# Patient Record
Sex: MALE | Race: WHITE | NOT HISPANIC OR LATINO | Employment: FULL TIME | URBAN - METROPOLITAN AREA
[De-identification: names, ages, dates, MRNs, and addresses within clinical notes are randomized per-mention and may not be internally consistent; named-entity substitution may affect disease eponyms.]

---

## 2021-01-26 ENCOUNTER — IMMUNIZATION (OUTPATIENT)
Dept: PEDIATRICS | Facility: CLINIC | Age: 31
End: 2021-01-26
Payer: COMMERCIAL

## 2021-01-26 PROCEDURE — 91300 PR COVID VAC PFIZER DIL RECON 30 MCG/0.3 ML IM: CPT

## 2021-01-26 PROCEDURE — 0001A PR COVID VAC PFIZER DIL RECON 30 MCG/0.3 ML IM: CPT

## 2021-02-13 ENCOUNTER — HEALTH MAINTENANCE LETTER (OUTPATIENT)
Age: 31
End: 2021-02-13

## 2021-02-16 ENCOUNTER — IMMUNIZATION (OUTPATIENT)
Dept: PEDIATRICS | Facility: CLINIC | Age: 31
End: 2021-02-16
Attending: INTERNAL MEDICINE
Payer: COMMERCIAL

## 2021-02-16 PROCEDURE — 91300 PR COVID VAC PFIZER DIL RECON 30 MCG/0.3 ML IM: CPT

## 2021-02-16 PROCEDURE — 0002A PR COVID VAC PFIZER DIL RECON 30 MCG/0.3 ML IM: CPT

## 2021-03-24 ENCOUNTER — HOSPITAL ENCOUNTER (INPATIENT)
Facility: CLINIC | Age: 31
LOS: 2 days | Discharge: ANOTHER HOSPITAL | DRG: 897 | End: 2021-03-26
Attending: EMERGENCY MEDICINE | Admitting: INTERNAL MEDICINE
Payer: COMMERCIAL

## 2021-03-24 ENCOUNTER — APPOINTMENT (OUTPATIENT)
Dept: ULTRASOUND IMAGING | Facility: CLINIC | Age: 31
DRG: 897 | End: 2021-03-24
Attending: INTERNAL MEDICINE
Payer: COMMERCIAL

## 2021-03-24 ENCOUNTER — TELEPHONE (OUTPATIENT)
Dept: BEHAVIORAL HEALTH | Facility: CLINIC | Age: 31
End: 2021-03-24

## 2021-03-24 DIAGNOSIS — F11.93 OPIATE WITHDRAWAL (H): ICD-10-CM

## 2021-03-24 DIAGNOSIS — F13.939 BENZODIAZEPINE WITHDRAWAL WITH COMPLICATION (H): ICD-10-CM

## 2021-03-24 DIAGNOSIS — R45.851 SUICIDAL IDEATION: ICD-10-CM

## 2021-03-24 DIAGNOSIS — Z11.52 ENCOUNTER FOR SCREENING LABORATORY TESTING FOR SEVERE ACUTE RESPIRATORY SYNDROME CORONAVIRUS 2 (SARS-COV-2): ICD-10-CM

## 2021-03-24 DIAGNOSIS — K75.9 HEPATITIS: ICD-10-CM

## 2021-03-24 DIAGNOSIS — F11.23 OPIOID DEPENDENCE WITH WITHDRAWAL (H): Primary | ICD-10-CM

## 2021-03-24 PROBLEM — K70.10 ALCOHOLIC HEPATITIS WITHOUT ASCITES (H): Status: ACTIVE | Noted: 2021-03-24

## 2021-03-24 LAB
ALBUMIN SERPL-MCNC: 3.2 G/DL (ref 3.4–5)
ALBUMIN SERPL-MCNC: 3.9 G/DL (ref 3.4–5)
ALP SERPL-CCNC: 48 U/L (ref 40–150)
ALP SERPL-CCNC: 55 U/L (ref 40–150)
ALT SERPL W P-5'-P-CCNC: 188 U/L (ref 0–70)
ALT SERPL W P-5'-P-CCNC: 219 U/L (ref 0–70)
AMPHETAMINES UR QL SCN: POSITIVE
ANION GAP SERPL CALCULATED.3IONS-SCNC: 8 MMOL/L (ref 3–14)
APAP SERPL-MCNC: 3 MG/L (ref 10–20)
AST SERPL W P-5'-P-CCNC: 398 U/L (ref 0–45)
AST SERPL W P-5'-P-CCNC: 599 U/L (ref 0–45)
BARBITURATES UR QL: NEGATIVE
BASOPHILS # BLD AUTO: 0.1 10E9/L (ref 0–0.2)
BASOPHILS NFR BLD AUTO: 0.4 %
BENZODIAZ UR QL: POSITIVE
BILIRUB DIRECT SERPL-MCNC: 0.2 MG/DL (ref 0–0.2)
BILIRUB SERPL-MCNC: 0.7 MG/DL (ref 0.2–1.3)
BILIRUB SERPL-MCNC: 0.9 MG/DL (ref 0.2–1.3)
BUN SERPL-MCNC: 22 MG/DL (ref 7–30)
CALCIUM SERPL-MCNC: 9.1 MG/DL (ref 8.5–10.1)
CANNABINOIDS UR QL SCN: POSITIVE
CHLORIDE SERPL-SCNC: 103 MMOL/L (ref 94–109)
CO2 SERPL-SCNC: 27 MMOL/L (ref 20–32)
COCAINE UR QL: POSITIVE
CREAT SERPL-MCNC: 0.79 MG/DL (ref 0.66–1.25)
DIFFERENTIAL METHOD BLD: ABNORMAL
EOSINOPHIL # BLD AUTO: 0 10E9/L (ref 0–0.7)
EOSINOPHIL NFR BLD AUTO: 0.1 %
ERYTHROCYTE [DISTWIDTH] IN BLOOD BY AUTOMATED COUNT: 11.8 % (ref 10–15)
ETHANOL UR QL SCN: NEGATIVE
GFR SERPL CREATININE-BSD FRML MDRD: >90 ML/MIN/{1.73_M2}
GLUCOSE SERPL-MCNC: 127 MG/DL (ref 70–99)
HCT VFR BLD AUTO: 44.7 % (ref 40–53)
HGB BLD-MCNC: 15.6 G/DL (ref 13.3–17.7)
IMM GRANULOCYTES # BLD: 0.1 10E9/L (ref 0–0.4)
IMM GRANULOCYTES NFR BLD: 0.4 %
INR PPP: 1.01 (ref 0.86–1.14)
LABORATORY COMMENT REPORT: NORMAL
LYMPHOCYTES # BLD AUTO: 1.5 10E9/L (ref 0.8–5.3)
LYMPHOCYTES NFR BLD AUTO: 13.3 %
MAGNESIUM SERPL-MCNC: 2 MG/DL (ref 1.6–2.3)
MCH RBC QN AUTO: 30.1 PG (ref 26.5–33)
MCHC RBC AUTO-ENTMCNC: 34.9 G/DL (ref 31.5–36.5)
MCV RBC AUTO: 86 FL (ref 78–100)
MONOCYTES # BLD AUTO: 1 10E9/L (ref 0–1.3)
MONOCYTES NFR BLD AUTO: 9 %
NEUTROPHILS # BLD AUTO: 8.8 10E9/L (ref 1.6–8.3)
NEUTROPHILS NFR BLD AUTO: 76.8 %
NRBC # BLD AUTO: 0 10*3/UL
NRBC BLD AUTO-RTO: 0 /100
OPIATES UR QL SCN: POSITIVE
PLATELET # BLD AUTO: 294 10E9/L (ref 150–450)
POTASSIUM SERPL-SCNC: 3.5 MMOL/L (ref 3.4–5.3)
POTASSIUM SERPL-SCNC: 3.9 MMOL/L (ref 3.4–5.3)
PROT SERPL-MCNC: 6.7 G/DL (ref 6.8–8.8)
PROT SERPL-MCNC: 7.5 G/DL (ref 6.8–8.8)
RBC # BLD AUTO: 5.18 10E12/L (ref 4.4–5.9)
SALICYLATES SERPL-MCNC: <2 MG/DL
SARS-COV-2 RNA RESP QL NAA+PROBE: NEGATIVE
SODIUM SERPL-SCNC: 138 MMOL/L (ref 133–144)
SPECIMEN SOURCE: NORMAL
WBC # BLD AUTO: 11.4 10E9/L (ref 4–11)

## 2021-03-24 PROCEDURE — 80076 HEPATIC FUNCTION PANEL: CPT | Performed by: INTERNAL MEDICINE

## 2021-03-24 PROCEDURE — 87635 SARS-COV-2 COVID-19 AMP PRB: CPT | Performed by: EMERGENCY MEDICINE

## 2021-03-24 PROCEDURE — 250N000013 HC RX MED GY IP 250 OP 250 PS 637: Performed by: EMERGENCY MEDICINE

## 2021-03-24 PROCEDURE — 90791 PSYCH DIAGNOSTIC EVALUATION: CPT

## 2021-03-24 PROCEDURE — 96365 THER/PROPH/DIAG IV INF INIT: CPT | Performed by: EMERGENCY MEDICINE

## 2021-03-24 PROCEDURE — 120N000002 HC R&B MED SURG/OB UMMC

## 2021-03-24 PROCEDURE — 258N000003 HC RX IP 258 OP 636: Performed by: EMERGENCY MEDICINE

## 2021-03-24 PROCEDURE — 76705 ECHO EXAM OF ABDOMEN: CPT

## 2021-03-24 PROCEDURE — 83735 ASSAY OF MAGNESIUM: CPT | Performed by: INTERNAL MEDICINE

## 2021-03-24 PROCEDURE — 80179 DRUG ASSAY SALICYLATE: CPT | Performed by: EMERGENCY MEDICINE

## 2021-03-24 PROCEDURE — 85025 COMPLETE CBC W/AUTO DIFF WBC: CPT | Performed by: EMERGENCY MEDICINE

## 2021-03-24 PROCEDURE — 80053 COMPREHEN METABOLIC PANEL: CPT | Performed by: EMERGENCY MEDICINE

## 2021-03-24 PROCEDURE — 80320 DRUG SCREEN QUANTALCOHOLS: CPT | Performed by: FAMILY MEDICINE

## 2021-03-24 PROCEDURE — C9803 HOPD COVID-19 SPEC COLLECT: HCPCS | Performed by: EMERGENCY MEDICINE

## 2021-03-24 PROCEDURE — 99285 EMERGENCY DEPT VISIT HI MDM: CPT | Performed by: EMERGENCY MEDICINE

## 2021-03-24 PROCEDURE — 80307 DRUG TEST PRSMV CHEM ANLYZR: CPT | Performed by: FAMILY MEDICINE

## 2021-03-24 PROCEDURE — 99223 1ST HOSP IP/OBS HIGH 75: CPT | Mod: AI | Performed by: INTERNAL MEDICINE

## 2021-03-24 PROCEDURE — 85610 PROTHROMBIN TIME: CPT | Performed by: EMERGENCY MEDICINE

## 2021-03-24 PROCEDURE — 84132 ASSAY OF SERUM POTASSIUM: CPT | Performed by: INTERNAL MEDICINE

## 2021-03-24 PROCEDURE — 99285 EMERGENCY DEPT VISIT HI MDM: CPT | Mod: 25 | Performed by: EMERGENCY MEDICINE

## 2021-03-24 PROCEDURE — 250N000011 HC RX IP 250 OP 636: Performed by: EMERGENCY MEDICINE

## 2021-03-24 PROCEDURE — 80143 DRUG ASSAY ACETAMINOPHEN: CPT | Performed by: EMERGENCY MEDICINE

## 2021-03-24 RX ORDER — HYDROXYZINE HYDROCHLORIDE 25 MG/1
25 TABLET, FILM COATED ORAL ONCE
Status: COMPLETED | OUTPATIENT
Start: 2021-03-24 | End: 2021-03-24

## 2021-03-24 RX ORDER — LIDOCAINE 40 MG/G
CREAM TOPICAL
Status: DISCONTINUED | OUTPATIENT
Start: 2021-03-24 | End: 2021-03-26 | Stop reason: HOSPADM

## 2021-03-24 RX ORDER — ONDANSETRON 4 MG/1
4 TABLET, ORALLY DISINTEGRATING ORAL EVERY 6 HOURS PRN
Status: DISCONTINUED | OUTPATIENT
Start: 2021-03-24 | End: 2021-03-26 | Stop reason: HOSPADM

## 2021-03-24 RX ORDER — BUPRENORPHINE AND NALOXONE 4; 1 MG/1; MG/1
1 FILM, SOLUBLE BUCCAL; SUBLINGUAL ONCE
Status: COMPLETED | OUTPATIENT
Start: 2021-03-24 | End: 2021-03-24

## 2021-03-24 RX ORDER — ONDANSETRON 8 MG/1
8 TABLET, ORALLY DISINTEGRATING ORAL ONCE
Status: COMPLETED | OUTPATIENT
Start: 2021-03-24 | End: 2021-03-24

## 2021-03-24 RX ORDER — NAPROXEN 500 MG/1
500 TABLET ORAL ONCE
Status: COMPLETED | OUTPATIENT
Start: 2021-03-24 | End: 2021-03-24

## 2021-03-24 RX ORDER — ACETAMINOPHEN 500 MG
1000 TABLET ORAL ONCE
Status: COMPLETED | OUTPATIENT
Start: 2021-03-24 | End: 2021-03-24

## 2021-03-24 RX ORDER — ONDANSETRON 2 MG/ML
4 INJECTION INTRAMUSCULAR; INTRAVENOUS EVERY 6 HOURS PRN
Status: DISCONTINUED | OUTPATIENT
Start: 2021-03-24 | End: 2021-03-26 | Stop reason: HOSPADM

## 2021-03-24 RX ORDER — HYDROXYZINE HYDROCHLORIDE 25 MG/1
50 TABLET, FILM COATED ORAL ONCE
Status: COMPLETED | OUTPATIENT
Start: 2021-03-24 | End: 2021-03-24

## 2021-03-24 RX ORDER — BUPRENORPHINE AND NALOXONE 4; 1 MG/1; MG/1
1 FILM, SOLUBLE BUCCAL; SUBLINGUAL DAILY
Status: DISCONTINUED | OUTPATIENT
Start: 2021-03-24 | End: 2021-03-24

## 2021-03-24 RX ORDER — SODIUM CHLORIDE 9 MG/ML
INJECTION, SOLUTION INTRAVENOUS CONTINUOUS
Status: DISCONTINUED | OUTPATIENT
Start: 2021-03-24 | End: 2021-03-26

## 2021-03-24 RX ADMIN — BUPRENORPHINE AND NALOXONE 1 FILM: 4; 1 FILM, SOLUBLE BUCCAL; SUBLINGUAL at 17:01

## 2021-03-24 RX ADMIN — ACETAMINOPHEN 1000 MG: 500 TABLET, FILM COATED ORAL at 15:44

## 2021-03-24 RX ADMIN — HYDROXYZINE HYDROCHLORIDE 25 MG: 25 TABLET, FILM COATED ORAL at 15:43

## 2021-03-24 RX ADMIN — HYDROXYZINE HYDROCHLORIDE 50 MG: 25 TABLET, FILM COATED ORAL at 21:52

## 2021-03-24 RX ADMIN — ONDANSETRON 8 MG: 8 TABLET, ORALLY DISINTEGRATING ORAL at 14:41

## 2021-03-24 RX ADMIN — ACETYLCYSTEINE 11.6 G: 200 INJECTION, SOLUTION INTRAVENOUS at 19:22

## 2021-03-24 RX ADMIN — NAPROXEN 500 MG: 500 TABLET ORAL at 15:43

## 2021-03-24 RX ADMIN — BUPRENORPHINE AND NALOXONE 1 FILM: 4; 1 FILM, SOLUBLE BUCCAL; SUBLINGUAL at 15:43

## 2021-03-24 ASSESSMENT — ENCOUNTER SYMPTOMS
NAUSEA: 1
FEVER: 0
DYSPHORIC MOOD: 1
CHILLS: 1
SLEEP DISTURBANCE: 1
ABDOMINAL PAIN: 0
SHORTNESS OF BREATH: 0
VOMITING: 1
MYALGIAS: 1

## 2021-03-24 NOTE — TELEPHONE ENCOUNTER
S: 6:25 pm Pt is a 30 yr old male in [facility] ED for SI and polysub abuse report by Lindsey    B: Pt reports SI w/ no plan but is unsure what he would do if he had access to a gun.  Pt reports he relapsed on substances 2 months ago and his fiance kicked him out 2 months ago.  Pt reports abusing Xanax, methe and heroin.  Pt reports he was arrested for a DUI yesterday and was released today. Pt reports he has nowhere to go.  No reported hx of seizures.  Pt reporting he would like treatment after stabilization.  No reported medical concerns.  COVID test ordered.     A: vol     R: per  pt is going to medical for admission.     Patient cleared and ready for behavioral bed placement: Yes
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
## 2021-03-24 NOTE — ED PROVIDER NOTES
ED Provider Note  [facility]      History     Chief Complaint   Patient presents with     Addiction Problem     pt has been heroin IV and smoking x 30 days     The history is provided by the patient and medical records.     Romeo Maldonado is a 30 year old male who presents to the ED for evaluation of an addiction problem.  The patient reports that he is been using heroin IV and smoking over the last 30 days.  He states that he has used about a half a gram to a gram per day.  The patient has been feeling nauseous, restless, diaphoretic in addition to having chills, diaphoresis, racing thoughts and vomiting.  The patient also endorses generalized myalgia.  The patient has not been able to sleep very well.  Patient has had Suboxone in the past.  The patient last used heroin 2 days ago.  He was most recently vomiting in the ED. He states he was diagnosed with hepatitis C in the past however received curative treatment approximately 4 years ago at [facility].    The [facility]  was able to provide additional information.  They state that 2 months ago the patient was sober but is since relapsed and is now having suicidal thoughts and feeling hopeless.  He states he has no reason to live and is having racing thoughts.  He reports that he lost everything.  The patient does not have any current suicidal ideation but he is not sure what would happen if he left.  The patient does not have any current plan but is looking for help.  The patient does not have access to a gun.    Past Medical History  Past Medical History:   Diagnosis Date     Allergic state      Anxiety      Chronic kidney disease      Depressive disorder      Hepatitis C      Substance abuse (H)     meth use     Uncomplicated asthma      Past Surgical History:   Procedure Laterality Date     INCISION AND DRAINAGE SOFT TISSUE UPPER EXTREMITY, COMBINED  8/11/2012    Procedure: COMBINED INCISION AND DRAINAGE SOFT TISSUE UPPER EXTREMITY;   Incision and drainage Right Arm Abscess;  Surgeon: Cheli White MD;  Location: [facility] OR     GABAPENTIN PO      Allergies   Allergen Reactions     Ambien [Zolpidem] Shortness Of Breath     wheezing     Pollen Extract Unknown     Family History  Family History   Problem Relation Age of Onset     Hypertension Father      Social History   Social History     Tobacco Use     Smoking status: Current Some Day Smoker     Packs/day: 0.25     Smokeless tobacco: Never Used   Substance Use Topics     Alcohol use: Yes     Comment: occasional     Drug use: Yes     Types: Methamphetamines, Opiates, IV     Comment: heroin daily, meth a couple of times in the last 2 weks      Past medical history, past surgical history, medications, allergies, family history, and social history were reviewed with the patient. No additional pertinent items.       Review of Systems   Constitutional: Positive for chills. Negative for fever.        Positive for restlessness.   Respiratory: Negative for shortness of breath.    Cardiovascular: Negative for chest pain.   Gastrointestinal: Positive for nausea and vomiting. Negative for abdominal pain.   Musculoskeletal: Positive for myalgias.   Psychiatric/Behavioral: Positive for dysphoric mood, sleep disturbance and suicidal ideas.        Positive for racing thoughts.     A complete review of systems was performed with pertinent positives and negatives noted in the HPI, and all other systems negative.    Physical Exam   BP: (!) 149/88  Pulse: 99  Temp: 97.9  F (36.6  C)  Resp: 16  Weight: 77.3 kg (170 lb 8 oz)  SpO2: 98 %  Physical Exam  Vitals signs and nursing note reviewed.   Constitutional:       General: He is not in acute distress.     Appearance: Normal appearance. He is not diaphoretic.   HENT:      Head: Atraumatic.   Eyes:      General: No scleral icterus.     Pupils: Pupils are equal, round, and reactive to light.   Cardiovascular:      Rate and Rhythm: Normal rate and regular rhythm.      Heart  sounds: Normal heart sounds.   Pulmonary:      Effort: No respiratory distress.      Breath sounds: Normal breath sounds.   Abdominal:      General: Bowel sounds are normal.      Palpations: Abdomen is soft.      Tenderness: There is no abdominal tenderness.   Musculoskeletal:         General: No tenderness.   Skin:     General: Skin is warm.      Findings: No rash.   Neurological:      General: No focal deficit present.      Mental Status: He is alert and oriented to person, place, and time.           ED Course       3:01 PM  The patient was seen and examined by Rosi Serna MD in Room ED16A.   Procedures              -----  Initiation of Medication for the Treatment of Opioid Use Disorder (OUD) in the Emergency Department (ED)  [facility]      Assessment:   Opioid(s) used: heroin   Amount: 0.5-]g - 1g / day   Frequency: daily   Route: intravenous  Duration: 1 month  Last use: 48 hours ago     Other substance(s) with ongoing use: benzos, cocaine, methamphetamines     The patient meets the following DSM-V criteria for Opioid Use Disorder (OUD):   Continued use despite causing social or interpersonal problems     (Severity: Mild: 2-3 criteria, Moderate: 4-5 criteria. Severe: 6 or more criteria)  Based on my assessment, the patient has Severe OUD.     Medication Initiated in the ED:  In the ED, treatment for OUD was initiated. The patient was given 2 dose(s) of 4 mg buprenorphine while in the ED.       Patient admitted to hospital for further cares   -----       Results for orders placed or performed during the hospital encounter of 03/24/21   Drug abuse screen 6 urine (chem dep)     Status: Abnormal   Result Value Ref Range    Amphetamine Qual Urine Positive (A) NEG^Negative    Barbiturates Qual Urine Negative NEG^Negative    Benzodiazepine Qual Urine Positive (A) NEG^Negative    Cannabinoids Qual Urine Positive (A) NEG^Negative    Cocaine Qual Urine Positive (A) NEG^Negative    Ethanol Qual Urine Negative  NEG^Negative    Opiates Qualitative Urine Positive (A) NEG^Negative     Medications   ondansetron (ZOFRAN-ODT) ODT tab 8 mg (8 mg Oral Given 3/24/21 1441)        Assessments & Plan (with Medical Decision Making)     30 year old male who presents to the ED for evaluation of an addiction problem suicidal ideation.  Patient signs and symptoms consistent with opiate withdrawal as well as benzodiazepine withdrawal.  Patient rancho for safety here in the emergency department for a one-to-one observation was discontinued.  He was initiated on MAT with Suboxone along with Zofran 8 mg ODT and hydroxyzine 25 mg p.o. and naproxen 5 mg p.o. and acetaminophen 1 g p.o.  Upon repeat assessment patient symptoms had somewhat improved but not resolved so he was given a second dose of Suboxone here in the emergency department.  Behavioral crisis assessment obtained in the emergency department, please refer to their note for further details.  Screening labs are drawn sent reviewed and document in epic and remarkable for an AST of 599, ALT of 219 but otherwise normal hepatic panel and normal electrolytes, CBC normal, UDS positive for amphetamines and benzos and cannabinoids and cocaine and opiates.  Case discussed with poison control who recommend initiation of acetylcysteine in the setting of unknown liver toxicity and recheck of hepatic panel at 11 PM and if decreasing transaminitis then can discontinue acetylcysteine however if LFTs are uptrending then he should continue full course of acetylcysteine.  Case discussed with medicine hospital service with arrangements to admit to the [facility].    I have reviewed the nursing notes. I have reviewed the findings, diagnosis, plan and need for follow up with the patient.    New Prescriptions    No medications on file       Final diagnoses:   Benzodiazepine withdrawal with complication (H)   Suicidal ideation   Hepatitis   Opiate withdrawal (H)       --  I, Delvis Azevedo, am serving as  a trained medical scribe to document services personally performed by Rosi Serna MD, based on the provider's statements to me.     I, Rosi Serna MD, was physically present and have reviewed and verified the accuracy of this note documented by Delvis Azevedo.    Rosi Serna MD  [facility] EMERGENCY DEPARTMENT  3/24/2021     Rosi Serna MD  03/26/21 [id number]

## 2021-03-25 LAB
ALBUMIN SERPL-MCNC: 2.9 G/DL (ref 3.4–5)
ALP SERPL-CCNC: 43 U/L (ref 40–150)
ALT SERPL W P-5'-P-CCNC: 158 U/L (ref 0–70)
ANION GAP SERPL CALCULATED.3IONS-SCNC: 7 MMOL/L (ref 3–14)
AST SERPL W P-5'-P-CCNC: 292 U/L (ref 0–45)
BILIRUB DIRECT SERPL-MCNC: 0.1 MG/DL (ref 0–0.2)
BILIRUB SERPL-MCNC: 0.5 MG/DL (ref 0.2–1.3)
BUN SERPL-MCNC: 22 MG/DL (ref 7–30)
CALCIUM SERPL-MCNC: 8 MG/DL (ref 8.5–10.1)
CHLORIDE SERPL-SCNC: 105 MMOL/L (ref 94–109)
CO2 SERPL-SCNC: 29 MMOL/L (ref 20–32)
CREAT SERPL-MCNC: 0.76 MG/DL (ref 0.66–1.25)
ERYTHROCYTE [DISTWIDTH] IN BLOOD BY AUTOMATED COUNT: 11.9 % (ref 10–15)
GFR SERPL CREATININE-BSD FRML MDRD: >90 ML/MIN/{1.73_M2}
GLUCOSE SERPL-MCNC: 95 MG/DL (ref 70–99)
HCT VFR BLD AUTO: 40.2 % (ref 40–53)
HGB BLD-MCNC: 13.7 G/DL (ref 13.3–17.7)
MCH RBC QN AUTO: 29.6 PG (ref 26.5–33)
MCHC RBC AUTO-ENTMCNC: 34.1 G/DL (ref 31.5–36.5)
MCV RBC AUTO: 87 FL (ref 78–100)
PLATELET # BLD AUTO: 217 10E9/L (ref 150–450)
POTASSIUM SERPL-SCNC: 3.6 MMOL/L (ref 3.4–5.3)
PROT SERPL-MCNC: 6.2 G/DL (ref 6.8–8.8)
RBC # BLD AUTO: 4.63 10E12/L (ref 4.4–5.9)
SODIUM SERPL-SCNC: 141 MMOL/L (ref 133–144)
WBC # BLD AUTO: 7.6 10E9/L (ref 4–11)

## 2021-03-25 PROCEDURE — 85027 COMPLETE CBC AUTOMATED: CPT | Performed by: INTERNAL MEDICINE

## 2021-03-25 PROCEDURE — 250N000013 HC RX MED GY IP 250 OP 250 PS 637: Performed by: PSYCHIATRY & NEUROLOGY

## 2021-03-25 PROCEDURE — 36415 COLL VENOUS BLD VENIPUNCTURE: CPT | Performed by: INTERNAL MEDICINE

## 2021-03-25 PROCEDURE — 80048 BASIC METABOLIC PNL TOTAL CA: CPT | Performed by: INTERNAL MEDICINE

## 2021-03-25 PROCEDURE — 80076 HEPATIC FUNCTION PANEL: CPT | Performed by: INTERNAL MEDICINE

## 2021-03-25 PROCEDURE — 99233 SBSQ HOSP IP/OBS HIGH 50: CPT | Performed by: INTERNAL MEDICINE

## 2021-03-25 PROCEDURE — 258N000003 HC RX IP 258 OP 636: Performed by: INTERNAL MEDICINE

## 2021-03-25 PROCEDURE — 99207 PR CDG-MDM COMPONENT: MEETS HIGH - UP CODED: CPT | Performed by: INTERNAL MEDICINE

## 2021-03-25 PROCEDURE — 99233 SBSQ HOSP IP/OBS HIGH 50: CPT | Performed by: PSYCHIATRY & NEUROLOGY

## 2021-03-25 PROCEDURE — 250N000011 HC RX IP 250 OP 636: Performed by: INTERNAL MEDICINE

## 2021-03-25 PROCEDURE — 120N000002 HC R&B MED SURG/OB UMMC

## 2021-03-25 RX ORDER — BUPRENORPHINE AND NALOXONE 4; 1 MG/1; MG/1
1 FILM, SOLUBLE BUCCAL; SUBLINGUAL DAILY
Status: SHIPPED | DISCHARGE
Start: 2021-03-26 | End: 2021-06-26

## 2021-03-25 RX ORDER — IBUPROFEN 400 MG/1
400 TABLET, FILM COATED ORAL EVERY 6 HOURS PRN
Status: DISCONTINUED | OUTPATIENT
Start: 2021-03-25 | End: 2021-03-26 | Stop reason: HOSPADM

## 2021-03-25 RX ORDER — BUPRENORPHINE 2 MG/1
2 TABLET SUBLINGUAL 4 TIMES DAILY
Status: DISCONTINUED | OUTPATIENT
Start: 2021-03-26 | End: 2021-03-25

## 2021-03-25 RX ORDER — CALCIUM CARBONATE 500 MG/1
1000 TABLET, CHEWABLE ORAL 3 TIMES DAILY PRN
Status: DISCONTINUED | OUTPATIENT
Start: 2021-03-25 | End: 2021-03-26 | Stop reason: HOSPADM

## 2021-03-25 RX ORDER — ONDANSETRON 4 MG/1
4 TABLET, ORALLY DISINTEGRATING ORAL EVERY 6 HOURS PRN
DISCHARGE
Start: 2021-03-25 | End: 2021-06-26

## 2021-03-25 RX ORDER — HYDROXYZINE HYDROCHLORIDE 25 MG/1
25-50 TABLET, FILM COATED ORAL 3 TIMES DAILY PRN
DISCHARGE
Start: 2021-03-25 | End: 2021-06-26

## 2021-03-25 RX ORDER — IBUPROFEN 400 MG/1
400 TABLET, FILM COATED ORAL EVERY 6 HOURS PRN
DISCHARGE
Start: 2021-03-25 | End: 2021-06-26

## 2021-03-25 RX ORDER — HYDROXYZINE HYDROCHLORIDE 25 MG/1
25-50 TABLET, FILM COATED ORAL 3 TIMES DAILY PRN
Status: DISCONTINUED | OUTPATIENT
Start: 2021-03-25 | End: 2021-03-26 | Stop reason: HOSPADM

## 2021-03-25 RX ORDER — PHENOBARBITAL 64.8 MG/1
64.8 TABLET ORAL 3 TIMES DAILY
Status: DISCONTINUED | OUTPATIENT
Start: 2021-03-25 | End: 2021-03-26 | Stop reason: HOSPADM

## 2021-03-25 RX ORDER — CALCIUM CARBONATE 500 MG/1
1-2 TABLET, CHEWABLE ORAL 3 TIMES DAILY PRN
DISCHARGE
Start: 2021-03-25 | End: 2021-06-26

## 2021-03-25 RX ORDER — BUPRENORPHINE AND NALOXONE 4; 1 MG/1; MG/1
1 FILM, SOLUBLE BUCCAL; SUBLINGUAL DAILY
Status: DISCONTINUED | OUTPATIENT
Start: 2021-03-25 | End: 2021-03-26 | Stop reason: HOSPADM

## 2021-03-25 RX ORDER — FAMOTIDINE 20 MG/1
20 TABLET, FILM COATED ORAL DAILY PRN
Status: DISCONTINUED | OUTPATIENT
Start: 2021-03-25 | End: 2021-03-26 | Stop reason: HOSPADM

## 2021-03-25 RX ORDER — GABAPENTIN 600 MG/1
600 TABLET ORAL DAILY PRN
Status: ON HOLD | COMMUNITY
End: 2021-03-26

## 2021-03-25 RX ORDER — FAMOTIDINE 20 MG/1
20 TABLET, FILM COATED ORAL DAILY PRN
DISCHARGE
Start: 2021-03-25 | End: 2021-06-26

## 2021-03-25 RX ORDER — PHENOBARBITAL 64.8 MG/1
64.8 TABLET ORAL 3 TIMES DAILY
Status: ON HOLD | DISCHARGE
Start: 2021-03-25 | End: 2021-03-31

## 2021-03-25 RX ORDER — BUPRENORPHINE AND NALOXONE 8; 2 MG/1; MG/1
1 FILM, SOLUBLE BUCCAL; SUBLINGUAL 2 TIMES DAILY
Status: ON HOLD | COMMUNITY
End: 2021-03-26

## 2021-03-25 RX ADMIN — SODIUM CHLORIDE: 9 INJECTION, SOLUTION INTRAVENOUS at 10:02

## 2021-03-25 RX ADMIN — PHENOBARBITAL 64.8 MG: 64.8 TABLET ORAL at 13:37

## 2021-03-25 RX ADMIN — SODIUM CHLORIDE: 9 INJECTION, SOLUTION INTRAVENOUS at 20:07

## 2021-03-25 RX ADMIN — SODIUM CHLORIDE: 9 INJECTION, SOLUTION INTRAVENOUS at 00:14

## 2021-03-25 RX ADMIN — BUPRENORPHINE AND NALOXONE 1 FILM: 4; 1 FILM, SOLUBLE BUCCAL; SUBLINGUAL at 13:37

## 2021-03-25 RX ADMIN — ONDANSETRON 4 MG: 2 INJECTION INTRAMUSCULAR; INTRAVENOUS at 10:23

## 2021-03-25 RX ADMIN — PHENOBARBITAL 64.8 MG: 64.8 TABLET ORAL at 20:13

## 2021-03-25 ASSESSMENT — ACTIVITIES OF DAILY LIVING (ADL)
TOILETING_ISSUES: NO
DRESSING/BATHING_DIFFICULTY: NO
DIFFICULTY_COMMUNICATING: NO
HEARING_DIFFICULTY_OR_DEAF: NO
WALKING_OR_CLIMBING_STAIRS_DIFFICULTY: NO
DIFFICULTY_EATING/SWALLOWING: NO
DOING_ERRANDS_INDEPENDENTLY_DIFFICULTY: NO
FALL_HISTORY_WITHIN_LAST_SIX_MONTHS: NO
CONCENTRATING,_REMEMBERING_OR_MAKING_DECISIONS_DIFFICULTY: NO
WEAR_GLASSES_OR_BLIND: NO

## 2021-03-25 NOTE — UTILIZATION REVIEW
Admission Status; Secondary Review Determination         Under the authority of the Utilization Management Committee, the utilization review process indicated a secondary review on the above patient.  The review outcome is based on review of the medical records, discussions with staff, and applying clinical experience noted on the date of the review.        (x)      Inpatient Status Appropriate - This patient's medical care is consistent with medical management for inpatient care and reasonable inpatient medical practice.     RATIONALE FOR DETERMINATION   The patient is a 30-year-old admitted on 3/24/2021 for multisubstance abuse and withdraw along with elevated liver enzymes and suicidal ideation.  The patient came to the emergency room with symptoms of nausea, diaphoresis, chills, racing thoughts and vomiting.  A tox screen came back positive for amphetamines, cocaine, opiates, cannabinoids, benzodiazepines.  Patient is diagnosed with opiate withdrawal and benzo withdrawal.  The patient had a assessment from a licensed practicing  from [facility] assessment which strongly recommended MICD detox versus inpatient psych due to very high risk for self-harm and suicidality.  Liver enzymes are elevated with significant AST of 599 and ALT of 219.  White blood cell count on admission is elevated at 11.4.  Because of possible overdose of a liver toxic drug the patient was medically admitted and will be assessed by psychiatry more fully to determine appropriate direction after becoming medically stable.  Due to all the above, the patient is appropriate to stay as inpatient status.        The severity of illness, intensity of service provided, expected LOS and risk for adverse outcome make the care complex, high risk and appropriate for hospital admission.        The information on this document is developed by the utilization review team in order for the business office to ensure compliance.  This only denotes the  appropriateness of proper admission status and does not reflect the quality of care rendered.         The definitions of Inpatient Status and Observation Status used in making the determination above are those provided in the CMS Coverage Manual, Chapter 1 and Chapter 6, section 70.4.      Sincerely,     Ezequiel Ku MD  Physician Advisor  Utilization Review/ Case Management  [facility].

## 2021-03-25 NOTE — ED NOTES
[facility]   ED Nurse to Floor Handoff     Romeo Maldonado is a 30 year old male who speaks English and lives with a spouse,  in a home  They arrived in the ED by car from emergency room    ED Chief Complaint: Addiction Problem (pt has been heroin IV and smoking x 30 days)    ED Dx;   Final diagnoses:   Benzodiazepine withdrawal with complication (H)   Suicidal ideation   Hepatitis         Needed?: No    Allergies:   Allergies   Allergen Reactions     Ambien [Zolpidem] Shortness Of Breath     wheezing     Pollen Extract Unknown   .  Past Medical Hx:   Past Medical History:   Diagnosis Date     Allergic state      Anxiety      Chronic kidney disease      Depressive disorder      Hepatitis C      Substance abuse (H)     meth use     Uncomplicated asthma       Baseline Mental status: WDL  Current Mental Status changes: at basesline    Infection present or suspected this encounter: no  Sepsis suspected: No  Isolation type: No active isolations  Patient tested for COVID 19 prior to admission: YES     Activity level - Baseline/Home:  Independent  Activity Level - Current:   Independent    Bariatric equipment needed?: No    In the ED these meds were given:   Medications   acetylcysteine (ACETADOTE) 11.6 g in sodium chloride 0.9 % 200 mL STEP ONE infusion (11.6 g Intravenous New Bag 3/24/21 1922)   ondansetron (ZOFRAN-ODT) ODT tab 8 mg (8 mg Oral Given 3/24/21 1441)   naproxen (NAPROSYN) tablet 500 mg (500 mg Oral Given 3/24/21 1543)   acetaminophen (TYLENOL) tablet 1,000 mg (1,000 mg Oral Given 3/24/21 1544)   hydrOXYzine (ATARAX) tablet 25 mg (25 mg Oral Given 3/24/21 1543)   buprenorphine HCl-naloxone HCl (SUBOXONE) 4-1 MG per film 1 Film (1 Film Sublingual Given 3/24/21 1701)       Drips running?  Yes    Home pump  No    Current LDAs  Peripheral IV 03/24/21 Anterior;Right Upper forearm (Active)   Number of days: 0       Labs results:   Labs Ordered and Resulted  from Time of ED Arrival Up to the Time of Departure from the ED   DRUG ABUSE SCREEN 6 CHEM DEP URINE ([facility]) - Abnormal; Notable for the following components:       Result Value    Amphetamine Qual Urine Positive (*)     Benzodiazepine Qual Urine Positive (*)     Cannabinoids Qual Urine Positive (*)     Cocaine Qual Urine Positive (*)     Opiates Qualitative Urine Positive (*)     All other components within normal limits   CBC WITH PLATELETS DIFFERENTIAL - Abnormal; Notable for the following components:    WBC 11.4 (*)     Absolute Neutrophil 8.8 (*)     All other components within normal limits   COMPREHENSIVE METABOLIC PANEL - Abnormal; Notable for the following components:    Glucose 127 (*)      (*)      (*)     All other components within normal limits   SARS-COV-2 (COVID-19) VIRUS RT-PCR   ACETAMINOPHEN LEVEL   SALICYLATE LEVEL   INR   HEPATIC PANEL       Imaging Studies: No results found for this or any previous visit (from the past 24 hour(s)).    Recent vital signs:   /73   Pulse 71   Temp 98.4  F (36.9  C) (Oral)   Resp 16   Wt 77.3 kg (170 lb 8 oz)   SpO2 93%   BMI 24.46 kg/m              Cardiac Rhythm: Normal Sinus  Pt needs tele? No  Skin/wound Issues: None    Code Status: Full Code    Pain control: good    Nausea control: good    Abnormal labs/tests/findings requiring intervention: Elevated kidney tests- treating with Acetadote    Family present during ED course? No   Family Comments/Social Situation comments: NA    Tasks needing completion: Mild discomfort starting in hips. Mild nausea. Does not want meds at the moment. First dose Acetadote running.     Diana Johnson RN  [phone] [place] [facility]  [phone] [facility]

## 2021-03-25 NOTE — TELEPHONE ENCOUNTER
S:  Pt is currently on 5 ortho, x [phone] depressed and suicidal and needing detox from xanax and heroin.    B:  Info per   Pt has been seen on consult.  He is now medically cleared and she would like him transferred to  as an MI/CD pt.  He is feeling very depressed and hopeless.  Pt is cooperative and voluntary.    Please see chart for further info.    A:  Needing hospitalization for safety and stabilization.  Brynn CONTE consulted and supports admit.    R:  Admit to   MI/CD    accepts for herself   Vol    -  3:45  3A  Ar[facility]ia informed.  -  3;45  5 Ortho informed
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
## 2021-03-25 NOTE — PROGRESS NOTES
[facility]    Medicine Progress Note - Hospitalist Service       Date of Admission:  3/24/2021  Assessment & Plan         Romeo Maldonado is a 30 year old male admitted on 3/24/2021. He presented to the ED for evaluation of heroin addiction.   The patient reports that he is been using heroin IV and smoking over the last 30 days. The patient has been feeling nauseous, restless, diaphoretic, chills, racing thoughts and vomiting.  The patient also endorses generalized myalgia. The patient last used heroin 2 days ago. He also is having suicidal thoughts. Patient is not able to sleep at night.   He presented for evaluation to the ED, tox screen came back positive for amphetamines, cocaine, opiates, cannabinoids and benzodiazepines. Patient was diagnosed with opiate withdrawal and benzodiazepine withdrawal. He received one dose of Suboxone. Patient was also found with abnormal LFT's, case was discussed with poison control who recommended initiation of acetylcysteine.          #Polysubstance abuse disorder  #Opiate withdrawal  #Benzodiazepine withdrawal.  -Denies alcohol use  -Continue to monitor opiate withdrawal using RN directed COWS protocol.  -Monitor benzo withdrawal using MSSA.  -Psychiatric consultation to assist with withdrawal, disposition planning ? suboxone management  - consultation  -Chemical dependency consultation  -IV fluid as needed/encourage oral fluids  -Supportive cares as antiemetics, antacids, famotidine, ibuprofen as needed  -Monitor hemodynamics    #Suicide ideation:    -Sitter at bedside.   -Suicide precautions.   -Psychiatry consult.        #Abnormal LFT's   Suspect related to substance use  -Improving.  -Status post acetylcysteine dose on admission   -Right upper quadrant ultrasound: Unremarkable  -Continue to trend          Diet: Combination Diet Regular Diet Adult    DVT Prophylaxis: Ambulate every shift  Marie Catheter: not  present  Code Status: Full Code           Disposition Plan   Expected discharge: 2 - 3 days, recommended to tbd    Entered: Deion Bush MD 03/25/2021, 8:28 AM       The patient's care was discussed with the Bedside Nurse, Care Coordinator/ and Patient.    Deion Bush MD  Hospitalist Service  [facility]  Contact information available via [facility] Paging/Directory    ______________________________________________________________________    Interval History   Interval events reviewed.   Reports not feeling well  Chills, nausea, anxiety, sweating  Feeling depressed with some suicidal ideations.  No plan.  No fever.  No pain abdomen.  No cough or cp or sob.   No LH or dizziness. .   No new sensory or motor complaint.   No new rash.    No other new or acute medical concern      Data reviewed today: I reviewed all medications, new labs and imaging results over the last 24 hours. I personally reviewed no images or EKG's today.    Physical Exam   Vital Signs: Temp: 96.8  F (36  C) Temp src: Oral BP: (!) 141/64 Pulse: 65   Resp: 16 SpO2: 93 % O2 Device: None (Room air)    Weight: 170 lbs 8 oz    General Appearance: Awake, interactive, anxious  Respiratory: Normal work of breathing. CTA BL.   Cardiovascular: S1 S2 Regular  GI: Soft. NT. ND.  Extremities: Distally wwp. No pedal edema  Skin: No acute rash on exposed areas.   Other: A0 x 4.  Flat affect    Data   Recent Labs   Lab 03/25/21  0552 03/24/21  2309 03/24/21  1719   WBC 7.6  --  11.4*   HGB 13.7  --  15.6   MCV 87  --  86     --  294   INR  --   --  1.01     --  138   POTASSIUM 3.6 3.5 3.9   CHLORIDE 105  --  103   CO2 29  --  27   BUN 22  --  22   CR 0.76  --  0.79   ANIONGAP 7  --  8   NILA 8.0*  --  9.1   GLC 95  --  127*   ALBUMIN 2.9* 3.2* 3.9   PROTTOTAL 6.2* 6.7* 7.5   BILITOTAL 0.5 0.7 0.9   ALKPHOS 43 48 55   * 188* 219*   * 398* 599*     Recent Results (from the past 24  hour(s))   US Abdomen Limited ([facility] only)    Narrative    EXAM: US ABDOMEN LIMITED  LOCATION: [facility]  DATE/TIME: 3/24/2021 11:23 PM    INDICATION: ; Abnormal LFT's;  COMPARISON: None.  TECHNIQUE: Limited abdominal ultrasound.    FINDINGS:    GALLBLADDER: Normal. No gallstones, wall thickening, or pericholecystic fluid. Negative sonographic Ernst's sign.    BILE DUCTS: No biliary dilatation. The common duct measures 2 mm.    LIVER: Normal parenchyma with smooth contour. No focal mass.    RIGHT KIDNEY: No hydronephrosis. Incidental 2.9 x 2.0 x 2.7 simple cyst.    PANCREAS: The visualized portions are normal.    No ascites.      Impression    IMPRESSION:  1.  Normal limited abdominal ultrasound.         Medications     sodium chloride 100 mL/hr at 03/25/21 1002       buprenorphine HCl-naloxone HCl  1 Film Sublingual Daily     PHENobarbital  64.8 mg Oral TID     sodium chloride (PF)  3 mL Intracatheter Q8H

## 2021-03-25 NOTE — PHARMACY-ADMISSION MEDICATION HISTORY
Admission Medication History Completed by Pharmacy    See [facility] Admission Navigator for allergy information, preferred outpatient pharmacy, prior to admission medications and immunization status.     Medication History Sources:     Patient    Pharmacy fill history via [employer]    Changes made to PTA medication list (reason):    Added: Suboxone (per fill history, pt)    Deleted: None    Changed: added dose and instructions to gabapentin    Additional Information:    Pt doesn't recall the last time he took his medications but said it's been over a month    Denied OTC medications or vitamins/supplements    Prior to Admission medications    Medication Sig Last Dose Taking? Auth Provider   buprenorphine HCl-naloxone HCl (SUBOXONE) 8-2 MG per film Place 1 Film under the tongue 2 times daily More than a month at Unknown time  Unknown, Entered By History   gabapentin (NEURONTIN) 600 MG tablet Take 600 mg by mouth daily as needed (anxiety) More than a month at Unknown time  Unknown, Entered By History       Date completed: 03/25/21    Medication history completed by: Carmelina Rose [facility]
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
## 2021-03-25 NOTE — H&P
[facility]    History and Physical - Hospitalist Service       Date of Admission:  3/24/2021    Assessment & Plan   Romeo Maldonado is a 30 year old male admitted on 3/24/2021. He presented to the ED for evaluation of heroin addiction.   The patient reports that he is been using heroin IV and smoking over the last 30 days. The patient has been feeling nauseous, restless, diaphoretic, chills, racing thoughts and vomiting.  The patient also endorses generalized myalgia. The patient last used heroin 2 days ago. He also is having suicidal thoughts. Patient is not able to sleep at night.   He presented for evaluation to the ED, tox screen came back positive for amphetamines, cocaine, opiates, cannabinoids and benzodiazepines. Patient was diagnosed with opiate withdrawal and benzodiazepine withdrawal. He received one dose of Suboxone. Patient was also found with abnormal LFT's, case was discussed with poison control who recommended initiation of acetylcysteine.        #Opiate withdrawal  #Benzodiazepine withdrawal.  #Substance abuse   -Admit to medical floor.   -Gentle hydration.   -Supportive management.   -Psychiatry consult.   -Further Suboxone per Psychiatry.     #Abnormal LFT's   -Continue trending.   -Follow-up poison control recommendations. If LFT's are uptrending then he should continue full course of acetylcysteine. Next LFT's @ 11 pm.   -GEORGE VARMA.   -Consider hepatology consult in the morning if LFT's are not improving.     #Suicide ideations   -Sitter at bedside.   -Suicide precautions.   -Psychiatry consult.        Diet:  General diet   DVT Prophylaxis: Ambulate every shift  Marie Catheter: not present  Code Status:  Full code          Disposition Plan   Expected discharge: TBD   Entered: Ronny Greer MD 03/24/2021, 9:30 PM     The patient's care was discussed with the Patient.    Ronny Greer MD  [facility]  Contact information available via [facility] Paging/Directory      ______________________________________________________________________    Chief Complaint   Nausea, vomit, chills and diaphoresis.     History is obtained from the patient    History of Present Illness   Romeo Maldonado is a 30 year old male admitted on 3/24/2021. He presented to the ED for evaluation of heroin addiction.   The patient reports that he is been using heroin IV and smoking over the last 30 days. The patient has been feeling nauseous, restless, diaphoretic, chills, racing thoughts and vomiting.  The patient also endorses generalized myalgia. The patient last used heroin 2 days ago. He also is having suicidal thoughts. Patient is not able to sleep at night.   He presented for evaluation to the ED, tox screen came back positive for amphetamines, cocaine, opiates, cannabinoids and benzodiazepines. Patient was diagnosed with opiate withdrawal and benzodiazepine withdrawal. He received one dose of Suboxone. Patient was also found with abnormal LFT's, case was discussed with poison control who recommended initiation of acetylcysteine.    He felt better after Suboxone was given. No chest pain, palpitations, shortness of breath, cough, fever, dizziness or abdominal pain.     Review of Systems    The 10 point Review of Systems is negative other than noted in the HPI or here. Other ROS negative.     Past Medical History    I have reviewed this patient's medical history and updated it with pertinent information if needed.   Past Medical History:   Diagnosis Date     Allergic state      Anxiety      Chronic kidney disease      Depressive disorder      Hepatitis C      Substance abuse (H)     meth use     Uncomplicated asthma        Past Surgical History   I have reviewed this patient's surgical history and updated it with pertinent information if needed.  Past Surgical History:   Procedure Laterality Date     INCISION AND DRAINAGE SOFT TISSUE  UPPER EXTREMITY, COMBINED  8/11/2012    Procedure: COMBINED INCISION AND DRAINAGE SOFT TISSUE UPPER EXTREMITY;  Incision and drainage Right Arm Abscess;  Surgeon: Cheli White MD;  Location:  OR       Social History   I have reviewed this patient's social history and updated it with pertinent information if needed.  Social History     Tobacco Use     Smoking status: Current Some Day Smoker     Packs/day: 0.25     Smokeless tobacco: Never Used   Substance Use Topics     Alcohol use: Yes     Comment: occasional     Drug use: Yes     Types: Methamphetamines, Opiates, IV     Comment: heroin daily, meth a couple of times in the last 2 weks       Family History   I have reviewed this patient's family history and updated it with pertinent information if needed.  Family History   Problem Relation Age of Onset     Hypertension Father        Prior to Admission Medications   Prior to Admission Medications   Prescriptions Last Dose Informant Patient Reported? Taking?   GABAPENTIN PO Unknown at Unknown time  Yes No      Facility-Administered Medications: None     Allergies   Allergies   Allergen Reactions     Ambien [Zolpidem] Shortness Of Breath     wheezing     Pollen Extract Unknown       Physical Exam   Vital Signs: Temp: 98.4  F (36.9  C) Temp src: Oral BP: 135/73 Pulse: 71   Resp: 16 SpO2: 93 % O2 Device: None (Room air)    Weight: 170 lbs 8 oz    General Appearance: Alert, room air, no acute distress.   Eyes: Pupils equal and reactive to light.   HEENT: Oral mucosa is moist.   Respiratory: Normal respiratory effort, clear lungs, no crackles or wheezing.   Cardiovascular: RRR, no murmur. No peripheral edema.   GI: Soft, + BS, no tenderness to palpation.   Lymph/Hematologic: No lymphadenopathy.   Genitourinary: Deferred.   Skin: No rash.   Musculoskeletal: No swollen joints.  Neurologic: Alert, oriented x 3, no focal deficits.   Psychiatric: Mood stable.     Data   Data reviewed today: I reviewed all medications, new  labs and imaging results over the last 24 hours. I personally reviewed no images or EKG's today.    Recent Labs   Lab 03/24/21  1719   WBC 11.4*   HGB 15.6   MCV 86      INR 1.01      POTASSIUM 3.9   CHLORIDE 103   CO2 27   BUN 22   CR 0.79   ANIONGAP 8   NILA 9.1   *   ALBUMIN 3.9   PROTTOTAL 7.5   BILITOTAL 0.9   ALKPHOS 55   *   *
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
## 2021-03-26 ENCOUNTER — HOSPITAL ENCOUNTER (INPATIENT)
Facility: CLINIC | Age: 31
LOS: 6 days | Discharge: NURSING, REHAB OR LONG-TERM CARE | DRG: 897 | End: 2021-04-01
Attending: PSYCHIATRY & NEUROLOGY | Admitting: PSYCHIATRY & NEUROLOGY
Payer: COMMERCIAL

## 2021-03-26 VITALS
DIASTOLIC BLOOD PRESSURE: 51 MMHG | WEIGHT: 170.5 LBS | TEMPERATURE: 97.2 F | HEART RATE: 59 BPM | OXYGEN SATURATION: 96 % | BODY MASS INDEX: 24.46 KG/M2 | SYSTOLIC BLOOD PRESSURE: 127 MMHG | RESPIRATION RATE: 16 BRPM

## 2021-03-26 DIAGNOSIS — F13.939 BENZODIAZEPINE WITHDRAWAL WITH COMPLICATION (H): ICD-10-CM

## 2021-03-26 DIAGNOSIS — F41.1 ANXIETY STATE: ICD-10-CM

## 2021-03-26 DIAGNOSIS — F33.2 SEVERE EPISODE OF RECURRENT MAJOR DEPRESSIVE DISORDER, WITHOUT PSYCHOTIC FEATURES (H): Primary | ICD-10-CM

## 2021-03-26 LAB
ALBUMIN SERPL-MCNC: 2.5 G/DL (ref 3.4–5)
ALP SERPL-CCNC: 38 U/L (ref 40–150)
ALT SERPL W P-5'-P-CCNC: 105 U/L (ref 0–70)
AST SERPL W P-5'-P-CCNC: 140 U/L (ref 0–45)
BILIRUB DIRECT SERPL-MCNC: <0.1 MG/DL (ref 0–0.2)
BILIRUB SERPL-MCNC: 0.2 MG/DL (ref 0.2–1.3)
PROT SERPL-MCNC: 5.4 G/DL (ref 6.8–8.8)

## 2021-03-26 PROCEDURE — 99223 1ST HOSP IP/OBS HIGH 75: CPT | Mod: AI | Performed by: PSYCHIATRY & NEUROLOGY

## 2021-03-26 PROCEDURE — 80076 HEPATIC FUNCTION PANEL: CPT | Performed by: INTERNAL MEDICINE

## 2021-03-26 PROCEDURE — 250N000013 HC RX MED GY IP 250 OP 250 PS 637: Performed by: PSYCHIATRY & NEUROLOGY

## 2021-03-26 PROCEDURE — 99239 HOSP IP/OBS DSCHRG MGMT >30: CPT | Performed by: INTERNAL MEDICINE

## 2021-03-26 PROCEDURE — 258N000003 HC RX IP 258 OP 636: Performed by: INTERNAL MEDICINE

## 2021-03-26 PROCEDURE — 36415 COLL VENOUS BLD VENIPUNCTURE: CPT | Performed by: INTERNAL MEDICINE

## 2021-03-26 PROCEDURE — 99207 PR CDG-MDM COMPONENT: MEETS HIGH - UP CODED: CPT | Performed by: PSYCHIATRY & NEUROLOGY

## 2021-03-26 PROCEDURE — 128N000001 HC R&B CD/MH ADULT

## 2021-03-26 PROCEDURE — 999N000216 HC STATISTIC ADULT CD FACE TO FACE-NO CHRG

## 2021-03-26 PROCEDURE — 250N000013 HC RX MED GY IP 250 OP 250 PS 637: Performed by: INTERNAL MEDICINE

## 2021-03-26 PROCEDURE — 99207 PR CDG-CHARGE REQUIRED MANUAL ENTRY: CPT | Performed by: INTERNAL MEDICINE

## 2021-03-26 RX ORDER — CALCIUM CARBONATE 500 MG/1
500-1000 TABLET, CHEWABLE ORAL 3 TIMES DAILY PRN
Status: DISCONTINUED | OUTPATIENT
Start: 2021-03-26 | End: 2021-04-01 | Stop reason: HOSPADM

## 2021-03-26 RX ORDER — ESCITALOPRAM OXALATE 10 MG/1
10 TABLET ORAL DAILY
Status: DISCONTINUED | OUTPATIENT
Start: 2021-03-26 | End: 2021-03-31

## 2021-03-26 RX ORDER — PHENOBARBITAL 64.8 MG/1
64.8 TABLET ORAL 3 TIMES DAILY
Status: DISCONTINUED | OUTPATIENT
Start: 2021-03-26 | End: 2021-03-28

## 2021-03-26 RX ORDER — DOCUSATE SODIUM 100 MG/1
100 CAPSULE, LIQUID FILLED ORAL 2 TIMES DAILY PRN
Status: DISCONTINUED | OUTPATIENT
Start: 2021-03-26 | End: 2021-04-01 | Stop reason: HOSPADM

## 2021-03-26 RX ORDER — HYDROXYZINE HYDROCHLORIDE 25 MG/1
25 TABLET, FILM COATED ORAL EVERY 4 HOURS PRN
Status: DISCONTINUED | OUTPATIENT
Start: 2021-03-26 | End: 2021-04-01 | Stop reason: HOSPADM

## 2021-03-26 RX ORDER — MAGNESIUM HYDROXIDE/ALUMINUM HYDROXICE/SIMETHICONE 120; 1200; 1200 MG/30ML; MG/30ML; MG/30ML
30 SUSPENSION ORAL EVERY 4 HOURS PRN
Status: DISCONTINUED | OUTPATIENT
Start: 2021-03-26 | End: 2021-04-01 | Stop reason: HOSPADM

## 2021-03-26 RX ORDER — DOCUSATE SODIUM 100 MG/1
100 CAPSULE, LIQUID FILLED ORAL 2 TIMES DAILY
Status: DISCONTINUED | OUTPATIENT
Start: 2021-03-26 | End: 2021-03-26

## 2021-03-26 RX ORDER — ACETAMINOPHEN 325 MG/1
650 TABLET ORAL EVERY 4 HOURS PRN
Status: DISCONTINUED | OUTPATIENT
Start: 2021-03-26 | End: 2021-04-01 | Stop reason: HOSPADM

## 2021-03-26 RX ORDER — IBUPROFEN 400 MG/1
400 TABLET, FILM COATED ORAL EVERY 6 HOURS PRN
Status: DISCONTINUED | OUTPATIENT
Start: 2021-03-26 | End: 2021-04-01 | Stop reason: HOSPADM

## 2021-03-26 RX ORDER — PHENOBARBITAL 64.8 MG/1
64.8 TABLET ORAL 2 TIMES DAILY
Status: DISCONTINUED | OUTPATIENT
Start: 2021-03-28 | End: 2021-03-29

## 2021-03-26 RX ORDER — HYDROXYZINE HYDROCHLORIDE 25 MG/1
25-50 TABLET, FILM COATED ORAL 3 TIMES DAILY PRN
Status: DISCONTINUED | OUTPATIENT
Start: 2021-03-26 | End: 2021-03-26

## 2021-03-26 RX ORDER — ONDANSETRON 4 MG/1
4 TABLET, ORALLY DISINTEGRATING ORAL EVERY 6 HOURS PRN
Status: DISCONTINUED | OUTPATIENT
Start: 2021-03-26 | End: 2021-04-01 | Stop reason: HOSPADM

## 2021-03-26 RX ORDER — FAMOTIDINE 10 MG
20 TABLET ORAL DAILY PRN
Status: DISCONTINUED | OUTPATIENT
Start: 2021-03-26 | End: 2021-04-01 | Stop reason: HOSPADM

## 2021-03-26 RX ORDER — PHENOBARBITAL 64.8 MG/1
64.8 TABLET ORAL 3 TIMES DAILY
Status: DISCONTINUED | OUTPATIENT
Start: 2021-03-26 | End: 2021-03-26

## 2021-03-26 RX ORDER — BUPRENORPHINE AND NALOXONE 4; 1 MG/1; MG/1
1 FILM, SOLUBLE BUCCAL; SUBLINGUAL DAILY
Status: DISCONTINUED | OUTPATIENT
Start: 2021-03-27 | End: 2021-03-27

## 2021-03-26 RX ORDER — METHOCARBAMOL 500 MG/1
500 TABLET, FILM COATED ORAL 3 TIMES DAILY
Status: DISCONTINUED | OUTPATIENT
Start: 2021-03-26 | End: 2021-03-26 | Stop reason: HOSPADM

## 2021-03-26 RX ORDER — PHENOBARBITAL 32.4 MG/1
32.4 TABLET ORAL ONCE
Status: COMPLETED | OUTPATIENT
Start: 2021-03-28 | End: 2021-03-28

## 2021-03-26 RX ADMIN — PHENOBARBITAL 64.8 MG: 64.8 TABLET ORAL at 20:22

## 2021-03-26 RX ADMIN — METHOCARBAMOL 500 MG: 500 TABLET ORAL at 13:44

## 2021-03-26 RX ADMIN — DOCUSATE SODIUM 100 MG: 100 CAPSULE, LIQUID FILLED ORAL at 16:52

## 2021-03-26 RX ADMIN — ESCITALOPRAM OXALATE 10 MG: 10 TABLET ORAL at 16:52

## 2021-03-26 RX ADMIN — SODIUM CHLORIDE: 9 INJECTION, SOLUTION INTRAVENOUS at 05:42

## 2021-03-26 RX ADMIN — PHENOBARBITAL 64.8 MG: 64.8 TABLET ORAL at 14:04

## 2021-03-26 RX ADMIN — BUPRENORPHINE AND NALOXONE 1 FILM: 4; 1 FILM, SOLUBLE BUCCAL; SUBLINGUAL at 08:29

## 2021-03-26 RX ADMIN — PHENOBARBITAL 64.8 MG: 64.8 TABLET ORAL at 08:28

## 2021-03-26 RX ADMIN — IBUPROFEN 400 MG: 400 TABLET ORAL at 11:28

## 2021-03-26 ASSESSMENT — ACTIVITIES OF DAILY LIVING (ADL)
DRESS: INDEPENDENT
FALL_HISTORY_WITHIN_LAST_SIX_MONTHS: NO
DOING_ERRANDS_INDEPENDENTLY_DIFFICULTY: NO
PATIENT_/_FAMILY_COMMUNICATION_STYLE: SPOKEN LANGUAGE (ENGLISH OR BILINGUAL)
DIFFICULTY_EATING/SWALLOWING: NO
CONCENTRATING,_REMEMBERING_OR_MAKING_DECISIONS_DIFFICULTY: NO
WEAR_GLASSES_OR_BLIND: NO
DIFFICULTY_COMMUNICATING: NO
TOILETING_ISSUES: NO
ADL_ASSESSMENT: WDL
DRESSING/BATHING_DIFFICULTY: NO
HYGIENE/GROOMING: INDEPENDENT
LAUNDRY: WITH SUPERVISION
HEARING_DIFFICULTY_OR_DEAF: NO
WALKING_OR_CLIMBING_STAIRS_DIFFICULTY: NO
ORAL_HYGIENE: INDEPENDENT

## 2021-03-26 ASSESSMENT — MIFFLIN-ST. JEOR: SCORE: 1804.48
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
## 2021-03-26 NOTE — CONSULTS
3/26/2021    CD consult acknowledged and closing.  Pt transferring to detox, will be able to receive services on 3A.     [email or web address]  [phone]
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
## 2021-03-26 NOTE — PHARMACY-ADMISSION MEDICATION HISTORY
"Admission Medication History Completed by Pharmacy    Please refer to progress note on 3/25/2021, \"Pharmacy-Admission Medication History\" under previous encounter at [facility], Med Surg Ortho unit for information regarding prior to admission medications.    Yumi Rivera, Pharm.D., [facility]  Behavioral Health Inpatient Pharmacist  [facility] ([facility]) Emergency Department  Phone: *[phone] (Ascom) or [phone]        "

## 2021-03-26 NOTE — PLAN OF CARE
Pt A/O X 4. Afebrile. VSS. Is on the MSSA protocol with scores of: 2,3. Is on the COWS protocol  with scores of 2,2. 1:1 Beside  present, and pt denies Suicidal Ideation and having a Suicidal plan. Lungs-Clear bilaterally with both anterior and posterior. Bowels-Hyperactive in all four quadrants. Voids spontaneously without difficulty in the bathroom. Denies nausea and vomiting. CMS and Neuro's are intact. Has numbness and tingling in the LLE. Has pain in the low back and given Ibuprofen, Robaxin, and pain is manageable. Is on a Regular diet and appetite was Good this shift. Pt up in room independently. PIV removed from the right arm for transfer to station 3A. Bilateral heels are elevated off the bed. Pt is able to make needs known, and call light is within reach. Report called and given to WANDA Mane @ station 3A. Pt was transferred with 2 security officers, and 1 transport aide via wheelchair to Station 3A. Continue to monitor.

## 2021-03-26 NOTE — CONSULTS
Brief Social Work Note    Note to acknowledge consult - pt is transferring to 3A when a bed is available. SW spoke with 5O charge RN, the pt has no current needs. SW on this unit will be available until the pt transfers should any needs arise.     GRETCHEN Sarah  [facility]  5 Ortho & 8A   Ph: [phone]  Pager: [phone]

## 2021-03-26 NOTE — PROGRESS NOTES
PATIENT CURRENTLY APPEARS MEDICALLY STABLE FOR TRANSFER TO DETOX UNIT.     Deion Bush MD  [facility]  Contact information available via [facility] Paging/Directory
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
## 2021-03-26 NOTE — PROGRESS NOTES
03/26/21 1508   Patient Belongings   Did you bring any home meds/supplements to the hospital?  No   Patient Belongings other (see comments)   Belongings Search Yes   Clothing Search Yes   Second Staff Randy PA     Coat, shoes, pants w/strings in storage  , cell in med room  Keys, name badge, plastic wallet in sharps  Visa, [employer], [employer], [address] [place] [address], [employer] in security    A               Admission:  I am responsible for any personal items that are not sent to the safe or pharmacy.  [facility] is not responsible for loss, theft or damage of any property in my possession.    Signature:  _________________________________ Date: _______  Time: _____                                              Staff Signature:  ____________________________ Date: ________  Time: _____      2nd Staff person, if patient is unable/unwilling to sign:    Signature: ________________________________ Date: ________  Time: _____     Discharge:  [facility]y has returned all of my personal belongings:    Signature: _________________________________ Date: ________  Time: _____                                          Staff Signature:  ____________________________ Date: ________  Time: _____

## 2021-03-27 PROCEDURE — 250N000013 HC RX MED GY IP 250 OP 250 PS 637: Performed by: PSYCHIATRY & NEUROLOGY

## 2021-03-27 PROCEDURE — 250N000011 HC RX IP 250 OP 636: Performed by: PSYCHIATRY & NEUROLOGY

## 2021-03-27 PROCEDURE — 250N000013 HC RX MED GY IP 250 OP 250 PS 637: Performed by: CLINICAL NURSE SPECIALIST

## 2021-03-27 PROCEDURE — 128N000001 HC R&B CD/MH ADULT

## 2021-03-27 RX ORDER — CLONIDINE HYDROCHLORIDE 0.1 MG/1
0.1 TABLET ORAL 2 TIMES DAILY PRN
Status: DISCONTINUED | OUTPATIENT
Start: 2021-03-27 | End: 2021-04-01 | Stop reason: HOSPADM

## 2021-03-27 RX ORDER — BUPRENORPHINE AND NALOXONE 4; 1 MG/1; MG/1
1 FILM, SOLUBLE BUCCAL; SUBLINGUAL 2 TIMES DAILY
Status: DISCONTINUED | OUTPATIENT
Start: 2021-03-27 | End: 2021-03-29

## 2021-03-27 RX ADMIN — DOCUSATE SODIUM 100 MG: 100 CAPSULE, LIQUID FILLED ORAL at 14:07

## 2021-03-27 RX ADMIN — PHENOBARBITAL 64.8 MG: 64.8 TABLET ORAL at 14:07

## 2021-03-27 RX ADMIN — HYDROXYZINE HYDROCHLORIDE 25 MG: 25 TABLET, FILM COATED ORAL at 20:34

## 2021-03-27 RX ADMIN — ONDANSETRON 4 MG: 4 TABLET, ORALLY DISINTEGRATING ORAL at 09:57

## 2021-03-27 RX ADMIN — CLONIDINE HYDROCHLORIDE 0.1 MG: 0.1 TABLET ORAL at 14:08

## 2021-03-27 RX ADMIN — BUPRENORPHINE AND NALOXONE 1 FILM: 4; 1 FILM, SOLUBLE BUCCAL; SUBLINGUAL at 08:23

## 2021-03-27 RX ADMIN — PHENOBARBITAL 64.8 MG: 64.8 TABLET ORAL at 08:23

## 2021-03-27 RX ADMIN — ESCITALOPRAM OXALATE 10 MG: 10 TABLET ORAL at 08:23

## 2021-03-27 RX ADMIN — IBUPROFEN 400 MG: 400 TABLET ORAL at 20:34

## 2021-03-27 RX ADMIN — BUPRENORPHINE AND NALOXONE 1 FILM: 4; 1 FILM, SOLUBLE BUCCAL; SUBLINGUAL at 20:35

## 2021-03-27 RX ADMIN — PHENOBARBITAL 64.8 MG: 64.8 TABLET ORAL at 20:35

## 2021-03-27 RX ADMIN — DOCUSATE SODIUM 100 MG: 100 CAPSULE, LIQUID FILLED ORAL at 20:34

## 2021-03-27 NOTE — PLAN OF CARE
Problem: Substance Withdrawal  Goal: Substance Withdrawal  Description: Signs and symptoms of listed problems will be absent or manageable.  Outcome: No Change     Patient has been isolative to his room.     Poor appetite; zofran 4mg was administered.    Patient reported depression 9/10 and anxiety 7/10. Denied SI/SIB. Refused to take anxiety medication.    MSSA=6 and 6.   COWS=8 and 7. Patient reported having more opiate withdrawal symptoms. Suboxone 4-1mg daily changed to BID. Clonidine 0.1mg PRN BID was ordered.    We'll continue to monitor.

## 2021-03-27 NOTE — PLAN OF CARE
Problem: Substance Withdrawal  Goal: Substance Withdrawal  Description: Signs and symptoms of listed problems will be absent or manageable.  Outcome: No Change     Pt scored a 6 and a 7 on the COWS, and a 4 and a 5 on the MSSA. Pt is receiving scheduled phenobarbital for withdrawals.     Pt was visible in the milieu early in the evening attending music therapy. Pt isolative to his room after dinner.     Pt rates his anxiety a 6/10 and depression a 7/10, denies SI/SIB. Pt describes his anxiety to be related to changing floors.

## 2021-03-27 NOTE — CONSULTS
Pt transferred from medical floor. Please refer to admission H&P by Dr. Torres while on medicine dated 03/24/2021 and Discharge Summary by Dr. Bush dated 03/26/2021 of which this writer reviewed and is up to date. Please call the on-call [facility] for any f/u medical issues if they arise.     In short, Romeo Maldonado is a 30 year old male with a past medical history of depression w/ SI, anxiety, polysubstance use d/o- benzo use & WD, opiate use & WD who is admitted to station 3A for detox off benzos and opiates.    Diagnoses:    #Benzo use and WD  #Opiate use and WD  -Management per primary team, chem dep    #Depression w/ SI  #Anxiety  -Management per primary team, psych/chem dep    #Abnormal LFTs  #Chronic hep C  ALT of 105 (bri of 219 on 03/24), AST of 140 (bri of 599 on 03/24), overall improving. T bili and alk phos normal. Tylenol level negative. Normal RUQ US. Known h /o Hep C, last AB positive 09/2015  w/ positive RNA quant. No records of treatment. Will need to f/u with PCP as OP for repeat LFTs and consideration of hep C treatment when sober (discharge f/u order placed).    nAalisa Gastelum PA-C

## 2021-03-27 NOTE — PROVIDER NOTIFICATION
03/27/21 0609   Sleep/Rest/Relaxation   Sleep/Rest/Relaxation appears asleep   Night Time # Hours 6.75 hours     No concerns voiced.

## 2021-03-28 PROCEDURE — 250N000013 HC RX MED GY IP 250 OP 250 PS 637: Performed by: PSYCHIATRY & NEUROLOGY

## 2021-03-28 PROCEDURE — 250N000013 HC RX MED GY IP 250 OP 250 PS 637: Performed by: CLINICAL NURSE SPECIALIST

## 2021-03-28 PROCEDURE — 250N000013 HC RX MED GY IP 250 OP 250 PS 637: Performed by: PHYSICIAN ASSISTANT

## 2021-03-28 PROCEDURE — H2032 ACTIVITY THERAPY, PER 15 MIN: HCPCS

## 2021-03-28 PROCEDURE — 128N000001 HC R&B CD/MH ADULT

## 2021-03-28 RX ORDER — BISACODYL 10 MG
10 SUPPOSITORY, RECTAL RECTAL DAILY PRN
Status: DISCONTINUED | OUTPATIENT
Start: 2021-03-28 | End: 2021-04-01 | Stop reason: HOSPADM

## 2021-03-28 RX ORDER — SENNOSIDES 8.6 MG
8.6 TABLET ORAL 2 TIMES DAILY PRN
Status: DISCONTINUED | OUTPATIENT
Start: 2021-03-28 | End: 2021-03-29

## 2021-03-28 RX ORDER — POLYETHYLENE GLYCOL 3350 17 G/17G
17 POWDER, FOR SOLUTION ORAL DAILY
Status: DISCONTINUED | OUTPATIENT
Start: 2021-03-28 | End: 2021-04-01 | Stop reason: HOSPADM

## 2021-03-28 RX ADMIN — PHENOBARBITAL 64.8 MG: 64.8 TABLET ORAL at 08:58

## 2021-03-28 RX ADMIN — ESCITALOPRAM OXALATE 10 MG: 10 TABLET ORAL at 08:58

## 2021-03-28 RX ADMIN — HYDROXYZINE HYDROCHLORIDE 25 MG: 25 TABLET, FILM COATED ORAL at 13:23

## 2021-03-28 RX ADMIN — PHENOBARBITAL 64.8 MG: 64.8 TABLET ORAL at 20:46

## 2021-03-28 RX ADMIN — DOCUSATE SODIUM 100 MG: 100 CAPSULE, LIQUID FILLED ORAL at 08:58

## 2021-03-28 RX ADMIN — PHENOBARBITAL 32.4 MG: 32.4 TABLET ORAL at 13:23

## 2021-03-28 RX ADMIN — POLYETHYLENE GLYCOL 3350 17 G: 17 POWDER, FOR SOLUTION ORAL at 11:15

## 2021-03-28 RX ADMIN — BUPRENORPHINE AND NALOXONE 1 FILM: 4; 1 FILM, SOLUBLE BUCCAL; SUBLINGUAL at 08:58

## 2021-03-28 RX ADMIN — BUPRENORPHINE AND NALOXONE 1 FILM: 4; 1 FILM, SOLUBLE BUCCAL; SUBLINGUAL at 20:46

## 2021-03-28 NOTE — PROVIDER NOTIFICATION
Pt c/o constipation. Been trying ordered PRNs with no results. States one small hard painful BM 2 days ago and prior that had not had a BM in 1 week. C/o abdominal pain 6/10, nausea. Bowel sounds hypoactive. Txt page sent to internal medicine, awaiting return call, reported to oncoming nurse.

## 2021-03-28 NOTE — PROGRESS NOTES
03/28/21 0613   Sleep/Rest/Relaxation   Sleep/Rest/Relaxation appears asleep   Night Time # Hours 6.25 hours     No concerns.

## 2021-03-28 NOTE — PROGRESS NOTES
"Pt taking PRN colace for constipation. Pt states last BM was 2 days ago  (3/25) and it was \"little\"  "

## 2021-03-28 NOTE — PLAN OF CARE
"Pt monitored for benzo and opiate withdrawal    COWS: 6, 6  MSSA: 3, 4    Pt is receiving scheduled phenobarb and suboxone.     Pt states his mood is \"feeling better\" but does endorse anxiety. States he feels \"overwhelmed\" by his situation. Denied SI  "

## 2021-03-28 NOTE — PLAN OF CARE
Problem: Substance Withdrawal  Goal: Substance Withdrawal  Description: Signs and symptoms of listed problems will be absent or manageable.  Outcome: Improving    Patient has been isolative to his room. He comes out for meals and medications.    Patient rated depression 8/10 and anxiety 7/10. Denied SI/SIB. Refused to take hydroxyzine for anxiety this morning. He did agree to take hydroxyzine 25mg this afternoon.    MSSA=4 and 4.  COWS=3 and 3.    Good appetite and sleep.    Patient continues to endorse constipation; given miralax 17g and colace 100mg. Senna 8.6mg BID PRN was ordered.    We'll continue to monitor.

## 2021-03-29 PROCEDURE — 250N000013 HC RX MED GY IP 250 OP 250 PS 637: Performed by: PHYSICIAN ASSISTANT

## 2021-03-29 PROCEDURE — 250N000013 HC RX MED GY IP 250 OP 250 PS 637: Performed by: CLINICAL NURSE SPECIALIST

## 2021-03-29 PROCEDURE — 99232 SBSQ HOSP IP/OBS MODERATE 35: CPT | Performed by: PSYCHIATRY & NEUROLOGY

## 2021-03-29 PROCEDURE — 128N000001 HC R&B CD/MH ADULT

## 2021-03-29 PROCEDURE — 250N000013 HC RX MED GY IP 250 OP 250 PS 637: Performed by: PSYCHIATRY & NEUROLOGY

## 2021-03-29 RX ORDER — QUETIAPINE FUMARATE 25 MG/1
25 TABLET, FILM COATED ORAL AT BEDTIME
Status: DISCONTINUED | OUTPATIENT
Start: 2021-03-29 | End: 2021-03-31

## 2021-03-29 RX ORDER — PHENOBARBITAL 64.8 MG/1
64.8 TABLET ORAL AT BEDTIME
Status: COMPLETED | OUTPATIENT
Start: 2021-03-29 | End: 2021-03-29

## 2021-03-29 RX ORDER — SENNOSIDES 8.6 MG
8.6 TABLET ORAL 2 TIMES DAILY
Status: DISCONTINUED | OUTPATIENT
Start: 2021-03-29 | End: 2021-04-01 | Stop reason: HOSPADM

## 2021-03-29 RX ORDER — BUPRENORPHINE AND NALOXONE 2; .5 MG/1; MG/1
1 FILM, SOLUBLE BUCCAL; SUBLINGUAL DAILY
Status: DISCONTINUED | OUTPATIENT
Start: 2021-03-29 | End: 2021-03-30

## 2021-03-29 RX ORDER — PHENOBARBITAL 32.4 MG/1
32.4 TABLET ORAL 3 TIMES DAILY
Status: DISCONTINUED | OUTPATIENT
Start: 2021-03-30 | End: 2021-03-30

## 2021-03-29 RX ORDER — PHENOBARBITAL 32.4 MG/1
32.4 TABLET ORAL AT BEDTIME
Status: DISCONTINUED | OUTPATIENT
Start: 2021-03-29 | End: 2021-03-29

## 2021-03-29 RX ADMIN — PHENOBARBITAL 64.8 MG: 64.8 TABLET ORAL at 21:33

## 2021-03-29 RX ADMIN — ESCITALOPRAM OXALATE 10 MG: 10 TABLET ORAL at 09:16

## 2021-03-29 RX ADMIN — SENNOSIDES 8.6 MG: 8.6 TABLET, FILM COATED ORAL at 21:33

## 2021-03-29 RX ADMIN — DOCUSATE SODIUM 100 MG: 100 CAPSULE, LIQUID FILLED ORAL at 10:47

## 2021-03-29 RX ADMIN — POLYETHYLENE GLYCOL 3350 17 G: 17 POWDER, FOR SOLUTION ORAL at 09:16

## 2021-03-29 RX ADMIN — QUETIAPINE FUMARATE 25 MG: 25 TABLET ORAL at 21:34

## 2021-03-29 RX ADMIN — BUPRENORPHINE AND NALOXONE 1 FILM: 2; .5 FILM, SOLUBLE BUCCAL; SUBLINGUAL at 17:46

## 2021-03-29 RX ADMIN — PHENOBARBITAL 64.8 MG: 64.8 TABLET ORAL at 09:15

## 2021-03-29 ASSESSMENT — ACTIVITIES OF DAILY LIVING (ADL)
LAUNDRY: WITH SUPERVISION
HYGIENE/GROOMING: INDEPENDENT
DRESS: SCRUBS (BEHAVIORAL HEALTH)
ORAL_HYGIENE: INDEPENDENT

## 2021-03-29 NOTE — PROGRESS NOTES
[place] Prescription Drug Control Monitoring Note:      NARX SCORES  Narcotic  391  Sedative  150  Stimulant  000  Explanation and GuidanceOVERDOSE RISK SCORE 290  (Range 000-999)  Explanation and Guidance  ADDITIONAL RISK INDICATORS ( 1 )  > 100 MME total and 40 MME/day average  Explanation and GuidanceThis NarxCare report is based on search criteria supplied and the data entered by the dispensing pharmacy. For more information about any prescription, please contact the dispensing pharmacy or the prescriber. NarxCare scores and reports are intended to aid, not replace, medical decision making. None of the information presented should be used as sole justification for providing or refusing to provide medications. The information on this report is not warranted as accurate or complete.  Graphs  RX GRAPH   Narcotic  Buprenorphine  Sedative  Stimulant  Other  All Prescribers  Prescribers  2 - Brandon Menezes  1 - Esau Salinas  Timeline  03/29  2m  6m  1y  2y  Buprenorphine mg  16  4  0  28  Timeline  03/29  2m  6m  1y  2y  Morphine MgEq (MME)  200  80  0  320  Timeline  03/29  2m  6m  1y  2y  Lorazepam MgEq (LME)  10  2  0  18  Timeline 03/29  2m  6m  1y  2y  *Per CDC guidance, the MME conversion factors prescribed or provided as part of the medication-assisted treatment for opioid use disorder should not be used to benchmark against dosage thresholds meant for opioids prescribed for pain. Buprenorphine products have no agreed upon morphine equivalency, and as partial opioid agonists, are not expected to be associated with overdose risk in the same dose-dependent manner as doses for full agonist opioids. MME = morphine milligram equivalents. LME = Lorazepam milligram equivalents. mg = dose in milligrams.  Summary  Summary  Total Prescriptions:  20  Total Prescribers:  2  Total Pharmacies:  2  Narcotics* (excluding Buprenorphine)  Current Qty:  0  Current MME/day:  0.00  30 Day Avg  MME/day:  0.00  Sedatives*  Current Qty:  0  Current LME/day:  0.00  30 Day Avg LME/day:  0.00  Buprenorphine*  Current Qty:  4  Current mg/day:  16.00  30 Day Avg mg/day:  14.40  Rx Data  PRESCRIPTIONS  Total Prescriptions: 20  Total Private Pay: 1  Fill Date ID Written Sold Drug Qty Days Prescriber Rx # Pharmacy Refill Daily Dose * Pymt Type   03/04/2021 3 01/29/2021 03/04/2021 Gabapentin 600 Mg Tablet  30.00 30 [place] [id number] [facility] 0/1  [place]  03/02/2021 3 03/02/2021 03/03/2021 Buprenorphine-Nalox 8-2mg Film  60.00 30 Fe Par [id number] [facility] ([facility]) 0/0 16.00 mg [place]  02/11/2021 2 01/29/2021 02/11/2021 Gabapentin 600 Mg Tablet  30.00 30 [place] [id number] [facility] 1/3  [place]  01/29/2021 2 01/29/2021 02/02/2021 Gabapentin 600 Mg Tablet  30.00 30 [place] [id number] [facility] ([facility] 0/3  [place]  11/01/2020 2 09/03/2020 11/01/2020 Gabapentin 600 Mg Tablet  18.00 18 [place] [id number] [facility] ([facility] 0/[place]  [place]  10/27/2020 2 06/02/2020 10/29/2020 Gabapentin 300 Mg Capsule  30.00 30 [place] [id number] [facility] 0/[place]  10/23/2020 2 09/03/2020 10/26/2020 Gabapentin 600 Mg Tablet  4.00 4 [place] [id number] [facility] 1/[place]  10/22/2020 2 09/03/2020 10/22/2020 Gabapentin 600 Mg Tablet  4.00 4 [place] [id number] [facility] ([facility] 0/[place]  [place]  10/05/2020 2 08/06/2020 10/07/2020 Gabapentin 600 Mg Tablet  30.00 30 [place] [id number] [facility] ([facility] 3/3  [place]  10/05/2020 1 06/02/2020 10/05/2020 Gabapentin 300 Mg Capsule  30.00 30 [place] [id number] [facility] ([facility] 0/1  [place]  09/18/2020 2 08/06/2020 09/20/2020 Gabapentin 600 Mg Tablet  30.00 30 [place] [id number] [facility] ([facility] 2/3  [place]  09/16/2020 3 09/03/2020 09/16/2020 Gabapentin 600 Mg Tablet  4.00 4 [place] [id number] [facility] [place]  [place]  09/04/2020 3 08/06/2020 09/04/2020 Gabapentin 600 Mg Tablet  30.00 30 [place] [id number] [facility] 1/3  [place]  08/17/2020 3 08/06/2020 08/23/2020 Gabapentin 600 Mg Tablet  30.00 30 [place] [id number] [facility] ([facility] 0/3  [place]  07/26/2020 3 07/02/2020 08/06/2020 Gabapentin 600 Mg Tablet  30.00 30 [place] [id number] [facility] ([facility] 1/1  [place]  07/02/2020 3 07/02/2020 07/02/2020 Gabapentin 600 Mg Tablet  30.00 30 [place] [id number] [facility] ([facility]605[facility] 0/1  [place]  06/19/2020 3 06/02/2020 06/19/2020 Gabapentin 300 Mg Capsule  30.00 30 [place] [id number] [facility] ([facility] 1/3  [place]  06/04/2020 3 06/02/2020 06/07/2020 Gabapentin 300 Mg Capsule  30.00 30 [place] [id number] [facility] ([facility] 0/3  [place]  06/02/2020 3 04/23/2020 06/02/2020 Gabapentin 100 Mg Capsule  60.00 30 [place] [id number] [facility] ([facility]605[facility] 1/1  [place]  04/23/2020 3 04/23/2020 04/23/2020 Gabapentin 100 Mg Capsule  60.00 30 [place] [id number] [facility] ([facility]605) 0/1  [place]  *Per CDC guidance, the MME conversion factors prescribed or provided as part of the medication-assisted treatment for opioid use disorder should not be used to benchmark against dosage thresholds meant for opioids prescribed for pain. Buprenorphine products have no agreed upon morphine equivalency, and as partial opioid agonists, are not expected to be associated with overdose risk in the same dose-dependent manner as doses for full agonist opioids. MME = morphine milligram equivalents. LME = Lorazepam milligram equivalents. mg = dose in milligrams.  Providers  Total Providers: 2  Name Address [facility] Zipcode Phone  Brandon Menezes [address]  [address] [facility] [address] [phone]  Esau Kaufman MD [address] [facility] [address] [phone]  Pharmacies  Total Pharmacies: 2  Name Address [facility] Zipcode Phone  [facility] ([phone]) [address] [place] [address] [phone]  [facility] ([phone]) [address] [place] [address] [phone]  The report provided is based upon the search criteria entered and the corresponding data as it has been reported by dispenser(s). If erroneous information is identified or additional information is needed, please contact the  dispenser or the prescriber provided on the report. Date Sold signifies the date the prescription was sold (left the pharmacy). The absence of Date Sold does not necessarily indicate the prescription was not dispensed. Fill Date represents the date the medication was filled or prepared by the pharmacy. Note, federal regulation (CFR Title 42: Part 2) requires patient consent prior to releasing certain patient data from federally funded opioid treatment programs (OTPs). As such, controlled substances dispensed from OTPs for medication-assisted treatment may not appear in the [place]  report. Morphine milligram equivalent (MME) conversion factors published by the CDC are used in the MME calculation. Per the CDC, the MME conversion factor is intended only for analytic purposes where prescription data are used to retrospectively calculate daily MME to inform analyses of risks associated with opioid prescribing. This value does not constitute clinical guidance or recommendations for converting patients from one form of opioid analgesic to another. Per the CDC, the conversion factors for drugs prescribed or provided, as part of medication-assisted treatment for opioid use disorder should not be used to benchmark against MME dosage thresholds meant for opioids prescribed for pain. Buprenorphine products listed in the CDC s MME file do not have an associated conversion factor. Lastly, the CDC notes, in clinical practice, calculating MME for methadone often involves a sliding-scale approach, whereby the conversion factor increases with increasing dose. The conversion factor of 3 for methadone presented in this file could underestimate MME for a given patient. This report contains confidential information, including patient identifiers, and is not a public record. The information on this report must be treated as protected health information and is only to be disclosed to others as authorized by applicable state and Federal  regulations.

## 2021-03-29 NOTE — PLAN OF CARE
"Pt was out in milieu and attended all groups. He reported feeling \"much better\".  MSSA 1 , COWS 1. Pt continues on the phenobarbital . He received a suppository prn has not had any results   "

## 2021-03-29 NOTE — PROGRESS NOTES
"Pt endorses anxiety and depression both rated 5 of 10 in severity. Denies suicidal ideation plans or intent. Reported some blood after giving himself a suppository yesterday that he declined to report to any member of staff. Denies currently rectal bleeding. States plan as to go to treatment followed by sober housing. Pt declined to take suboxone this morning stating he wants to taper off of this medication (updated Dr. Dick about pt wishes). Reports 2 relapses in the past 5 years \"had 2 slips\" \"one that lasted a day and the other that lasted about a month\". COWS score is a 0 and MSSA score is a 1 today. Will continue to monitor and intervene as warranted.      "

## 2021-03-29 NOTE — PROVIDER NOTIFICATION
Pt declined suboxone this morning stating he wanted to taper off of it. Dr. Dick updated and order obtained for 2mg suboxone daily. Pt updated and voices no unmet needs at this time.

## 2021-03-29 NOTE — PLAN OF CARE
Pt reported feeling depressed today  8/9. MSSA 2, Cows 0.He was out in the milieu and was attended groupsl.

## 2021-03-29 NOTE — PROGRESS NOTES
Met with pt to discuss aftercare plans. Pt reports he would like treatment at [facility], had [employer] for insurance. Pt signed CHERRIE, writer placed call and spoke with Bhakti in admissions ([phone]). Faxed over hospital documents for review. Bhakti will call pt on the unit to initiate referral. CM to follow-up on 3/30.

## 2021-03-29 NOTE — PROVIDER NOTIFICATION
Pt reported some bleeding yesterday while giving himself a suppository. Denies bleeding today. Still constipated. Updated Viola with internal medicine and an enema will be ordered.

## 2021-03-29 NOTE — PROGRESS NOTES
Behavioral Team Discussion: (3/29/2021)    Continued Stay Criteria/Rationale: Patient admitted for Chemical Use Issues.  Plan: The following services will be provided to the patient; psychiatric assessment, medication management, therapeutic milieu, individual and group support, and skills groups.   Participants: 3A Provider: Dr. Madhav Dick MD; 3A RN: Simon Cristina RN; 3A CM's: Yumiko Mcgee.  Summary/Recommendation: Providers will assess today for treatment recommendations, discharge planning, and aftercare plans. CM will meet with pt for discharge planning.   Medical/Physical: Per ED note:    Allergic state       Anxiety       Chronic kidney disease       Depressive disorder       Hepatitis C       Substance abuse (H)       meth use     Uncomplicated asthma      Precautions:   Behavioral Orders   Procedures     Code 1 - Restrict to Unit     Routine Programming     As clinically indicated     Status 15     Every 15 minutes.     Suicide precautions     Patients on Suicide Precautions should have a Combination Diet ordered that includes a Diet selection(s) AND a Behavioral Tray selection for Safe Tray - with utensils, or Safe Tray - NO utensils       Withdrawal precautions     Rationale for change in precautions or plan: N/A  Progress: Initial.

## 2021-03-29 NOTE — PROGRESS NOTES
COVID-19 PCR Testing Order Questions 3/24/21   Asymptomatic/Symptomatic: Asymptomatic    Indication for Testing: Inpatient behavioral admission    Close contact of [employer] Employee or Provider/Resident/Faculty? No     Some values recorded on this date have been omitted.        Comments are available for some flowsheets but are not being displayed.      COVID-19 PCR Results    COVID-19 PCR Results 10/26/20 3/24/21   COVID-19 Virus by PCR (External Result) Not Detected      SARS-CoV-2 Virus Specimen Source    Nasopharyngeal   SARS-CoV-2 PCR Result    NEGATIVE       Comments are available for some flowsheets but are not being displayed.

## 2021-03-29 NOTE — PROGRESS NOTES
[facility], [place]   Psychiatric Progress Note        Interim history   This is a 30 year old male with 1.  Major depressive disorder, recurrent, severe without psychotic features.   2.  Posttraumatic stress disorder (PTSD), chronic.     3.  Anxiety disorder, not otherwise specified.   4.  Benzodiazepine use disorder, severe.   5.  Benzodiazepine withdrawal, severe.   6.  Opiate use disorder, severe.   7.  Opiate withdrawal, severe.     8.  He is also using periodic cocaine and meth.    .Pt seen in rounds.   The patient's care was discussed with the treatment team during the daily team meeting and/or staff's chart notes were reviewed.  Staff report patient has been visible in the milieu,  no acute eventsovernight.     Patient's mood is Optimistic, hopeful   Energy Level:decent   Sleep:poor  Appetite:fair   Denied any motivation interest   Denied Suicidal/homicidal ideation/plan intent.  Denied anypsychosis  No prior suicde attempts  No access to gun    Tolerating meds and has no side effects.              Medications:     Current Facility-Administered Medications   Medication     acetaminophen (TYLENOL) tablet 650 mg     alum & mag hydroxide-simethicone (MAALOX) suspension 30 mL     bisacodyl (DULCOLAX) Suppository 10 mg     buprenorphine HCl-naloxone HCl (SUBOXONE) 4-1 MG per film 1 Film     calcium carbonate (TUMS) chewable tablet 500-1,000 mg     cloNIDine (CATAPRES) tablet 0.1 mg     docusate sodium (COLACE) capsule 100 mg     escitalopram (LEXAPRO) tablet 10 mg     famotidine (PEPCID) tablet 20 mg     hydrOXYzine (ATARAX) tablet 25 mg     ibuprofen (ADVIL/MOTRIN) tablet 400 mg     ondansetron (ZOFRAN-ODT) ODT tab 4 mg     PHENobarbital (LUMINAL) tablet 32.4 mg     polyethylene glycol (MIRALAX) Packet 17 g     sennosides (SENOKOT) tablet 8.6 mg     Facility-Administered Medications Ordered in Other Encounters   Medication     Self Administer Medications: Behavioral Services           "   Allergies:     Allergies   Allergen Reactions     Ambien [Zolpidem] Shortness Of Breath     wheezing     Pollen Extract Unknown            Psychiatric Examination:   Blood pressure 116/72, pulse 52, temperature 97.7  F (36.5  C), temperature source Temporal, resp. rate 16, height 1.778 m (5' 10\"), weight 84.8 kg (187 lb), SpO2 98 %.  Weight is 187 lbs 0 oz  Body mass index is 26.83 kg/m .    Appearance:  awake, alert and adequately groomed  Attitude:  cooperative  Eye Contact:  good  Mood:  better  Affect:  appropriate and in normal range and mood congruent  Speech:  clear, coherent rate /rhythm are good  Psychomotor Behavior:  no evidence of tardive dyskinesia, dystonia, or tics and intact station, gait and muscle tone  Throught Process:  logical  Associations:  no loose associations  Thought Content:  no evidence of suicidal ideation or homicidal ideation, no evidence of psychotic thought, no auditory hallucinations present and no visual hallucinations present  Insight:  limited  Judgement:  limited  Oriented to:  time, person, and place  Attention Span and Concentration:  intact  Recent and Remote Memory:  intact  Language fund of knowledge are adequate         Labs:   No results found for this or any previous visit (from the past 24 hour(s)).      DX   1.  Major depressive disorder, recurrent, severe without psychotic features.   2.  Posttraumatic stress disorder (PTSD), chronic.     3.  Anxiety disorder, not otherwise specified.   4.  Benzodiazepine use disorder, severe.   5.  Benzodiazepine withdrawal, severe.   6.  Opiate use disorder, severe.   7.  Opiate withdrawal, severe.     8.  He is also using periodic cocaine and meth.    PLAN  Will continue taper of phenbarbital 90 mg today   MSSA    Eating Disturbances: ate and enjoyed all of it or not applicable  Tremor: 0 - no tremor  Sleep Disturbance: slept through the night or not applicable  Clouding of Sensorium: no evidence  Hallucinations: 0 - " none  Quality of Contact: 0 - awareness of examiner and people around him/her  Agitation: 0 - normal activity  Paroxysmal Sweats: 0 - no observed sweating  Temperature: 99.5 or below  Pulse: 0 - 69 or below  Total MSSA Score: 1  C/o of constipation Will talk with medicine  Will order tox    Continue lexapro 10 mg   wants to taper off suboxone dose reduced to 2mg sl daily   He is open to being on naltrexone injections    Laboratory/Imaging: reviewed with patient   Consults: internal medicine consult reviewed  Patient will be treated in therapeutic milieu with appropriate individual and group therapies as described.  PDMP CHECKED     Supportive psychotheraoy provided, annie talked about recovery enviroment, relapse prevention, triggers to use.  Discussed with patient many issues of addiction,triggers, relapse, and establishing a solid recovery program.  Asked pt to be med complinat   Medical diagnoses to be addressed this admission:    Plan:  Diagnoses:    #Benzo use and WD  #Opiate use and WD  -Management per primary team, chem dep     #Depression w/ SI  #Anxiety  -Management per primary team, psych/chem dep     #Abnormal LFTs  #Chronic hep C  ALT of 105 (bri of 219 on 03/24), AST of 140 (bri of 599 on 03/24), overall improving. T bili and alk phos normal. Tylenol level negative. Normal RUQ US. Known h /o Hep C, last AB positive 09/2015  w/ positive RNA quant. No records of treatment. Will need to f/u with PCP as OP for repeat LFTs and consideration of hep C treatment when sober (discharge f/u order placed).       Legal Status: voluntary    Safety Assessment:   Checks:  15 min  Precautions: withdrawal precautions  Pt has not required locked seclusion or restraints in the past 24 hours to maintain safety, please refer to RN documentation for further details.  Discussed with patient many issues of addiction,triggers, relapse, and establishing a solid recovery program.  Able to give informed consent:  YES   Discussed  Risks/Benefits/Side Effects/Alternatives: YES    After discussion of the indications, risks, benefits, alternatives and consequences of no treatment, the patient elects to complete detox and do trt.

## 2021-03-29 NOTE — PROGRESS NOTES
03/29/21 0610   Sleep/Rest/Relaxation   Sleep/Rest/Relaxation difficulty falling asleep   Night Time # Hours 5 hours     Patient was having trouble sleeping; he was offered vistaril for anxiety but he refused.

## 2021-03-29 NOTE — PLAN OF CARE
Pt attended the Therapeutic Recreation group with focus on leisure education and acquisition of knowledge. Pt was not very engaged in creating a leisure inventory containing healthy recreation outlets that related to important domains of development. Pt was very disruptive and needed several cues to stop talking while others were trying to participate.

## 2021-03-30 PROCEDURE — 250N000013 HC RX MED GY IP 250 OP 250 PS 637: Performed by: PHYSICIAN ASSISTANT

## 2021-03-30 PROCEDURE — 128N000001 HC R&B CD/MH ADULT

## 2021-03-30 PROCEDURE — 99232 SBSQ HOSP IP/OBS MODERATE 35: CPT | Performed by: PSYCHIATRY & NEUROLOGY

## 2021-03-30 PROCEDURE — 250N000013 HC RX MED GY IP 250 OP 250 PS 637: Performed by: PSYCHIATRY & NEUROLOGY

## 2021-03-30 RX ORDER — PHENOBARBITAL 32.4 MG/1
32.4 TABLET ORAL 2 TIMES DAILY
Status: COMPLETED | OUTPATIENT
Start: 2021-03-30 | End: 2021-03-30

## 2021-03-30 RX ORDER — BUPRENORPHINE AND NALOXONE 2; .5 MG/1; MG/1
1 FILM, SOLUBLE BUCCAL; SUBLINGUAL DAILY PRN
Status: DISCONTINUED | OUTPATIENT
Start: 2021-03-30 | End: 2021-04-01 | Stop reason: HOSPADM

## 2021-03-30 RX ORDER — PHENOBARBITAL 32.4 MG/1
32.4 TABLET ORAL 2 TIMES DAILY
Status: COMPLETED | OUTPATIENT
Start: 2021-03-31 | End: 2021-03-31

## 2021-03-30 RX ADMIN — PHENOBARBITAL 32.4 MG: 32.4 TABLET ORAL at 21:55

## 2021-03-30 RX ADMIN — QUETIAPINE FUMARATE 25 MG: 25 TABLET ORAL at 21:54

## 2021-03-30 RX ADMIN — ESCITALOPRAM OXALATE 10 MG: 10 TABLET ORAL at 10:15

## 2021-03-30 RX ADMIN — SENNOSIDES 8.6 MG: 8.6 TABLET, FILM COATED ORAL at 10:16

## 2021-03-30 RX ADMIN — PHENOBARBITAL 32.4 MG: 32.4 TABLET ORAL at 10:16

## 2021-03-30 RX ADMIN — POLYETHYLENE GLYCOL 3350 17 G: 17 POWDER, FOR SOLUTION ORAL at 10:16

## 2021-03-30 RX ADMIN — SENNOSIDES 8.6 MG: 8.6 TABLET, FILM COATED ORAL at 21:54

## 2021-03-30 RX ADMIN — BUPRENORPHINE AND NALOXONE 1 FILM: 2; .5 FILM, SOLUBLE BUCCAL; SUBLINGUAL at 10:25

## 2021-03-30 ASSESSMENT — ACTIVITIES OF DAILY LIVING (ADL)
HYGIENE/GROOMING: INDEPENDENT
ORAL_HYGIENE: INDEPENDENT
DRESS: SCRUBS (BEHAVIORAL HEALTH)
LAUNDRY: WITH SUPERVISION

## 2021-03-30 NOTE — PROGRESS NOTES
Pt denies anxiety and depression. Denies suicidal ideation plans or intent. Declined full assessment and medications at this time preferring to sleep in bed. Will assess and administer medications later once pt is done sleeping.

## 2021-03-30 NOTE — PROGRESS NOTES
"[facility], [place]   Psychiatric Progress Note        Interim history   This is a 30 year old male with 1.  Major depressive disorder, recurrent, severe without psychotic features.   2.  Posttraumatic stress disorder (PTSD), chronic.     3.  Anxiety disorder, not otherwise specified.   4.  Benzodiazepine use disorder, severe.   5.  Benzodiazepine withdrawal, severe.   6.  Opiate use disorder, severe.   7.  Opiate withdrawal, severe.     8.  He is also using periodic cocaine and meth.    .Pt seen in rounds.   The patient's care was discussed with the treatment team during the daily team meeting and/or staff's chart notes were reviewed.  Staff report patient has been visible in the milieu,  no acute eventsovernight.     Patient's mood is good  Energy Level:good  Sleep:improved \"solid\"  Appetite:fair   Denied any motivation interest   Denied Suicidal/homicidal ideation/plan intent.  Denied anypsychosis  No prior suicde attempts  No access to gun    Tolerating meds and has no side effects.              Medications:     Current Facility-Administered Medications   Medication     acetaminophen (TYLENOL) tablet 650 mg     alum & mag hydroxide-simethicone (MAALOX) suspension 30 mL     bisacodyl (DULCOLAX) Suppository 10 mg     buprenorphine HCl-naloxone HCl (SUBOXONE) 2-0.5 MG per film 1 Film     calcium carbonate (TUMS) chewable tablet 500-1,000 mg     cloNIDine (CATAPRES) tablet 0.1 mg     docusate sodium (COLACE) capsule 100 mg     escitalopram (LEXAPRO) tablet 10 mg     famotidine (PEPCID) tablet 20 mg     hydrOXYzine (ATARAX) tablet 25 mg     ibuprofen (ADVIL/MOTRIN) tablet 400 mg     ondansetron (ZOFRAN-ODT) ODT tab 4 mg     PHENobarbital (LUMINAL) tablet 32.4 mg     polyethylene glycol (MIRALAX) Packet 17 g     QUEtiapine (SEROquel) tablet 25 mg     sennosides (SENOKOT) tablet 8.6 mg     sodium phosphate (FLEET ENEMA) 1 enema     witch hazel-glycerin (TUCKS) pad     Facility-Administered " "Medications Ordered in Other Encounters   Medication     Self Administer Medications: Behavioral Services             Allergies:     Allergies   Allergen Reactions     Ambien [Zolpidem] Shortness Of Breath     wheezing     Pollen Extract Unknown            Psychiatric Examination:   Blood pressure 113/73, pulse 63, temperature 97.7  F (36.5  C), temperature source Temporal, resp. rate 16, height 1.778 m (5' 10\"), weight 84.8 kg (187 lb), SpO2 97 %.  Weight is 187 lbs 0 oz  Body mass index is 26.83 kg/m .    Appearance:  awake, alert and adequately groomed  Attitude:  cooperative  Eye Contact:  good  Mood:  better  Affect:  appropriate and in normal range and mood congruent  Speech:  clear, coherent rate /rhythm are good  Psychomotor Behavior:  no evidence of tardive dyskinesia, dystonia, or tics and intact station, gait and muscle tone  Throught Process:  logical  Associations:  no loose associations  Thought Content:  no evidence of suicidal ideation or homicidal ideation, no evidence of psychotic thought, no auditory hallucinations present and no visual hallucinations present  Insight:  limited  Judgement:  limited  Oriented to:  time, person, and place  Attention Span and Concentration:  intact  Recent and Remote Memory:  intact  Language fund of knowledge are adequate         Labs:   No results found for this or any previous visit (from the past 24 hour(s)).      DX   1.  Major depressive disorder, recurrent, severe without psychotic features.   2.  Posttraumatic stress disorder (PTSD), chronic.     3.  Anxiety disorder, not otherwise specified.   4.  Benzodiazepine use disorder, severe.   5.  Benzodiazepine withdrawal, severe.   6.  Opiate use disorder, severe.   7.  Opiate withdrawal, severe.     8.  He is also using periodic cocaine and meth.    PLAN  Will continue taper of phenbarbital 60 mg today we will continue the taper tomorrow taper     patient is having an interview with [facility] hopefully he will be " able to go to the treatment there tomorrow      MSSA    Eating Disturbances: ate and enjoyed all of it or not applicable  Tremor: 0 - no tremor  Sleep Disturbance: slept through the night or not applicable  Clouding of Sensorium: no evidence  Hallucinations: 0 - none  Quality of Contact: 0 - awareness of examiner and people around him/her  Agitation: 0 - normal activity  Paroxysmal Sweats: 0 - no observed sweating  Temperature: 99.5 or below  Pulse: 0 - 69 or below  Total MSSA Score: 1      Continue lexapro 10 mg   wants to taper off suboxone dose reduced to 2mg sl daily , it will be switched to as needed  He is open to being on naltrexone injections  Ordered quetiapine as needed for insomnia as needed    Laboratory/Imaging: reviewed with patient   Consults: internal medicine consult reviewed  Patient will be treated in therapeutic milieu with appropriate individual and group therapies as described.  PDMP CHECKED     Supportive psychotheraoy provided, annie talked about recovery enviroment, relapse prevention, triggers to use.  Discussed with patient many issues of addiction,triggers, relapse, and establishing a solid recovery program.  Asked pt to be med complinat   Medical diagnoses to be addressed this admission:    Plan:  Diagnoses:    #Benzo use and WD  #Opiate use and WD  -Management per primary team, chem dep     #Depression w/ SI  #Anxiety  -Management per primary team, psych/chem dep     #Abnormal LFTs  #Chronic hep C  ALT of 105 (bri of 219 on 03/24), AST of 140 (bri of 599 on 03/24), overall improving. T bili and alk phos normal. Tylenol level negative. Normal RUQ US. Known h /o Hep C, last AB positive 09/2015  w/ positive RNA quant. No records of treatment. Will need to f/u with PCP as OP for repeat LFTs and consideration of hep C treatment when sober (discharge f/u order placed).       Legal Status: voluntary    Safety Assessment:   Checks:  15 min  Precautions: withdrawal precautions  Pt has not  required locked seclusion or restraints in the past 24 hours to maintain safety, please refer to RN documentation for further details.  Discussed with patient many issues of addiction,triggers, relapse, and establishing a solid recovery program.  Able to give informed consent:  YES   Discussed Risks/Benefits/Side Effects/Alternatives: YES    After discussion of the indications, risks, benefits, alternatives and consequences of no treatment, the patient elects to complete detox and do trt.

## 2021-03-30 NOTE — PROGRESS NOTES
Spoke with Bhakti at [facility] who reports pt has an assessment scheduled for today, 3/30 at 4:30 pm with Jonh Pearce ([phone]). Pt notified of time. Assessment will be sent to [facility] for funding authorization when complete. Will follow-up with Meme on 3/31 to coordinate admission.

## 2021-03-30 NOTE — PROGRESS NOTES
03/30/21 0606   Sleep/Rest/Relaxation   Sleep/Rest/Relaxation appears asleep   Night Time # Hours 6.5 hours     No concerns voiced.

## 2021-03-31 PROCEDURE — H2032 ACTIVITY THERAPY, PER 15 MIN: HCPCS

## 2021-03-31 PROCEDURE — 250N000013 HC RX MED GY IP 250 OP 250 PS 637: Performed by: PHYSICIAN ASSISTANT

## 2021-03-31 PROCEDURE — 250N000013 HC RX MED GY IP 250 OP 250 PS 637: Performed by: PSYCHIATRY & NEUROLOGY

## 2021-03-31 PROCEDURE — 128N000001 HC R&B CD/MH ADULT

## 2021-03-31 RX ORDER — FOLIC ACID 1 MG/1
5 TABLET ORAL DAILY
Status: DISCONTINUED | OUTPATIENT
Start: 2021-03-31 | End: 2021-04-01 | Stop reason: HOSPADM

## 2021-03-31 RX ORDER — QUETIAPINE FUMARATE 25 MG/1
25 TABLET, FILM COATED ORAL
Status: DISCONTINUED | OUTPATIENT
Start: 2021-03-31 | End: 2021-04-01 | Stop reason: HOSPADM

## 2021-03-31 RX ORDER — PHENOBARBITAL 32.4 MG/1
32.4 TABLET ORAL DAILY
Qty: 3 TABLET | Refills: 0 | Status: SHIPPED | OUTPATIENT
Start: 2021-03-31 | End: 2021-06-26

## 2021-03-31 RX ORDER — BUSPIRONE HYDROCHLORIDE 5 MG/1
5 TABLET ORAL 3 TIMES DAILY
Status: DISCONTINUED | OUTPATIENT
Start: 2021-03-31 | End: 2021-04-01 | Stop reason: HOSPADM

## 2021-03-31 RX ORDER — QUETIAPINE FUMARATE 25 MG/1
25 TABLET, FILM COATED ORAL
Qty: 30 TABLET | Refills: 1 | Status: SHIPPED | OUTPATIENT
Start: 2021-03-31 | End: 2021-06-26

## 2021-03-31 RX ORDER — FOLIC ACID 1 MG/1
5 TABLET ORAL DAILY
Qty: 150 TABLET | Refills: 4 | Status: SHIPPED | OUTPATIENT
Start: 2021-03-31 | End: 2021-06-26

## 2021-03-31 RX ORDER — BUSPIRONE HYDROCHLORIDE 5 MG/1
5 TABLET ORAL 3 TIMES DAILY
Qty: 90 TABLET | Refills: 1 | Status: SHIPPED | OUTPATIENT
Start: 2021-03-31 | End: 2021-06-26

## 2021-03-31 RX ORDER — ESCITALOPRAM OXALATE 5 MG/1
15 TABLET ORAL DAILY
Qty: 90 TABLET | Refills: 1 | Status: SHIPPED | OUTPATIENT
Start: 2021-04-01 | End: 2021-06-26

## 2021-03-31 RX ORDER — ESCITALOPRAM OXALATE 5 MG/1
5 TABLET ORAL ONCE
Status: COMPLETED | OUTPATIENT
Start: 2021-03-31 | End: 2021-03-31

## 2021-03-31 RX ADMIN — PHENOBARBITAL 32.4 MG: 32.4 TABLET ORAL at 09:09

## 2021-03-31 RX ADMIN — ESCITALOPRAM 5 MG: 5 TABLET, FILM COATED ORAL at 14:13

## 2021-03-31 RX ADMIN — POLYETHYLENE GLYCOL 3350 17 G: 17 POWDER, FOR SOLUTION ORAL at 09:09

## 2021-03-31 RX ADMIN — SENNOSIDES 8.6 MG: 8.6 TABLET, FILM COATED ORAL at 09:09

## 2021-03-31 RX ADMIN — SENNOSIDES 8.6 MG: 8.6 TABLET, FILM COATED ORAL at 22:02

## 2021-03-31 RX ADMIN — BUSPIRONE HYDROCHLORIDE 5 MG: 5 TABLET ORAL at 22:01

## 2021-03-31 RX ADMIN — BUSPIRONE HYDROCHLORIDE 5 MG: 5 TABLET ORAL at 14:14

## 2021-03-31 RX ADMIN — ESCITALOPRAM OXALATE 10 MG: 10 TABLET ORAL at 09:08

## 2021-03-31 RX ADMIN — BUPRENORPHINE AND NALOXONE 1 FILM: 2; .5 FILM, SOLUBLE BUCCAL; SUBLINGUAL at 11:23

## 2021-03-31 RX ADMIN — FOLIC ACID 5 MG: 1 TABLET ORAL at 14:13

## 2021-03-31 RX ADMIN — QUETIAPINE FUMARATE 25 MG: 25 TABLET ORAL at 22:01

## 2021-03-31 NOTE — PLAN OF CARE
"Pt states \"I'm struggling\" and \"anxious\" then requested prn suboxone. Administered 2 mg suboxone for COWS score of a 3. Pt denies suicidal ideation plans or intent. Endorses anxiety 6 and depression an 8 of 10. Pt depression slowly increasing over the past few days from a 5 to 6 to an 8 today. Updated Dr. Jeter about pt increasing depression. Will continue to monitor and intervene as warranted.  "

## 2021-03-31 NOTE — PLAN OF CARE
Pt attended groups this evening. He reported feeling situational depressed, regarding his situation. He denied any SI. He denied any anxiety. He appears to be tolerating his phenobarbital regiment.

## 2021-03-31 NOTE — PROGRESS NOTES
"Patient seen, chart reviewed, care discussed with staff.  Blood pressure 121/80, pulse 57, temperature 98  F (36.7  C), temperature source Temporal, resp. rate 16, height 1.778 m (5' 10\"), weight 84.8 kg (187 lb), SpO2 97 %.  Depression discussed; onset in his teens, it comes and goes without fully remitting.  Family history positive on his maternal side.  Symptoms of sleep onset delay, lack of energy, sadness, not wanting to face the day, resentful feelings.  Past meds: Lexapro- worked some  Prozac- worked some  Celexa, Paxil, Effexor- \"shakey  Cymbalta- worked some, hard withdrawal  Wellbutrin- Panic and increased anxiety  He is interested in Buspar.    Options discussed, including increased Lexapro (QTC normal 2015), or change to Pristiq    Minipress and Tenex were discussed for PTSD    General appearance: fair  Alert.   Affect: sad  Mood: depressed   Speech:  normal.   Eye contact:  good.    Psychomotor behavior: normal  Gait: normal.    Abnormal movements: none  Delusions: none  Hallucinations:   none  Thoughts: logical  Associations: intact  Judgement: good  Insight: good  Cognitions: intact in conversation  Memory:  intact in conversation  Orientation: normal    Not suicidal.    Seroquel side effects discussed in detail including weight, TD, blood sugar.    Plan: 1.  Increase Lexapro to 15mg  2.  Add Buspar      Current Facility-Administered Medications:      acetaminophen (TYLENOL) tablet 650 mg, 650 mg, Oral, Q4H PRN, Madhav Dick MD     alum & mag hydroxide-simethicone (MAALOX) suspension 30 mL, 30 mL, Oral, Q4H PRN, Madhav Dick MD     bisacodyl (DULCOLAX) Suppository 10 mg, 10 mg, Rectal, Daily PRN, Vidhi Squires PA     buprenorphine HCl-naloxone HCl (SUBOXONE) 2-0.5 MG per film 1 Film, 1 Film, Sublingual, Daily PRN, Madhav Dick MD, 1 Film at 03/31/21 1123     busPIRone (BUSPAR) tablet 5 mg, 5 mg, Oral, TID, Jeancarlos Jeter MD     calcium carbonate (TUMS) chewable tablet 500-1,000 " mg, 500-1,000 mg, Oral, TID PRN, Madhav Dick MD     cloNIDine (CATAPRES) tablet 0.1 mg, 0.1 mg, Oral, BID PRN, Naegele, Debra Ann, APRN CNS, 0.1 mg at 03/27/21 1408     docusate sodium (COLACE) capsule 100 mg, 100 mg, Oral, BID PRN, Madhav Dick MD, 100 mg at 03/29/21 1047     [START ON 4/1/2021] escitalopram (LEXAPRO) tablet 15 mg, 15 mg, Oral, Daily, Jeancarlos Jeter MD     escitalopram (LEXAPRO) tablet 5 mg, 5 mg, Oral, Once, Jeancarlos Jeter MD     famotidine (PEPCID) tablet 20 mg, 20 mg, Oral, Daily PRN, Madhav Dick MD     folic acid (FOLVITE) tablet 5 mg, 5 mg, Oral, Daily, Jeancarlos Jeter MD     hydrOXYzine (ATARAX) tablet 25 mg, 25 mg, Oral, Q4H PRN, Madhav Dick MD, 25 mg at 03/28/21 1323     ibuprofen (ADVIL/MOTRIN) tablet 400 mg, 400 mg, Oral, Q6H PRN, Madhav Dick MD, 400 mg at 03/27/21 2034     ondansetron (ZOFRAN-ODT) ODT tab 4 mg, 4 mg, Oral, Q6H PRN, Madhav Dick MD, 4 mg at 03/27/21 0957     polyethylene glycol (MIRALAX) Packet 17 g, 17 g, Oral, Daily, Vidhi Squires PA, 17 g at 03/31/21 0909     QUEtiapine (SEROquel) tablet 25 mg, 25 mg, Oral, At Bedtime PRN, Jeancarlos Jeter MD     sennosides (SENOKOT) tablet 8.6 mg, 8.6 mg, Oral, BID, Viola Marsh PA-C, 8.6 mg at 03/31/21 0909     sodium phosphate (FLEET ENEMA) 1 enema, 1 enema, Rectal, Daily PRN, Gessler, Viola M, PA-C     witch hazel-glycerin (TUCKS) pad, , Topical, Q1H PRN, Madhav Dick MD    Facility-Administered Medications Ordered in Other Encounters:      Self Administer Medications: Behavioral Services, , Does not apply, See Admin Instructions, Mian Hurley MD  Recent Results (from the past 168 hour(s))   Drug abuse screen 6 urine (chem dep)    Collection Time: 03/24/21  2:26 PM   Result Value Ref Range    Amphetamine Qual Urine Positive (A) NEG^Negative    Barbiturates Qual Urine Negative NEG^Negative    Benzodiazepine Qual Urine Positive (A) NEG^Negative    Cannabinoids Qual Urine  Positive (A) NEG^Negative    Cocaine Qual Urine Positive (A) NEG^Negative    Ethanol Qual Urine Negative NEG^Negative    Opiates Qualitative Urine Positive (A) NEG^Negative   CBC with platelets differential    Collection Time: 03/24/21  5:19 PM   Result Value Ref Range    WBC 11.4 (H) 4.0 - 11.0 10e9/L    RBC Count 5.18 4.4 - 5.9 10e12/L    Hemoglobin 15.6 13.3 - 17.7 g/dL    Hematocrit 44.7 40.0 - 53.0 %    MCV 86 78 - 100 fl    MCH 30.1 26.5 - 33.0 pg    MCHC 34.9 31.5 - 36.5 g/dL    RDW 11.8 10.0 - 15.0 %    Platelet Count 294 150 - 450 10e9/L    Diff Method Automated Method     % Neutrophils 76.8 %    % Lymphocytes 13.3 %    % Monocytes 9.0 %    % Eosinophils 0.1 %    % Basophils 0.4 %    % Immature Granulocytes 0.4 %    Nucleated RBCs 0 0 /100    Absolute Neutrophil 8.8 (H) 1.6 - 8.3 10e9/L    Absolute Lymphocytes 1.5 0.8 - 5.3 10e9/L    Absolute Monocytes 1.0 0.0 - 1.3 10e9/L    Absolute Eosinophils 0.0 0.0 - 0.7 10e9/L    Absolute Basophils 0.1 0.0 - 0.2 10e9/L    Abs Immature Granulocytes 0.1 0 - 0.4 10e9/L    Absolute Nucleated RBC 0.0    Comprehensive metabolic panel    Collection Time: 03/24/21  5:19 PM   Result Value Ref Range    Sodium 138 133 - 144 mmol/L    Potassium 3.9 3.4 - 5.3 mmol/L    Chloride 103 94 - 109 mmol/L    Carbon Dioxide 27 20 - 32 mmol/L    Anion Gap 8 3 - 14 mmol/L    Glucose 127 (H) 70 - 99 mg/dL    Urea Nitrogen 22 7 - 30 mg/dL    Creatinine 0.79 0.66 - 1.25 mg/dL    GFR Estimate >90 >60 mL/min/[1.73_m2]    GFR Estimate If Black >90 >60 mL/min/[1.73_m2]    Calcium 9.1 8.5 - 10.1 mg/dL    Bilirubin Total 0.9 0.2 - 1.3 mg/dL    Albumin 3.9 3.4 - 5.0 g/dL    Protein Total 7.5 6.8 - 8.8 g/dL    Alkaline Phosphatase 55 40 - 150 U/L     (H) 0 - 70 U/L     (HH) 0 - 45 U/L   Asymptomatic SARS-CoV-2 COVID-19 Virus (Coronavirus) by PCR    Collection Time: 03/24/21  5:19 PM    Specimen: Nasopharyngeal   Result Value Ref Range    SARS-CoV-2 Virus Specimen Source Nasopharyngeal      SARS-CoV-2 PCR Result NEGATIVE     SARS-CoV-2 PCR Comment (Note)    Acetaminophen level    Collection Time: 03/24/21  5:19 PM   Result Value Ref Range    Acetaminophen Level 3 mg/L   Salicylate level    Collection Time: 03/24/21  5:19 PM   Result Value Ref Range    Salicylate Level <2 mg/dL   INR    Collection Time: 03/24/21  5:19 PM   Result Value Ref Range    INR 1.01 0.86 - 1.14   Potassium    Collection Time: 03/24/21 11:09 PM   Result Value Ref Range    Potassium 3.5 3.4 - 5.3 mmol/L   Magnesium    Collection Time: 03/24/21 11:09 PM   Result Value Ref Range    Magnesium 2.0 1.6 - 2.3 mg/dL   Hepatic panel    Collection Time: 03/24/21 11:09 PM   Result Value Ref Range    Bilirubin Direct 0.2 0.0 - 0.2 mg/dL    Bilirubin Total 0.7 0.2 - 1.3 mg/dL    Albumin 3.2 (L) 3.4 - 5.0 g/dL    Protein Total 6.7 (L) 6.8 - 8.8 g/dL    Alkaline Phosphatase 48 40 - 150 U/L     (H) 0 - 70 U/L     (H) 0 - 45 U/L   Hepatic panel    Collection Time: 03/25/21  5:52 AM   Result Value Ref Range    Bilirubin Direct 0.1 0.0 - 0.2 mg/dL    Bilirubin Total 0.5 0.2 - 1.3 mg/dL    Albumin 2.9 (L) 3.4 - 5.0 g/dL    Protein Total 6.2 (L) 6.8 - 8.8 g/dL    Alkaline Phosphatase 43 40 - 150 U/L     (H) 0 - 70 U/L     (H) 0 - 45 U/L   Basic metabolic panel    Collection Time: 03/25/21  5:52 AM   Result Value Ref Range    Sodium 141 133 - 144 mmol/L    Potassium 3.6 3.4 - 5.3 mmol/L    Chloride 105 94 - 109 mmol/L    Carbon Dioxide 29 20 - 32 mmol/L    Anion Gap 7 3 - 14 mmol/L    Glucose 95 70 - 99 mg/dL    Urea Nitrogen 22 7 - 30 mg/dL    Creatinine 0.76 0.66 - 1.25 mg/dL    GFR Estimate >90 >60 mL/min/[1.73_m2]    GFR Estimate If Black >90 >60 mL/min/[1.73_m2]    Calcium 8.0 (L) 8.5 - 10.1 mg/dL   CBC with platelets    Collection Time: 03/25/21  5:52 AM   Result Value Ref Range    WBC 7.6 4.0 - 11.0 10e9/L    RBC Count 4.63 4.4 - 5.9 10e12/L    Hemoglobin 13.7 13.3 - 17.7 g/dL    Hematocrit 40.2 40.0 - 53.0 %     MCV 87 78 - 100 fl    MCH 29.6 26.5 - 33.0 pg    MCHC 34.1 31.5 - 36.5 g/dL    RDW 11.9 10.0 - 15.0 %    Platelet Count 217 150 - 450 10e9/L   Hepatic panel    Collection Time: 03/26/21  5:10 AM   Result Value Ref Range    Bilirubin Direct <0.1 0.0 - 0.2 mg/dL    Bilirubin Total 0.2 0.2 - 1.3 mg/dL    Albumin 2.5 (L) 3.4 - 5.0 g/dL    Protein Total 5.4 (L) 6.8 - 8.8 g/dL    Alkaline Phosphatase 38 (L) 40 - 150 U/L     (H) 0 - 70 U/L     (H) 0 - 45 U/L

## 2021-03-31 NOTE — PROGRESS NOTES
Spoke with pt who reports he had his assessment on 3/30 with Jonh Pearce, reports assessment went well and they will update pt when insurance authorization has been approved. Writer placed call to Jonh and left voicemail message with contact information for case coordination. Waiting for return call.

## 2021-03-31 NOTE — PLAN OF CARE
Pt continues to report feelings of depression and anxiety.He reports his depression is 9/10 anxiety 5/10.He denies any SI. He feels that he is overwhelmed with his circumstances. He did attended groups. He did not request or require any prn medication.

## 2021-03-31 NOTE — PROGRESS NOTES
03/31/21 0602   Sleep/Rest/Relaxation   Sleep/Rest/Relaxation appears asleep   Night Time # Hours 6.75 hours     No concerns.

## 2021-04-01 VITALS
HEART RATE: 98 BPM | WEIGHT: 187 LBS | HEIGHT: 70 IN | DIASTOLIC BLOOD PRESSURE: 70 MMHG | BODY MASS INDEX: 26.77 KG/M2 | RESPIRATION RATE: 16 BRPM | TEMPERATURE: 98 F | OXYGEN SATURATION: 97 % | SYSTOLIC BLOOD PRESSURE: 115 MMHG

## 2021-04-01 PROCEDURE — 250N000013 HC RX MED GY IP 250 OP 250 PS 637: Performed by: PHYSICIAN ASSISTANT

## 2021-04-01 PROCEDURE — 250N000013 HC RX MED GY IP 250 OP 250 PS 637: Performed by: PSYCHIATRY & NEUROLOGY

## 2021-04-01 RX ADMIN — ESCITALOPRAM OXALATE 15 MG: 10 TABLET ORAL at 08:56

## 2021-04-01 RX ADMIN — POLYETHYLENE GLYCOL 3350 17 G: 17 POWDER, FOR SOLUTION ORAL at 08:55

## 2021-04-01 RX ADMIN — FOLIC ACID 5 MG: 1 TABLET ORAL at 08:55

## 2021-04-01 RX ADMIN — BUPRENORPHINE AND NALOXONE 1 FILM: 2; .5 FILM, SOLUBLE BUCCAL; SUBLINGUAL at 08:56

## 2021-04-01 RX ADMIN — DOCUSATE SODIUM 100 MG: 100 CAPSULE, LIQUID FILLED ORAL at 08:56

## 2021-04-01 RX ADMIN — BUSPIRONE HYDROCHLORIDE 5 MG: 5 TABLET ORAL at 08:55

## 2021-04-01 RX ADMIN — SENNOSIDES 8.6 MG: 8.6 TABLET, FILM COATED ORAL at 08:56

## 2021-04-01 ASSESSMENT — ACTIVITIES OF DAILY LIVING (ADL)
DRESS: SCRUBS (BEHAVIORAL HEALTH)
HYGIENE/GROOMING: INDEPENDENT
ORAL_HYGIENE: INDEPENDENT
LAUNDRY: WITH SUPERVISION

## 2021-04-01 NOTE — DISCHARGE INSTRUCTIONS
Behavioral Discharge Planning and Instructions  THANK YOU FOR CHOOSING [facility]  3A  [phone]    Summary: You were admitted to [facility] on 3/26/2021 for detoxification from sedative hypnotics (benzodiazepines) and opioids.  A medical exam was performed that included lab work. You have met with a  and opted to attend treatment at [facility].  Please take care and make your recovery a daily priority, Romeo! It was a pleasure working with you and the entire treatment team here wishes you the very best in your recovery!     Recommendation:  Residential Treatment    Main Diagnoses:  Per Dr. Madhav Dick;  1.  Major depressive disorder, recurrent, severe without psychotic features.   2.  Posttraumatic stress disorder (PTSD), chronic.     3.  Anxiety disorder, not otherwise specified.   4.  Benzodiazepine use disorder, severe.   5.  Benzodiazepine withdrawal, severe.   6.  Opiate use disorder, severe.   7.  Opiate withdrawal, severe.     8.  He is also using periodic cocaine and meth.     Major Treatments, Procedures and Findings:  You were treated for benzodiazepine detoxification using phenobarbital. You were treated for opioid detoxification using suboxone.  You had a chemical dependency assessment. You had labs drawn and those results were reviewed with you. Please take a copy of your lab work with you to your next primary care provider appointment.    Symptoms to Report:  If you experience more anxiety, confusion, sleeplessness, deep sadness or thoughts of suicide, notify your treatment team or notify your primary care provider. IF ANY OF THE SYMPTOMS YOU ARE EXPERIENCING ARE A MEDICAL EMERGENCY CALL 911 IMMEDIATELY.     Lifestyle Adjustment: Adjust your lifestyle to get enough sleep, relaxation, exercise and good nutrition. Continue to develop healthy coping skills to decrease stress and promote a sober living environment. Do not use mood altering substances including alcohol, illegal  drugs or addictive medications other than what is currently prescribed.     Disposition: [facility]    Facts about COVID19 at www.cdc.gov/COVID19 and www.[place].gov/covid19    Keeping hands clean is one of the most important steps we can take to avoid getting sick and spreading germs to others.  Please wash your hands frequently and lather with soap for at least 20 seconds!    Medical Follow-Up:  You declined to set up a follow up prior to discharge reporting you might establish care within the [facility] system. Please set this appointment up prior to discharge from [facility].    Treatment Follow-Up:  [facility]  Admission: Thursday April 1st at 3:00 pm  [address] [place] [address]  [phone]    Recovery apps for your phone to locate current in person and zoom recovery meetings  [place] - meeting finn  AA  - meeting finn  Meeting guide - meeting finn  Quick NA meeting - meeting finn  Meme- has various apps    Resources:  *due to covid-19 most AA/NA meetings are being held online*  AA meetings online search for them at: https://aa-intergroup.org (worldwide meeting listings)  AA meetings for [place] area can be found online at: https://a[place].org (click local online meetings listings)  NA meetings for [place] area can be found online at: https://www.nam[place].org  (click find a meeting)  AA and NA Sponsors are excellent resources for support and you can find one at any support group meeting.   Alcoholics Anonymous (https://aa.org/): for information 24 hours/day  AA Intergroup service office in [place] (http://www.aastpaul.org/) [phone]  AA Intergroup service office in [facility]: [phone]. (http://www.a[place].org/)  Narcotics Anonymous (www.nam[place].org) (431) 671-8238  https://aafairviewriverside.org/meetings  SMART Recovery - self management for addiction recovery:  www.[employer]recovery.org  Pathways ~ A Health Crisis Resource & Support Center:   949.403.6880.  https://prescribetoprevent.org/patient-education/videos/  http://www.harmreduction.org  [facility] [phone]  Support Group:  AA/NA and Sponsor/support.  National [employer] on Mental Illness (www.[place].berto.org): 537.195.2564 or 577-744-1189.  Alcoholics Anonymous (www.alcoholics-anonymous.org): Check your phone book for your local chapter.  Suicide Awareness Voices of Education (SAVE) (www.save.org): 746-506-BRGC (1898)  National Suicide Prevention Line (www.mentalhealthmn.org): 540-038-ATKG (7124)  Mental Health Consumer/Survivor Network of [place] (www.mhcsn.net): 708.225.4098 or 355-475-1158  Mental Health Association of [place] (www.mentalhealth.org): 989.853.6299 or 426-594-4297   Substance Abuse and Mental Health Services (www.samhsa.gov)  [place] Opioid Prevention Coalition: www.opioidcoalition.org    [place] Recovery Connection ([facility])  [facility] connects people seeking recovery to resources that help foster and sustain long-term recovery.  Whether you are seeking resources for treatment, transportation, housing, job training, education, health care or other pathways to recovery, [facility] is a great place to start.  [phone].  www.[place]    Great Pod casts for nutrition and wellness  Listen on Apple Podcasts  Dishing [employer] Nutrition   [employer] [facility]   Nutrition       Understand the connection between what you eat and how you feel. Hosted by licensed nutritionists and dietitians from [employer] we share practical, real-life solutions for healthier living through nutrition.     General Medication Instructions:   See your medication sheet(s) for instructions.   Take all medications as prescribed.  Make no changes unless your primary care provider suggests them.   Go to all your primary care provider visits.  Be sure to have all your required lab tests. This way, your medicines can be refilled on time.  Do not use any forms of alcohol.    Please  Note:  If you have any questions at anytime after you are discharged please call [facility] detox unit 3AW at [phone].  [facility], Behavioral Intake [phone]  Medical Records call [phone]  Outpatient Behavioral Intake call [phone]  LP+ Wait List/Bed Availability call [phone]    Please remember to take all of your behavioral discharge planning and lab paperwork to any follow up appointments, it contains your lab results, diagnosis, medication list and discharge recommendations.      THANK YOU FOR CHOOSING [facility]

## 2021-04-01 NOTE — PROGRESS NOTES
Pt reports he spoke with Luad at [facility] ([phone]) and funding auth was approved by [employer]. Pt states he is waiting to talk with his mother to see if she can drive him to treatment this evening or tomorrow. Placed call to Luda at [facility], left voicemail message waiting for return call to assist with admission process.     Update: Pt reports his mother will pick him up today at 1:30 pm and can drive him to [facility]. Pt and treatment team notified. Placed call to Luda at [facility] to provide update. Pt is expected to arrive around 3:00 pm. AVS udpated.

## 2021-04-01 NOTE — PROGRESS NOTES
04/01/21 0611   Sleep/Rest/Relaxation   Sleep/Rest/Relaxation appears asleep   Night Time # Hours 6.75 hours      No concerns.

## 2021-04-01 NOTE — PLAN OF CARE
Problem: General Rehab Plan of Care  Goal: Therapeutic Recreation/Music Therapy Goal (Art Therapy)  Description: The patient and/or their representative will achieve their patient-specific goals related to the plan of care.  The patient-specific goals include: emotional expression  Outcome: No Change    Art Therapy directive was to create a two dimensional mask using art materials of pts choice and representing personal strength. Goals of directive: emotional expression, strength-based directive, emotional regulation, creating a personal self narrative.  Pt was a positive participant, focused on task for the full duration of group. Pt finished mask and briefly shared with group.  Pts mood was calm, pleasant participant.

## 2021-04-01 NOTE — PROGRESS NOTES
Pt given copy of their discharge instructions and medication administration instructions. All discharge plans and labs were discussed with patient. Pt reports no questions at this time regarding discharge plans, labs or medications. All of pt prescribed medications for discharge are being returned to discharge pharmacy (per ). [facility] does not want medications to be sent to their facilty per case management team here. Pt denies any suicidal ideation, plans or intent at this time. Patient discharge assisted via [employer] directly to the lobby where his ride is here to transport him to treatment. Patient plan is to begin treatment today. Patient is discharged at this time.
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
## 2021-06-26 ENCOUNTER — APPOINTMENT (OUTPATIENT)
Dept: GENERAL RADIOLOGY | Facility: CLINIC | Age: 31
End: 2021-06-26
Attending: EMERGENCY MEDICINE
Payer: COMMERCIAL

## 2021-06-26 ENCOUNTER — HOSPITAL ENCOUNTER (OUTPATIENT)
Facility: CLINIC | Age: 31
Setting detail: OBSERVATION
Discharge: HOME | End: 2021-06-27
Attending: EMERGENCY MEDICINE | Admitting: HOSPITALIST
Payer: COMMERCIAL

## 2021-06-26 DIAGNOSIS — R09.02 HYPOXIA: ICD-10-CM

## 2021-06-26 DIAGNOSIS — T40.601A OPIATE OVERDOSE, ACCIDENTAL OR UNINTENTIONAL, INITIAL ENCOUNTER (H): ICD-10-CM

## 2021-06-26 DIAGNOSIS — T40.2X1A OPIOID OVERDOSE, ACCIDENTAL OR UNINTENTIONAL, INITIAL ENCOUNTER (H): ICD-10-CM

## 2021-06-26 LAB
ANION GAP SERPL CALCULATED.3IONS-SCNC: 2 MMOL/L (ref 3–14)
BASOPHILS # BLD AUTO: 0 10E9/L (ref 0–0.2)
BASOPHILS NFR BLD AUTO: 0.2 %
BUN SERPL-MCNC: 18 MG/DL (ref 7–30)
CALCIUM SERPL-MCNC: 8.1 MG/DL (ref 8.5–10.1)
CHLORIDE SERPL-SCNC: 102 MMOL/L (ref 94–109)
CO2 SERPL-SCNC: 32 MMOL/L (ref 20–32)
CREAT SERPL-MCNC: 1.04 MG/DL (ref 0.66–1.25)
DIFFERENTIAL METHOD BLD: ABNORMAL
EOSINOPHIL # BLD AUTO: 0 10E9/L (ref 0–0.7)
EOSINOPHIL NFR BLD AUTO: 0 %
ERYTHROCYTE [DISTWIDTH] IN BLOOD BY AUTOMATED COUNT: 12.3 % (ref 10–15)
ETHANOL SERPL-MCNC: <0.01 G/DL
GFR SERPL CREATININE-BSD FRML MDRD: >90 ML/MIN/{1.73_M2}
GLUCOSE SERPL-MCNC: 117 MG/DL (ref 70–99)
HCT VFR BLD AUTO: 41.3 % (ref 40–53)
HGB BLD-MCNC: 13.2 G/DL (ref 13.3–17.7)
IMM GRANULOCYTES # BLD: 0.1 10E9/L (ref 0–0.4)
IMM GRANULOCYTES NFR BLD: 0.7 %
INTERPRETATION ECG - MUSE: NORMAL
INTERPRETATION ECG - MUSE: NORMAL
LABORATORY COMMENT REPORT: NORMAL
LYMPHOCYTES # BLD AUTO: 1.3 10E9/L (ref 0.8–5.3)
LYMPHOCYTES NFR BLD AUTO: 9.9 %
MCH RBC QN AUTO: 28.8 PG (ref 26.5–33)
MCHC RBC AUTO-ENTMCNC: 32 G/DL (ref 31.5–36.5)
MCV RBC AUTO: 90 FL (ref 78–100)
MONOCYTES # BLD AUTO: 1.1 10E9/L (ref 0–1.3)
MONOCYTES NFR BLD AUTO: 8.5 %
NEUTROPHILS # BLD AUTO: 10.5 10E9/L (ref 1.6–8.3)
NEUTROPHILS NFR BLD AUTO: 80.7 %
NRBC # BLD AUTO: 0 10*3/UL
NRBC BLD AUTO-RTO: 0 /100
NT-PROBNP SERPL-MCNC: 112 PG/ML (ref 0–450)
PLATELET # BLD AUTO: 239 10E9/L (ref 150–450)
POTASSIUM SERPL-SCNC: 3.9 MMOL/L (ref 3.4–5.3)
RBC # BLD AUTO: 4.59 10E12/L (ref 4.4–5.9)
SARS-COV-2 RNA RESP QL NAA+PROBE: NEGATIVE
SODIUM SERPL-SCNC: 136 MMOL/L (ref 133–144)
SPECIMEN SOURCE: NORMAL
TROPONIN I SERPL-MCNC: 0.02 UG/L (ref 0–0.04)
TROPONIN I SERPL-MCNC: <0.015 UG/L (ref 0–0.04)
WBC # BLD AUTO: 13 10E9/L (ref 4–11)

## 2021-06-26 PROCEDURE — 250N000013 HC RX MED GY IP 250 OP 250 PS 637: Performed by: EMERGENCY MEDICINE

## 2021-06-26 PROCEDURE — 84484 ASSAY OF TROPONIN QUANT: CPT | Performed by: EMERGENCY MEDICINE

## 2021-06-26 PROCEDURE — G0378 HOSPITAL OBSERVATION PER HR: HCPCS

## 2021-06-26 PROCEDURE — 99285 EMERGENCY DEPT VISIT HI MDM: CPT | Mod: 25

## 2021-06-26 PROCEDURE — 71045 X-RAY EXAM CHEST 1 VIEW: CPT

## 2021-06-26 PROCEDURE — 93005 ELECTROCARDIOGRAM TRACING: CPT

## 2021-06-26 PROCEDURE — 80048 BASIC METABOLIC PNL TOTAL CA: CPT | Performed by: EMERGENCY MEDICINE

## 2021-06-26 PROCEDURE — 82077 ASSAY SPEC XCP UR&BREATH IA: CPT | Performed by: EMERGENCY MEDICINE

## 2021-06-26 PROCEDURE — 99220 PR INITIAL OBSERVATION CARE,LEVEL III: CPT | Performed by: HOSPITALIST

## 2021-06-26 PROCEDURE — 71046 X-RAY EXAM CHEST 2 VIEWS: CPT

## 2021-06-26 PROCEDURE — 87635 SARS-COV-2 COVID-19 AMP PRB: CPT | Performed by: EMERGENCY MEDICINE

## 2021-06-26 PROCEDURE — 85025 COMPLETE CBC W/AUTO DIFF WBC: CPT | Performed by: EMERGENCY MEDICINE

## 2021-06-26 PROCEDURE — 250N000013 HC RX MED GY IP 250 OP 250 PS 637: Performed by: HOSPITALIST

## 2021-06-26 PROCEDURE — 83880 ASSAY OF NATRIURETIC PEPTIDE: CPT | Performed by: EMERGENCY MEDICINE

## 2021-06-26 RX ORDER — ACETAMINOPHEN 325 MG/1
650 TABLET ORAL EVERY 4 HOURS PRN
Status: DISCONTINUED | OUTPATIENT
Start: 2021-06-26 | End: 2021-06-27 | Stop reason: HOSPADM

## 2021-06-26 RX ORDER — ALBUTEROL SULFATE 90 UG/1
2 AEROSOL, METERED RESPIRATORY (INHALATION) EVERY 6 HOURS PRN
Status: ON HOLD | COMMUNITY
End: 2021-07-14

## 2021-06-26 RX ORDER — ESCITALOPRAM OXALATE 20 MG/1
20 TABLET ORAL DAILY
Status: DISCONTINUED | OUTPATIENT
Start: 2021-06-26 | End: 2021-06-27 | Stop reason: HOSPADM

## 2021-06-26 RX ORDER — GABAPENTIN 800 MG/1
800 TABLET ORAL 3 TIMES DAILY
Status: DISCONTINUED | OUTPATIENT
Start: 2021-06-26 | End: 2021-06-27 | Stop reason: HOSPADM

## 2021-06-26 RX ORDER — LIDOCAINE 40 MG/G
CREAM TOPICAL
Status: DISCONTINUED | OUTPATIENT
Start: 2021-06-26 | End: 2021-06-27 | Stop reason: HOSPADM

## 2021-06-26 RX ORDER — ONDANSETRON 4 MG/1
4 TABLET, ORALLY DISINTEGRATING ORAL EVERY 6 HOURS PRN
Status: DISCONTINUED | OUTPATIENT
Start: 2021-06-26 | End: 2021-06-27 | Stop reason: HOSPADM

## 2021-06-26 RX ORDER — ACETAMINOPHEN 650 MG/1
650 SUPPOSITORY RECTAL EVERY 4 HOURS PRN
Status: DISCONTINUED | OUTPATIENT
Start: 2021-06-26 | End: 2021-06-27 | Stop reason: HOSPADM

## 2021-06-26 RX ORDER — ACETAMINOPHEN 500 MG
1000 TABLET ORAL ONCE
Status: COMPLETED | OUTPATIENT
Start: 2021-06-26 | End: 2021-06-26

## 2021-06-26 RX ORDER — ONDANSETRON 2 MG/ML
4 INJECTION INTRAMUSCULAR; INTRAVENOUS EVERY 6 HOURS PRN
Status: DISCONTINUED | OUTPATIENT
Start: 2021-06-26 | End: 2021-06-27 | Stop reason: HOSPADM

## 2021-06-26 RX ORDER — GABAPENTIN 800 MG/1
800 TABLET ORAL 3 TIMES DAILY
COMMUNITY
End: 2021-07-04

## 2021-06-26 RX ORDER — METOCLOPRAMIDE 10 MG/1
10 TABLET ORAL ONCE
Status: COMPLETED | OUTPATIENT
Start: 2021-06-26 | End: 2021-06-26

## 2021-06-26 RX ORDER — PHENOBARBITAL 32.4 MG/1
32.4 TABLET ORAL DAILY
Status: DISCONTINUED | OUTPATIENT
Start: 2021-06-26 | End: 2021-06-26

## 2021-06-26 RX ORDER — QUETIAPINE FUMARATE 25 MG/1
25 TABLET, FILM COATED ORAL
Status: DISCONTINUED | OUTPATIENT
Start: 2021-06-26 | End: 2021-06-26

## 2021-06-26 RX ORDER — BUPRENORPHINE AND NALOXONE 4; 1 MG/1; MG/1
1 FILM, SOLUBLE BUCCAL; SUBLINGUAL DAILY
Status: DISCONTINUED | OUTPATIENT
Start: 2021-06-26 | End: 2021-06-26

## 2021-06-26 RX ORDER — BUSPIRONE HYDROCHLORIDE 5 MG/1
5 TABLET ORAL 3 TIMES DAILY
Status: DISCONTINUED | OUTPATIENT
Start: 2021-06-26 | End: 2021-06-26

## 2021-06-26 RX ORDER — ESCITALOPRAM OXALATE 20 MG/1
20 TABLET ORAL DAILY
Status: ON HOLD | COMMUNITY
End: 2021-07-14

## 2021-06-26 RX ADMIN — ACETAMINOPHEN 650 MG: 325 TABLET, FILM COATED ORAL at 18:15

## 2021-06-26 RX ADMIN — METOCLOPRAMIDE 10 MG: 10 TABLET ORAL at 05:37

## 2021-06-26 RX ADMIN — GABAPENTIN 800 MG: 800 TABLET, FILM COATED ORAL at 20:31

## 2021-06-26 RX ADMIN — ESCITALOPRAM OXALATE 20 MG: 20 TABLET ORAL at 20:32

## 2021-06-26 RX ADMIN — ACETAMINOPHEN 1000 MG: 500 TABLET, FILM COATED ORAL at 05:37

## 2021-06-26 ASSESSMENT — MIFFLIN-ST. JEOR
SCORE: 1790.25
SCORE: 1750.04

## 2021-06-26 ASSESSMENT — ENCOUNTER SYMPTOMS: ARTHRALGIAS: 1

## 2021-06-26 NOTE — ED NOTES
Pt given warm blankets. Pt remains cooperative. Drifts to sleep quickly but awakens easily to voice. RR 16. Remains on the 3L O2 via NC.
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
## 2021-06-26 NOTE — PROGRESS NOTES
RECEIVING UNIT ED HANDOFF REVIEW    ED Nurse Handoff Report was reviewed by: Jaspal Conley RN on June 26, 2021 at 5:28 PM

## 2021-06-26 NOTE — H&P
[facility]    History and Physical - Hospitalist Service       Date of Admission:  6/26/2021    Assessment & Plan     Romeo Maldonado is a 30 year old male who is status post treatment with Narcan for an unintentional heroin overdose.  He was being observed in the ER and was going to discharge home, but he developed hypoxia and is requiring 3L/min oxygen by nasal canula.      Opioid(heroin) overdose   Opioid dependence   Treated with Narcan and has resolved.    Acute respiratory failure, hypoxic  Status post respiratory arrest due to heroin  History of mild intermittent asthma  Initial chest X-ray was negative but after he started to require supplemental oxygen, repeat chest X-ray showed mild pulmonary vascular congestion. He is on 3L nasal canula.  Could be due to non-cardiogenic pulmonary edema from heroin overdose or from ongoing hypoventilation due to opoid toxicity. He does have some pain from the chest compressions so there may be some atelectasis. Aspiration pneumonitis is possible but seems unlikely.  On exam his lungs are clear to auscultation.    Continue oxygen as needed    Could try inhaled bronchodilators if not improving    Anxiety and depression     Continue gabapentin and escitalopram     Diet: Regular Diet Adultregular   DVT Prophylaxis: Low Risk/Ambulatory with no VTE prophylaxis indicated  Marie Catheter: Not present  Central Lines: None  Code Status: Full Code Full                Disposition Plan   Expected discharge: Tomorrow, recommended to prior living arrangement once o2 saturation is stable on RA.     The patient's care was discussed with the Patient.    Vazquez Duarte MD  [facility]  Securely message with the [employer] Web Console (learn more here)  Text page via [employer] Paging/Directory      ______________________________________________________________________    Chief Complaint       History is obtained from the patient, electronic  health record and emergency department physician    History of Present Illness   Romeo Maldonado is a 30 year old male who has a history of opioid abuse.  He uses heroin and unintentionally overdosed at his home on the bathroom floor.  His mother found him not breathing and initiated CPR.  She called 911 and on route he was given intranasal Narcan.  In the emergency room he had stable vital signs he was awake and oriented.  EKG showed sinus rhythm with J-point elevation.  White count was 13,000 and his other labs were unremarkable.  A chest x-ray was unremarkable.  He received Tylenol and Reglan in the ER.  Poison control recommended up to 6 hours of observation.  Towards the end of the observation period the patient became hypoxic and was requiring 3 L of oxygen by nasal cannula.  A repeat chest x-ray showed mild pulmonary vascular congestion.  On IC the patient has no complaints at all.  He has no cough or wheezing.    Review of Systems    The 10 point Review of Systems is negative other than noted in the HPI or here.     Past Medical History    I have reviewed this patient's medical history and updated it with pertinent information if needed.   Past Medical History:   Diagnosis Date     Allergic state      Anxiety      Chronic kidney disease      Depressive disorder      Hepatitis C      Substance abuse (H)     meth use     Uncomplicated asthma        Past Surgical History   I have reviewed this patient's surgical history and updated it with pertinent information if needed.  Past Surgical History:   Procedure Laterality Date     INCISION AND DRAINAGE SOFT TISSUE UPPER EXTREMITY, COMBINED  8/11/2012    Procedure: COMBINED INCISION AND DRAINAGE SOFT TISSUE UPPER EXTREMITY;  Incision and drainage Right Arm Abscess;  Surgeon: Cheli White MD;  Location:  OR       Social History   I have reviewed this patient's social history and updated it with pertinent information if needed.  Social History     Tobacco Use      Smoking status: Current Some Day Smoker     Packs/day: 0.25     Smokeless tobacco: Never Used   Substance Use Topics     Alcohol use: Yes     Comment: occasional     Drug use: Yes     Types: Methamphetamines, Opiates, IV     Comment: heroin daily, meth a couple of times in the last 2 weks       Family History   I have reviewed this patient's family history and updated it with pertinent information if needed.  Family History   Problem Relation Age of Onset     Hypertension Father        Prior to Admission Medications   Prior to Admission Medications   Prescriptions Last Dose Informant Patient Reported? Taking?   albuterol (PROAIR HFA/PROVENTIL HFA/VENTOLIN HFA) 108 (90 Base) MCG/ACT inhaler More than a month at Unknown time Self Yes No   Sig: Inhale 2 puffs into the lungs every 6 hours   escitalopram (LEXAPRO) 20 MG tablet 6/25/2021 at Unknown time Self Yes Yes   Sig: Take 20 mg by mouth daily   gabapentin (NEURONTIN) 800 MG tablet 6/25/2021 at Unknown time Self Yes Yes   Sig: Take 800 mg by mouth 3 times daily      Facility-Administered Medications: None     Allergies   Allergies   Allergen Reactions     Ambien [Zolpidem] Shortness Of Breath     wheezing     Pollen Extract Unknown       Physical Exam   Vital Signs: Temp: 98.8  F (37.1  C) Temp src: Oral BP: 132/77 Pulse: 79   Resp: 16 SpO2: 95 % O2 Device: None (Room air) Oxygen Delivery: 3 LPM  Weight: 183 lbs 13.82 oz    Constitutional: awake, alert, cooperative, no apparent distress  Eyes: Lids and lashes normal, pupils equal, round, extra ocular muscles intact, sclera clear, conjunctiva normal  ENT: Normocephalic, without obvious abnormality, atraumatic  Respiratory: No increased work of breathing, good air exchange, clear to auscultation bilaterally, no crackles or wheezing  Cardiovascular:regular rate and rhythm, normal S1 and S2, no S3 or S4, and no murmur noted  GI: normal bowel sounds, soft, non-distended, non-tender, no masses palpated  Skin: no rashes  and no jaundice  Musculoskeletal: There is no redness, warmth, or swelling of the joints.  Full range of motion noted.  Motor strength is 5 out of 5 all extremities bilaterally.  Tone is normal.  Neurologic: Awake, alert, oriented to name, place and time.  Cranial nerves II-XII are grossly intact.  Motor is 5 out of 5 bilaterally.  No tremor.  Neuropsychiatric: General: normal, calm and normal eye contact    Data   Data reviewed today: I reviewed all medications, new labs and imaging results over the last 24 hours. I personally reviewed no images or EKG's today. Chest X-ray - mild pulmonary vascular congestion.    Recent Labs   Lab 06/26/21  1349 06/26/21  1139 06/26/21  0540   WBC  --   --  13.0*   HGB  --   --  13.2*   MCV  --   --  90   PLT  --   --  239   NA  --   --  136   POTASSIUM  --   --  3.9   CHLORIDE  --   --  102   CO2  --   --  32   BUN  --   --  18   CR  --   --  1.04   ANIONGAP  --   --  2*   NILA  --   --  8.1*   GLC  --   --  117*   TROPI <0.015 0.018  --      Recent Results (from the past 24 hour(s))   Chest XR,  PA & LAT    Narrative    EXAM: XR CHEST 2 VW  LOCATION: [facility]  DATE/TIME: 6/26/2021 6:03 AM    INDICATION: Hypoxia.  COMPARISON: 05/29/2015      Impression    IMPRESSION: Negative chest.   XR Chest Port 1 View    Narrative    XR CHEST PORT 1 VIEW 6/26/2021 12:20 PM    HISTORY: worsening SOB    COMPARISON: Radiograph earlier today at 6:20 AM      Impression    IMPRESSION:Mild pulmonary vascular congestion. Mild linear atelectasis  in the right lung base. No focal infiltrate, pleural effusion or  pneumothorax. Normal heart size. Tortuous aorta.    RAH PANCHAL MD

## 2021-06-26 NOTE — PHARMACY-ADMISSION MEDICATION HISTORY
Pharmacy Medication History  Admission medication history interview status for the 6/26/2021  admission is complete. See EPIC admission navigator for prior to admission medications     Location of Interview: Patient room  Medication history sources: Patient and Surescripts    Significant changes made to the medication list:  Added: none  Changed: none  Removed: suboxone, buspirone, calcium, famotidine, folic acid, hydroxizine, ibuprofen, ondansetron, phenobarbital, quetiapine,     In the past week, patient estimated taking medication this percent of the time: greater than 90%    Additional medication history information:   - none    Medication reconciliation completed by provider prior to medication history? No    Time spent in this activity: 15 minutes    Prior to Admission medications    Medication Sig Last Dose Taking? Auth Provider   escitalopram (LEXAPRO) 20 MG tablet Take 20 mg by mouth daily 6/25/2021 at Unknown time Yes Reported, Patient   gabapentin (NEURONTIN) 800 MG tablet Take 800 mg by mouth 3 times daily 6/25/2021 at Unknown time Yes Unknown, Entered By History   albuterol (PROAIR HFA/PROVENTIL HFA/VENTOLIN HFA) 108 (90 Base) MCG/ACT inhaler Inhale 2 puffs into the lungs every 6 hours More than a month at Unknown time  Unknown, Entered By History         The information provided in this note is only as accurate as the sources available at the time of update(s)

## 2021-06-26 NOTE — PROGRESS NOTES
Called lab. Covid sample did not have the hot pink label on it. Lab pulled sample from  and will run it in house.

## 2021-06-26 NOTE — ED NOTES
Bed: ED16  Expected date: 6/26/21  Expected time: 4:45 AM  Means of arrival: Ambulance  Comments:  Megan 534: 30M Opiod right eye - Narcan given

## 2021-06-26 NOTE — ED NOTES
Pt talking to his mother on the phone. Pt agitated on the phone with her. Pt remains cooperative with staff.

## 2021-06-26 NOTE — PLAN OF CARE
DATE & TIME: 6/26/21, 5732-3917  Cognitive Concerns/ Orientation : A&O x4   BEHAVIOR & AGGRESSION TOOL COLOR: green  CIWA SCORE: N/A   ABNL VS/O2: VSS, on RA  MOBILITY: independent  PAIN MANAGMENT: tylenol given for headache  DIET: regular  BOWEL/BLADDER: continent  SKIN: intact   D/C DAY/GOALS/PLACE: tomorrow home?  OTHER IMPORTANT INFO: observation status

## 2021-06-26 NOTE — ED NOTES
Pt informed that his mother had called and wanted and update. Pt states that he would talk with his mother. Pt given phone.

## 2021-06-26 NOTE — ED NOTES
Pt falls asleep quickly between conversation but awakens easily to voice. Respirations 16 and shallow at this time. O2 at 3L via NC currently.

## 2021-06-26 NOTE — ED TRIAGE NOTES
Pt found by mother on the floor. Mother started CPR. Pt given 8mg narcan intranasal by police at approx 0415. Pt reports that he believes he smoke heroin earlier in the night and then took iv. heroin. EMS gave fluids and zofran 4mg.

## 2021-06-26 NOTE — ED NOTES
Poison control called at this time for updated vitals and status. Informed of current stable vitals and status. They state he is cleared and to call with any changes. MD updated.

## 2021-06-26 NOTE — ED PROVIDER NOTES
"  History   Chief Complaint:  Drug overdose     HPI   Romeo Maldonado is a 30 year old male with history of depression and substance abuse who presents with heroin overdose.  He was found on the bathroom floor by his mother who started CPR. En route to the ED, EMS gave 8 Narcan intranasal. He noted he has a history of heroin use but has not used it in a while. He also explained he has been on suboxone in the past but was not interested in taking it again. He complained of sternum pain and leg pain and a headache.      Review of Systems   Cardiovascular: Positive for chest pain.   Musculoskeletal: Positive for arthralgias.        + leg pain   Neurological: Positive for syncope.   All other systems reviewed and are negative.    Allergies:  Ambien [Zolpidem]  Pollen Extract    Medications:  Pepcid  Atarax  Zofran  Luminal    Past Medical History:    Allergies  Anxiety  CKD  Depression  Hepatitis C  Substance abuse  Asthma  Suicidal ideation  Benzodiazepine withdrawal complication  Opioid withdrawal  Psychosis  Cellulitis  Chemical dependency  Opiate addiction  Drug overdose     Past Surgical History:    I and D soft tissue upper extremity    Family History:    Father: hypertension    Social History:  Presents to the ED via EMS.    Physical Exam     Patient Vitals for the past 24 hrs:   BP Temp Temp src Pulse Resp SpO2 Height Weight   06/26/21 0600 128/69 -- -- 79 -- 96 % -- --   06/26/21 0530 110/75 -- -- 72 -- 98 % -- --   06/26/21 0514 -- 98.3  F (36.8  C) Temporal -- -- -- -- --   06/26/21 0506 -- -- -- -- -- 94 % -- --   06/26/21 0504 -- -- -- -- -- (!) 87 % -- --   06/26/21 0503 -- -- -- 82 -- -- 1.778 m (5' 10\") 79.4 kg (175 lb)   06/26/21 0453 136/79 -- Temporal -- 16 92 % -- --       Physical Exam  Eyes:               Sclera white; Pupils are equal and round  ENT:                External ears and nares normal  CV:                  Rate as above with regular rhythm                           Sternal " tenderness  Resp:               Breath sounds clear and equal bilaterally                          Non-labored, no retractions or accessory muscle use  GI:                   Abdomen is soft, non-tender, non-distended                          No rebound tenderness or peritoneal features  MS:                  Moves all extremities  Skin:                Warm and dry  Neuro:             Speech is normal and fluent. No apparent deficit.    Emergency Department Course   ECG  ECG taken at 538, ECG read at 544  Normal sinus rhythm.  ST elevation, consider early repolarization, pericarditis, or injury.  Nonspecific ST and T wave abnormality  Abnormal ECG   No significant changes as compared to prior, dated 10/20/15.  Rate 69 bpm. AK interval 152 ms. QRS duration 88 ms. QT/QTc 370/396 ms. P-R-T axes 47 29 31.     Laboratory:  CBC: WBC 13.0 (H), HGB 13.2 (L),      BMP: anion gap: 2 (L), glucose: 117, calcium: 8.1 (L) (H) o/w WNL (Creatinine 1.04)     Imaging:  Chest XR,  PA & LAT  IMPRESSION: Negative chest.  Reading per radiology    Emergency Department Course:    Reviewed:  I reviewed nursing notes, vitals, past medical history and care everywhere    Assessments:  502 I obtained history and examined the patient as noted above.     Consults:   640 I consulted with Poison control regarding the patient.    Interventions:  537 Tylenol 1000 mg PO  537 Reglan 10 mg PO      Impression & Plan     Medical Decision Making:  Romeo Maldonado is here for evaluation after requiring CPR due to heroin overdose at home. Fortunately he is neurologically intact after Narcan. He has borderline saturations of 91% suggestive that he may have developed pulmonary edema, pulmonary contusion secondary to CPR, or aspiration. Fortunately CXR normal.  No severe abnormalities in blood work.  Headache treated with oral medications.  Not thunderclap and no associated neurologic abnormalities.  CT not indicated as bleed not suspected.  Poison control  recommended up to 6 hours of observation due to the potential for opioids other than heroin being present in what he used, definitely at least 4 hours.  Signed out to Dr. Abdullahi    Diagnosis:    ICD-10-CM    1. Opioid overdose, accidental or unintentional, initial encounter (H)  T40.2X1A      Scribe Disclosure:  I, Mariellacherise Mei, am serving as a scribe at 5:02 AM on 6/26/2021 to document services personally performed by Lucy Sanchez, based on my observations and the provider's statements to me.          Lucy Sanchez MD  06/26/21 0719

## 2021-06-27 VITALS
HEIGHT: 70 IN | RESPIRATION RATE: 18 BRPM | SYSTOLIC BLOOD PRESSURE: 142 MMHG | BODY MASS INDEX: 26.32 KG/M2 | WEIGHT: 183.86 LBS | TEMPERATURE: 98.3 F | HEART RATE: 60 BPM | OXYGEN SATURATION: 95 % | DIASTOLIC BLOOD PRESSURE: 85 MMHG

## 2021-06-27 LAB
ALBUMIN SERPL-MCNC: 3.1 G/DL (ref 3.4–5)
ALP SERPL-CCNC: 53 U/L (ref 40–150)
ALT SERPL W P-5'-P-CCNC: 27 U/L (ref 0–70)
ANION GAP SERPL CALCULATED.3IONS-SCNC: 5 MMOL/L (ref 3–14)
AST SERPL W P-5'-P-CCNC: 16 U/L (ref 0–45)
BILIRUB DIRECT SERPL-MCNC: 0.1 MG/DL (ref 0–0.2)
BILIRUB SERPL-MCNC: 0.7 MG/DL (ref 0.2–1.3)
BUN SERPL-MCNC: 11 MG/DL (ref 7–30)
CALCIUM SERPL-MCNC: 8.4 MG/DL (ref 8.5–10.1)
CHLORIDE SERPL-SCNC: 109 MMOL/L (ref 94–109)
CO2 SERPL-SCNC: 27 MMOL/L (ref 20–32)
CREAT SERPL-MCNC: 0.68 MG/DL (ref 0.66–1.25)
ERYTHROCYTE [DISTWIDTH] IN BLOOD BY AUTOMATED COUNT: 11.9 % (ref 10–15)
GFR SERPL CREATININE-BSD FRML MDRD: >90 ML/MIN/{1.73_M2}
GLUCOSE SERPL-MCNC: 99 MG/DL (ref 70–99)
HCT VFR BLD AUTO: 43.1 % (ref 40–53)
HGB BLD-MCNC: 13.8 G/DL (ref 13.3–17.7)
MCH RBC QN AUTO: 28.4 PG (ref 26.5–33)
MCHC RBC AUTO-ENTMCNC: 32 G/DL (ref 31.5–36.5)
MCV RBC AUTO: 89 FL (ref 78–100)
PLATELET # BLD AUTO: 214 10E9/L (ref 150–450)
POTASSIUM SERPL-SCNC: 4.3 MMOL/L (ref 3.4–5.3)
PROT SERPL-MCNC: 6.5 G/DL (ref 6.8–8.8)
RBC # BLD AUTO: 4.86 10E12/L (ref 4.4–5.9)
SODIUM SERPL-SCNC: 141 MMOL/L (ref 133–144)
WBC # BLD AUTO: 7.9 10E9/L (ref 4–11)

## 2021-06-27 PROCEDURE — 85027 COMPLETE CBC AUTOMATED: CPT | Performed by: HOSPITALIST

## 2021-06-27 PROCEDURE — 99217 PR OBSERVATION CARE DISCHARGE: CPT | Performed by: INTERNAL MEDICINE

## 2021-06-27 PROCEDURE — 80048 BASIC METABOLIC PNL TOTAL CA: CPT | Performed by: HOSPITALIST

## 2021-06-27 PROCEDURE — 36415 COLL VENOUS BLD VENIPUNCTURE: CPT | Performed by: HOSPITALIST

## 2021-06-27 PROCEDURE — 250N000013 HC RX MED GY IP 250 OP 250 PS 637: Performed by: HOSPITALIST

## 2021-06-27 PROCEDURE — 99207 PR CDG-CODE CATEGORY CHANGED: CPT | Performed by: INTERNAL MEDICINE

## 2021-06-27 PROCEDURE — 80076 HEPATIC FUNCTION PANEL: CPT | Performed by: HOSPITALIST

## 2021-06-27 PROCEDURE — G0378 HOSPITAL OBSERVATION PER HR: HCPCS

## 2021-06-27 RX ADMIN — ESCITALOPRAM OXALATE 20 MG: 20 TABLET ORAL at 08:24

## 2021-06-27 RX ADMIN — GABAPENTIN 800 MG: 800 TABLET, FILM COATED ORAL at 08:23

## 2021-06-27 NOTE — PROVIDER NOTIFICATION
MD Notification    Notified Person: MD    Notified Person Name: Dr. Price    Notification Date/Time: 06/27/21 8:55 AM    Notification Interaction: Text Page    Purpose of Notification: Pt states he is already in an outpatient Chemical dependency treatment program through [facility]. Was hoping to Discharge him today. Psych and Chem Dep don't consult on weekends    Orders Received: Waiting for response    Comments:

## 2021-06-27 NOTE — DISCHARGE SUMMARY
[facility]  Discharge Summary        Romeo Maldonado MRN# [id number]   YOB: 1990 Age: 30 year old     Date of Admission:  6/26/2021  Date of Discharge:  6/27/2021  Admitting Physician:  Vazquez Duarte MD  Discharge Physician: Esther Price MD  Discharging Service: Hospitalist     Primary Provider: Иван Jaime  Primary Care Physician Phone Number: [phone]         Discharge Diagnoses/Problem Oriented Hospital Course (Providers):    Romeo Maldonado was admitted on 6/26/2021 by Vazquez Duarte MD and I would refer you to their history and physical.  The following problems were addressed during his hospitalization:  Assessment & Plan       Romeo Maldonado is a 30 year old male who is status post treatment with Narcan for an unintentional heroin overdose.  He was being observed in the ER and was going to discharge home, but he developed hypoxia and is requiring 3L/min oxygen by nasal canula.       Opioid(heroin) overdose   Opioid dependence   Treated with Narcan and has resolved.  Chemical dependency consultation recommended to patient  Patient said he just went through inpatient CHEM Depp treatment at [place] and is currently in outpatient program and is not refused to see CHEM Depp during this hospitalization       Acute respiratory failure, hypoxic  Status post respiratory arrest due to heroin  History of mild intermittent asthma  Initial chest X-ray was negative but after he started to require supplemental oxygen, repeat chest X-ray showed mild pulmonary vascular congestion. He is on 3L nasal canula.  Could be due to non-cardiogenic pulmonary edema from heroin overdose or from ongoing hypoventilation due to opoid toxicity. He does have some pain from the chest compressions so there may be some atelectasis. Aspiration pneumonitis is possible but seems unlikely.  On exam his lungs are clear to auscultation.    Able to be weaned off of oxygen  today    Could try inhaled bronchodilators if not improving     Anxiety and depression     Continue gabapentin and escitalopram        Diet: Regular Diet Adultregular   DVT Prophylaxis: Low Risk/Ambulatory with no VTE prophylaxis indicated  Marie Catheter: Not present  Central Lines: None  Code Status: Full Code Full                    Disposition Plan     Expected discharge: Discharge to home today in stable condition with family    Esther Price MD   Pager  [phone](7AM-6PM)          Code Status:      Full Code        Brief Hospital Stay Summary Sent Home With Patient in AVS:        Reason for your hospital stay      Heroin overdose causing respiratory arrest                 Important Results:      See below         Pending Results:        Unresulted Labs Ordered in the Past 30 Days of this Admission     No orders found for last 31 day(s).            Discharge Instructions and Follow-Up:      Follow-up Appointments     Follow-up and recommended labs and tests       Follow up with primary care provider, Иван Jaime, within 7 days   for hospital follow- up.  No follow up labs or test are needed.               Discharge Disposition:      Discharged to home        Discharge Medications:        Discharge Medication List as of 6/27/2021 10:04 AM      CONTINUE these medications which have NOT CHANGED    Details   escitalopram (LEXAPRO) 20 MG tablet Take 20 mg by mouth daily, Historical      gabapentin (NEURONTIN) 800 MG tablet Take 800 mg by mouth 3 times daily, Historical      albuterol (PROAIR HFA/PROVENTIL HFA/VENTOLIN HFA) 108 (90 Base) MCG/ACT inhaler Inhale 2 puffs into the lungs every 6 hours, HistoricalPharmacy may dispense brand covered by insurance (Proair, or proventil or ventolin or generic albuterol inhaler)               Allergies:         Allergies   Allergen Reactions     Ambien [Zolpidem] Shortness Of Breath     wheezing     Pollen Extract Unknown           Consultations This Hospital Stay:     "  No consults were done         Condition and Physical on Discharge:      Discharge condition: Stable   Vitals: Blood pressure (!) 142/85, pulse 60, temperature 98.3  F (36.8  C), temperature source Axillary, resp. rate 18, height 1.778 m (5' 10\"), weight 83.4 kg (183 lb 13.8 oz), SpO2 95 %.     Constitutional: Alert, awake, NAD    Lungs: CTA b/l    Cardiovascular: RRR   Abdomen: Soft, NT, ND, BS+   Skin: Warm and dry    Other:          Discharge Time:      Greater than 30 minutes.        Image Results From This Hospital Stay (For Non-EPIC Providers):        Results for orders placed or performed during the hospital encounter of 06/26/21   Chest XR,  PA & LAT    Narrative    EXAM: XR CHEST 2 VW  LOCATION: [facility]  DATE/TIME: 6/26/2021 6:03 AM    INDICATION: Hypoxia.  COMPARISON: 05/29/2015      Impression    IMPRESSION: Negative chest.   XR Chest Port 1 View    Narrative    XR CHEST PORT 1 VIEW 6/26/2021 12:20 PM    HISTORY: worsening SOB    COMPARISON: Radiograph earlier today at 6:20 AM      Impression    IMPRESSION:Mild pulmonary vascular congestion. Mild linear atelectasis  in the right lung base. No focal infiltrate, pleural effusion or  pneumothorax. Normal heart size. Tortuous aorta.    RAH PANHCAL MD             Most Recent Lab Results In EPIC (For Non-EPIC Providers):    Most Recent 3 CBC's:  Recent Labs   Lab Test 06/27/21  0819 06/26/21  0540 03/25/21  0552   WBC 7.9 13.0* 7.6   HGB 13.8 13.2* 13.7   MCV 89 90 87    239 217      Most Recent 3 BMP's:  Recent Labs   Lab Test 06/27/21  0819 06/26/21  0540 03/25/21  0552    136 141   POTASSIUM 4.3 3.9 3.6   CHLORIDE 109 102 105   CO2 27 32 29   BUN 11 18 22   CR 0.68 1.04 0.76   ANIONGAP 5 2* 7   NILA 8.4* 8.1* 8.0*   GLC 99 117* 95     Most Recent 3 Troponin's:  Recent Labs   Lab Test 06/26/21  1349 06/26/21  1139   TROPI <0.015 0.018     Most Recent 3 INR's:  Recent Labs   Lab Test 03/24/21  1719   INR 1.01     Most Recent 2 " LFT's:  Recent Labs   Lab Test 06/27/21  0819 03/26/21  0510   AST 16 140*   ALT 27 105*   ALKPHOS 53 38*   BILITOTAL 0.7 0.2     Most Recent Cholesterol Panel:No lab results found.  Most Recent 6 Bacteria Isolates From Any Culture (See EPIC Reports for Culture Details):  Recent Labs   Lab Test 05/29/15  1138 05/29/15  1135 03/03/14  2225 03/03/14  2159   CULT No Beta Streptococcus isolated No growth No growth No growth     Most Recent TSH, T4 and HgbA1c:   Recent Labs   Lab Test 09/24/15  0745   TSH 0.91

## 2021-06-27 NOTE — PLAN OF CARE
DATE & TIME: 6/26/21-6/27/21 6025-9331   Cognitive Concerns/ Orientation : Pt A/Ox4   BEHAVIOR & AGGRESSION TOOL COLOR: Gree   ABNL VS/O2: VSS on RA  MOBILITY: Independent  PAIN MANAGMENT: Denies  DIET: Regular  BOWEL/BLADDER: Continent  DRAIN/DEVICES: PIV SL  SKIN: Intact  D/C DATE: Discharge likely today  OTHER IMPORTANT INFO: Lung sounds clear. Pt complained of sternal discomfort d/t chest compressions.

## 2021-06-27 NOTE — PROGRESS NOTES
Observation goals PRIOR TO DISCHARGE     Comments: -vital signs normal or at patient baseline met  Nurse to notify provider when observation goals have been met and patient is ready for discharge.

## 2021-06-27 NOTE — PLAN OF CARE
DATE & TIME: 6/26/21 1900-2300    Cognitive Concerns/ Orientation : Pt A/Ox4   BEHAVIOR & AGGRESSION TOOL COLOR: Green   ABNL VS/O2: VSS on RA  MOBILITY: Independent  PAIN MANAGMENT: Denies  DIET: Regular  BOWEL/BLADDER: Continent  DRAIN/DEVICES: IV SL  SKIN: Intact  D/C DATE: Discharge tomorrow  OTHER IMPORTANT INFO: Lung sounds clear.

## 2021-06-27 NOTE — PLAN OF CARE
Pt discharged back home via family. Refused Psych and Chem dep consults because he is currently in a outpatient program with [facility]. AVS gone over with patient in room and all questions were answered.     Sierra Hawkins RN

## 2021-07-04 ENCOUNTER — APPOINTMENT (OUTPATIENT)
Dept: GENERAL RADIOLOGY | Facility: CLINIC | Age: 31
DRG: 885 | End: 2021-07-04
Attending: EMERGENCY MEDICINE
Payer: COMMERCIAL

## 2021-07-04 ENCOUNTER — HOSPITAL ENCOUNTER (INPATIENT)
Facility: CLINIC | Age: 31
LOS: 9 days | Discharge: PSYCHIATRIC OR SUBSTANCE USE FACILITY | DRG: 885 | End: 2021-07-15
Attending: EMERGENCY MEDICINE | Admitting: PSYCHIATRY & NEUROLOGY
Payer: COMMERCIAL

## 2021-07-04 DIAGNOSIS — F32.9 CURRENT EPISODE OF MAJOR DEPRESSIVE DISORDER WITHOUT PRIOR EPISODE, UNSPECIFIED DEPRESSION EPISODE SEVERITY: ICD-10-CM

## 2021-07-04 DIAGNOSIS — R50.9 HYPERTHERMIA: ICD-10-CM

## 2021-07-04 DIAGNOSIS — Z11.52 ENCOUNTER FOR SCREENING LABORATORY TESTING FOR SEVERE ACUTE RESPIRATORY SYNDROME CORONAVIRUS 2 (SARS-COV-2): ICD-10-CM

## 2021-07-04 DIAGNOSIS — K59.09 OTHER CONSTIPATION: ICD-10-CM

## 2021-07-04 DIAGNOSIS — F11.23 OPIOID DEPENDENCE WITH WITHDRAWAL (H): ICD-10-CM

## 2021-07-04 DIAGNOSIS — F41.1 ANXIETY STATE: ICD-10-CM

## 2021-07-04 DIAGNOSIS — K59.03 DRUG-INDUCED CONSTIPATION: ICD-10-CM

## 2021-07-04 DIAGNOSIS — F51.01 PRIMARY INSOMNIA: ICD-10-CM

## 2021-07-04 DIAGNOSIS — R09.02 HYPOXIA: Primary | ICD-10-CM

## 2021-07-04 DIAGNOSIS — N17.9 ACUTE RENAL FAILURE, UNSPECIFIED ACUTE RENAL FAILURE TYPE (H): ICD-10-CM

## 2021-07-04 DIAGNOSIS — R50.9 HYPERTHERMIA-INDUCED DEFECT: ICD-10-CM

## 2021-07-04 LAB
ALBUMIN SERPL-MCNC: 4.8 G/DL (ref 3.4–5)
ALBUMIN UR-MCNC: 300 MG/DL
ALP SERPL-CCNC: 92 U/L (ref 40–150)
ALT SERPL W P-5'-P-CCNC: 27 U/L (ref 0–70)
AMPHETAMINES UR QL SCN: POSITIVE
ANION GAP SERPL CALCULATED.3IONS-SCNC: 4 MMOL/L (ref 3–14)
APAP SERPL-MCNC: <2 MG/L (ref 10–20)
APPEARANCE UR: ABNORMAL
AST SERPL W P-5'-P-CCNC: 36 U/L (ref 0–45)
BACTERIA #/AREA URNS HPF: ABNORMAL /HPF
BARBITURATES UR QL: NEGATIVE
BASOPHILS # BLD AUTO: 0 10E9/L (ref 0–0.2)
BASOPHILS NFR BLD AUTO: 0.3 %
BENZODIAZ UR QL: NEGATIVE
BILIRUB SERPL-MCNC: 0.9 MG/DL (ref 0.2–1.3)
BILIRUB UR QL STRIP: NEGATIVE
BUN SERPL-MCNC: 18 MG/DL (ref 7–30)
CALCIUM SERPL-MCNC: 9.5 MG/DL (ref 8.5–10.1)
CANNABINOIDS UR QL SCN: NEGATIVE
CHLORIDE SERPL-SCNC: 109 MMOL/L (ref 94–109)
CK SERPL-CCNC: 993 U/L (ref 30–300)
CO2 SERPL-SCNC: 29 MMOL/L (ref 20–32)
COCAINE UR QL: NEGATIVE
COLOR UR AUTO: YELLOW
CREAT SERPL-MCNC: 1.35 MG/DL (ref 0.66–1.25)
DIFFERENTIAL METHOD BLD: ABNORMAL
EOSINOPHIL # BLD AUTO: 0 10E9/L (ref 0–0.7)
EOSINOPHIL NFR BLD AUTO: 0.1 %
ERYTHROCYTE [DISTWIDTH] IN BLOOD BY AUTOMATED COUNT: 12.3 % (ref 10–15)
ETHANOL UR QL SCN: NEGATIVE
GFR SERPL CREATININE-BSD FRML MDRD: 69 ML/MIN/{1.73_M2}
GLUCOSE SERPL-MCNC: 140 MG/DL (ref 70–99)
GLUCOSE UR STRIP-MCNC: NEGATIVE MG/DL
HCT VFR BLD AUTO: 47.7 % (ref 40–53)
HGB BLD-MCNC: 16 G/DL (ref 13.3–17.7)
HGB UR QL STRIP: NEGATIVE
HYALINE CASTS #/AREA URNS LPF: 28 /LPF (ref 0–2)
IMM GRANULOCYTES # BLD: 0.1 10E9/L (ref 0–0.4)
IMM GRANULOCYTES NFR BLD: 0.4 %
KETONES UR STRIP-MCNC: 10 MG/DL
LABORATORY COMMENT REPORT: NORMAL
LACTATE BLD-SCNC: 0.8 MMOL/L (ref 0.7–2)
LACTATE BLD-SCNC: 2.5 MMOL/L (ref 0.7–2)
LEUKOCYTE ESTERASE UR QL STRIP: NEGATIVE
LYMPHOCYTES # BLD AUTO: 2 10E9/L (ref 0.8–5.3)
LYMPHOCYTES NFR BLD AUTO: 16.3 %
MCH RBC QN AUTO: 29.5 PG (ref 26.5–33)
MCHC RBC AUTO-ENTMCNC: 33.5 G/DL (ref 31.5–36.5)
MCV RBC AUTO: 88 FL (ref 78–100)
MONOCYTES # BLD AUTO: 1.5 10E9/L (ref 0–1.3)
MONOCYTES NFR BLD AUTO: 12.2 %
MUCOUS THREADS #/AREA URNS LPF: PRESENT /LPF
NEUTROPHILS # BLD AUTO: 8.4 10E9/L (ref 1.6–8.3)
NEUTROPHILS NFR BLD AUTO: 70.7 %
NITRATE UR QL: NEGATIVE
NRBC # BLD AUTO: 0 10*3/UL
NRBC BLD AUTO-RTO: 0 /100
OPIATES UR QL SCN: POSITIVE
PH UR STRIP: 5.5 PH (ref 5–7)
PLATELET # BLD AUTO: 333 10E9/L (ref 150–450)
POTASSIUM SERPL-SCNC: 3.8 MMOL/L (ref 3.4–5.3)
PROT SERPL-MCNC: 8.8 G/DL (ref 6.8–8.8)
RBC # BLD AUTO: 5.43 10E12/L (ref 4.4–5.9)
RBC #/AREA URNS AUTO: 2 /HPF (ref 0–2)
SALICYLATES SERPL-MCNC: <2 MG/DL
SARS-COV-2 RNA RESP QL NAA+PROBE: NEGATIVE
SODIUM SERPL-SCNC: 142 MMOL/L (ref 133–144)
SOURCE: ABNORMAL
SP GR UR STRIP: 1.02 (ref 1–1.03)
SPECIMEN SOURCE: NORMAL
SQUAMOUS #/AREA URNS AUTO: 0 /HPF (ref 0–1)
UROBILINOGEN UR STRIP-MCNC: NORMAL MG/DL (ref 0–2)
WBC # BLD AUTO: 11.9 10E9/L (ref 4–11)
WBC #/AREA URNS AUTO: 3 /HPF (ref 0–5)

## 2021-07-04 PROCEDURE — 81001 URINALYSIS AUTO W/SCOPE: CPT | Performed by: EMERGENCY MEDICINE

## 2021-07-04 PROCEDURE — 99285 EMERGENCY DEPT VISIT HI MDM: CPT | Mod: 25 | Performed by: EMERGENCY MEDICINE

## 2021-07-04 PROCEDURE — 93010 ELECTROCARDIOGRAM REPORT: CPT | Performed by: EMERGENCY MEDICINE

## 2021-07-04 PROCEDURE — 83605 ASSAY OF LACTIC ACID: CPT | Performed by: EMERGENCY MEDICINE

## 2021-07-04 PROCEDURE — 96360 HYDRATION IV INFUSION INIT: CPT | Performed by: EMERGENCY MEDICINE

## 2021-07-04 PROCEDURE — 80143 DRUG ASSAY ACETAMINOPHEN: CPT | Performed by: EMERGENCY MEDICINE

## 2021-07-04 PROCEDURE — 80053 COMPREHEN METABOLIC PANEL: CPT | Performed by: EMERGENCY MEDICINE

## 2021-07-04 PROCEDURE — 85025 COMPLETE CBC W/AUTO DIFF WBC: CPT | Performed by: EMERGENCY MEDICINE

## 2021-07-04 PROCEDURE — 82550 ASSAY OF CK (CPK): CPT | Performed by: EMERGENCY MEDICINE

## 2021-07-04 PROCEDURE — 80320 DRUG SCREEN QUANTALCOHOLS: CPT | Performed by: EMERGENCY MEDICINE

## 2021-07-04 PROCEDURE — 96361 HYDRATE IV INFUSION ADD-ON: CPT | Performed by: EMERGENCY MEDICINE

## 2021-07-04 PROCEDURE — 80179 DRUG ASSAY SALICYLATE: CPT | Performed by: EMERGENCY MEDICINE

## 2021-07-04 PROCEDURE — 87635 SARS-COV-2 COVID-19 AMP PRB: CPT | Performed by: EMERGENCY MEDICINE

## 2021-07-04 PROCEDURE — 93005 ELECTROCARDIOGRAM TRACING: CPT | Performed by: EMERGENCY MEDICINE

## 2021-07-04 PROCEDURE — 258N000003 HC RX IP 258 OP 636: Performed by: EMERGENCY MEDICINE

## 2021-07-04 PROCEDURE — 99223 1ST HOSP IP/OBS HIGH 75: CPT | Mod: AI | Performed by: INTERNAL MEDICINE

## 2021-07-04 PROCEDURE — 250N000013 HC RX MED GY IP 250 OP 250 PS 637: Performed by: EMERGENCY MEDICINE

## 2021-07-04 PROCEDURE — C9803 HOPD COVID-19 SPEC COLLECT: HCPCS | Performed by: EMERGENCY MEDICINE

## 2021-07-04 PROCEDURE — 83735 ASSAY OF MAGNESIUM: CPT | Performed by: INTERNAL MEDICINE

## 2021-07-04 PROCEDURE — 71046 X-RAY EXAM CHEST 2 VIEWS: CPT

## 2021-07-04 PROCEDURE — 87040 BLOOD CULTURE FOR BACTERIA: CPT | Performed by: EMERGENCY MEDICINE

## 2021-07-04 PROCEDURE — 80307 DRUG TEST PRSMV CHEM ANLYZR: CPT | Performed by: EMERGENCY MEDICINE

## 2021-07-04 RX ORDER — SODIUM CHLORIDE 9 MG/ML
INJECTION, SOLUTION INTRAVENOUS ONCE
Status: COMPLETED | OUTPATIENT
Start: 2021-07-04 | End: 2021-07-04

## 2021-07-04 RX ORDER — HYDROXYZINE HYDROCHLORIDE 25 MG/1
25 TABLET, FILM COATED ORAL 3 TIMES DAILY PRN
Status: ON HOLD | COMMUNITY
End: 2021-07-14

## 2021-07-04 RX ORDER — LIDOCAINE 40 MG/G
CREAM TOPICAL
Status: DISCONTINUED | OUTPATIENT
Start: 2021-07-04 | End: 2021-07-06

## 2021-07-04 RX ORDER — NICOTINE 21 MG/24HR
1 PATCH, TRANSDERMAL 24 HOURS TRANSDERMAL ONCE
Status: COMPLETED | OUTPATIENT
Start: 2021-07-04 | End: 2021-07-05

## 2021-07-04 RX ORDER — SODIUM CHLORIDE 9 MG/ML
INJECTION, SOLUTION INTRAVENOUS CONTINUOUS
Status: DISCONTINUED | OUTPATIENT
Start: 2021-07-04 | End: 2021-07-06

## 2021-07-04 RX ORDER — ACETAMINOPHEN 325 MG/1
650 TABLET ORAL EVERY 6 HOURS PRN
Status: DISCONTINUED | OUTPATIENT
Start: 2021-07-04 | End: 2021-07-06

## 2021-07-04 RX ADMIN — NICOTINE POLACRILEX 2 MG: 2 GUM, CHEWING BUCCAL at 20:14

## 2021-07-04 RX ADMIN — NICOTINE 1 PATCH: 21 PATCH, EXTENDED RELEASE TRANSDERMAL at 21:16

## 2021-07-04 RX ADMIN — SODIUM CHLORIDE: 9 INJECTION, SOLUTION INTRAVENOUS at 19:29

## 2021-07-04 RX ADMIN — SODIUM CHLORIDE: 9 INJECTION, SOLUTION INTRAVENOUS at 19:34

## 2021-07-04 RX ADMIN — SODIUM CHLORIDE 1000 ML: 9 INJECTION, SOLUTION INTRAVENOUS at 17:40

## 2021-07-04 NOTE — ED PROVIDER NOTES
ED Provider Note  [facility]      History     Chief Complaint   Patient presents with     Depression     Severe depression, recently out of detox 2 days ago, used opiates and meth, not suicidal but afraid it will get to that point      HPI  Romeo Maldonado is a 30 year old male who presents for depression and substance use.     Has a hx of anxiety, depression and polysubstance abuse with his drug of choice being opiates.   He was admitted to [facility] from 6/26-6/27 for unintentional heroin overdose requiring reversal by narcan and resulting in mild pulmonary edema. From [facility] he spent several days in detox at [facility]. Since leaving [place] he feels anxious, severely depressed and struggling after a stressor of a failed relationship with the mother of his daughter (now ex-fiance). Denies current SI or HI. Has been taking lexapro and gabapentin.   He has used alcohol in small volume yesterday, heroin regularly, and this AM a partial tablet of Adderall.     Has been out on the street for the last 24 hours in 90 degree heat, ans comes in with diaphoresis and fever to 102. He denies cough, shortness of breath, abdominal pain, nausea and vomiting, dysuria, cellulitis. Has minimal right forearm injection site pain. He does not share needles, but has fear of getting HIV. Has a hx of treated Hep C.     Additional information obtained from history from DEC :  1 beer yesterday  Heroin only 10 times since April 2021  Small amount of adderall today  Working  Had been living in sober setting after 28-day treatment, now staying in a hotel.   Taking lexapro as scheduled. Not taking hydroxyzine. Has been prescribed gabapentin previously but no longer due to misusing it.      Past Medical History  Past Medical History:   Diagnosis Date     Allergic state      Anxiety      Chronic kidney disease      Depressive disorder      Hepatitis C      Substance abuse (H)     meth use      Uncomplicated asthma      Past Surgical History:   Procedure Laterality Date     INCISION AND DRAINAGE SOFT TISSUE UPPER EXTREMITY, COMBINED  8/11/2012    Procedure: COMBINED INCISION AND DRAINAGE SOFT TISSUE UPPER EXTREMITY;  Incision and drainage Right Arm Abscess;  Surgeon: Cheli White MD;  Location:  OR     escitalopram (LEXAPRO) 20 MG tablet  hydrOXYzine (ATARAX) 25 MG tablet  albuterol (PROAIR HFA/PROVENTIL HFA/VENTOLIN HFA) 108 (90 Base) MCG/ACT inhaler      Allergies   Allergen Reactions     Ambien [Zolpidem] Shortness Of Breath     wheezing     Pollen Extract Unknown     Family History  Family History   Problem Relation Age of Onset     Hypertension Father      Social History   Social History     Tobacco Use     Smoking status: Current Some Day Smoker     Packs/day: 0.25     Smokeless tobacco: Never Used   Substance Use Topics     Alcohol use: Yes     Comment: occasional     Drug use: Yes     Types: Methamphetamines, Opiates, IV     Comment: heroin daily, meth a couple of times in the last 2 weks      Past medical history, past surgical history, medications, allergies, family history, and social history were reviewed with the patient. No additional pertinent items.       Review of Systems  A complete review of systems was performed with pertinent positives and negatives noted in the HPI, and all other systems negative.    Physical Exam   BP: (!) 152/65  Pulse: 125  Temp: 102.7  F (39.3  C)  Resp: 16  SpO2: 94 %       Physical Exam  Gen:A&Ox3, slightly restless and speech pressured but linear, diaphoretic  HEENT:PERRL, no facial tenderness or wounds, head atraumatic, oropharynx clear, mucous membranes moist, TMs clear bilaterally  Neck:no bony tenderness or step offs, no JVD, trachea midline  Back: no CVA tenderness, no midline bony tenderness  CV:tachycardic without murmurs  PULM:Clear to auscultation bilaterally  Abd:soft, nontender, nondistended. Bowel sounds present and normal  UE:No traumatic  injuries, skin normal  LE:no traumatic injuries, skin normal, no LE edema.   Neuro:CN II-XII intact, strength 5/5 throughout, gait stable, coordination normal  Skin: no rashes or ecchymoses, moist      ED Course     ED Course as of Jul 06 1044   Tue Jul 06, 2021   0037 Voluntary, holdable (would overdose on fentanyl)        Procedures             EKG Interpretation:      Interpreted by Haylee Alcala MD  Time reviewed: 5:34pm  Symptoms at time of EKG: hyperthermia  Rhythm: normal sinus   Rate: 94  Axis: normal  Ectopy: none  Conduction: normal  ST Segments/ T Waves: LVH  Q Waves: none  Comparison to prior: Unchanged from June 26, 2021    Clinical Impression: NSR with LVH       Results for orders placed or performed during the hospital encounter of 07/04/21   Chest XR,  PA & LAT     Status: None    Narrative    CHEST TWO VIEWS July 4, 2021 6:26 PM     HISTORY: Fever, evaluate for infiltrate.    COMPARISON: June 26, 2021.       Impression    IMPRESSION: There are no acute infiltrates. The cardiac silhouette is  not enlarged. Pulmonary vasculature is unremarkable.    DANGELO ACOSTA MD          SYSTEM ID:  [id number]   CBC with platelets differential     Status: Abnormal   Result Value Ref Range    WBC 11.9 (H) 4.0 - 11.0 10e9/L    RBC Count 5.43 4.4 - 5.9 10e12/L    Hemoglobin 16.0 13.3 - 17.7 g/dL    Hematocrit 47.7 40.0 - 53.0 %    MCV 88 78 - 100 fl    MCH 29.5 26.5 - 33.0 pg    MCHC 33.5 31.5 - 36.5 g/dL    RDW 12.3 10.0 - 15.0 %    Platelet Count 333 150 - 450 10e9/L    Diff Method Automated Method     % Neutrophils 70.7 %    % Lymphocytes 16.3 %    % Monocytes 12.2 %    % Eosinophils 0.1 %    % Basophils 0.3 %    % Immature Granulocytes 0.4 %    Nucleated RBCs 0 0 /100    Absolute Neutrophil 8.4 (H) 1.6 - 8.3 10e9/L    Absolute Lymphocytes 2.0 0.8 - 5.3 10e9/L    Absolute Monocytes 1.5 (H) 0.0 - 1.3 10e9/L    Absolute Eosinophils 0.0 0.0 - 0.7 10e9/L    Absolute Basophils 0.0 0.0 - 0.2 10e9/L    Abs Immature  Granulocytes 0.1 0 - 0.4 10e9/L    Absolute Nucleated RBC 0.0    Comprehensive metabolic panel     Status: Abnormal   Result Value Ref Range    Sodium 142 133 - 144 mmol/L    Potassium 3.8 3.4 - 5.3 mmol/L    Chloride 109 94 - 109 mmol/L    Carbon Dioxide 29 20 - 32 mmol/L    Anion Gap 4 3 - 14 mmol/L    Glucose 140 (H) 70 - 99 mg/dL    Urea Nitrogen 18 7 - 30 mg/dL    Creatinine 1.35 (H) 0.66 - 1.25 mg/dL    GFR Estimate 69 >60 mL/min/[1.73_m2]    GFR Estimate If Black 79 >60 mL/min/[1.73_m2]    Calcium 9.5 8.5 - 10.1 mg/dL    Bilirubin Total 0.9 0.2 - 1.3 mg/dL    Albumin 4.8 3.4 - 5.0 g/dL    Protein Total 8.8 6.8 - 8.8 g/dL    Alkaline Phosphatase 92 40 - 150 U/L    ALT 27 0 - 70 U/L    AST 36 0 - 45 U/L   Lactic acid     Status: Abnormal   Result Value Ref Range    Lactic Acid 2.5 (H) 0.7 - 2.0 mmol/L   UA with Microscopic reflex to Culture     Status: Abnormal    Specimen: Urine clean catch; Unspecified Urine   Result Value Ref Range    Color Urine Yellow     Appearance Urine Slightly Cloudy     Glucose Urine Negative NEG^Negative mg/dL    Bilirubin Urine Negative NEG^Negative    Ketones Urine 10 (A) NEG^Negative mg/dL    Specific Gravity Urine 1.020 1.003 - 1.035    Blood Urine Negative NEG^Negative    pH Urine 5.5 5.0 - 7.0 pH    Protein Albumin Urine 300 (A) NEG^Negative mg/dL    Urobilinogen mg/dL Normal 0.0 - 2.0 mg/dL    Nitrite Urine Negative NEG^Negative    Leukocyte Esterase Urine Negative NEG^Negative    Source Unspecified Urine     WBC Urine 3 0 - 5 /HPF    RBC Urine 2 0 - 2 /HPF    Bacteria Urine None (A) NEG^Negative /HPF    Squamous Epithelial /HPF Urine 0 0 - 1 /HPF    Mucous Urine Present (A) NEG^Negative /LPF    Hyaline Casts 28 (H) 0 - 2 /LPF   Drug abuse screen 6 urine (tox)     Status: Abnormal   Result Value Ref Range    Amphetamine Qual Urine Positive (A) NEG^Negative    Barbiturates Qual Urine Negative NEG^Negative    Benzodiazepine Qual Urine Negative NEG^Negative    Cannabinoids Qual  Urine Negative NEG^Negative    Cocaine Qual Urine Negative NEG^Negative    Ethanol Qual Urine Negative NEG^Negative    Opiates Qualitative Urine Positive (A) NEG^Negative   Symptomatic SARS-CoV-2 COVID-19 Virus (Coronavirus) by PCR     Status: None    Specimen: Nasopharyngeal   Result Value Ref Range    SARS-CoV-2 Virus Specimen Source Nasopharyngeal     SARS-CoV-2 PCR Result NEGATIVE     SARS-CoV-2 PCR Comment (Note)    CK total     Status: Abnormal   Result Value Ref Range    CK Total 993 (H) 30 - 300 U/L   Acetaminophen level     Status: None   Result Value Ref Range    Acetaminophen Level <2 mg/L   Salicylate level     Status: None   Result Value Ref Range    Salicylate Level <2 mg/dL   Lactic acid     Status: None   Result Value Ref Range    Lactic Acid 0.8 0.7 - 2.0 mmol/L   Basic metabolic panel     Status: Abnormal   Result Value Ref Range    Sodium 138 133 - 144 mmol/L    Potassium 4.0 3.4 - 5.3 mmol/L    Chloride 106 94 - 109 mmol/L    Carbon Dioxide 29 20 - 32 mmol/L    Anion Gap 3 3 - 14 mmol/L    Glucose 104 (H) 70 - 99 mg/dL    Urea Nitrogen 17 7 - 30 mg/dL    Creatinine 0.79 0.66 - 1.25 mg/dL    GFR Estimate >90 >60 mL/min/[1.73_m2]    GFR Estimate If Black >90 >60 mL/min/[1.73_m2]    Calcium 8.6 8.5 - 10.1 mg/dL   CBC with platelets differential     Status: None   Result Value Ref Range    WBC 6.0 4.0 - 11.0 10e9/L    RBC Count 4.82 4.4 - 5.9 10e12/L    Hemoglobin 14.3 13.3 - 17.7 g/dL    Hematocrit 43.7 40.0 - 53.0 %    MCV 91 78 - 100 fl    MCH 29.7 26.5 - 33.0 pg    MCHC 32.7 31.5 - 36.5 g/dL    RDW 12.0 10.0 - 15.0 %    Platelet Count 235 150 - 450 10e9/L    Diff Method Automated Method     % Neutrophils 57.6 %    % Lymphocytes 28.9 %    % Monocytes 10.4 %    % Eosinophils 2.3 %    % Basophils 0.5 %    % Immature Granulocytes 0.3 %    Nucleated RBCs 0 0 /100    Absolute Neutrophil 3.5 1.6 - 8.3 10e9/L    Absolute Lymphocytes 1.7 0.8 - 5.3 10e9/L    Absolute Monocytes 0.6 0.0 - 1.3 10e9/L     "Absolute Eosinophils 0.1 0.0 - 0.7 10e9/L    Absolute Basophils 0.0 0.0 - 0.2 10e9/L    Abs Immature Granulocytes 0.0 0 - 0.4 10e9/L    Absolute Nucleated RBC 0.0    CK total     Status: Abnormal   Result Value Ref Range    CK Total 532 (H) 30 - 300 U/L   EKG 12 lead     Status: None   Result Value Ref Range    Interpretation ECG Click View Image link to view waveform and result    Psychiatry IP Consult: Severe depression; Consultant may enter orders: Yes; Patient to be seen: Routine; Call back #: Pager [phone]; Requesting provider? Attending physician     Status: None ()    Carrol Estrada, NATIVIDAD CNP     7/5/2021 12:23 PM    [facility], [place]   Initial Psychiatric Consult   Consult date: July 5, 2021         Reason for Consult, requesting source:    severe depression; Severe depression, recently out of detox 2   days ago, used opiates and meth, not suicidal but afraid it will   get to that point   Requesting source: Ronny Strong        HPI:   Admitted 7/4/21  Per H&P: \"Romeo Maldonado is a 30 year old male who presents for   depression and substance use.    Has a hx of anxiety, depression and polysubstance abuse with his   drug of choice being opiates.   He was admitted to [facility] from 6/26-6/27 for   unintentional heroin overdose requiring reversal by narcan and   resulting in mild pulmonary edema. From [facility] he spent   several days in detox at [facility]. Since leaving [place] he   feels anxious, severely depressed and struggling after a stressor   of a failed relationship with the mother of his daughter (now   ex-fiance). Denies current SI or HI. Has been taking lexapro and   gabapentin.   He has used alcohol in small volume yesterday, heroin regularly,   and this AM a partial tablet of Adderall.   Has been out on the street for the last 24 hours in 90 degree   heat, ans comes in with diaphoresis and fever to 102. He denies   cough, shortness of " "breath, abdominal pain, nausea and vomiting,   dysuria, cellulitis. Has minimal right forearm injection site   pain. He does not share needles, but has fear of getting HIV. Has   a hx of treated Hep C.\"    DEC: \"1 beer yesterday  Heroin only 10 times since April 2021- (recent IV use w clean   needle per PT)  Small amount of adderall\"    Came is w fever of 102- no afebrile and in my meeting w him NAD,   no s/s of withdrawal. No GI distress, feels hungry. He tell me   that typically WD Sx are mild. He states he has used heroin X 2   since discontinue from [facility] on 6/27/21.    USD positive for opiates, amphetamine; neg for alcohol  Neg for Covid. Blood cultures no growth thus far. CXR no   infiltrates.   EKG QTc 410.    The patient requests to be admitted directly to psychiatry for   treatment of depression- he denies SI at this time but is very   worried he could become suicidal and use dugs again to point of   over dose. .  The plan at this time is to admit to 8A medical;   recent fevers (now appears to be resolved) and to monitor detox.   From my understanding today 3A detox is full.      He was on 3A detox (Dr Jeter) in April 2021. At time of admission   UDS positive for amphetamine, opiates, benzodiazepine, and   cannabinoid.  He tells me he was discharged from there to   [facility]. Meds at discharge: Lexapro 15 mg, Buspar 5 TID,   Suboxone 2/0.5 film daily PRN, clonidine 0.1 mg BID, hydroxyzine   25 mg q 6 hr PRN, quetiapine 25 mg HS PRN. He states that he   stopped taking the Suboxone and did not use PRN meds. Suboxone is   very constipating for him, per his report.  Per records:   Prozac- worked some  Celexa, Paxil, Effexor- \"shaky  Cymbalta- worked some, hard withdrawal  Wellbutrin- Panic and increased anxiety  Gabapentin_ HX of misuse  Ambien- unspecified allergy    He tells me he has had many selective serotonin reuptake   inhibitor trials and none work although he had previously stated   that Lexapro had helped " some.   He thinks his depression is   contributing to his CD relapse. As stated, above, he has   psychosocial stressors. GF kicked him out of the house when he   was in treatment and she is already seeing someone else. They had   been together for 5 years and have a young daughter.         Past Psychiatric History:   PSYCHIATRIC HOSPITALIZATIONS: numerous. [place] 2012 and 3A   detox April 2021. Numerous CD admissions  PSYCHIATRIC TREATMENT/DX: Depression, PTSD, FE, substance use   disorder.   CURRENT PSYCHIATRIC PROVIDER: [facility].   THERAPIST: through [facility], has seen a couple of times  PSYCHOTROPIC HX/RESPONSE/ADR/ADHERENCE: as noted above  ONSET OF PSYCHIATRIC SYMPTOMS: teens, depression- continuous.  RECENT STRESSORS: B/U w fiance  HX SUICIDE ATTEMPTS/SIB: overdose Hx  SLEEP: poor  INTEREST/ENERGY: low  FOCUS/CONCENTRATION: fair  ST AND LT MEMORY: both intact  APPETITE: adequate  FEEDING ISSUES: none identified  MOOD HX/DEPRESSION/TAMIKO: depression no clear Hx of tamiko  ANXIETY/PANIC: anxious, no clear Hx of panic  OBSESSION/COMPULSIONS: none  TRAUMA-NIGHTMARES/INTRUSIVE THOUGHTS/FLASHBACKS: ? Hx but denies   today  HALLUCINATIONS: denies  DELUSIONS: none evident  PARANOIA: none evident  OTHER sx THOUGHT DISORDER: none    TBI/LOC: denies  DELIRIUM SCREEN: neg  HISTORY OF COMMITMENT/COURT AUTH MED: denies  No Hx of ECT          Substance Use and History:   Per Hx reviewed w him today: recent use of heroin X 2 since 6/27   including IV use. Used Adderall PO PTA.  Recent opiate overdose   requiring Narcan reversal 6/26/21 at [facility].  Per Dr Jeter in April 2021:  history of abusing benzos and heroin   in the past.  Started with pain pills at the age of 15; at age   17, he switched to heroin.  He was on Suboxone maintenance.  Two   months ago, he got it, but he did not take it.  He continued to   relapse.  He has been using heroin.  He smokes it.  He has   tolerance, withdrawal,  progressive use with loss of control, and   tried to quit unsuccessfully.  Benzos have been his drug of   choice in the past.  This relapse has been occurring for 2   months.  He has been taking Xanax 2 mg twice a day.  This has   been going on for 2 months.  He has tolerance, withdrawal,   progressive use with loss of control.  He has had blackouts.  He   has no history of seizures.  He denies using any alcohol.  He   vapes nicotine.  He states that he wants to sabotage, and that is   why he has done this.    He has a history of using meth.  He has history of using   alcohol.  He has history of overdosing.  He had numerous   psychiatric hospitalizations for meth use.  He has history of   overdose in 2012.    Tobacco- 0.25 PPD    Most recent CD Tx [facility] and then [facility], still has   therapist at [facility].    Today he expressed ambivalence about restarting Suboxone. He says   is causes him constipation. I discuss the danger of relapse   including death, recently requiring Narcan, and that the   constipation can be managed. He states he will give it some   consideration.     Hx of DUI          Past Medical History:   PAST MEDICAL HISTORY:   Past Medical History:   Diagnosis Date     Allergic state      Anxiety      Chronic kidney disease      Depressive disorder      Hepatitis C      Substance abuse (H)     meth use     Uncomplicated asthma        PAST SURGICAL HISTORY:   Past Surgical History:   Procedure Laterality Date     INCISION AND DRAINAGE SOFT TISSUE UPPER EXTREMITY, COMBINED    8/11/2012    Procedure: COMBINED INCISION AND DRAINAGE SOFT TISSUE UPPER   EXTREMITY;  Incision and drainage Right Arm Abscess;  Surgeon:   Cheli White MD;  Location: [facility] OR             Family History:   FAMILY HISTORY:   Family History   Problem Relation Age of Onset     Hypertension Father    HX OF SUICIDE IN FAMILY: denies  HX OF CD IN FAMILY: pGF alcohol  Hx MI in FAMILY: mother        Social History:   Living in hotel  at this time. States former [employer]/finance kicked him   out of the house when he was at [facility]. She is now with someone   else. They have a young daughter together. Born in [facility].   Only child. Parent are  and he was raised by his mother.     He was a student in [place] at [employer] but is now on leave.   He was working at [employer] but tells me he is working on   FMLA leave to get addiction and MH treated.          Physical ROS:   The patient endorsed no acute physical distress. The remainder of   10-point review of systems was negative except as noted in HPI.         Medications:     Current Facility-Administered Medications:      acetaminophen (TYLENOL) tablet 650 mg, 650 mg, Oral, Q6H PRN,   Ronny Greer MD     lidocaine (LMX4) cream, , Topical, Q1H PRN, Ronny Greer MD     lidocaine 1 % 0.1-1 mL, 0.1-1 mL, Other, Q1H PRN,   Ronny Greer MD     nicotine (NICODERM CQ) 21 MG/24HR 24 hr patch 1 patch, 1   patch, Transdermal, Once, Haylee Alcala MD, 1 patch at   07/04/21 2116     sodium chloride (PF) 0.9% PF flush 3 mL, 3 mL, Intracatheter,   Q8H, Ronny Greer MD     sodium chloride (PF) 0.9% PF flush 3 mL, 3 mL, Intracatheter,   q1 min prn, Ronny Greer MD     sodium chloride 0.9% infusion, , Intravenous, Continuous,   Ronny Greer MD    Current Outpatient Medications:      escitalopram (LEXAPRO) 20 MG tablet, Take 20 mg by mouth   daily, Disp: , Rfl:      hydrOXYzine (ATARAX) 25 MG tablet, Take 25 mg by mouth 3 times   daily as needed for itching, Disp: , Rfl:      albuterol (PROAIR HFA/PROVENTIL HFA/VENTOLIN HFA) 108 (90   Base) MCG/ACT inhaler, Inhale 2 puffs into the lungs every 6   hours, Disp: , Rfl:     Facility-Administered Medications Ordered in Other Encounters:      Self Administer Medications: Behavioral Services, , Does not   apply, See Admin Instructions, Mian Hurley MD  (Not in a hospital  admission)         Allergies:     Allergies   Allergen Reactions     Ambien [Zolpidem] Shortness Of Breath     wheezing     Pollen Extract Unknown          Labs:     Recent Results (from the past 48 hour(s))   CBC with platelets differential    Collection Time: 07/04/21  5:10 PM   Result Value Ref Range    WBC 11.9 (H) 4.0 - 11.0 10e9/L    RBC Count 5.43 4.4 - 5.9 10e12/L    Hemoglobin 16.0 13.3 - 17.7 g/dL    Hematocrit 47.7 40.0 - 53.0 %    MCV 88 78 - 100 fl    MCH 29.5 26.5 - 33.0 pg    MCHC 33.5 31.5 - 36.5 g/dL    RDW 12.3 10.0 - 15.0 %    Platelet Count 333 150 - 450 10e9/L    Diff Method Automated Method     % Neutrophils 70.7 %    % Lymphocytes 16.3 %    % Monocytes 12.2 %    % Eosinophils 0.1 %    % Basophils 0.3 %    % Immature Granulocytes 0.4 %    Nucleated RBCs 0 0 /100    Absolute Neutrophil 8.4 (H) 1.6 - 8.3 10e9/L    Absolute Lymphocytes 2.0 0.8 - 5.3 10e9/L    Absolute Monocytes 1.5 (H) 0.0 - 1.3 10e9/L    Absolute Eosinophils 0.0 0.0 - 0.7 10e9/L    Absolute Basophils 0.0 0.0 - 0.2 10e9/L    Abs Immature Granulocytes 0.1 0 - 0.4 10e9/L    Absolute Nucleated RBC 0.0    Comprehensive metabolic panel    Collection Time: 07/04/21  5:10 PM   Result Value Ref Range    Sodium 142 133 - 144 mmol/L    Potassium 3.8 3.4 - 5.3 mmol/L    Chloride 109 94 - 109 mmol/L    Carbon Dioxide 29 20 - 32 mmol/L    Anion Gap 4 3 - 14 mmol/L    Glucose 140 (H) 70 - 99 mg/dL    Urea Nitrogen 18 7 - 30 mg/dL    Creatinine 1.35 (H) 0.66 - 1.25 mg/dL    GFR Estimate 69 >60 mL/min/[1.73_m2]    GFR Estimate If Black 79 >60 mL/min/[1.73_m2]    Calcium 9.5 8.5 - 10.1 mg/dL    Bilirubin Total 0.9 0.2 - 1.3 mg/dL    Albumin 4.8 3.4 - 5.0 g/dL    Protein Total 8.8 6.8 - 8.8 g/dL    Alkaline Phosphatase 92 40 - 150 U/L    ALT 27 0 - 70 U/L    AST 36 0 - 45 U/L   Blood culture    Collection Time: 07/04/21  5:10 PM    Specimen: Arm, Left; Blood    Right Arm   Result Value Ref Range    Specimen Description Blood Right Arm     Culture  Micro No growth after 16 hours    Blood culture    Collection Time: 07/04/21  5:10 PM    Specimen: Arm, Right; Blood    Left Arm   Result Value Ref Range    Specimen Description Blood Left Arm     Culture Micro No growth after 16 hours    UA with Microscopic reflex to Culture    Collection Time: 07/04/21  5:10 PM    Specimen: Urine clean catch; Unspecified Urine   Result Value Ref Range    Color Urine Yellow     Appearance Urine Slightly Cloudy     Glucose Urine Negative NEG^Negative mg/dL    Bilirubin Urine Negative NEG^Negative    Ketones Urine 10 (A) NEG^Negative mg/dL    Specific Gravity Urine 1.020 1.003 - 1.035    Blood Urine Negative NEG^Negative    pH Urine 5.5 5.0 - 7.0 pH    Protein Albumin Urine 300 (A) NEG^Negative mg/dL    Urobilinogen mg/dL Normal 0.0 - 2.0 mg/dL    Nitrite Urine Negative NEG^Negative    Leukocyte Esterase Urine Negative NEG^Negative    Source Unspecified Urine     WBC Urine 3 0 - 5 /HPF    RBC Urine 2 0 - 2 /HPF    Bacteria Urine None (A) NEG^Negative /HPF    Squamous Epithelial /HPF Urine 0 0 - 1 /HPF    Mucous Urine Present (A) NEG^Negative /LPF    Hyaline Casts 28 (H) 0 - 2 /LPF   Drug abuse screen 6 urine (tox)    Collection Time: 07/04/21  5:10 PM   Result Value Ref Range    Amphetamine Qual Urine Positive (A) NEG^Negative    Barbiturates Qual Urine Negative NEG^Negative    Benzodiazepine Qual Urine Negative NEG^Negative    Cannabinoids Qual Urine Negative NEG^Negative    Cocaine Qual Urine Negative NEG^Negative    Ethanol Qual Urine Negative NEG^Negative    Opiates Qualitative Urine Positive (A) NEG^Negative   CK total    Collection Time: 07/04/21  5:10 PM   Result Value Ref Range    CK Total 993 (H) 30 - 300 U/L   Acetaminophen level    Collection Time: 07/04/21  5:10 PM   Result Value Ref Range    Acetaminophen Level <2 mg/L   Salicylate level    Collection Time: 07/04/21  5:10 PM   Result Value Ref Range    Salicylate Level <2 mg/dL   Symptomatic SARS-CoV-2 COVID-19 Virus  "(Coronavirus) by PCR    Collection Time: 07/04/21  5:11 PM    Specimen: Nasopharyngeal   Result Value Ref Range    SARS-CoV-2 Virus Specimen Source Nasopharyngeal     SARS-CoV-2 PCR Result NEGATIVE     SARS-CoV-2 PCR Comment (Note)    EKG 12 lead    Collection Time: 07/04/21  5:32 PM   Result Value Ref Range    Interpretation ECG Click View Image link to view waveform and   result    Lactic acid    Collection Time: 07/04/21  5:51 PM   Result Value Ref Range    Lactic Acid 2.5 (H) 0.7 - 2.0 mmol/L   Lactic acid    Collection Time: 07/04/21  8:04 PM   Result Value Ref Range    Lactic Acid 0.8 0.7 - 2.0 mmol/L          Physical and Psychiatric Examination:     /72   Pulse 96   Temp 98.3  F (36.8  C) (Oral)   Resp 16     SpO2 97%   Weight is 0 lbs 0 oz  There is no height or weight on file to   calculate BMI.    Physical Exam:  I have reviewed the physical exam as documented by the medical   team and agree with findings and assessment and have no   additional findings to add at this time.        Mental Status Exam  APPEARANCE/GROOMING/ATTITUDE: Fair grooming, calm, cooperative  ORIENTATION: Alert and oriented to person, place, date, day and   situation  SPEECH: Normal rate and rhythm, clear  MOVEMENTS: Ambulatory.  No tics, tremors, rigidity or abnormal   involuntary movements seen  THOUGHT PROCESS: Organized, no loosening of associations  THOUGHT CONTENT: Currently denies suicidal ideation but tells me   he is very worried that he will develop suicidal ideation and is   very concerned about his recent overdose on opiates requiring   Narcan reversal though he states that was accidental.  Denies   thoughts of harm towards others.  Denies thoughts of self   injurious behavior.  Denies auditory or visual hallucinations.    No evidence of delusions or paranoia  ATTENTION/CONCENTRATION/RECALL: Adequate focus and concentration;   recall 3/3  MOOD: \"Sad about my life and I think that is why I continue to   use " "drugs\"  AFFECT: Depressed  INTELLECTUAL CAPACITY: Average on gross examination  FUND OF KNOWLEDGE: Intact  INSIGHT: Fair  JUDGMENT: Impaired as evidenced by relapse to substance use   despite the consequences  IMPULSE CONTROL: Poor as evidenced by ongoing substance use    RISK ASSESSMENT: Risk of relapse to substance use.  Risk of   overdose as is evident in recent history               DSM-5 Diagnosis:   Major depressive disorder, recurrent, severe without psychotic   features  Opiate use disorder with potential for opiate withdrawal  Stimulant use disorder  Benzodiazepine use disorder  Generalized anxiety disorder  PTSD by history  Rule out substance-induced mood disorder          Assessment:   Patient is a 30-year-old single male father of young daughter who   came into [facility] seeking help for substance use issues.  He was   just discharged from [facility] after requiring Narcan   reversal for opiate overdose, discharge 6/27/2021.  He tells me   he has used heroin twice since leaving [facility] and has used   some oral Adderall.  He reports IV drug use.  He states he has   severe depression and he is very worried that he will develop   suicidality due to his ongoing depression in combination with   substance use in the setting of severe psychosocial stressors   patient was on [facility] for detox in April 2021 and recently was at   [facility].    Patient is being monitored for recurrence of fevers and thus far   work-up is negative and he is now afebrile.  Apparently he had   been walking around out in the heat for a few days and he was   dehydrated.            Summary of Recommendations:   1. Start COWS (ordered) . He tells me WD symptoms are typically   mild. Denies WD at this time.   2. Discussed restarting Suboxone and rec staying on it given his   relapse Hx. He is ambivalent at this time but will consider it   and discuss w providers. Re-consult psych PRN.   3. Monitor for detox on medical and if none " transfer to inpatient   psychiatry- Pt worries about SI and requests vol admission;   alternatively, if medically clear admit to [facility] for detox, then to   psych  4. He states he feels safe in the hospital. No imminent risk of   self harm.  I do not think 1:1 is necessary.   5. CD assessment and discussion of Tx options. (ordered)   6. No med changes at this time. Will wait until detox completed.   Continue Lexapro for now- recent increase from 15 to 20 mg noted.   Ordered.     Please page me if you have any question [phone]          Blood culture     Status: None (Preliminary result)    Specimen: Arm, Left; Blood    Right Arm   Result Value Ref Range    Specimen Description Blood Right Arm     Culture Micro No growth after 2 days    Blood culture     Status: None (Preliminary result)    Specimen: Arm, Right; Blood    Left Arm   Result Value Ref Range    Specimen Description Blood Left Arm     Culture Micro No growth after 2 days      Medications   lidocaine 1 % 0.1-1 mL (has no administration in time range)   lidocaine (LMX4) cream (has no administration in time range)   sodium chloride (PF) 0.9% PF flush 3 mL ( Intracatheter Canceled Entry 7/4/21 2320)   sodium chloride (PF) 0.9% PF flush 3 mL (has no administration in time range)   sodium chloride 0.9% infusion (has no administration in time range)   acetaminophen (TYLENOL) tablet 650 mg (650 mg Oral Given 7/6/21 0948)   albuterol (PROAIR HFA/PROVENTIL HFA/VENTOLIN HFA) 108 (90 Base) MCG/ACT inhaler 2 puff (has no administration in time range)   hydrOXYzine (ATARAX) tablet 25 mg (has no administration in time range)   escitalopram (LEXAPRO) tablet 20 mg (20 mg Oral Given 7/6/21 0928)   0.9% sodium chloride BOLUS (0 mLs Intravenous Stopped 7/4/21 1840)   sodium chloride 0.9% infusion ( Intravenous Stopped 7/4/21 2132)   nicotine (NICORETTE) gum 2 mg (2 mg Buccal Given 7/4/21 2014)   nicotine (NICODERM CQ) 21 MG/24HR 24 hr patch 1 patch (1 patch Transdermal  Patch/Med Applied 7/4/21 2116)        Assessments & Plan (with Medical Decision Making)   29 yo M presenting for mental health evaluation with a hx of depression, anxiety and substance abuse.   Found to have fever with concern for environmental heat exposure, though DDx includes COVID-19 or other sources of infection, as well as sympathomimetic toxidrome. Pt is alert and does not seem to have symptoms of anticholinergic toxidrome.   Cooling initiated with icepacks, hydration with room-temp 0.9NS IV, fan evaporation.   IV access obtained and lab testing done.   CBC  CMP  Lactic acid  CK  UA  COVID-19 infection  CXR  UDS  Blood cultures x2    Labs notable for elevated lactic acid of 2.5, MILTON with Cr 1.35, doubled from just 1 week ago. . WBC 11.9.   UA negative for UTI.   Drug screen + for amphetamine and opiates as expected.   Acetaminophen and salicylate levels negative.   COVID negative.   CXR without infiltrates.     Pt has cooled appropriately.   Discussed with IM triage hospitalist. At this time I am recommending IM admission for ongoing IV fluids for MILTON and monitoring for return of fever that could indicate bacteremia from IVDU. I do not feel that I can support full medical clearance for voluntary admission to the mental health service without observation and repeat Cr testing in the AM.     Pt has been seen by DEC . Please see her note for additional information.     I have reviewed the nursing notes. I have reviewed the findings, diagnosis, plan and need for follow up with the patient.    New Prescriptions    No medications on file       Final diagnoses:   Acute renal failure, unspecified acute renal failure type (H)   Hyperthermia       --  Haylee Alcala MD [facility] EMERGENCY DEPARTMENT  7/4/2021     Haylee Alcala MD  07/06/21 7568

## 2021-07-04 NOTE — PHARMACY-ADMISSION MEDICATION HISTORY
Admission Medication History Completed by Pharmacy    See [facility] Admission Navigator for allergy information, preferred outpatient pharmacy, prior to admission medications and immunization status.     Medication History Sources:     Patient, dispense report    Changes made to PTA medication list (reason):    Added:   o Hydroxyzine 25 mg tablet    Deleted:   o Gabapentin 800 mg tablet; Take 800 mg 3 times daily    Changed: None    Additional Information:    Preferred pharmacy: [facility]    Patient reported he told his provider to discontinue gabapentin because he was taking more than what the instructions stated since he has a history of chemical dependence.     Prior to Admission medications    Medication Sig Last Dose Taking? Auth Provider   escitalopram (LEXAPRO) 20 MG tablet Take 20 mg by mouth daily Past Week Yes Reported, Patient   hydrOXYzine (ATARAX) 25 MG tablet Take 25 mg by mouth 3 times daily as needed for itching Past Month Yes Reported, Patient   albuterol (PROAIR HFA/PROVENTIL HFA/VENTOLIN HFA) 108 (90 Base) MCG/ACT inhaler Inhale 2 puffs into the lungs every 6 hours More than a month  Unknown, Entered By History       Date completed: 07/04/21    Medication history completed by: Krzysztof Tejada

## 2021-07-05 ENCOUNTER — TELEPHONE (OUTPATIENT)
Dept: BEHAVIORAL HEALTH | Facility: CLINIC | Age: 31
End: 2021-07-05

## 2021-07-05 LAB
ANION GAP SERPL CALCULATED.3IONS-SCNC: 3 MMOL/L (ref 3–14)
BASOPHILS # BLD AUTO: 0 10E9/L (ref 0–0.2)
BASOPHILS NFR BLD AUTO: 0.5 %
BUN SERPL-MCNC: 17 MG/DL (ref 7–30)
CALCIUM SERPL-MCNC: 8.6 MG/DL (ref 8.5–10.1)
CHLORIDE SERPL-SCNC: 106 MMOL/L (ref 94–109)
CK SERPL-CCNC: 532 U/L (ref 30–300)
CO2 SERPL-SCNC: 29 MMOL/L (ref 20–32)
CREAT SERPL-MCNC: 0.79 MG/DL (ref 0.66–1.25)
DIFFERENTIAL METHOD BLD: NORMAL
EOSINOPHIL # BLD AUTO: 0.1 10E9/L (ref 0–0.7)
EOSINOPHIL NFR BLD AUTO: 2.3 %
ERYTHROCYTE [DISTWIDTH] IN BLOOD BY AUTOMATED COUNT: 12 % (ref 10–15)
GFR SERPL CREATININE-BSD FRML MDRD: >90 ML/MIN/{1.73_M2}
GLUCOSE SERPL-MCNC: 104 MG/DL (ref 70–99)
HCT VFR BLD AUTO: 43.7 % (ref 40–53)
HGB BLD-MCNC: 14.3 G/DL (ref 13.3–17.7)
IMM GRANULOCYTES # BLD: 0 10E9/L (ref 0–0.4)
IMM GRANULOCYTES NFR BLD: 0.3 %
INTERPRETATION ECG - MUSE: NORMAL
LYMPHOCYTES # BLD AUTO: 1.7 10E9/L (ref 0.8–5.3)
LYMPHOCYTES NFR BLD AUTO: 28.9 %
MCH RBC QN AUTO: 29.7 PG (ref 26.5–33)
MCHC RBC AUTO-ENTMCNC: 32.7 G/DL (ref 31.5–36.5)
MCV RBC AUTO: 91 FL (ref 78–100)
MONOCYTES # BLD AUTO: 0.6 10E9/L (ref 0–1.3)
MONOCYTES NFR BLD AUTO: 10.4 %
NEUTROPHILS # BLD AUTO: 3.5 10E9/L (ref 1.6–8.3)
NEUTROPHILS NFR BLD AUTO: 57.6 %
NRBC # BLD AUTO: 0 10*3/UL
NRBC BLD AUTO-RTO: 0 /100
PLATELET # BLD AUTO: 235 10E9/L (ref 150–450)
POTASSIUM SERPL-SCNC: 4 MMOL/L (ref 3.4–5.3)
RBC # BLD AUTO: 4.82 10E12/L (ref 4.4–5.9)
SODIUM SERPL-SCNC: 138 MMOL/L (ref 133–144)
WBC # BLD AUTO: 6 10E9/L (ref 4–11)

## 2021-07-05 PROCEDURE — 99223 1ST HOSP IP/OBS HIGH 75: CPT | Performed by: NURSE PRACTITIONER

## 2021-07-05 PROCEDURE — 85025 COMPLETE CBC W/AUTO DIFF WBC: CPT | Performed by: EMERGENCY MEDICINE

## 2021-07-05 PROCEDURE — 80048 BASIC METABOLIC PNL TOTAL CA: CPT | Performed by: EMERGENCY MEDICINE

## 2021-07-05 PROCEDURE — 82550 ASSAY OF CK (CPK): CPT | Performed by: INTERNAL MEDICINE

## 2021-07-05 PROCEDURE — 90791 PSYCH DIAGNOSTIC EVALUATION: CPT

## 2021-07-05 PROCEDURE — 99232 SBSQ HOSP IP/OBS MODERATE 35: CPT | Performed by: INTERNAL MEDICINE

## 2021-07-05 PROCEDURE — 250N000013 HC RX MED GY IP 250 OP 250 PS 637: Performed by: NURSE PRACTITIONER

## 2021-07-05 RX ORDER — ALBUTEROL SULFATE 90 UG/1
2 AEROSOL, METERED RESPIRATORY (INHALATION) EVERY 6 HOURS PRN
Status: DISCONTINUED | OUTPATIENT
Start: 2021-07-05 | End: 2021-07-06

## 2021-07-05 RX ORDER — ESCITALOPRAM OXALATE 10 MG/1
20 TABLET ORAL DAILY
Status: DISCONTINUED | OUTPATIENT
Start: 2021-07-05 | End: 2021-07-06

## 2021-07-05 RX ORDER — HYDROXYZINE HYDROCHLORIDE 25 MG/1
25 TABLET, FILM COATED ORAL 3 TIMES DAILY PRN
Status: DISCONTINUED | OUTPATIENT
Start: 2021-07-05 | End: 2021-07-06

## 2021-07-05 RX ORDER — ESCITALOPRAM OXALATE 10 MG/1
20 TABLET ORAL DAILY
Status: DISCONTINUED | OUTPATIENT
Start: 2021-07-05 | End: 2021-07-05

## 2021-07-05 RX ADMIN — ESCITALOPRAM OXALATE 20 MG: 10 TABLET ORAL at 18:56

## 2021-07-05 NOTE — CONSULTS
"  [facility], [place]   Initial Psychiatric Consult   Consult date: July 5, 2021         Reason for Consult, requesting source:    severe depression; Severe depression, recently out of detox 2 days ago, used opiates and meth, not suicidal but afraid it will get to that point   Requesting source: Ronny Strong        HPI:   Admitted 7/4/21  Per H&P: \"Romeo Maldonado is a 30 year old male who presents for depression and substance use.    Has a hx of anxiety, depression and polysubstance abuse with his drug of choice being opiates.   He was admitted to [facility] from 6/26-6/27 for unintentional heroin overdose requiring reversal by narcan and resulting in mild pulmonary edema. From [facility] he spent several days in detox at [facility]. Since leaving [facility] he feels anxious, severely depressed and struggling after a stressor of a failed relationship with the mother of his daughter (now ex-fiance). Denies current SI or HI. Has been taking lexapro and gabapentin.   He has used alcohol in small volume yesterday, heroin regularly, and this AM a partial tablet of Adderall.   Has been out on the street for the last 24 hours in 90 degree heat, ans comes in with diaphoresis and fever to 102. He denies cough, shortness of breath, abdominal pain, nausea and vomiting, dysuria, cellulitis. Has minimal right forearm injection site pain. He does not share needles, but has fear of getting HIV. Has a hx of treated Hep C.\"    DEC: \"1 beer yesterday  Heroin only 10 times since April 2021- (recent IV use w clean needle per PT)  Small amount of adderall\"    Came is w fever of 102- no afebrile and in my meeting w him NAD, no s/s of withdrawal. No GI distress, feels hungry. He tell me that typically WD Sx are mild. He states he has used heroin X 2 since discontinue from [facility] on 6/27/21.    USD positive for opiates, amphetamine; neg for alcohol  Neg for Covid. Blood cultures no growth thus far. CXR " "no infiltrates.   EKG QTc 410.    The patient requests to be admitted directly to psychiatry for treatment of depression- he denies SI at this time but is very worried he could become suicidal and use dugs again to point of over dose. .  The plan at this time is to admit to  medical; recent fevers (now appears to be resolved) and to monitor detox. From my understanding today 3A detox is full.      He was on 3A detox (Dr Jeter) in April 2021. At time of admission UDS positive for amphetamine, opiates, benzodiazepine, and cannabinoid.  He tells me he was discharged from there to [facility]. Meds at discharge: Lexapro 15 mg, Buspar 5 TID, Suboxone 2/0.5 film daily PRN, clonidine 0.1 mg BID, hydroxyzine 25 mg q 6 hr PRN, quetiapine 25 mg HS PRN. He states that he stopped taking the Suboxone and did not use PRN meds. Suboxone is very constipating for him, per his report.  Per records:   Prozac- worked some  Celexa, Paxil, Effexor- \"rony Baueralta- worked some, hard withdrawal  Wellbutrin- Panic and increased anxiety  Gabapentin_ HX of misuse  Ambien- unspecified allergy    He tells me he has had many selective serotonin reuptake inhibitor trials and none work although he had previously stated that Lexapro had helped some.   He thinks his depression is contributing to his CD relapse. As stated, above, he has psychosocial stressors. GF kicked him out of the house when he was in treatment and she is already seeing someone else. They had been together for 5 years and have a young daughter.         Past Psychiatric History:   PSYCHIATRIC HOSPITALIZATIONS: numerous. [place] 2012 and 3A detox April 2021. Numerous CD admissions  PSYCHIATRIC TREATMENT/DX: Depression, PTSD, FE, substance use disorder.   CURRENT PSYCHIATRIC PROVIDER: [facility] psychiatric provider.   THERAPIST: through [facility], has seen a couple of times  PSYCHOTROPIC HX/RESPONSE/ADR/ADHERENCE: as noted above  ONSET OF PSYCHIATRIC SYMPTOMS: teens, " depression- continuous.  RECENT STRESSORS: B/U w fiance  HX SUICIDE ATTEMPTS/SIB: overdose Hx  SLEEP: poor  INTEREST/ENERGY: low  FOCUS/CONCENTRATION: fair  ST AND LT MEMORY: both intact  APPETITE: adequate  FEEDING ISSUES: none identified  MOOD HX/DEPRESSION/TAMIKO: depression no clear Hx of tamiko  ANXIETY/PANIC: anxious, no clear Hx of panic  OBSESSION/COMPULSIONS: none  TRAUMA-NIGHTMARES/INTRUSIVE THOUGHTS/FLASHBACKS: ? Hx but denies today  HALLUCINATIONS: denies  DELUSIONS: none evident  PARANOIA: none evident  OTHER sx THOUGHT DISORDER: none    TBI/LOC: denies  DELIRIUM SCREEN: neg  HISTORY OF COMMITMENT/COURT AUTH MED: denies  No Hx of ECT          Substance Use and History:   Per Hx reviewed w him today: recent use of heroin X 2 since 6/27 including IV use. Used Adderall PO PTA.  Recent opiate overdose requiring Narcan reversal 6/26/21 at [facility].  Per Dr Jeter in April 2021:  history of abusing benzos and heroin in the past.  Started with pain pills at the age of 15; at age 17, he switched to heroin.  He was on Suboxone maintenance.  Two months ago, he got it, but he did not take it.  He continued to relapse.  He has been using heroin.  He smokes it.  He has tolerance, withdrawal, progressive use with loss of control, and tried to quit unsuccessfully.  Benzos have been his drug of choice in the past.  This relapse has been occurring for 2 months.  He has been taking Xanax 2 mg twice a day.  This has been going on for 2 months.  He has tolerance, withdrawal, progressive use with loss of control.  He has had blackouts.  He has no history of seizures.  He denies using any alcohol.  He vapes nicotine.  He states that he wants to sabotage, and that is why he has done this.    He has a history of using meth.  He has history of using alcohol.  He has history of overdosing.  He had numerous psychiatric hospitalizations for meth use.  He has history of overdose in 2012.    Tobacco- 0.25 PPD    Most recent CD Tx  [facility] and then [facility], still has therapist at [facility].    Today he expressed ambivalence about restarting Suboxone. He says is causes him constipation. I discuss the danger of relapse including death, recently requiring Narcan, and that the constipation can be managed. He states he will give it some consideration.     Hx of DUI          Past Medical History:   PAST MEDICAL HISTORY:   Past Medical History:   Diagnosis Date     Allergic state      Anxiety      Chronic kidney disease      Depressive disorder      Hepatitis C      Substance abuse (H)     meth use     Uncomplicated asthma        PAST SURGICAL HISTORY:   Past Surgical History:   Procedure Laterality Date     INCISION AND DRAINAGE SOFT TISSUE UPPER EXTREMITY, COMBINED  8/11/2012    Procedure: COMBINED INCISION AND DRAINAGE SOFT TISSUE UPPER EXTREMITY;  Incision and drainage Right Arm Abscess;  Surgeon: Cheli White MD;  Location: [facility] OR             Family History:   FAMILY HISTORY:   Family History   Problem Relation Age of Onset     Hypertension Father    HX OF SUICIDE IN FAMILY: denies  HX OF CD IN FAMILY: pGF alcohol  Hx MI in FAMILY: mother        Social History:   Living in hotel at this time. States former GF/finance kicked him out of the house when he was at [facility]. She is now with someone else. They have a young daughter together. Born in [place]. Only child. Parent are  and he was raised by his mother.     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 He was working at [employer] but tells me he is working on FMLA leave to get addiction and MH treated.          Physical ROS:   The patient endorsed no acute physical distress. The remainder of 10-point review of systems was negative except as noted in HPI.         Medications:     Current Facility-Administered Medications:      acetaminophen (TYLENOL) tablet 650 mg, 650 mg, Oral, Q6H PRN, Ronny Greer MD     lidocaine (LMX4) cream, ,  Topical, Q1H PRN, Ronny Greer MD     lidocaine 1 % 0.1-1 mL, 0.1-1 mL, Other, Q1H PRN, Ronny Greer MD     nicotine (NICODERM CQ) 21 MG/24HR 24 hr patch 1 patch, 1 patch, Transdermal, Once, Haylee Alcala MD, 1 patch at 07/04/21 2116     sodium chloride (PF) 0.9% PF flush 3 mL, 3 mL, Intracatheter, Q8H, Ronny Greer MD     sodium chloride (PF) 0.9% PF flush 3 mL, 3 mL, Intracatheter, q1 min prn, Ronny Greer MD     sodium chloride 0.9% infusion, , Intravenous, Continuous, Ronny Greer MD    Current Outpatient Medications:      escitalopram (LEXAPRO) 20 MG tablet, Take 20 mg by mouth daily, Disp: , Rfl:      hydrOXYzine (ATARAX) 25 MG tablet, Take 25 mg by mouth 3 times daily as needed for itching, Disp: , Rfl:      albuterol (PROAIR HFA/PROVENTIL HFA/VENTOLIN HFA) 108 (90 Base) MCG/ACT inhaler, Inhale 2 puffs into the lungs every 6 hours, Disp: , Rfl:     Facility-Administered Medications Ordered in Other Encounters:      Self Administer Medications: Behavioral Services, , Does not apply, See Admin Instructions, Mian Hurley MD  (Not in a hospital admission)         Allergies:     Allergies   Allergen Reactions     Ambien [Zolpidem] Shortness Of Breath     wheezing     Pollen Extract Unknown          Labs:     Recent Results (from the past 48 hour(s))   CBC with platelets differential    Collection Time: 07/04/21  5:10 PM   Result Value Ref Range    WBC 11.9 (H) 4.0 - 11.0 10e9/L    RBC Count 5.43 4.4 - 5.9 10e12/L    Hemoglobin 16.0 13.3 - 17.7 g/dL    Hematocrit 47.7 40.0 - 53.0 %    MCV 88 78 - 100 fl    MCH 29.5 26.5 - 33.0 pg    MCHC 33.5 31.5 - 36.5 g/dL    RDW 12.3 10.0 - 15.0 %    Platelet Count 333 150 - 450 10e9/L    Diff Method Automated Method     % Neutrophils 70.7 %    % Lymphocytes 16.3 %    % Monocytes 12.2 %    % Eosinophils 0.1 %    % Basophils 0.3 %    % Immature Granulocytes 0.4 %    Nucleated RBCs 0 0 /100    Absolute  Neutrophil 8.4 (H) 1.6 - 8.3 10e9/L    Absolute Lymphocytes 2.0 0.8 - 5.3 10e9/L    Absolute Monocytes 1.5 (H) 0.0 - 1.3 10e9/L    Absolute Eosinophils 0.0 0.0 - 0.7 10e9/L    Absolute Basophils 0.0 0.0 - 0.2 10e9/L    Abs Immature Granulocytes 0.1 0 - 0.4 10e9/L    Absolute Nucleated RBC 0.0    Comprehensive metabolic panel    Collection Time: 07/04/21  5:10 PM   Result Value Ref Range    Sodium 142 133 - 144 mmol/L    Potassium 3.8 3.4 - 5.3 mmol/L    Chloride 109 94 - 109 mmol/L    Carbon Dioxide 29 20 - 32 mmol/L    Anion Gap 4 3 - 14 mmol/L    Glucose 140 (H) 70 - 99 mg/dL    Urea Nitrogen 18 7 - 30 mg/dL    Creatinine 1.35 (H) 0.66 - 1.25 mg/dL    GFR Estimate 69 >60 mL/min/[1.73_m2]    GFR Estimate If Black 79 >60 mL/min/[1.73_m2]    Calcium 9.5 8.5 - 10.1 mg/dL    Bilirubin Total 0.9 0.2 - 1.3 mg/dL    Albumin 4.8 3.4 - 5.0 g/dL    Protein Total 8.8 6.8 - 8.8 g/dL    Alkaline Phosphatase 92 40 - 150 U/L    ALT 27 0 - 70 U/L    AST 36 0 - 45 U/L   Blood culture    Collection Time: 07/04/21  5:10 PM    Specimen: Arm, Left; Blood    Right Arm   Result Value Ref Range    Specimen Description Blood Right Arm     Culture Micro No growth after 16 hours    Blood culture    Collection Time: 07/04/21  5:10 PM    Specimen: Arm, Right; Blood    Left Arm   Result Value Ref Range    Specimen Description Blood Left Arm     Culture Micro No growth after 16 hours    UA with Microscopic reflex to Culture    Collection Time: 07/04/21  5:10 PM    Specimen: Urine clean catch; Unspecified Urine   Result Value Ref Range    Color Urine Yellow     Appearance Urine Slightly Cloudy     Glucose Urine Negative NEG^Negative mg/dL    Bilirubin Urine Negative NEG^Negative    Ketones Urine 10 (A) NEG^Negative mg/dL    Specific Gravity Urine 1.020 1.003 - 1.035    Blood Urine Negative NEG^Negative    pH Urine 5.5 5.0 - 7.0 pH    Protein Albumin Urine 300 (A) NEG^Negative mg/dL    Urobilinogen mg/dL Normal 0.0 - 2.0 mg/dL    Nitrite Urine  Negative NEG^Negative    Leukocyte Esterase Urine Negative NEG^Negative    Source Unspecified Urine     WBC Urine 3 0 - 5 /HPF    RBC Urine 2 0 - 2 /HPF    Bacteria Urine None (A) NEG^Negative /HPF    Squamous Epithelial /HPF Urine 0 0 - 1 /HPF    Mucous Urine Present (A) NEG^Negative /LPF    Hyaline Casts 28 (H) 0 - 2 /LPF   Drug abuse screen 6 urine (tox)    Collection Time: 07/04/21  5:10 PM   Result Value Ref Range    Amphetamine Qual Urine Positive (A) NEG^Negative    Barbiturates Qual Urine Negative NEG^Negative    Benzodiazepine Qual Urine Negative NEG^Negative    Cannabinoids Qual Urine Negative NEG^Negative    Cocaine Qual Urine Negative NEG^Negative    Ethanol Qual Urine Negative NEG^Negative    Opiates Qualitative Urine Positive (A) NEG^Negative   CK total    Collection Time: 07/04/21  5:10 PM   Result Value Ref Range    CK Total 993 (H) 30 - 300 U/L   Acetaminophen level    Collection Time: 07/04/21  5:10 PM   Result Value Ref Range    Acetaminophen Level <2 mg/L   Salicylate level    Collection Time: 07/04/21  5:10 PM   Result Value Ref Range    Salicylate Level <2 mg/dL   Symptomatic SARS-CoV-2 COVID-19 Virus (Coronavirus) by PCR    Collection Time: 07/04/21  5:11 PM    Specimen: Nasopharyngeal   Result Value Ref Range    SARS-CoV-2 Virus Specimen Source Nasopharyngeal     SARS-CoV-2 PCR Result NEGATIVE     SARS-CoV-2 PCR Comment (Note)    EKG 12 lead    Collection Time: 07/04/21  5:32 PM   Result Value Ref Range    Interpretation ECG Click View Image link to view waveform and result    Lactic acid    Collection Time: 07/04/21  5:51 PM   Result Value Ref Range    Lactic Acid 2.5 (H) 0.7 - 2.0 mmol/L   Lactic acid    Collection Time: 07/04/21  8:04 PM   Result Value Ref Range    Lactic Acid 0.8 0.7 - 2.0 mmol/L          Physical and Psychiatric Examination:     /72   Pulse 96   Temp 98.3  F (36.8  C) (Oral)   Resp 16   SpO2 97%   Weight is 0 lbs 0 oz  There is no height or weight on file to  "calculate BMI.    Physical Exam:  I have reviewed the physical exam as documented by the medical team and agree with findings and assessment and have no additional findings to add at this time.        Mental Status Exam  APPEARANCE/GROOMING/ATTITUDE: Fair grooming, calm, cooperative  ORIENTATION: Alert and oriented to person, place, date, day and situation  SPEECH: Normal rate and rhythm, clear  MOVEMENTS: Ambulatory.  No tics, tremors, rigidity or abnormal involuntary movements seen  THOUGHT PROCESS: Organized, no loosening of associations  THOUGHT CONTENT: Currently denies suicidal ideation but tells me he is very worried that he will develop suicidal ideation and is very concerned about his recent overdose on opiates requiring Narcan reversal though he states that was accidental.  Denies thoughts of harm towards others.  Denies thoughts of self injurious behavior.  Denies auditory or visual hallucinations.  No evidence of delusions or paranoia  ATTENTION/CONCENTRATION/RECALL: Adequate focus and concentration; recall 3/3  MOOD: \"Sad about my life and I think that is why I continue to use drugs\"  AFFECT: Depressed  INTELLECTUAL CAPACITY: Average on gross examination  FUND OF KNOWLEDGE: Intact  INSIGHT: Fair  JUDGMENT: Impaired as evidenced by relapse to substance use despite the consequences  IMPULSE CONTROL: Poor as evidenced by ongoing substance use    RISK ASSESSMENT: Risk of relapse to substance use.  Risk of overdose as is evident in recent history               DSM-5 Diagnosis:   Major depressive disorder, recurrent, severe without psychotic features  Opiate use disorder with potential for opiate withdrawal  Stimulant use disorder  Benzodiazepine use disorder  Generalized anxiety disorder  PTSD by history  Rule out substance-induced mood disorder          Assessment:   Patient is a 30-year-old single male father of young daughter who came into [facility] seeking help for substance use issues.  He was just " discharged from [facility] after requiring Narcan reversal for opiate overdose, discharge 6/27/2021.  He tells me he has used heroin twice since leaving [facility] and has used some oral Adderall.  He reports IV drug use.  He states he has severe depression and he is very worried that he will develop suicidality due to his ongoing depression in combination with substance use in the setting of severe psychosocial stressors patient was on 3A for detox in April 2021 and recently was at [facility].    Patient is being monitored for recurrence of fevers and thus far work-up is negative and he is now afebrile.  Apparently he had been walking around out in the heat for a few days and he was dehydrated.            Summary of Recommendations:   1. Start COWS (ordered) . He tells me WD symptoms are typically mild. Denies WD at this time.   2. Discussed restarting Suboxone and rec staying on it given his relapse Hx. He is ambivalent at this time but will consider it and discuss w providers. Re-consult psych PRN.   3. Monitor for detox on medical and if none transfer to inpatient psychiatry- Pt worries about SI and requests vol admission; alternatively, if medically clear admit to 3A for detox, then to psych  4. He states he feels safe in the hospital. No imminent risk of self harm.  I do not think 1:1 is necessary.   5. CD assessment and discussion of Tx options. (ordered)   6. No med changes at this time. Will wait until detox completed. Continue Lexapro for now- recent increase from 15 to 20 mg noted. Ordered.     Please page me if you have any question [phone]

## 2021-07-05 NOTE — UTILIZATION REVIEW
Admission Status; Secondary Review Determination         Under the authority of the Utilization Management Committee, the utilization review process indicated a secondary review on the above patient.  The review outcome is based on review of the medical records, discussions with staff, and applying clinical experience noted on the date of the review.        (x)      Inpatient Status Appropriate - This patient's medical care is consistent with medical management for inpatient care and reasonable inpatient medical practice.      RATIONALE FOR DETERMINATION   The patient is a 30-year-old male admitted on 7/4/2021.  He was recently admitted at [facility] for heroin and other drug addictions.  He has a history of using meth.  He has alcohol abuse and numerous psychiatric admissions.  He left self to the hospital on 6/27/2021.  He came to the emergency room due to having diaphoresis and fever up to 102 with association of being on the street for the last 24 hours and injecting drugs into his right forearm where there is currently some injection site pain.  Blood cultures are negative so far.  Drug screen is positive for opiates, amphetamines, negative for alcohol.  Psychiatry has been consulted.  Recommendation is to monitor for detox on the medical floor and if there is not room on the medical floor to transfer to inpatient psychiatry.  If medically cleared he will is recommended to be admitted to 3 a for detox then to psych.  Based on need for inpatient management without an imminent discharge date, recommend inpatient status remain.    The severity of illness, intensity of service provided, expected LOS and risk for adverse outcome make the care complex, high risk and appropriate for hospital admission.        The information on this document is developed by the utilization review team in order for the business office to ensure compliance.  This only denotes the appropriateness of proper admission status and  does not reflect the quality of care rendered.         The definitions of Inpatient Status and Observation Status used in making the determination above are those provided in the CMS Coverage Manual, Chapter 1 and Chapter 6, section 70.4.      Sincerely,     Ezequiel Ku MD  Physician Advisor  Utilization Review/ Case Management  [facility].

## 2021-07-05 NOTE — ED NOTES
Mom Kaylee called to get an update about patient. Very tearful and thankful for care. Patient gives permission to talk to mom.

## 2021-07-05 NOTE — H&P
[facility]    History and Physical - Hospitalist Service       Date of Admission:  7/4/2021    Assessment & Plan      Romeo Maldonado is a 30 year old male admitted on 7/4/2021 who was admitted to the hospital for evaluation of severe depression. He has a PMHx significant for anxiety, depression and polysubstance abuse.   Recently out of detox 2 days ago, used opiates and meth, not suicidal but afraid it will get to that point. At the ER he was found with fever.      Depression   Substance abuse  -Psychiatry consult.   -Sitter at bedside.   -Continue on PTA Lexapro for now.     MILTON   Dehydration   Mild CK elevation  -Most likely prerenal.   -Continue on IVF's.   -Strict I/O's.   -Continue monitoring renal function and lytes.   -Continue trending CK.     Fever   Dehydration   Lactic acidosis   -Lactic acidosis resolved.   -Continue on IVF's.   -No evidence of infectious process at this moment, continue monitoring. Hold ABX for now.   -Fever resolved.   -Continue trending WBC.          Diet: Combination Diet Regular Diet Adult    DVT Prophylaxis: Ambulate every shift  Marie Catheter: Not present  Central Lines: None  Code Status: Full Code          Disposition Plan   Expected discharge: TBD      The patient's care was discussed with the Patient.    Ronny Greer MD  [facility]  Securely message with the Vocera Web Console (learn more here)  Text page via [employer] Paging/Directory      ______________________________________________________________________    Chief Complaint   Depression     History is obtained from the patient    History of Present Illness   Romeo Maldonado is a 30 year old male admitted on 7/4/2021 who was admitted to the hospital for evaluation of severe depression. He has a PMHx significant for anxiety, depression and polysubstance abuse.   He was admitted to [facility] from 6/26-6/27 for  unintentional heroin overdose requiring reversal by narcan and resulting in mild pulmonary edema. From [facility] he spent several days in detox at [facility]. Since leaving [place] he feels anxious, severely depressed and struggling after a stressor of a failed relationship with the mother of his daughter. Denies current SI or HI. Has been taking lexapro and gabapentin.   He has used alcohol in small volume yesterday, heroin regularly, and this AM a partial tablet of Adderall.   Has been out on the street for the last 24 hours in 90 degree heat, and comes in with diaphoresis and fever to 102. He denies cough, shortness of breath, abdominal pain, nausea, vomit, dysuria, diarrhea or cellulitis.   At the ER initial evaluation was significant for MILTON, lactic acidosis and mild leucocytosis. CXR was negative. COVID-19 was negative. He received IVF's and supportive management, fever resolved.        Review of Systems    The 10 point Review of Systems is negative other than noted in the HPI or here. Other ROS negative.     Past Medical History    I have reviewed this patient's medical history and updated it with pertinent information if needed.   Past Medical History:   Diagnosis Date     Allergic state      Anxiety      Chronic kidney disease      Depressive disorder      Hepatitis C      Substance abuse (H)     meth use     Uncomplicated asthma        Past Surgical History   I have reviewed this patient's surgical history and updated it with pertinent information if needed.  Past Surgical History:   Procedure Laterality Date     INCISION AND DRAINAGE SOFT TISSUE UPPER EXTREMITY, COMBINED  8/11/2012    Procedure: COMBINED INCISION AND DRAINAGE SOFT TISSUE UPPER EXTREMITY;  Incision and drainage Right Arm Abscess;  Surgeon: Cheli White MD;  Location:  OR       Social History   I have reviewed this patient's social history and updated it with pertinent information if needed.  Social History     Tobacco Use      Smoking status: Current Some Day Smoker     Packs/day: 0.25     Smokeless tobacco: Never Used   Substance Use Topics     Alcohol use: Yes     Comment: occasional     Drug use: Yes     Types: Methamphetamines, Opiates, IV     Comment: heroin daily, meth a couple of times in the last 2 weks       Family History   I have reviewed this patient's family history and updated it with pertinent information if needed.  Family History   Problem Relation Age of Onset     Hypertension Father        Prior to Admission Medications   Prior to Admission Medications   Prescriptions Last Dose Informant Patient Reported? Taking?   albuterol (PROAIR HFA/PROVENTIL HFA/VENTOLIN HFA) 108 (90 Base) MCG/ACT inhaler More than a month Self Yes No   Sig: Inhale 2 puffs into the lungs every 6 hours   escitalopram (LEXAPRO) 20 MG tablet Past Week Self Yes Yes   Sig: Take 20 mg by mouth daily   hydrOXYzine (ATARAX) 25 MG tablet Past Month  Yes Yes   Sig: Take 25 mg by mouth 3 times daily as needed for itching      Facility-Administered Medications: None     Allergies   Allergies   Allergen Reactions     Ambien [Zolpidem] Shortness Of Breath     wheezing     Pollen Extract Unknown       Physical Exam   Vital Signs: Temp: 98.3  F (36.8  C) Temp src: Oral BP: 127/72 Pulse: 96   Resp: 16 SpO2: 97 % O2 Device: None (Room air)    Weight: 0 lbs 0 oz    General Appearance: Alert, room air, no acute distress.   Eyes: Pupils equal and reactive to light.   HEENT: Oral mucosa dry.   Respiratory: Normal respiratory effort, clear lungs, no crackles or wheezing.   Cardiovascular: RRR, no murmur.   GI: Non-distended, soft, + BS, no tenderness to palpation.   Lymph/Hematologic: No lymphadenopathy.   Genitourinary: Deferred.   Skin: No rash.   Musculoskeletal: No peripheral edema.   Neurologic: Alert, oriented x 3, no focal deficits.   Psychiatric: Mood stable.     Data   Data reviewed today: I reviewed all medications, new labs and imaging results over the last 24  hours. I personally reviewed the chest x-ray image(s) showing As above .    Recent Labs   Lab 07/04/21  1710   WBC 11.9*   HGB 16.0   MCV 88         POTASSIUM 3.8   CHLORIDE 109   CO2 29   BUN 18   CR 1.35*   ANIONGAP 4   NILA 9.5   *   ALBUMIN 4.8   PROTTOTAL 8.8   BILITOTAL 0.9   ALKPHOS 92   ALT 27   AST 36

## 2021-07-05 NOTE — PROGRESS NOTES
[facility]    Medicine Progress Note - Hospitalist Service       Date of Admission:  7/4/2021    Assessment & Plan           Romeo Maldonado is a 30 year old male admitted on 7/4/2021 who was admitted to the hospital for evaluation of severe depression. He has a PMHx significant for anxiety, depression and polysubstance abuse.   Recently out of detox 2 days ago, used opiates and meth, not suicidal but afraid it will get to that point. At the ER he was found with fever.       Depression   Substance abuse  -Psychiatry consultation. recs reviewed. Okay to discontinue 1:1.   -Continue on PTA Lexapro for now.   - Further per Psychiatry.      MILTON   Dehydration   Mild CK elevation  -Most likely prerenal.   -Continue on IVF's.   -Strict I/O's.   -Continue monitoring renal function and lytes.   -Continue trending CK.     7/5/2021  Better.   Tolerating po.      Fever   Dehydration   Lactic acidosis   Leucocytosis: resolved.   -Lactic acidosis resolved.   -No evidence of infectious process at this moment, continue monitoring. Hold ABX for now.   -Fever resolved.            Diet: Combination Diet Regular Diet Adult    DVT Prophylaxis: Ambulate every shift  Marie Catheter: Not present  Central Lines: None  Code Status: Full Code         Disposition Plan   Expected discharge: tbd.        dw ED provider. Per ED provider, patient medically doing well. No longer needs medical admission. Patient will be reassessed for likely psychiatry admission.   Med floor 8 med surg charge RN updated.   Patient updated.     Dieon uBsh MD  Hospitalist Service  [facility]  Securely message with the Vocera Web Console (learn more here)  Text page via [employer] Paging/Directory    __________________________________________________    Interval History   Doing better.   No fever or chills. No emesis or pain abdomen.   No cough or cp or sob.   No LH or  dizziness  Feels depressed. Denies SI @ current.     Data reviewed today: I reviewed all medications, new labs and imaging results over the last 24 hours. I personally reviewed no images or EKG's today.    Physical Exam   Vital Signs: Temp: 98.3  F (36.8  C) Temp src: Oral BP: 127/72 Pulse: 96   Resp: 16 SpO2: 97 % O2 Device: None (Room air)    Weight: 0 lbs 0 oz    General Appearance: Awake, interactive, NAD  Respiratory: Normal work of breathing.   Cardiovascular: RRR  GI: Soft. NT. ND.  Extremities: Distally wwp. No pedal edema  Skin: No acute rash on exposed areas.   Other: A0 x 4.     Data   Recent Labs   Lab 07/05/21  1717 07/04/21  1710   WBC 6.0 11.9*   HGB 14.3 16.0   MCV 91 88    333    142   POTASSIUM 4.0 3.8   CHLORIDE 106 109   CO2 29 29   BUN 17 18   CR 0.79 1.35*   ANIONGAP 3 4   NILA 8.6 9.5   * 140*   ALBUMIN  --  4.8   PROTTOTAL  --  8.8   BILITOTAL  --  0.9   ALKPHOS  --  92   ALT  --  27   AST  --  36     No results found for this or any previous visit (from the past 24 hour(s)).  Medications     sodium chloride         escitalopram  20 mg Oral Daily     sodium chloride (PF)  3 mL Intracatheter Q8H

## 2021-07-05 NOTE — ED NOTES
Pt has been pleasant and cooperative the whole evening and night. Denies SI. VS improved. Awake and on phone until 0100. Pt slept from 0100 until end of shift. No concerns currently. Waiting for bed on 8A MedSur pending staffing and bedside attendant.

## 2021-07-05 NOTE — ED NOTES
[facility]   ED Nurse to Floor Handoff     Romeo Maldonado is a 30 year old male who speaks English and currently in between living situations and looking for new housing.    He arrived in the ED on foot. He was walking around the cities and debating on when he was going to seek help.     ED Chief Complaint: Depression (Severe depression, recently out of detox 2 days ago, used opiates and meth, not suicidal but afraid it will get to that point )    ED Dx;   Final diagnoses:   Acute renal failure, unspecified acute renal failure type (H)   Hyperthermia         Needed?: No    Allergies:   Allergies   Allergen Reactions     Ambien [Zolpidem] Shortness Of Breath     wheezing     Pollen Extract Unknown   .  Past Medical Hx:   Past Medical History:   Diagnosis Date     Allergic state      Anxiety      Chronic kidney disease      Depressive disorder      Hepatitis C      Substance abuse (H)     meth use     Uncomplicated asthma       Baseline Mental status: WDL  Current Mental Status changes: at basesline    Infection present or suspected this encounter: no  Sepsis suspected: No  Isolation type: No active isolations  Patient tested for COVID 19 prior to admission: YES     Activity level - Baseline/Home:  Independent  Activity Level - Current:   Independent    Bariatric equipment needed?: No    In the ED these meds were given:   Medications   0.9% sodium chloride BOLUS (0 mLs Intravenous Stopped 7/4/21 1840)   sodium chloride 0.9% infusion ( Intravenous New Bag 7/4/21 1934)       Drips running?  Yes - continuous fluids    Home pump  No    Current LDAs  Peripheral IV 07/04/21 Right Upper forearm (Active)   Site Assessment WDL 07/04/21 1739   Line Status Infusing 07/04/21 1739   Dressing Intervention New dressing  07/04/21 1739   Phlebitis Scale 0-->no symptoms 07/04/21 1739   Infiltration Scale 0 07/04/21 1739   Number of days: 0       Labs results:   Labs Ordered and  Resulted from Time of ED Arrival Up to the Time of Departure from the ED   CBC WITH PLATELETS DIFFERENTIAL - Abnormal; Notable for the following components:       Result Value    WBC 11.9 (*)     Absolute Neutrophil 8.4 (*)     Absolute Monocytes 1.5 (*)     All other components within normal limits   COMPREHENSIVE METABOLIC PANEL - Abnormal; Notable for the following components:    Glucose 140 (*)     Creatinine 1.35 (*)     All other components within normal limits   LACTIC ACID WHOLE BLOOD - Abnormal; Notable for the following components:    Lactic Acid 2.5 (*)     All other components within normal limits   ROUTINE UA WITH MICROSCOPIC REFLEX TO CULTURE - Abnormal; Notable for the following components:    Ketones Urine 10 (*)     Protein Albumin Urine 300 (*)     Bacteria Urine None (*)     Mucous Urine Present (*)     Hyaline Casts 28 (*)     All other components within normal limits   DRUG ABUSE SCREEN 6 CHEM DEP URINE ([facility]) - Abnormal; Notable for the following components:    Amphetamine Qual Urine Positive (*)     Opiates Qualitative Urine Positive (*)     All other components within normal limits   CK TOTAL - Abnormal; Notable for the following components:    CK Total 993 (*)     All other components within normal limits   SARS-COV-2 (COVID-19) VIRUS RT-PCR   ACETAMINOPHEN LEVEL   SALICYLATE LEVEL   ACETAMINOPHEN LEVEL   SALICYLATE LEVEL   PERIPHERAL IV CATHETER   BLOOD CULTURE   BLOOD CULTURE       Imaging Studies:   Recent Results (from the past 24 hour(s))   Chest XR,  PA & LAT    Narrative    CHEST TWO VIEWS July 4, 2021 6:26 PM     HISTORY: Fever, evaluate for infiltrate.    COMPARISON: June 26, 2021.       Impression    IMPRESSION: There are no acute infiltrates. The cardiac silhouette is  not enlarged. Pulmonary vasculature is unremarkable.    DANGELO ACOSTA MD          SYSTEM ID:  [id number]       Recent vital signs:   /72   Pulse 96   Temp 98.3  F (36.8  C) (Oral)   Resp 16   SpO2 97%              Cardiac Rhythm: Normal Sinus  Pt needs tele? No  Skin/wound Issues: None    Code Status: Full Code    Pain control: good    Nausea control: pt had none    Abnormal labs/tests/findings requiring intervention: See lab results     Family present during ED course? No   Family Comments/Social Situation comments: NA    Tasks needing completion: None    Diana Johnson, RN  [phone] [facility]  [phone] [facility]

## 2021-07-06 ENCOUNTER — TELEPHONE (OUTPATIENT)
Dept: BEHAVIORAL HEALTH | Facility: CLINIC | Age: 31
End: 2021-07-06

## 2021-07-06 PROCEDURE — 124N000002 HC R&B MH UMMC

## 2021-07-06 PROCEDURE — 250N000013 HC RX MED GY IP 250 OP 250 PS 637: Performed by: NURSE PRACTITIONER

## 2021-07-06 PROCEDURE — 250N000013 HC RX MED GY IP 250 OP 250 PS 637: Performed by: INTERNAL MEDICINE

## 2021-07-06 RX ORDER — ALBUTEROL SULFATE 90 UG/1
2 AEROSOL, METERED RESPIRATORY (INHALATION) EVERY 6 HOURS PRN
Status: DISCONTINUED | OUTPATIENT
Start: 2021-07-06 | End: 2021-07-15 | Stop reason: HOSPADM

## 2021-07-06 RX ORDER — ESCITALOPRAM OXALATE 20 MG/1
20 TABLET ORAL DAILY
Status: DISCONTINUED | OUTPATIENT
Start: 2021-07-07 | End: 2021-07-07

## 2021-07-06 RX ORDER — ACETAMINOPHEN 325 MG/1
650 TABLET ORAL EVERY 4 HOURS PRN
Status: DISCONTINUED | OUTPATIENT
Start: 2021-07-06 | End: 2021-07-15 | Stop reason: HOSPADM

## 2021-07-06 RX ORDER — OLANZAPINE 10 MG/2ML
10 INJECTION, POWDER, FOR SOLUTION INTRAMUSCULAR 3 TIMES DAILY PRN
Status: DISCONTINUED | OUTPATIENT
Start: 2021-07-06 | End: 2021-07-15 | Stop reason: HOSPADM

## 2021-07-06 RX ORDER — MAGNESIUM HYDROXIDE/ALUMINUM HYDROXICE/SIMETHICONE 120; 1200; 1200 MG/30ML; MG/30ML; MG/30ML
30 SUSPENSION ORAL EVERY 4 HOURS PRN
Status: DISCONTINUED | OUTPATIENT
Start: 2021-07-06 | End: 2021-07-15 | Stop reason: HOSPADM

## 2021-07-06 RX ORDER — TRAZODONE HYDROCHLORIDE 50 MG/1
50 TABLET, FILM COATED ORAL
Status: DISCONTINUED | OUTPATIENT
Start: 2021-07-06 | End: 2021-07-15 | Stop reason: HOSPADM

## 2021-07-06 RX ORDER — OLANZAPINE 10 MG/1
10 TABLET ORAL 3 TIMES DAILY PRN
Status: DISCONTINUED | OUTPATIENT
Start: 2021-07-06 | End: 2021-07-15 | Stop reason: HOSPADM

## 2021-07-06 RX ORDER — HYDROXYZINE HYDROCHLORIDE 25 MG/1
25 TABLET, FILM COATED ORAL 3 TIMES DAILY PRN
Status: DISCONTINUED | OUTPATIENT
Start: 2021-07-06 | End: 2021-07-15 | Stop reason: HOSPADM

## 2021-07-06 RX ADMIN — ESCITALOPRAM OXALATE 20 MG: 10 TABLET ORAL at 09:28

## 2021-07-06 RX ADMIN — ACETAMINOPHEN 650 MG: 325 TABLET, FILM COATED ORAL at 09:48

## 2021-07-06 ASSESSMENT — MIFFLIN-ST. JEOR: SCORE: 1750.04

## 2021-07-06 NOTE — ED NOTES
SIGN-OUT:  - Assumed care of this patient from Dr. Sweeney.  Patient has been admitted to the medical service overnight for fever elevated white count.  Thought was a environmental exposure, possible toxidrome, infective work-up was negative.  After 25 hours in the emergency department patient was afebrile without antipyretics.  On reassessment 24 hours later patient's MILTON had normalized, vital signs remain normal, patient denied any infectious symptoms, was tolerating po normally  and had a normal repeat physical exam.      He did state he initially presented the ER seeking help for his depression stating he did become suicidal recently.  He is on Lexapro reports he may have missed a couple of doses but rather otherwise takes it regularly.  At this point do not feel that he needs a medical admission.  Will perform a mental health assessment.      - Pending at shift change: Initially waiting for inpatient medical bed though patient's fever has resolved, labs normalized, patient was asymptomatic and had no abnormal physical exam findings so do not feel medical admission is necessary at this time.    DEC assessment, please see their note.  Patient reports recent drug relapse; increasing depression and increasing suicidal ideation.  Initially reported no plan however later to the mental health  stated that he would overdose on fentanyl.  Plan is to admit to the mental health unit for treatment of his depression and suicidal ideation.      UPDATES / REASSESSMENT:  - Reassessment: Patient will be admitted to the mental health unit for treatment of his depression and suicidal ideation.      DISPOSITION:  -Admission to the mental health unit.    MD Jacinto Temple, Manoj Goyal MD  07/05/21 4702

## 2021-07-06 NOTE — TELEPHONE ENCOUNTER
R:  Patient cleared and ready for behavioral bed placement: Yes     Provider - Nia paged @ 1pm to present for unit 10/[facility]hol[facility]ky  Provider called back at 1::12pm and accepted for 10/[facility]holomy[facility]ky  Pt placed in que and unit called with disposition at 1:15pm   ED updated with placement @ 1:22pm        Per chart review and ED Dr - Pt is medically stable and referred for IP MH :  See Chart notes:    Emergency Medicine   ED Notes      Signed   Date of Service:  7/5/2021  7:18 PM   Creation Time:  7/5/2021  7:18 PM      Depression   Substance abuse  -Psychiatry consultation. recs reviewed. Okay to discontinue 1:1.   -Continue on PTA Lexapro for now.   - Further per Psychiatry.      MILTON   Dehydration   Mild CK elevation  -Most likely prerenal.   -Continue on IVF's.   -Strict I/O's.   -Continue trending CK.   Disposition Plan   Expected discharge: tbd.      annalise ED provider. Per ED provider, patient medically doing well. No longer needs medical admission. Patient will be reassessed for likely psychiatry admission.   Med floor 8 med surg charge RN updated.   Patient updated.      Deion Bush MD  Hospitalist Service  [facility]

## 2021-07-06 NOTE — CONSULTS
7/6/2021    CD consult closing at this time.  Per chart review, pt is willing to seek placement within the [facility], but is waiting on bed availability. A consult can be reordered if/when a placement occurs at [place]. If he is placed elsewhere, he will receive CD consult services there.     LC Harden  [email or web address]  [phone]

## 2021-07-06 NOTE — ED NOTES
[facility] ED Mental Health Handoff Note:       Brief HPI:  This is a 30 year old male signed out to me by Dr. Saini.  See initial ED Provider note for full details of the presentation. Interval history is pertinent for no acute events.    Home meds reviewed and ordered/administered: No    Medically stable for inpatient mental health admission: Yes.    Evaluated by mental health: Yes. The recommendation is for inpatient mental health treatment. Bed search in process    Safety concerns: At the time I received sign out, there were no safety concerns.    Hold Status:  Active Orders   N/A            Exam:   Patient Vitals for the past 24 hrs:   BP Temp Temp src Pulse Resp SpO2   07/06/21 0951 131/73 97.9  F (36.6  C) Oral 69 16 97 %   07/05/21 1938 121/71 98.1  F (36.7  C) -- 64 18 99 %   07/05/21 1543 126/68 98.1  F (36.7  C) Oral 58 -- 97 %           ED Course:    Medications   lidocaine 1 % 0.1-1 mL (has no administration in time range)   lidocaine (LMX4) cream (has no administration in time range)   sodium chloride (PF) 0.9% PF flush 3 mL (3 mLs Intracatheter Given 7/6/21 1308)   sodium chloride (PF) 0.9% PF flush 3 mL (has no administration in time range)   sodium chloride 0.9% infusion (has no administration in time range)   acetaminophen (TYLENOL) tablet 650 mg (650 mg Oral Given 7/6/21 0948)   albuterol (PROAIR HFA/PROVENTIL HFA/VENTOLIN HFA) 108 (90 Base) MCG/ACT inhaler 2 puff (has no administration in time range)   hydrOXYzine (ATARAX) tablet 25 mg (has no administration in time range)   escitalopram (LEXAPRO) tablet 20 mg (20 mg Oral Given 7/6/21 0928)   0.9% sodium chloride BOLUS (0 mLs Intravenous Stopped 7/4/21 1840)   sodium chloride 0.9% infusion ( Intravenous Stopped 7/4/21 2132)   nicotine (NICORETTE) gum 2 mg (2 mg Buccal Given 7/4/21 2014)   nicotine (NICODERM CQ) 21 MG/24HR 24 hr patch 1 patch (1 patch Transdermal Patch/Med Applied 7/4/21 2116)       ED Course as of Jul 06 1432   Tue Jul 06,  2021 0037 Voluntary, holdable (would overdose on fentanyl)          There were no significant events during my shift.    Patient was signed out to the oncoming provider.      Impression:    ICD-10-CM    1. Acute renal failure, unspecified acute renal failure type (H)  N17.9 Blood culture     Blood culture     Symptomatic SARS-CoV-2 COVID-19 Virus (Coronavirus) by PCR     Acetaminophen level     Acetaminophen level     Salicylate level     Salicylate level     Lactic acid     Basic metabolic panel     CBC with platelets differential     CK total     CK total     CANCELED: Acetaminophen level     CANCELED: Salicylate level     CANCELED: Potassium     CANCELED: Magnesium     CANCELED: Basic metabolic panel     CANCELED: CBC with platelets     CANCELED: CBC with platelets     CANCELED: Basic metabolic panel     CANCELED: CK total   2. Hyperthermia  R50.9        Plan:    1. Awaiting mental health evaluation/recommendations.      RESULTS:   Results for orders placed or performed during the hospital encounter of 07/04/21 (from the past 24 hour(s))   Basic metabolic panel     Status: Abnormal    Collection Time: 07/05/21  5:17 PM   Result Value Ref Range    Sodium 138 133 - 144 mmol/L    Potassium 4.0 3.4 - 5.3 mmol/L    Chloride 106 94 - 109 mmol/L    Carbon Dioxide 29 20 - 32 mmol/L    Anion Gap 3 3 - 14 mmol/L    Glucose 104 (H) 70 - 99 mg/dL    Urea Nitrogen 17 7 - 30 mg/dL    Creatinine 0.79 0.66 - 1.25 mg/dL    GFR Estimate >90 >60 mL/min/[1.73_m2]    GFR Estimate If Black >90 >60 mL/min/[1.73_m2]    Calcium 8.6 8.5 - 10.1 mg/dL   CBC with platelets differential     Status: None    Collection Time: 07/05/21  5:17 PM   Result Value Ref Range    WBC 6.0 4.0 - 11.0 10e9/L    RBC Count 4.82 4.4 - 5.9 10e12/L    Hemoglobin 14.3 13.3 - 17.7 g/dL    Hematocrit 43.7 40.0 - 53.0 %    MCV 91 78 - 100 fl    MCH 29.7 26.5 - 33.0 pg    MCHC 32.7 31.5 - 36.5 g/dL    RDW 12.0 10.0 - 15.0 %    Platelet Count 235 150 - 450 10e9/L     Diff Method Automated Method     % Neutrophils 57.6 %    % Lymphocytes 28.9 %    % Monocytes 10.4 %    % Eosinophils 2.3 %    % Basophils 0.5 %    % Immature Granulocytes 0.3 %    Nucleated RBCs 0 0 /100    Absolute Neutrophil 3.5 1.6 - 8.3 10e9/L    Absolute Lymphocytes 1.7 0.8 - 5.3 10e9/L    Absolute Monocytes 0.6 0.0 - 1.3 10e9/L    Absolute Eosinophils 0.1 0.0 - 0.7 10e9/L    Absolute Basophils 0.0 0.0 - 0.2 10e9/L    Abs Immature Granulocytes 0.0 0 - 0.4 10e9/L    Absolute Nucleated RBC 0.0    CK total     Status: Abnormal    Collection Time: 07/05/21  5:54 PM   Result Value Ref Range    CK Total 532 (H) 30 - 300 U/L             MD Bossman Cha Steven E, MD  07/06/21 6624

## 2021-07-06 NOTE — ED NOTES
Handoff report to WANDA Flores.  Informed of course of ED stay and plan of care.  WANDA Flores verbalized understanding.

## 2021-07-06 NOTE — TELEPHONE ENCOUNTER
[facility] 30[facility] plan     B: Pt reports SI w plan to overdose   Pt reports increase in dep sz  Last week pt overdosed on heroin, denies it was an SA last week.   Pt reports he is concerned that he would not be able to keep himself safe if he were to go home   Social stressors: pt was set to be  in April, rico cheated, kicked out of the home, unable to see his 3 month old child   Pt reports therapy, but inconsistent  Pt reports he hasn't been caring for himself well, sleeping too much, dropped out of school, dep and flat affect in the ED     Medically cleared, eating, drinking, ambulating indep  Patient cleared and ready for behavioral bed placement: Yes   No covid concerns, test neg     A: Voluntary     R: Pt placed on work list until appropriate placement is available     1048pm - Intake called ED, spoke w RN about how far pt is willing to go for placement. [place]

## 2021-07-06 NOTE — TELEPHONE ENCOUNTER
0332 Bed Search Update:    [place]    [facility] beds available  [facility]-No beds available  [facility]-No beds available  [facility] beds available  [facility]-No beds available  [facility]-No beds available  [facility]-No beds available  [facility]-No beds available    Pt remains on wait list pending bed availability.

## 2021-07-06 NOTE — ED NOTES
Handoff report to WANDA Leroy.  Informed of course of ED stay and plan of care.  Mariaa verbalized understanding.

## 2021-07-07 LAB
CHOLEST SERPL-MCNC: 174 MG/DL
HDLC SERPL-MCNC: 45 MG/DL
LDLC SERPL CALC-MCNC: 102 MG/DL
NONHDLC SERPL-MCNC: 129 MG/DL
TRIGL SERPL-MCNC: 137 MG/DL
TSH SERPL DL<=0.005 MIU/L-ACNC: 0.45 MU/L (ref 0.4–4)

## 2021-07-07 PROCEDURE — 124N000002 HC R&B MH UMMC

## 2021-07-07 PROCEDURE — 99223 1ST HOSP IP/OBS HIGH 75: CPT | Performed by: PSYCHIATRY & NEUROLOGY

## 2021-07-07 PROCEDURE — 250N000013 HC RX MED GY IP 250 OP 250 PS 637: Performed by: PSYCHIATRY & NEUROLOGY

## 2021-07-07 PROCEDURE — 99207 PR CDG-MDM COMPONENT: MEETS HIGH - UP CODED: CPT | Performed by: PSYCHIATRY & NEUROLOGY

## 2021-07-07 PROCEDURE — 84443 ASSAY THYROID STIM HORMONE: CPT | Performed by: PSYCHIATRY & NEUROLOGY

## 2021-07-07 PROCEDURE — 36415 COLL VENOUS BLD VENIPUNCTURE: CPT | Performed by: PSYCHIATRY & NEUROLOGY

## 2021-07-07 PROCEDURE — 80061 LIPID PANEL: CPT | Performed by: PSYCHIATRY & NEUROLOGY

## 2021-07-07 RX ORDER — BUPROPION HYDROCHLORIDE 100 MG/1
100 TABLET, EXTENDED RELEASE ORAL DAILY
Status: DISCONTINUED | OUTPATIENT
Start: 2021-07-07 | End: 2021-07-09

## 2021-07-07 RX ORDER — ESCITALOPRAM OXALATE 10 MG/1
10 TABLET ORAL DAILY
Status: DISCONTINUED | OUTPATIENT
Start: 2021-07-08 | End: 2021-07-12

## 2021-07-07 RX ORDER — NICOTINE 21 MG/24HR
1 PATCH, TRANSDERMAL 24 HOURS TRANSDERMAL DAILY
Status: DISCONTINUED | OUTPATIENT
Start: 2021-07-07 | End: 2021-07-15 | Stop reason: HOSPADM

## 2021-07-07 RX ORDER — BUPRENORPHINE AND NALOXONE 4; 1 MG/1; MG/1
1 FILM, SOLUBLE BUCCAL; SUBLINGUAL 3 TIMES DAILY PRN
Status: DISCONTINUED | OUTPATIENT
Start: 2021-07-07 | End: 2021-07-12

## 2021-07-07 RX ADMIN — HYDROXYZINE HYDROCHLORIDE 25 MG: 25 TABLET, FILM COATED ORAL at 13:44

## 2021-07-07 RX ADMIN — BUPRENORPHINE AND NALOXONE 1 FILM: 4; 1 FILM BUCCAL; SUBLINGUAL at 21:10

## 2021-07-07 RX ADMIN — NICOTINE 1 PATCH: 14 PATCH, EXTENDED RELEASE TRANSDERMAL at 17:36

## 2021-07-07 RX ADMIN — NICOTINE POLACRILEX 2 MG: 2 GUM, CHEWING ORAL at 17:43

## 2021-07-07 RX ADMIN — BUPROPION HYDROCHLORIDE 100 MG: 100 TABLET, EXTENDED RELEASE ORAL at 15:02

## 2021-07-07 RX ADMIN — OLANZAPINE 10 MG: 10 TABLET, FILM COATED ORAL at 21:13

## 2021-07-07 RX ADMIN — HYDROXYZINE HYDROCHLORIDE 25 MG: 25 TABLET, FILM COATED ORAL at 21:10

## 2021-07-07 RX ADMIN — BUPRENORPHINE AND NALOXONE 1 FILM: 4; 1 FILM BUCCAL; SUBLINGUAL at 15:02

## 2021-07-07 RX ADMIN — ESCITALOPRAM OXALATE 20 MG: 20 TABLET ORAL at 08:44

## 2021-07-07 ASSESSMENT — ACTIVITIES OF DAILY LIVING (ADL)
DIFFICULTY_EATING/SWALLOWING: NO
LAUNDRY: UNABLE TO COMPLETE
HYGIENE/GROOMING: INDEPENDENT
HYGIENE/GROOMING: INDEPENDENT
DRESSING/BATHING_DIFFICULTY: NO
FALL_HISTORY_WITHIN_LAST_SIX_MONTHS: NO
TOILETING_ISSUES: NO
PATIENT_/_FAMILY_COMMUNICATION_STYLE: SPOKEN LANGUAGE (ENGLISH OR BILINGUAL)
DRESS: INDEPENDENT
DOING_ERRANDS_INDEPENDENTLY_DIFFICULTY: NO
ORAL_HYGIENE: INDEPENDENT
ADL_ASSESSMENT: WDL EXCEPT (NO ADDITIONAL)
WALKING_OR_CLIMBING_STAIRS_DIFFICULTY: NO
CONCENTRATING,_REMEMBERING_OR_MAKING_DECISIONS_DIFFICULTY: NO
ORAL_HYGIENE: INDEPENDENT
HEARING_DIFFICULTY_OR_DEAF: NO
DIFFICULTY_COMMUNICATING: NO
WEAR_GLASSES_OR_BLIND: NO
DRESS: INDEPENDENT

## 2021-07-07 NOTE — PLAN OF CARE
"Patient is alert and oriented. Mood is calm and cooperative. Affect is flat, blunted and sad. He is withdrawn/isolative into his room. He came out for his meals and medications. He endorses depression, rates it at a 9 out of 10. He has anxiety but is mostly depressed. Pt states he's been depressed most of his adult life but it has gotten worse since March. Pt reports SI thoughts only but contracts for safety on the unit. He denies SIB, denies hallucinations or HI. Pt reports racing thoughts all the time, \"I can't shut my thoughts off, I think too much.\" He reports poor sleep. He reports good appetite. He denies pain other than chest cavity pain from last week when they performed CPR on him after he overdosed.   Sitting BP was 152/105 and standing BP was 166/101. After recheck his sitting BP was 147/92 and standing BP was 145/95. . Pt states \"I feel like I am going through opiate withdrawal.\" He reports chills. MD updated, staff will continue to monitor.   Patient on withdrawal protocol. He scored 12 on COWS assessment. Awaiting suboxone order to be verified before I can administer the medication.   Patient received prn suboxone at 3:06 pm.     Patient received prn hydroxyzine for anxiety.   "

## 2021-07-07 NOTE — PLAN OF CARE
Assessment/Intervention/Current Symtoms and Care Coordination  CTC (writer) met with trx team, provided update, and reviewed pt's chart. CTC completed initial plan of care team note. CTC met with pt to introduce themselves, and conduct initial psychosocial interview. IPA was completed. Pt presented as flat and blunted most of the interview. Pt stated that he currently feels like he can't put his life back together and is at a loss of what to do, but is open to recommendations from the trx team. CTC explained what they could and could not due to help pt, including that housing is outside their scope. CTC provided pt a list of sober homes for him to follow-up with independently, as per pt's request. CTC agreed to check with pt tomorrow after meeting with the trx team to review trx and discharge planning with pt again.    Discharge Plan or Goal  Evaluation/offer of rule 25 assessment, further goals to be determined on evaluation and assessment.     Barriers to Discharge   Safe discharge plan, ongoing symptom severity (Depression Symptoms), and medication evaluation/assessment.    Referral Status  TBD/None    Legal Status  VOLUNTARY

## 2021-07-07 NOTE — H&P
Admitted: 07/04/2021    The patient was seen for 53 minutes on station 10 of [facility].  Greater than 50% of the time was spent on counseling, coordinating care and discussing outpatient chemical addiction, his prior to admission medication and his plans to follow up in community.    CHIEF COMPLAINT AND REASON FOR ADMISSION:  The patient is a 31-year-old gentleman who was recently hospitalized on medical floor after he overdosed on heroin.  The patient had mild pulmonary edema.  He was discharged, stabilized, and discharged and spent several days in detox, admission forms, since leaving admission he felt anxious, severely depressed, was struggling with stressors of strained relationship with mother of his daughter.  He was seen by psychiatric consult on 07/05/2021 and was recommended to go to inpatient psychiatry.  The patient was admitted voluntarily.    HISTORY OF PRESENT ILLNESS:  The patient presents as a reasonably reliable historian, reports that he was getting close to getting  with mother of their daughter, then found out that she was seeing someone else, she kicked him out.  He went to his own mother, says that he started using, his mother asked him to leave as well.  The patient is currently homeless.  Reports using opiates on pretty much a regular basis.  Also, reports that he has been using Adderall as well.  He requested to be admitted directly to psychiatry for treatment of depression.  The patient reports pretty severe depression.  Said that he spends most of the time in bed, not getting out of bed and frequently  not wanting to wake up, he still works full time as a finance person remotely, but has difficulties doing that.  Reports positive suicidal ideation.    PAST PSYCHIATRY HISTORY:  The patient reports that Prozac worked some, Celexa, Paxil, Effexor made him shake, Cymbalta works hard withdrawal.  Reported that Wellbutrin made him restless, admits that he had used  it for 10 days only reported abusing gabapentin.    ALLERGIES:  THAT HE IS ALLERGIC TO AMBIEN, caused shortness of breath, and POLLEN EXTRACT.    PAST PSYCHIATRIC HOSPITALIZATIONS:  Numerous was hospitalized at this facility also had at least 8 chemical dependency treatments, last one was at [facility] 04/2021.  In addition to the above-mentioned abusing opiates, the patient also has a history of abusing benzodiazepines was abusing pain pills at the age of 15, 16, 17 switched to heroin, was on Suboxone maintenance.  Reported tolerance, withdrawal, progressive use with loss of control, tried to quit unsuccessfully, benzos have been his drug of choice in the past, but currently is opiates.  Reports history of blackouts, but no history of seizures.  Denies abusing alcohol, has a history of DUI.    PAST MEDICAL HISTORY:  Significant for mental health, chronic kidney disease, hepatitis C, uncomplicated asthma.  The patient is IV heroin user.    PAST SURGICAL HISTORY:  Significant for incision and drainage of right arm abscess.    HOME MEDICATIONS:  Lexapro 20 mg daily, hydroxyzine 25 mg 3 times a day p.r.n. for itching, Albuterol 2 puffs into lungs every 6 hours as needed.     For physical examination and 12-point review of systems, please refer to Dr. Haylee Alcala note from 07/04/2021.  I reviewed this note and agree with it.    FAMILY HISTORY AND SOCIAL HISTORY:  The patient reports that he is an only child.  Parents .  Father lives in [place].  Mother is in the area.  Denies any family history of suicide, states that his family history of anxiety and depression on mother's side. Patient is living in a hotel at this time also said that he is homeless?  Said that his former fiancee kicked him out of the house when he was at [facility].  She is now with someone else.  He does have a young daughter together.  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  He is now on a leave.  He was working in  security and financial but says that he is working on [employer] to get his addiction and mental health, treated.    PHYSICAL EXAMINATION:    VITAL SIGNS:  Temperature 98.6, respirations 16, heart rate 68, blood pressure 152/88.    MENTAL STATUS EXAMINATION:  The patient is unshaven, disheveled gentleman looking according to stated age, dressed in hospital garbs.  He was lying in bed in his room.  I would describe his attitude as pretty pleasant, cooperative, maintained fair eye contact.  Speech was spontaneous, normal rate, normal volume.  Described his mood is depressed.  Affect was restricted, congruent with mood.  He did not show any significant anxiety during the interview.  Reported passive suicidal thoughts.  No active suicidal thoughts, contracts for safety here.  Denies homicidal ideation, denied auditory or visual hallucinations.  Did not voice any delusional ideas.  Thought processes are logical, goal directed.  Associations tight.  He is alert and oriented x3.  Fund of knowledge is average with proper usage of vocabulary.  Ability to focus, concentrate, immediate short and long-term memories are within normal limits.  Gait and posture were not tested.  The patient was in the bed.  Based on nursing report, the patient did not appear to have any difficulties with his gait and posture since his admission.  Insight and judgment reasonably fair.    The patient states that he did not benefit from Lexapro.  He is agreeable with the idea of being started on a more stimulating antidepressant, Wellbutrin.  I am going to decrease his dose of Lexapro to 10 mg daily and eventually stop it.  He will be put on opiate withdrawal scale (COWS) and Suboxone to use when he scores 9 and higher. Will also request a chemical dependency consult.    Jhon Kramer MD        D: 2021   T: 2021   MT: [id number]    Name:     TYSHAWN SUAREZ  MRN:      [id number]        Account:     [id number]   :      1990            Admitted:    07/04/2021       Document: [id number]
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
## 2021-07-07 NOTE — PLAN OF CARE
BEHAVIORAL TEAM DISCUSSION    Participants: Dr. Jhon Kramer MD, Eli Butler RN, Chava Coleman [facility]  Progress: New Admit  Anticipated length of stay: 7 days  Continued Stay Criteria/Rationale: Evaluation and assessment for hospitalization.  Medical/Physical: Pt states he feels like he is in opiate withdrawal, attending will assess  Precautions:   Behavioral Orders   Procedures    Code 1 - Restrict to Unit    Routine Programming     As clinically indicated    Status 15     Every 15 minutes.    Suicide precautions     Patients on Suicide Precautions should have a Combination Diet ordered that includes a Diet selection(s) AND a Behavioral Tray selection for Safe Tray - with utensils, or Safe Tray - NO utensils       Plan: Continue evaluation and assessment for stabilization and aftercare needs.  Rationale for change in precautions or plan: None
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
## 2021-07-07 NOTE — PLAN OF CARE
Patient appeared asleep for 5.75hrs during the night shift. He had no medical or behavioral concerns during the night. Patient remains on 15min checks

## 2021-07-07 NOTE — PROGRESS NOTES
07/07/21 0049   Patient Belongings   Did you bring any home meds/supplements to the hospital?  Yes   Disposition of meds  Sent to security/pharmacy per site process   Patient Belongings locker;sent to security per site process   Patient Belongings Put in Hospital Secure Location (Security or Locker, etc.) other (see comments)   Belongings Search Yes   Clothing Search Yes   Second Staff O.D.     In locker:  Pair of shorts  Belt  Pair of shoes  Phone  2 shirts  Pair of socks  2 lighters  2 pairs of underwear  Jacket   cord  Book  Wallet    To security:  Mastercard [id number]  Visa [phone]  Discover [phone]  Redcard [phone]    A               Admission:  I am responsible for any personal items that are not sent to the safe or pharmacy.  [facility] is not responsible for loss, theft or damage of any property in my possession.    Signature:  _________________________________ Date: _______  Time: _____                                              Staff Signature:  ____________________________ Date: ________  Time: _____      2nd Staff person, if patient is unable/unwilling to sign:    Signature: ________________________________ Date: ________  Time: _____     Discharge:  [facility] has returned all of my personal belongings:    Signature: _________________________________ Date: ________  Time: _____                                          Staff Signature:  ____________________________ Date: ________  Time: _____

## 2021-07-07 NOTE — PROGRESS NOTES
07/07/21 1433   General Information   Has Not Attended OT as of: 07/07/21     Plan: OT staff will meet with pt to review the role of occupational therapy and explain the value of having them involved in their treatment plan including options to meet current needs/self-identified goals. As group attendance is established, Pt will be given self-assessment to inform OT initial assessment.

## 2021-07-08 PROCEDURE — G0177 OPPS/PHP; TRAIN & EDUC SERV: HCPCS

## 2021-07-08 PROCEDURE — H2032 ACTIVITY THERAPY, PER 15 MIN: HCPCS

## 2021-07-08 PROCEDURE — 999N000216 HC STATISTIC ADULT CD FACE TO FACE-NO CHRG

## 2021-07-08 PROCEDURE — 250N000013 HC RX MED GY IP 250 OP 250 PS 637: Performed by: PSYCHIATRY & NEUROLOGY

## 2021-07-08 PROCEDURE — 250N000011 HC RX IP 250 OP 636: Performed by: PSYCHIATRY & NEUROLOGY

## 2021-07-08 PROCEDURE — 124N000002 HC R&B MH UMMC

## 2021-07-08 PROCEDURE — 99232 SBSQ HOSP IP/OBS MODERATE 35: CPT | Performed by: PSYCHIATRY & NEUROLOGY

## 2021-07-08 RX ORDER — OLANZAPINE 5 MG/1
5 TABLET ORAL AT BEDTIME
Status: DISCONTINUED | OUTPATIENT
Start: 2021-07-08 | End: 2021-07-15 | Stop reason: HOSPADM

## 2021-07-08 RX ORDER — ONDANSETRON 4 MG/1
4 TABLET, ORALLY DISINTEGRATING ORAL EVERY 6 HOURS PRN
Status: DISCONTINUED | OUTPATIENT
Start: 2021-07-08 | End: 2021-07-12

## 2021-07-08 RX ADMIN — NICOTINE 1 PATCH: 14 PATCH, EXTENDED RELEASE TRANSDERMAL at 08:42

## 2021-07-08 RX ADMIN — BUPRENORPHINE AND NALOXONE 1 FILM: 4; 1 FILM BUCCAL; SUBLINGUAL at 21:30

## 2021-07-08 RX ADMIN — OLANZAPINE 5 MG: 5 TABLET, FILM COATED ORAL at 21:23

## 2021-07-08 RX ADMIN — BUPRENORPHINE AND NALOXONE 1 FILM: 4; 1 FILM BUCCAL; SUBLINGUAL at 14:18

## 2021-07-08 RX ADMIN — BUPRENORPHINE AND NALOXONE 1 FILM: 4; 1 FILM BUCCAL; SUBLINGUAL at 08:41

## 2021-07-08 RX ADMIN — ACETAMINOPHEN 650 MG: 325 TABLET, FILM COATED ORAL at 21:30

## 2021-07-08 RX ADMIN — ACETAMINOPHEN 650 MG: 325 TABLET, FILM COATED ORAL at 16:00

## 2021-07-08 RX ADMIN — ONDANSETRON 4 MG: 4 TABLET, ORALLY DISINTEGRATING ORAL at 21:31

## 2021-07-08 RX ADMIN — OLANZAPINE 10 MG: 10 TABLET, FILM COATED ORAL at 16:00

## 2021-07-08 RX ADMIN — BUPROPION HYDROCHLORIDE 100 MG: 100 TABLET, EXTENDED RELEASE ORAL at 08:41

## 2021-07-08 RX ADMIN — ESCITALOPRAM OXALATE 10 MG: 10 TABLET ORAL at 08:41

## 2021-07-08 ASSESSMENT — ACTIVITIES OF DAILY LIVING (ADL)
ORAL_HYGIENE: INDEPENDENT
HYGIENE/GROOMING: INDEPENDENT
DRESS: INDEPENDENT
HYGIENE/GROOMING: INDEPENDENT
DRESS: INDEPENDENT
LAUNDRY: WITH SUPERVISION
ORAL_HYGIENE: INDEPENDENT

## 2021-07-08 ASSESSMENT — MIFFLIN-ST. JEOR: SCORE: 1756.85

## 2021-07-08 NOTE — CONSULTS
This W met with the pt via videophone to begin his SEAN consult.  The pt stated that he would prefer not to go back to treatment and wanted to see about getting into a sober house and being started on Suboxone maintenance.  Pt also mentioned that he may want to connect with [facility] and see if they can be of some help since he completed tx with them recently. This W did inform the patient that most sober houses may have a 30 day requirement of abstinence prior to being accepted.  The pt acknowledged this possibility and stated that he would like to see if he could get in and would like to discuss it with his doctor.    The pt decided to decline the assessment.      This W let the pt know that a new consult could be ordered for an assessment should he decide he would like to be considered for treatment.

## 2021-07-08 NOTE — PLAN OF CARE
"  Problem: Suicidal Behavior  Goal: Suicidal Behavior is Absent or Managed  Outcome: Improving  Flowsheets (Taken 7/8/2021 0242)  Mutually Determined Action Steps (Suicidal Behavior Absent/Managed):   identifies protective factors   sets future-oriented goal   shares suicidal thoughts   verbalizes safety check rationale    RN Assessment:  SI/Self harm:  pt has thoughts of wanting to die, largely due to thoughts about recent significant life changes. Pt does express hopefulness and identifies goals. Pt denies any plans or intent to harm himself here.   Aggression/agitation/HI:  none  AVH:  none  Sleep: no concerns  PRN Med: suboxone for COWS score of 9.   Medication AE: none reported or observed  Physical Complaints/Issues: symptoms of opiate withdrawal- tremor, chills, body aches  I & O: eating and drinking well  ADLs: independent  Vitals:  pulse runs low at baseline. Denies any s/s of bradycardia. BP WNL. Writer educated pt on s/s of bradycardia and when to seek RN.  COVID 19 Assessment:  negative  Milieu Participation: visible, attended community meeting  Behavior: calm and controlled. Pt is pleasant and polite in interactions.   Pt states he came here to switch his antidepressant, and to keep himself safe from suicide and self-injury (heroin use). Pt states he wants to be on suboxone maintenance when discharged and states \"I'm ready to do what I need to do\" to stay sober. Pt reports appetite has improved today and that he feels \"much better than yesterday\". No other concerns at this time. Nursing will continue to monitor and assess.   "

## 2021-07-08 NOTE — PLAN OF CARE
Assessment/Intervention/Current Symtoms and Care Coordination  CTC (writer) met with trx team, provided update, and reviewed pt's chart. CTC met with pt this morning who stated he spoke with CD  this morning, and when doing so he determined he wanted to pursue sober housing at this time instead of treatment due to wanting to keep his job. Pt also inquired about Suboxone, which CTC said they would need to talk with the attending about first before a referral for that could be made. CTC spoke with trx team about pt's request, which it was agreed may be a good option for pt at this time if he was unable to attend CD trx if he also go therapy with it. However, attending will not be establishing pt with Suboxone and will need to find a community provider to do so. Attending agreed to review this with pt again today. CTC confirmed that they could look into potential option and meet with pt to discuss discharge planing related to Suboxone further with pt.    CTC met with pt to review Suboxone options and to reiterate that pt would need to get established with a community provider to start Suboxone. CTC provide pt options that could also provide therapy support, which pt was agreeable to. Pt signed CHERRIE's. Pt reported that he plans to call the sober houses provided to him yesterday today and tomorrow to find a place that would work for him. CTC will follow-up with pt tomorrow about progress made.      Discharge Plan or Goal  Evaluation/offer of rule 25 assessment, further goals to be determined on evaluation and assessment.      Barriers to Discharge   Safe discharge plan, ongoing symptom severity (Depression Symptoms), and medication evaluation/assessment.     Referral Status  Pt will be referred to [facility] for Suboxone establishment and therapy services prior to discharge.     Legal Status  VOLUNTARY

## 2021-07-08 NOTE — PROGRESS NOTES
[facility], [place]   Psychiatric Progress Note        Interim History:   The patient's care was discussed with the treatment team during the daily team meeting and/or staff's chart notes were reviewed.  Staff report patient's COWS score was 9 this am, received suboxone per protocol. His pulse this am was in the high 50's- low 60's and RN requested Suboxone parameters placed. RN reports Romeo rates his anxiety and depression very high and required prn Zyprexa last night. CTC reports that patient told CD consult that he would like to pursue sober housing. [facility] will provide patient with sober housing resources, outpatient suboxone clincs, and outpatient therapy.     Met with patient: in the lounge. Patient was calm and cooperative. Says that he is doing good today, feeling better. Patient says that he plans to call sober houses and that he would like to start therapy after discharge. Expresses that he plans to stay in the hospital to get stabilized on medications and until he feels better. Patient reports negative thoughts including self-sabotage, racing thoughts regarding mistakes he has made in the past, and wants to balance is mood better. Romeo also reports nightmares last night. Romeo states that he has had chronic, passive suicidal ideations and negative thoughts. Patient was somewhat fixated on wanting to get started on Suboxone outpatient as he feels like it will be helpful for him. We explained to him that [facility] will provide him with resources. Patient agrees to continue Wellbutrin and Zyprexa in clinic, he will update us if he had intolerable side effects. Patient requested to print his [place] paperwork, we told patient that he welcome to do so today or tomorrow. Patient had no further questions or concerns.          Medications:       buPROPion  100 mg Oral Daily     escitalopram  10 mg Oral Daily     nicotine  1 patch Transdermal Daily     nicotine   Transdermal Q8H           "Allergies:     Allergies   Allergen Reactions     Ambien [Zolpidem] Shortness Of Breath     wheezing     Pollen Extract Unknown          Labs:   No results found for this or any previous visit (from the past 24 hour(s)).       Psychiatric Examination:     /63 (BP Location: Left arm)   Pulse 60   Temp 97.6  F (36.4  C) (Oral)   Resp 16   Ht 1.778 m (5' 10\")   Wt 80.1 kg (176 lb 8 oz)   SpO2 96%   BMI 25.33 kg/m    Weight is 176 lbs 8 oz  Body mass index is 25.33 kg/m .  Orthostatic Vitals       Most Recent      Sitting Orthostatic /92 07/07 0922    Sitting Orthostatic Pulse (bpm) 57 07/07 0922    Standing Orthostatic /61 07/08 0821    Standing Orthostatic Pulse (bpm) 51 07/08 0821        Appearance: awake, alert, adequately groomed, dressed in hospital scrubs and appeared as age stated  Attitude:  cooperative  Eye Contact:  good  Mood:  better, still reports significant anxiety  Affect:  appropriate and in normal range  Speech:  clear, coherent  Psychomotor Behavior:  no evidence of tardive dyskinesia, dystonia, or tics  Throught Process:  linear and goal oriented  Associations:  no loose associations  Thought Content:  no evidence of suicidal ideation or homicidal ideation, passive suicidal ideation present, no auditory hallucinations present and no visual hallucinations present  Insight:  fair  Judgement:  fair  Oriented to:  time, person, and place  Attention Span and Concentration:  fair  Recent and Remote Memory:  fair    Clinical Global Impressions  First: 5/4 7/8/2021      Most recent:            Precautions:     Behavioral Orders   Procedures     Code 1 - Restrict to Unit     Routine Programming     As clinically indicated     Status 15     Every 15 minutes.     Suicide precautions     Patients on Suicide Precautions should have a Combination Diet ordered that includes a Diet selection(s) AND a Behavioral Tray selection for Safe Tray - with utensils, or Safe Tray - NO utensils         "    DIagnoses:     MDD  IV heroin user           Plan:     Continue Wellbutrin 100 mg daily. If patient continues to feel depressed will consider increasing dose tomorrow. Will start on Zyprexa qhs. Will put parameters for Suboxone. Will get a supervised computer time to print out FMLA papers. Will, likely, need to stay at this hospital over weekend.     ALEXIA Quintana-Student     I was present with PA student who participated in the service and in the documentation of the note. I have verified the history and personally performed the physical exam and medical decision making. I agree with the assessment and plan of care as documented in the note.     Jhon Kramer MD  [facility] Psychiatry

## 2021-07-08 NOTE — PLAN OF CARE
Problem: Adult Inpatient Plan of Care  Goal: Plan of Care Review  Outcome: Improving     Pt was visible in the lounge off and on. Was withdrawn and quiet. Pt reported having racing thought and was given Zyprexa 10 mg. Reported that he endorsed high anxiety and depression. Denied AH and SI. His affect was incongruent and mood calm. Endorsed opiate-related discomfort and was given Suboxone with relief. Eating well and sleeping fair.   Plan: Status 15s; Build trust with pt. Continue to build on strengths. Encourage compliance and healthy coping.

## 2021-07-08 NOTE — PROGRESS NOTES
07/08/21 1431   General Information   Date Initially Attended OT 07/08/21     OT Groups Attended: Clinic     Affect/Hygiene/Presentation: Calm/pleasant, engaged, congruent affect. No apparent hygiene concerns.     Patient Performance/Response: Pt actively participated in occupational therapy clinic with 5 patients for 50 minutes. Pt was able to ask for assistance as needed, and independently initiate a familiar, single-step, creative expression task without difficulty. Pt demonstrated good focus (30 minutes without interruption) and attention to detail during task completion. Pt appeared comfortable interacting with peers and writer, and socialized with a select peer throughout group.    Plan: More information needed to complete OT Initial Assessment; OT staff will meet with pt to review the role of occupational therapy and explain the value of having them involved in their treatment plan including options to meet current needs/self-identified goals. As group attendance is established, Pt will be given self-assessment to inform OT initial assessment.

## 2021-07-08 NOTE — PROVIDER NOTIFICATION
07/08/21 0600   Sleep/Rest/Relaxation   Sleep/Rest/Relaxation (WDL) WDL   Sleep/Rest/Relaxation appears asleep   Night Time # Hours 6.5 hours     Pt had a quiet night with no behavioral or safety concerns. Was observed sleeping during the safety checks. No issue noted or reported.

## 2021-07-09 PROCEDURE — 99232 SBSQ HOSP IP/OBS MODERATE 35: CPT | Performed by: PSYCHIATRY & NEUROLOGY

## 2021-07-09 PROCEDURE — 250N000013 HC RX MED GY IP 250 OP 250 PS 637: Performed by: PSYCHIATRY & NEUROLOGY

## 2021-07-09 PROCEDURE — 124N000002 HC R&B MH UMMC

## 2021-07-09 PROCEDURE — 250N000011 HC RX IP 250 OP 636: Performed by: PSYCHIATRY & NEUROLOGY

## 2021-07-09 RX ORDER — BUPROPION HYDROCHLORIDE 150 MG/1
150 TABLET, EXTENDED RELEASE ORAL DAILY
Status: DISCONTINUED | OUTPATIENT
Start: 2021-07-10 | End: 2021-07-15 | Stop reason: HOSPADM

## 2021-07-09 RX ADMIN — OLANZAPINE 10 MG: 10 TABLET, FILM COATED ORAL at 18:22

## 2021-07-09 RX ADMIN — ACETAMINOPHEN 650 MG: 325 TABLET, FILM COATED ORAL at 11:46

## 2021-07-09 RX ADMIN — ONDANSETRON 4 MG: 4 TABLET, ORALLY DISINTEGRATING ORAL at 13:27

## 2021-07-09 RX ADMIN — ONDANSETRON 4 MG: 4 TABLET, ORALLY DISINTEGRATING ORAL at 21:13

## 2021-07-09 RX ADMIN — BUPRENORPHINE AND NALOXONE 1 FILM: 4; 1 FILM BUCCAL; SUBLINGUAL at 13:26

## 2021-07-09 RX ADMIN — ESCITALOPRAM OXALATE 10 MG: 10 TABLET ORAL at 09:36

## 2021-07-09 RX ADMIN — BUPRENORPHINE AND NALOXONE 1 FILM: 4; 1 FILM BUCCAL; SUBLINGUAL at 09:41

## 2021-07-09 RX ADMIN — NICOTINE POLACRILEX 4 MG: 2 GUM, CHEWING ORAL at 18:22

## 2021-07-09 RX ADMIN — BUPROPION HYDROCHLORIDE 100 MG: 100 TABLET, EXTENDED RELEASE ORAL at 09:35

## 2021-07-09 RX ADMIN — NICOTINE 1 PATCH: 14 PATCH, EXTENDED RELEASE TRANSDERMAL at 09:35

## 2021-07-09 RX ADMIN — NICOTINE POLACRILEX 4 MG: 2 GUM, CHEWING ORAL at 15:12

## 2021-07-09 RX ADMIN — HYDROXYZINE HYDROCHLORIDE 25 MG: 25 TABLET, FILM COATED ORAL at 18:22

## 2021-07-09 RX ADMIN — OLANZAPINE 5 MG: 5 TABLET, FILM COATED ORAL at 21:12

## 2021-07-09 ASSESSMENT — ACTIVITIES OF DAILY LIVING (ADL)
LAUNDRY: WITH SUPERVISION
ORAL_HYGIENE: INDEPENDENT
DRESS: INDEPENDENT
HYGIENE/GROOMING: INDEPENDENT

## 2021-07-09 NOTE — PLAN OF CARE
"  Problem: Adult Inpatient Plan of Care  Goal: Plan of Care Review  Outcome: Improving    Pt was isolative and withdrawn. Presented with depressed mood and blunted affect. Reported having racing thought and received Zyprexa with relief. Reported endorsing \"low depression and high anxiety.\" Denied SI. Reported pain and nausea and received Tylenol and Zofran with relief. Eating and sleeping well.   Plan: Status 15s; Build trust with pt. Continue to build on strengths. Encourage compliance and healthy coping.          "

## 2021-07-09 NOTE — PLAN OF CARE
Problem: Suicidal Behavior  Goal: Suicidal Behavior is Absent or Managed  Outcome: No Change  Note: Pt slept through the night with no concerns noted. No SI behaviors observed.     NOC Shift Report    Pt in bed at beginning of shift, breathing quiet and unlabored. Pt slept through shift. Pt slept 6.75 hours.     No pt complaints or concerns at this time. Pt continues on opiate withdrawal scale when pt awake.     No PRNs given. Will continue to monitor.     Precautions: Suicide

## 2021-07-09 NOTE — PROGRESS NOTES
[facility], [place]   Psychiatric Progress Note        Interim History:   The patient's care was discussed with the treatment team during the daily team meeting and/or staff's chart notes were reviewed.  Staff report patient's COWS score was 14 this morning, thus patient received Suboxone. We requested that staff weighs objective information more strongly than subjective for patient to receive Suboxone PRN per protocol. RN reports patient had a difficulty getting out of bed today and at least 6.75 hours but likely more. Patient denied SI, SIB, HI, and Auditory hallucinations. CTC reports patient plans to talk to family about a 28 day outpatient CD treatment program and he referral sent to [facility] for outpatient Suboxone provider and therapy. [facility] says that [facility] can likely see patient next week.     Met with patient: in the lounge. Patient was calm and cooperative but more fatigued this morning. Romeo reports that he is feeling more depressed today thinking about his past mistakes and says he just wants to lay in bed all day. Patient states that he knows he cannot lay in bed all day and is trying to get home sooner in the mornings. Patient says that he plans to call sober \[facility]", work, and pursue therapy along with a suboxone provider at [facility] when he discharges. Patient agreed to increase his bupropion dose from 100 mg daily to 250 mg daily and to stay the weekend. We discussed continuing to take zyprexa 5 mg HS and asked patient to let us know if he continues to be fatigued.         Medications:       buPROPion  100 mg Oral Daily     escitalopram  10 mg Oral Daily     nicotine  1 patch Transdermal Daily     nicotine   Transdermal Q8H     OLANZapine  5 mg Oral At Bedtime          Allergies:     Allergies   Allergen Reactions     Ambien [Zolpidem] Shortness Of Breath     wheezing     Pollen Extract Unknown          Labs:   No results found for this  "or any previous visit (from the past 24 hour(s)).       Psychiatric Examination:     BP (!) 161/77   Pulse 69   Temp 97.8  F (36.6  C) (Oral)   Resp 16   Ht 1.778 m (5' 10\")   Wt 80.1 kg (176 lb 8 oz)   SpO2 97%   BMI 25.33 kg/m    Weight is 176 lbs 8 oz  Body mass index is 25.33 kg/m .  Orthostatic Vitals       Most Recent      Sitting Orthostatic /92 07/07 0922    Sitting Orthostatic Pulse (bpm) 57 07/07 0922    Standing Orthostatic /61 07/08 0821    Standing Orthostatic Pulse (bpm) 51 07/08 0821        Appearance: adequately groomed, dressed in hospital scrubs, appeared as age stated and fatigued, but alert   Attitude:  cooperative  Eye Contact:  good  Mood:  sad , depressed and tired  Affect:  appropriate and in normal range  Speech:  clear, coherent  Psychomotor Behavior:  no evidence of tardive dyskinesia, dystonia, or tics  Throught Process:  linear and goal oriented  Associations:  no loose associations  Thought Content:  no evidence of suicidal ideation or homicidal ideation, passive suicidal ideation present, no auditory hallucinations present and no visual hallucinations present. Patient contracts for safety.   Insight:  fair  Judgement:  fair  Oriented to:  time, person, and place  Attention Span and Concentration:  fair  Recent and Remote Memory:  fair    Clinical Global Impressions  First: 5/4 7/8/2021      Most recent:            Precautions:     Behavioral Orders   Procedures     Code 1 - Restrict to Unit     Routine Programming     As clinically indicated     Status 15     Every 15 minutes.     Suicide precautions     Patients on Suicide Precautions should have a Combination Diet ordered that includes a Diet selection(s) AND a Behavioral Tray selection for Safe Tray - with utensils, or Safe Tray - NO utensils            DIagnoses:     MDD  IV heroin user           Plan:     Increase Wellbutrin to 250 mg daily. Will continue Zyprexa 5 mg HS. Patient will get a supervised computer " time to print out FMLA papers. [facility] provided patient with sober house resources. [facility] also provided suboxone outpatient clinic and therapy at [facility].    ALEXIA Quintana-Student     I was present with PA student who participated in the service and in the documentation of the note. I have verified the history and personally performed the physical exam and medical decision making. I agree with the assessment and plan of care as documented in the note.     Jhon Kramer MD  [facility] Psychiatry

## 2021-07-09 NOTE — PLAN OF CARE
"Music Therapy Group note    Total time in session: 45 minutes    Number of patients in group: 5    Scope of service: psychodynamic     Patient progress: initial encounter    Intervention: Future Self Letter    Goal of group: self-reflection and visioning     Patient response/reaction to treatment intervention(s): Romeo listened to song example of Future Self Letter shared by MT live in group, then responded by writing his own letter to himself.  After peer shared hers, he was inspired to share his with the group.  The letter was very honest, affirming and moving and he said he felt \"actually better!\" at the end of session.  Affect appeared regulated, calm and cooperative.    Jessica Acosta, MT-BC  Board-Certified Music Therapist           "

## 2021-07-09 NOTE — PLAN OF CARE
Assessment/Intervention/Current Symtoms and Care Coordination  JOE (writer) met with trx team, provided update, and reviewed pt's chart. CTC called [facility], setting up Suboxone intake appointment, and coordinating therapy referral request. CTC checked-in with pt who stated he had not called the sober houses yet, but planned on doing so later today. Pt confirmed that he is working with attending to adjust his meds. Pt signed an CHERRIE for his psychiatry clinic for follow-up to be made. Otherwise, pt has no other concerns or questions for CTC today. AVS was updated.     Discharge Plan or Goal  Evaluation/offer of rule 25 assessment, further goals to be determined on evaluation and assessment.      Barriers to Discharge   Safe discharge plan, ongoing symptom severity (Depression Symptoms), and medication evaluation/assessment.     Referral Status  Pt will be referred to [facility] for Suboxone intake (7/9/21)  [facility] will refer pt internally for therapy at intake appointment (7/9/21)     Legal Status  VOLUNTARY

## 2021-07-09 NOTE — PLAN OF CARE
Pt states his anxiety is 6/10 and depression is a 8/10 this morning. Pt ate well for breakfast and returned to bed wanting to wait for medications.  Pt is pleasant and cooperative.  Pt denies any SI/SIB.  Pt denies hearing any voices or thoughts of HI.  Pt scored 14 on MSSA protocol and suboxone was given as ordered.  Pt states he feels like he isn't feeling well from withdrawal.  Pt later states suboxone made him feel a lot better. Pt c/o toothache rated 5/10 and requested tylenol prn.  Pt states tylenol helps with pain in his tooth.  Pt states he is feeling nauseous and having withdrawal symptoms in later afternoon. Writer scored pt 10 on MSSA and give suboxone as ordered.  Pt also received prn for nausea.  Pt states his appetite is coming back and has been eating quite a bit.  Writer request double orders from provider.

## 2021-07-10 LAB
BACTERIA SPEC CULT: NO GROWTH
BACTERIA SPEC CULT: NO GROWTH
SPECIMEN SOURCE: NORMAL
SPECIMEN SOURCE: NORMAL

## 2021-07-10 PROCEDURE — 124N000002 HC R&B MH UMMC

## 2021-07-10 PROCEDURE — 250N000013 HC RX MED GY IP 250 OP 250 PS 637: Performed by: PSYCHIATRY & NEUROLOGY

## 2021-07-10 RX ADMIN — OLANZAPINE 5 MG: 5 TABLET, FILM COATED ORAL at 21:22

## 2021-07-10 RX ADMIN — NICOTINE POLACRILEX 4 MG: 2 GUM, CHEWING ORAL at 18:47

## 2021-07-10 RX ADMIN — OLANZAPINE 10 MG: 10 TABLET, FILM COATED ORAL at 18:47

## 2021-07-10 RX ADMIN — BUPROPION HYDROCHLORIDE 150 MG: 150 TABLET, EXTENDED RELEASE ORAL at 08:39

## 2021-07-10 RX ADMIN — ESCITALOPRAM OXALATE 10 MG: 10 TABLET ORAL at 08:39

## 2021-07-10 RX ADMIN — HYDROXYZINE HYDROCHLORIDE 25 MG: 25 TABLET, FILM COATED ORAL at 14:38

## 2021-07-10 RX ADMIN — BUPRENORPHINE AND NALOXONE 1 FILM: 4; 1 FILM BUCCAL; SUBLINGUAL at 13:06

## 2021-07-10 RX ADMIN — NICOTINE 1 PATCH: 14 PATCH, EXTENDED RELEASE TRANSDERMAL at 08:39

## 2021-07-10 ASSESSMENT — ACTIVITIES OF DAILY LIVING (ADL)
HYGIENE/GROOMING: INDEPENDENT
DRESS: INDEPENDENT
ORAL_HYGIENE: INDEPENDENT
LAUNDRY: WITH SUPERVISION
LAUNDRY: WITH SUPERVISION
HYGIENE/GROOMING: INDEPENDENT
DRESS: INDEPENDENT
ORAL_HYGIENE: INDEPENDENT

## 2021-07-10 NOTE — PLAN OF CARE
Problem: Behavior Regulation Impairment (Excessive Substance Use)  Goal: Improved Behavioral Control (Excessive Substance Use)  Outcome: No Change  Note: Patient requested to have Suboxone with his am medications. Said the Suboxone was prescribed to him to control his withdraw from Heroin. Patient reported feeling anxious, sweating, nauseated, having tremors, and having generalized body aches.   However, he had just finished and ate 100% of his breakfast, his palms were not sweaty or tremolos. He was offered PRN for anxiety, however, patient decided that he needs to go back to his room and rest. He has been sleeping since. VS: 97.7, 60, 105/64, 16. COWS scored # 2. Will follow with patient when he gets up for lunch.     Addendum: patient was waken up by staff for lunch. He came to the nurses station and requested to have the Suboxone as he was feeling the worst withdraw. Patient was showing the wet front of his scrubs, stating that it was sweat. The back of his scrubs was dry throughout. Patient was approached with the fact that his scrub is dry on the back and under the armpits. He was surprised and said he would never tried to deceive staff that he is truly withdrawing. COWS was scored at #9. PRN Suboxone 4 mg was given. Patient reported feeling better an hr after the that.

## 2021-07-10 NOTE — PLAN OF CARE
Pt was visible in the milieu for the majority of the shift. He was observed playing games with peers and watching TV in the lounge. Pt's affect was blunted/fat but would brighten upon approach. He socialized appropriately with staff and peers. Pt's mood was anxious and depressed, he endorsed some anxiety and depression but did not give a rating for them. He denied SI, SIB, and AVH. He was medication compliant, reported no side effects, and received no PRN's. Pt ate all of his meals, VS were WNL, and he denied pain but endorsed all over body aches.     Medical concerns: He scored a 4 on his COWS assessment at start of shift and again before bed. He was irritated that he would not be getting his Suboxone and asked that the doctor change it so he gets it scheduled instead of as needed.

## 2021-07-10 NOTE — PLAN OF CARE
Problem: Suicidal Behavior  Goal: Suicidal Behavior is Absent or Managed  Outcome: Improving  Note: Patient denied si/sib/ wishing to be dead/HI/hallucinations or delusions. Rated depression at 8/10, anxiety at 6/10, given PRN Hydroxyzine per his request, a spt said it works for his anxiety.   Patient plan for the day is to rest, have dinner and watch a movie.   Long term plan: to start Subutex maintenance for 1-2 yrs.   Patient reported having constipation, prune juice was offered and accepted.       Problem: Mood Impairment (Anxiety Signs/Symptoms)  Goal: Improved Mood Symptoms (Anxiety Signs/Symptoms)  Outcome: Improving      Chronic Hypertension, Ambulatory Care   GENERAL INFORMATION:   Chronic hypertension  is a long-term condition in which your blood pressure (BP) is higher than normal  Your BP is the force of your blood moving against the walls of your arteries  Hypertension is a BP of 140/90 or higher  Common symptoms include the following:   · Headache     · Blurred vision    · Chest pain     · Dizziness or weakness     · Trouble breathing     · Nosebleeds  Seek immediate care for the following symptoms:   · Severe headache or vision loss    · Weakness in an arm or leg    · Confusion or difficulty speaking    · Discomfort in your chest that feels like squeezing, pressure, fullness, or pain    · Suddenly feeling lightheaded or trouble breathing    · Pain or discomfort in your back, neck, jaw, stomach, or arm  Treatment for chronic hypertension  may include medicine to lower your BP  You may also need to make lifestyle changes  Take your medicine exactly as directed  Manage chronic hypertension:   · Take your BP at home  Sit and rest for 5 minutes before you take your BP  Extend your arm and support it on a flat surface  Your arm should be at the same level as your heart  Follow the directions that came with your BP monitor  If possible, take at least 2 BP readings each time  Take your BP at least twice a day at the same times each day, such as morning and evening  Keep a log of your BP readings and bring it to your follow-up visits  · Eat less sodium (salt)  Do not add sodium to your food  Limit foods that are high in sodium, such as canned foods, potato chips, and cold cuts  Your healthcare provider may suggest that you follow the [address] [place] [address] Street  The plan is low in sodium, unhealthy fats, and total fat  It is high in potassium, calcium, and fiber  · Exercise regularly  Exercise at least 30 minutes per day, on most days of the week  This will help decrease your BP   Ask your healthcare provider about the best exercise plan for you  · Limit alcohol  Women should limit alcohol to 1 drink a day  Men should limit alcohol to 2 drinks a day  A drink of alcohol is 12 ounces of beer, 5 ounces of wine, or 1½ ounces of liquor  · Do not smoke  If you smoke, it is never too late to quit  Smoking can increase your BP  Smoking also worsens other health conditions you may have that can increase your risk for hypertension  Ask your healthcare provider for information if you need help quitting  Follow up with your healthcare provider as directed: You will need to return to have your BP checked and to have other lab tests done  Write down your questions so you remember to ask them during your visits  CARE AGREEMENT:   You have the right to help plan your care  Learn about your health condition and how it may be treated  Discuss treatment options with your caregivers to decide what care you want to receive  You always have the right to refuse treatment  The above information is an  only  It is not intended as medical advice for individual conditions or treatments  Talk to your doctor, nurse or pharmacist before following any medical regimen to see if it is safe and effective for you  © 2014 [address] is for End User's use only and may not be sold, redistributed or otherwise used for commercial purposes  All illustrations and images included in CareNotes® are the copyrighted property of A D A M , Inc  or [employer] Hu

## 2021-07-10 NOTE — PLAN OF CARE
Problem: Behavior Regulation Impairment (Excessive Substance Use)  Goal: Improved Behavioral Control (Excessive Substance Use)  Outcome: No Change  Note: Patient requested to have Suboxone with his am medications. Said the Suboxone was prescribed to him to control his withdraw from Heroin. Patient reported feeling anxious, sweating, nauseated, having tremors, and having generalized body aches.   However, he had just finished and ate 100% of his breakfast, his palms were not sweaty or tremolos. He was offered PRN for anxiety, however, patient decided that he needs to go back to his room and rest. He has been sleeping since. VS: 97.7, 60, 105/64, 16. COWS scored # 2. Will follow with patient when he gets up for lunch.

## 2021-07-10 NOTE — PLAN OF CARE
"Problem: Suicidal Behavior  Goal: Suicidal Behavior is Absent or Managed  Outcome: No Change  Note: Pt continues to sleep through the majority of the night. No SI concerns noted.     NOC Shift Report    Pt in bed at beginning of shift, breathing quiet and unlabored. Pt slept through majority of shift. Pt slept 6.25 hours.     Pt came out of room around 0200 to get some water. Writer assessed pt for their opiate withdrawal scale. Pt pulse was 50 at time of assessment. Writer re-took pulse 10 minutes later after having pt sit in lounge. Pt pulse was 60, no additional concerns. Pt scored a 3, no interventions required. Pt appeared calm with no sweating and no agitation. Pt verbalized experiencing joint aches, declined hot/cold packs. Pt was given more water and a snack. Pt returned to room to rest.     Pt came out of room around 0600 and stated they \"did not feel great.\" Pt appears to be somewhat medication seeking with their COWS assesment as their physical state does not match the symptoms they describe. Pt requested AM medications. Writer informed them it was too early and offered other therapeutic interventions. Pt declined and is currently sitting in lounge. Will continue to monitor closely.     No PRNs given. Will continue to monitor.     Precautions: Suicide     "

## 2021-07-10 NOTE — PLAN OF CARE
"Pt came to the desk asking for PRN zyprexa and nicotine gum. Writer asked why pt feels he needs zyprexa. Pt explained he is feeling \"very anxious and agitated\". Pt does not disclose what triggered this. PRN zyprexa 10 mg administered with some relief. Pt denies SI/SIB/AVH at that time. Pt then asks when he can get suboxone again. Writer explained that we will continue to assess whether it is needed and at this time, his VS are stable and he is not showing any symptoms that indicate withdrawal. Pt continues asking how often he can get it, whether he can \"get it later if i'm feeling worse\" and asking about what symptoms he needs in order to get it. Writer explains that if his VS increase indicating withdrawal, this will be noted and possible suboxone would be administered. Immediately after talking about this, pt is observed pacing the hallway quickly back and forth. Later, pt asks for suboxone again. Writer checked VS which were stable other than BP slightly elevated. Pt scored 8 for COWS and therefore was not given suboxone. Pt expresses frustration about this and upset about having to go through assessment process every time he feels sick from withdrawal. Pt requesting to have suboxone scheduled once a day in the morning and feels this will suffice for him. He states that providers have been suggesting he go on suboxone maintenance for years and he has been declining, but now feels that it may be his only choice because he keeps having relapses on heroin and he feels maintenance would help this. He states \"i'm not med seeking, i'm just trying to get my life together so I can stay sober and spend time with my daughter\". Pt was encouraged to speak with provider on Monday about suboxone maintenance. He states he thinks he will wake up during the night feeling bad and writer reassured him the night nurse would be able to assess him at that time.   "

## 2021-07-11 PROCEDURE — 250N000013 HC RX MED GY IP 250 OP 250 PS 637: Performed by: PSYCHIATRY & NEUROLOGY

## 2021-07-11 PROCEDURE — G0177 OPPS/PHP; TRAIN & EDUC SERV: HCPCS

## 2021-07-11 PROCEDURE — 124N000002 HC R&B MH UMMC

## 2021-07-11 RX ADMIN — OLANZAPINE 10 MG: 10 TABLET, FILM COATED ORAL at 14:42

## 2021-07-11 RX ADMIN — OLANZAPINE 5 MG: 5 TABLET, FILM COATED ORAL at 22:13

## 2021-07-11 RX ADMIN — ESCITALOPRAM OXALATE 10 MG: 10 TABLET ORAL at 08:57

## 2021-07-11 RX ADMIN — NICOTINE POLACRILEX 4 MG: 2 GUM, CHEWING ORAL at 13:07

## 2021-07-11 RX ADMIN — HYDROXYZINE HYDROCHLORIDE 25 MG: 25 TABLET, FILM COATED ORAL at 08:57

## 2021-07-11 RX ADMIN — BUPROPION HYDROCHLORIDE 150 MG: 150 TABLET, EXTENDED RELEASE ORAL at 08:57

## 2021-07-11 RX ADMIN — NICOTINE 1 PATCH: 14 PATCH, EXTENDED RELEASE TRANSDERMAL at 08:57

## 2021-07-11 RX ADMIN — BUPRENORPHINE AND NALOXONE 1 FILM: 4; 1 FILM BUCCAL; SUBLINGUAL at 10:48

## 2021-07-11 ASSESSMENT — ACTIVITIES OF DAILY LIVING (ADL)
LAUNDRY: WITH SUPERVISION
ORAL_HYGIENE: INDEPENDENT
HYGIENE/GROOMING: INDEPENDENT
DRESS: INDEPENDENT
ORAL_HYGIENE: INDEPENDENT
LAUNDRY: WITH SUPERVISION
DRESS: INDEPENDENT
HYGIENE/GROOMING: INDEPENDENT

## 2021-07-11 ASSESSMENT — MIFFLIN-ST. JEOR: SCORE: 1809.92

## 2021-07-11 NOTE — PLAN OF CARE
"Pt observed this afternoon sitting in the dining room with his head down on the table. Writer inquired how pt is doing. Pt states he is \"kind of crummy\" mentally and physically. He states he is \"working through some stuff, which is what I came here to do so it's okay\". Affect flat and depressed. Pt appears tearful. Pt feels his medications are helping him feel better. Writer asks if there is anything I can do to help him feel better right now and pt states \"no i'm okay\". Pt denies SI/SIB. Pt ate 100% of dinner and snacks this shift. After dinner, pt has been isolative to his room. He has been withdrawn to himself all shift and has not been interacting with peers. No SI/SIB behaviors observed.    COWS score was 8 this afternoon, no medication was given. COWS score 7 at HS, no medication given. Pt continues to be sweaty, anxious at baseline and slightly tremulous, but is able to eat, VSS, and able to sit still without issue. Pt continues to state that he would like to have his suboxone scheduled once per day instead of PRN. He also asked that dose be increased to 8 mg for that once per day dose. Writer continues to encourage him to speak with the provider about his desire to be on suboxone maintenance. Pt verbalized understanding.   "

## 2021-07-11 NOTE — PLAN OF CARE
Pt spent much of the shift in his room in bed. He did receive a phone call from his mother and she dropped off some paperwork for his job during visiting hours.     At 0900 pt received a prn of hydroxyzine 25 mg for anxiety. Pt said he felt little relief. Pt's COWS scores were 5 and 9. Pt was sweating through his shirt, shivering, had GI upset, slight tremors, and anxiety and restlessness. Pt was given a prn of suboxone at 1038 according to the opiate protocol. Pt later said it was helpful. At 0245 the pt received a prn of Zyprexa for agitation. Pt rated his anxiety and depression at an 8-9 out of 10. Pt later said his agitation had improved after he had Zyprexa. Pt endorsed passive SI but was able to contract for safety. Pt denied SIB/HI/AVH. Pt medication compliant and no medication side effects reported or noted. Pt ADLs were poor. He said he didn't feel up to showering.

## 2021-07-11 NOTE — PLAN OF CARE
"Problem: Mood Impairment (Anxiety Signs/Symptoms)  Goal: Improved Mood Symptoms (Anxiety Signs/Symptoms)  Outcome: No Change  Note: Pt did not appear anxious throughout the shift, but did have poor sleep.     NOC Shift Report    Pt in bed at beginning of shift, breathing quiet and unlabored. Pt presented with difficulty staying asleep. Pt slept 5.5 hours.     Around 0100, pt came out of their room to get some water. At this time, pt was assessed for their opiate withdrawal scale. Pt scored a 1, no medication intervention required. Pt pulse noted at 54. When asked how pt was feeling, pt stated \"I am alright.\" Pt denied pain. Pt was not tremulous, sweaty, nor agitated at this time. Pt given snack per request and returned to room to rest.     Around 0300, pt came out of their room and requested a snack. When asked how pt was doing, pt stated \"I'm alright. Just not sleeping great.\" Staff asked if there was anything they could do for pt, pt politely declined and returned to room.      No PRNs given. Will continue to monitor.     Precautions: Suicide     "

## 2021-07-11 NOTE — PROGRESS NOTES
"Romeo  attended a Life Skills group today that involved sharing reflections according to question prompts. Thoughtful and pleasant. Discussed the importance of boundaries and discernment in close relationships. Forthcoming about his addiction history and \"catastrophic\" divorce.      07/11/21 1500   Occupational Therapy   Type of Intervention structured groups   Response Initiates, socially acceptable   Hours 1       "

## 2021-07-12 PROCEDURE — 99207 PR CDG-MDM COMPONENT: MEETS MODERATE - DOWN CODED: CPT | Performed by: PSYCHIATRY & NEUROLOGY

## 2021-07-12 PROCEDURE — 250N000011 HC RX IP 250 OP 636: Performed by: PSYCHIATRY & NEUROLOGY

## 2021-07-12 PROCEDURE — 124N000002 HC R&B MH UMMC

## 2021-07-12 PROCEDURE — 250N000013 HC RX MED GY IP 250 OP 250 PS 637: Performed by: PSYCHIATRY & NEUROLOGY

## 2021-07-12 PROCEDURE — 99232 SBSQ HOSP IP/OBS MODERATE 35: CPT | Performed by: PSYCHIATRY & NEUROLOGY

## 2021-07-12 RX ORDER — ESCITALOPRAM OXALATE 5 MG/1
5 TABLET ORAL DAILY
Status: COMPLETED | OUTPATIENT
Start: 2021-07-13 | End: 2021-07-14

## 2021-07-12 RX ORDER — ARIPIPRAZOLE 2 MG/1
2 TABLET ORAL DAILY
Status: DISCONTINUED | OUTPATIENT
Start: 2021-07-12 | End: 2021-07-15 | Stop reason: HOSPADM

## 2021-07-12 RX ORDER — BUPRENORPHINE AND NALOXONE 8; 2 MG/1; MG/1
1 FILM, SOLUBLE BUCCAL; SUBLINGUAL DAILY
Status: DISCONTINUED | OUTPATIENT
Start: 2021-07-12 | End: 2021-07-15 | Stop reason: HOSPADM

## 2021-07-12 RX ADMIN — ACETAMINOPHEN 650 MG: 325 TABLET, FILM COATED ORAL at 11:19

## 2021-07-12 RX ADMIN — BUPRENORPHINE AND NALOXONE 1 FILM: 4; 1 FILM BUCCAL; SUBLINGUAL at 11:19

## 2021-07-12 RX ADMIN — ARIPIPRAZOLE 2 MG: 2 TABLET ORAL at 12:57

## 2021-07-12 RX ADMIN — NICOTINE POLACRILEX 4 MG: 2 GUM, CHEWING ORAL at 12:57

## 2021-07-12 RX ADMIN — ACETAMINOPHEN 650 MG: 325 TABLET, FILM COATED ORAL at 20:30

## 2021-07-12 RX ADMIN — OLANZAPINE 10 MG: 10 TABLET, FILM COATED ORAL at 14:56

## 2021-07-12 RX ADMIN — ESCITALOPRAM OXALATE 10 MG: 10 TABLET ORAL at 08:28

## 2021-07-12 RX ADMIN — NICOTINE 1 PATCH: 14 PATCH, EXTENDED RELEASE TRANSDERMAL at 12:57

## 2021-07-12 RX ADMIN — ONDANSETRON 4 MG: 4 TABLET, ORALLY DISINTEGRATING ORAL at 11:19

## 2021-07-12 RX ADMIN — HYDROXYZINE HYDROCHLORIDE 25 MG: 25 TABLET, FILM COATED ORAL at 20:12

## 2021-07-12 RX ADMIN — OLANZAPINE 5 MG: 5 TABLET, FILM COATED ORAL at 20:13

## 2021-07-12 RX ADMIN — BUPROPION HYDROCHLORIDE 150 MG: 150 TABLET, EXTENDED RELEASE ORAL at 08:28

## 2021-07-12 ASSESSMENT — ACTIVITIES OF DAILY LIVING (ADL)
LAUNDRY: WITH SUPERVISION
DRESS: SCRUBS (BEHAVIORAL HEALTH);INDEPENDENT
ORAL_HYGIENE: INDEPENDENT
HYGIENE/GROOMING: INDEPENDENT

## 2021-07-12 NOTE — PLAN OF CARE
Assessment/Intervention/Current Symtoms and Care Coordination  JOE (writer) met with trx team, provided update, and reviewed pt's chart. [facility] called Meme ([phone]) today to discuss referring pt to their inpatient program. Meme confirmed that pt had called over th weekend and started the process. Meme did request updated medical records, including, MAR, H&P, and progress notes; which were faxed over this morning ([phone]) after JOE confirmed an CHERRIE was in the system. [facility] also confirmed with [facility] that they accept pt's on Suboxone and that if pt needs to be established on Suboxone that they could do that if pt is accepted there.     [facility] updated pt that [facility] had an open case and that records requested had been sent. [facility] also assisted pt in getting FMLA paperwork and gave it to the attending. [facility] agreed to Fax that once attending gave it to them ([facility] Fax [phone]) as per pt's request. [facility] heard back from Ifrah Valentine [phone], who was able to schedule an intake assessment for tomorrow starting between 12:45am-1:15pm that pt can do via phone. Meme will call tomorrow and ask for pt to conduct the appointment on unit. Meme should call JOE tomorrow afterward to update them about admit options.      Discharge Plan or Goal  Evaluation/offer of rule 25 assessment, further goals to be determined on evaluation and assessment.      Barriers to Discharge   Safe discharge plan, ongoing symptom severity (Depression Symptoms), and medication evaluation/assessment.     Referral Status  Pt will be referred to [facility] for Suboxone intake (7/9/21)  [facility] will refer pt internally for therapy at intake appointment (7/9/21)  [facility] intake referral, med rec. faxed over (7/12/21)    Legal Status  VOLUNTARY

## 2021-07-12 NOTE — PLAN OF CARE
Patient is alert and oriented. Able to communicate needs. He is isolative/withdrawn into his room. He came out for medications and his breakfast. Ate 100% of his breakfast. He endorses high anxiety, rates it 8 out 10. Depression is at 8-9 out of 10. Patient denies SI/SIB/hallucinations. Patient reports generalized body ache, he is nauseous. Noted sweaty with wet shirt, skin is clammy. He scored 10 on COWS assessment, received prn suboxone. Other prns medications given are tylenol for body aches and ondansetron for nausea and olanzapine. Patient is on scheduled Suboxone, the provider wants the scheduled dose to start tomorrow because pt has already received prn suboxone. Staff will continue to monitor.

## 2021-07-12 NOTE — PLAN OF CARE
Night Shift Summary (7/11/21 into 07/12/21)    Pt in bed sleeping at start of shift. Breathing quiet and unlabored. Intermittently awake during the shift and up to the bathroom x 1. Sleep appeared to be disrupted when pt's roommate was up and about, in and out of the room. Appears to have slept 5 hours.    Suicide precautions, with no related events occurring this shift.     Will continue to monitor and assess.       Problem: Behavioral Health Plan of Care  Goal: Absence of New-Onset Illness or Injury  Outcome: Improving  Remains safe on the unit.      Problem: Sleep Disturbance  Goal: Adequate Sleep/Rest  Outcome: Declining   Slept < 6 hours.

## 2021-07-12 NOTE — PROGRESS NOTES
[facility], [place]   Psychiatric Progress Note        Interim History:   The patient's care was discussed with the treatment team during the daily team meeting and/or staff's chart notes were reviewed.  Staff report patient's is not scoring on the withdrawal protocol, no PRN Suboxone given. RN reports patient was isolative, depressed over the weekend, and requesting 8 mg scheduled Suboxone. [facility] in inpatient CD treatment at [facility]. Additionally, [facility] for [facility] on 07/21/21 at 4:00 pm for a suboxone provider and therapy (pending patients decision regarding pursuing inpatient CD treatment or not). [facility] appointment will be cancelled if patient decides to pursue inpatient CD treatment.        Met with patient: in the conference room. 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 Patient states that he reached out to [facility] over the weekend and states they allow for scheduled Suboxone. Patient states wishes to start Abilify during the day and Subxone 8 mg every day. Romeo states that Zyprexa is helpful at night but also would like Abilify for mood stabilization during the day. We discussed plan with patient, who agreed to, starting scheduled Abilify during the day, scheduled Suboxone every day, and also will decrease lexapro taper to 5 mg daily for the next three days. Patient had no further questions or concerns.          Medications:       ARIPiprazole  2 mg Oral Daily     buprenorphine HCl-naloxone HCl  1 Film Sublingual Daily     buPROPion  150 mg Oral Daily     [START ON 7/13/2021] escitalopram  5 mg Oral Daily     nicotine  1 patch Transdermal Daily     nicotine   Transdermal Q8H     OLANZapine  5 mg Oral At Bedtime          Allergies:     Allergies   Allergen Reactions     Ambien [Zolpidem] Shortness Of Breath      "wheezing     Pollen Extract Unknown          Labs:   No results found for this or any previous visit (from the past 24 hour(s)).       Psychiatric Examination:     /83   Pulse 65   Temp 97.8  F (36.6  C) (Oral)   Resp 16   Ht 1.778 m (5' 10\")   Wt 85.4 kg (188 lb 3.2 oz)   SpO2 96%   BMI 27.00 kg/m    Weight is 188 lbs 3.2 oz  Body mass index is 27 kg/m .  Orthostatic Vitals       Most Recent      Sitting Orthostatic /83 07/12 0821    Sitting Orthostatic Pulse (bpm) 57 07/12 0821    Standing Orthostatic /81 07/12 0821    Standing Orthostatic Pulse (bpm) 76 07/12 0821          Appearance: dressed in hospital scrubs, appeared as age stated, fatigued and slightly unkempt, but alert   Attitude:  more cooperative  Eye Contact:  good  Mood:  sad , depressed and tired  Affect:  appropriate and in normal range  Speech:  clear, coherent  Psychomotor Behavior:  no evidence of tardive dyskinesia, dystonia, or tics  Throught Process:  linear and goal oriented  Associations:  no loose associations  Thought Content:  no evidence of suicidal ideation or homicidal ideation, passive suicidal ideation present, no auditory hallucinations present and no visual hallucinations present. Patient contracts for safety.   Insight:  fair  Judgement:  fair  Oriented to:  time, person, and place  Attention Span and Concentration:  fair  Recent and Remote Memory:  fair    Clinical Global Impressions  First: 5/4 7/8/2021      Most recent:            Precautions:     Behavioral Orders   Procedures     Code 1 - Restrict to Unit     Routine Programming     As clinically indicated     Status 15     Every 15 minutes.     Suicide precautions     Patients on Suicide Precautions should have a Combination Diet ordered that includes a Diet selection(s) AND a Behavioral Tray selection for Safe Tray - with utensils, or Safe Tray - NO utensils            DIagnoses:     MDD, recurrent, moderate severity,  IV heroin user  Unspecified " anxiety disorder.           Plan:     Continue Wellbutrin to 150 mg daily. Will continue Zyprexa 5 mg HS. Start Abilify 2 mg daily. Decrease Lexapro taper to 5 mg x 3 days. Start on Suboxone 8 mg daily and will discontinue Suboxone prn. Patient provided [place] paperwork which we will fill out and fax over. Patient will likely pursue CD treatment at [facility] pending insurance and acceptance. Alternative includes referral for outpatient provider and therapist at [facility].     ALEXIA Quintana-Student     I was present with PA student who participated in the service and in the documentation of the note. I have verified the history and personally performed the physical exam and medical decision making. I agree with the assessment and plan of care as documented in the note.     Jhon Kramer MD  [facility]

## 2021-07-13 PROCEDURE — 250N000013 HC RX MED GY IP 250 OP 250 PS 637: Performed by: PSYCHIATRY & NEUROLOGY

## 2021-07-13 PROCEDURE — 99231 SBSQ HOSP IP/OBS SF/LOW 25: CPT | Performed by: PSYCHIATRY & NEUROLOGY

## 2021-07-13 PROCEDURE — 124N000002 HC R&B MH UMMC

## 2021-07-13 RX ORDER — AMOXICILLIN 250 MG
1 CAPSULE ORAL DAILY
Status: DISCONTINUED | OUTPATIENT
Start: 2021-07-13 | End: 2021-07-14

## 2021-07-13 RX ADMIN — NICOTINE POLACRILEX 4 MG: 2 GUM, CHEWING ORAL at 20:38

## 2021-07-13 RX ADMIN — NICOTINE POLACRILEX 4 MG: 2 GUM, CHEWING ORAL at 09:23

## 2021-07-13 RX ADMIN — NICOTINE 1 PATCH: 14 PATCH, EXTENDED RELEASE TRANSDERMAL at 08:20

## 2021-07-13 RX ADMIN — OLANZAPINE 10 MG: 10 TABLET, FILM COATED ORAL at 11:56

## 2021-07-13 RX ADMIN — NICOTINE POLACRILEX 4 MG: 2 GUM, CHEWING ORAL at 11:13

## 2021-07-13 RX ADMIN — ARIPIPRAZOLE 2 MG: 2 TABLET ORAL at 08:20

## 2021-07-13 RX ADMIN — DOCUSATE SODIUM AND SENNOSIDES 1 TABLET: 8.6; 5 TABLET ORAL at 11:13

## 2021-07-13 RX ADMIN — NICOTINE POLACRILEX 4 MG: 2 GUM, CHEWING ORAL at 18:03

## 2021-07-13 RX ADMIN — BUPROPION HYDROCHLORIDE 150 MG: 150 TABLET, EXTENDED RELEASE ORAL at 08:20

## 2021-07-13 RX ADMIN — MAGNESIUM HYDROXIDE 30 ML: 400 SUSPENSION ORAL at 11:13

## 2021-07-13 RX ADMIN — ESCITALOPRAM 5 MG: 5 TABLET, FILM COATED ORAL at 08:20

## 2021-07-13 RX ADMIN — OLANZAPINE 5 MG: 5 TABLET, FILM COATED ORAL at 20:38

## 2021-07-13 RX ADMIN — BUPRENORPHINE AND NALOXONE 1 FILM: 8; 2 FILM BUCCAL; SUBLINGUAL at 08:20

## 2021-07-13 ASSESSMENT — ACTIVITIES OF DAILY LIVING (ADL)
DRESS: SCRUBS (BEHAVIORAL HEALTH);INDEPENDENT
HYGIENE/GROOMING: INDEPENDENT
ORAL_HYGIENE: INDEPENDENT
LAUNDRY: WITH SUPERVISION

## 2021-07-13 ASSESSMENT — MIFFLIN-ST. JEOR: SCORE: 1838.04

## 2021-07-13 NOTE — PLAN OF CARE
"Patient spent majority of the [place] [facility] [place] Social with a few specific peers. He paced in the hallway. He plans to attend groups today. Mood is calm, depressed and sometimes anxious. Affect is flat and blunted but brightens upon approach. Patient is medication compliant. Patient reports depression and anxiety, rates both at a 6 out of 10. Patient denies SI/SIB/hallucinations. He denies any racing thoughts. Patient reports his concentration has improved. Patient requested prn olanzapine for agitation, when staff asked what's making him agitated he reports it's something to do with his FMLA paper that the doctor filled out for him.  Pt states \"the doctor was very specific about my drug use and that makes me nervous and kind of worried about my job.\" I don't want my employer to know about my drug use, not that i'll be fired but now people are going to look at me differently at work.\" He c/o constipations, received MOM and senna. He also drank prune juice, no results yet. Patient has intake assessment phone interview with Meme today.Patient reports he slept good last night but per the note from night nurse pt slept 4.25 hours. Good oral intake. Medication SE is constipation.   "

## 2021-07-13 NOTE — PROGRESS NOTES
"[facility], [place]   Psychiatric Progress Note        Interim History:   The patient's care was discussed with the treatment team during the daily team meeting and/or staff's chart notes were reviewed.  Staff report patient reports both anxiety and depression at 6/10. Patient slept 4.25 hours last night. Additionally, RN requests verbal okay for code 3 order so that patient may go outside to [place] along with milk of magnesia and senna as patient reports constipation. We gave verbal okay and RN placed orders. CTC reports patient has intake assessment with Meme this afternoon. CTC states he will likely hear back later today regarding patients status. Staff request to discontinue COWS scoring.     Met with patient: in the conference room. Patient was calm and cooperative. Romeo reports his mood as \"positive\" today states he got up on time and went to a group this morning. Additionally, he reports less sleep yesterday but states that he napped during the day. Patient endorses constipation but denies any other medication complains. Romeo asked if he could get have Suboxone scheduled 8 mg BID, add an additional sleep medication, and to continue 5 mg Lexapro daily. We explained we will not be able to change Suboxone 8 mg BID but could consider 4 mg BID instead. We encouraged him to not nap during the day so that he can sleep at night. Will plan to continue Lexapro 5 mg daily for the time being. We encouraged patient to limit medication changes at this time and that he can make adjustments with his outpatient provider for Suboxone dosing. Patient states he has his intake with [facility] this afternoon. Patient had no further questions or concerns.          Medications:       ARIPiprazole  2 mg Oral Daily     buprenorphine HCl-naloxone HCl  1 Film Sublingual Daily     buPROPion  150 mg Oral Daily     escitalopram  5 mg Oral Daily     nicotine  1 patch Transdermal Daily     nicotine   " "Transdermal Q8H     OLANZapine  5 mg Oral At Bedtime     senna-docusate  1 tablet Oral Daily          Allergies:     Allergies   Allergen Reactions     Ambien [Zolpidem] Shortness Of Breath     wheezing     Pollen Extract Unknown          Labs:   No results found for this or any previous visit (from the past 24 hour(s)).       Psychiatric Examination:     /78 (BP Location: Left arm)   Pulse 58   Temp 98  F (36.7  C) (Oral)   Resp 16   Ht 1.778 m (5' 10\")   Wt 88.2 kg (194 lb 6.4 oz)   SpO2 96%   BMI 27.89 kg/m    Weight is 194 lbs 6.4 oz  Body mass index is 27.89 kg/m .  Orthostatic Vitals       Most Recent      Sitting Orthostatic /78 07/13 0751    Sitting Orthostatic Pulse (bpm) 58 07/13 0751    Standing Orthostatic /72 07/13 0751    Standing Orthostatic Pulse (bpm) 79 07/13 0751        Appearance: dressed in hospital scrubs, appeared as age stated, well groomed, awake and less fatigued, alert, unshaven  Attitude:  more cooperative  Eye Contact:  good  Mood:  better, less sad and depressed  Affect:  appropriate and in normal range  Speech:  clear, coherent  Psychomotor Behavior:  no evidence of tardive dyskinesia, dystonia, or tics  Throught Process:  linear and goal oriented  Associations:  no loose associations  Thought Content:  no evidence of suicidal ideation or homicidal ideation, passive suicidal ideation present, no auditory hallucinations present and no visual hallucinations present. Patient contracts for safety.   Insight:  fair  Judgement:  fair  Oriented to:  time, person, and place  Attention Span and Concentration:  fair  Recent and Remote Memory:  fair    Clinical Global Impressions  First: 5/4 7/8/2021      Most recent:            Precautions:     Behavioral Orders   Procedures     Code 1 - Restrict to Unit     Code 3     Okay for patient to go to [facility]     Routine Programming     As clinically indicated     Status 15     Every 15 minutes.     Suicide precautions     " Patients on Suicide Precautions should have a Combination Diet ordered that includes a Diet selection(s) AND a Behavioral Tray selection for Safe Tray - with utensils, or Safe Tray - NO utensils            DIagnoses:     MDD, recurrent, moderate severity,  IV heroin user  Unspecified anxiety disorder.           Plan:     No medication changes today. Patient will likely pursue CD treatment at [facility] pending insurance and acceptance. Discontinue COWS. Will continue to provide structure and support.     ALEXIA Quintana-Student     I was present with PA student who participated in the service and in the documentation of the note. I have verified the history and personally performed the physical exam and medical decision making. I agree with the assessment and plan of care as documented in the note.     Jhon Kramer MD  [facility]

## 2021-07-13 NOTE — PLAN OF CARE
Assessment/Intervention/Current Symtoms and Care Coordination  JOE (writer) met with trx team, provided update, and reviewed pt's chart. CTC completed weekly care plan team note. CTC met with pt this morning to review FMLA documents that provider completed that required pt's signature. Pt signed paperwork after discussing the paperwork with the provider to clarify some things. [facility] faxed the paperwork to [employer] [facility] (Fax: [phone]) as per pt's request. [facility] also confirmed with pt that he had his intake assessment with Meme today, which pt knew was happening. Pt expressed looking forward to the phone call and is hoping to get into [facility] quickly. Pt updated CTC later that his intake went well and that Meme should get back to him/[facility] tomorrow once they check with insurance. It sounds like [facility] wants to admit pt before this weekend, if possible, but no date has been set yet. [facility] agreed to check in with Ifrah tomorrow if they don't hear anything.      Discharge Plan or Goal  Evaluation/offer of rule 25 assessment, further goals to be determined on evaluation and assessment.      Barriers to Discharge   Safe discharge plan, ongoing symptom severity (Depression Symptoms), and medication evaluation/assessment.     Referral Status  Pt was referred to [facility] for Suboxone intake (7/9/21)  [facility] will refer pt internally for therapy at intake appointment (7/9/21)  [facility] intake referral, med rec. faxed over (7/12/21)  [facility] intake assessment (7/13/21) completed, waiting on callback    Legal Status  VOLUNTARY

## 2021-07-13 NOTE — PLAN OF CARE
BEHAVIORAL TEAM DISCUSSION    Participants: Dr. Jhon Kramer MD, Eli Butler RN, Chava Coleman [facility]  Progress: Improving, pt continues to endorse intermittent depression and withdrawal symptoms, but overall presentation appears improved.  Anticipated length of stay: 7 additional days  Continued Stay Criteria/Rationale: Endorsed symptoms (depression and withdrawal), referral process to [facility], and medication adjustments.    Medical/Physical: None reported in team, pt is being taken off of COWS as he is not scoring high enough  Precautions:   Behavioral Orders   Procedures    Code 1 - Restrict to Unit    Code 3     Okay for patient to go to [place]    Routine Programming     As clinically indicated    Status 15     Every 15 minutes.    Suicide precautions     Patients on Suicide Precautions should have a Combination Diet ordered that includes a Diet selection(s) AND a Behavioral Tray selection for Safe Tray - with utensils, or Safe Tray - NO utensils       Plan: Pt will be discharge to either [facility] or the community, based on their determination/assessment today. If discharged to the community he will be provided outpatient follow-up and will likely go to a sober home.   Rationale for change in precautions or plan: Ongoing stabilization and aftercare support efforts.

## 2021-07-13 NOTE — PLAN OF CARE
"Problem: Sleep Disturbance  Goal: Adequate Sleep/Rest  Outcome: No Change  Note: Pt continues to wake throughout the night, coming out of room to request snacks.     NOC Shift Report    Pt in bed at beginning of shift, breathing quiet and unlabored. Pt presented with difficulty staying asleep. Pt slept 4.25 hours.     Pt came out of room around 0045 and requested a snack. Pt presented with a flat, blunted affect and calm, mildly anxious mood. At 0100, pt COWS was completed. Pt scored a 4, no interventions required at this time. Pt was offered PRN Trazodone for sleep, pt declined. Pt returned to room.    Around 0300, pt came out of room to request a snack. When asked how pt ws feeling, pt stated \"I'm just hungry all the time.\" Pt was polite and cooperative throughout interaction. Pt returned to room to rest.     No pt complaints or concerns at this time.     No PRNs given. Will continue to monitor.     Precautions: Suicide    "

## 2021-07-13 NOTE — PLAN OF CARE
"  Problem: Adult Inpatient Plan of Care  Goal: Plan of Care Review  Outcome: No Change  Flowsheets  Taken 7/12/2021 2238  Plan of Care Reviewed With: patient  Progress: no change  Taken 7/12/2021 2226  Plan of Care Reviewed With: patient    Patient had been resting in bed majority of the shift. Goes out of his bedroom during snacks and at supper. Flat and blunted affect. Stated he does not feel well. Vital signs were WDL. COW score at the shift was 2. He was given a PRN Tylenol 650 mg for complaints of generalized body aches and PRN Hydroxyzine 25 mg for anxiety rated 8/10. He took his scheduled meds, too. He rated depression 9/10. Denied SI, SIB and hallucinations. No other concerns noted. He said he just wanted to rest in his bedroom. Will continue to monitor and assess pt.     Add: Prompted pt to have his covid-19 swab test  But he declined, stated, \" Can we do it later? \" Endorsed to NOC shift.  "

## 2021-07-14 PROCEDURE — 99231 SBSQ HOSP IP/OBS SF/LOW 25: CPT | Performed by: PHYSICIAN ASSISTANT

## 2021-07-14 PROCEDURE — 250N000013 HC RX MED GY IP 250 OP 250 PS 637: Performed by: PSYCHIATRY & NEUROLOGY

## 2021-07-14 PROCEDURE — 250N000013 HC RX MED GY IP 250 OP 250 PS 637: Performed by: PHYSICIAN ASSISTANT

## 2021-07-14 PROCEDURE — 99207 PR CDG-MDM COMPONENT: MEETS LOW - DOWN CODED: CPT | Performed by: PSYCHIATRY & NEUROLOGY

## 2021-07-14 PROCEDURE — 124N000002 HC R&B MH UMMC

## 2021-07-14 PROCEDURE — 99207 PR CONSULT E&M CHANGED TO SUBSEQUENT LEVEL: CPT | Performed by: PHYSICIAN ASSISTANT

## 2021-07-14 PROCEDURE — 99231 SBSQ HOSP IP/OBS SF/LOW 25: CPT | Performed by: PSYCHIATRY & NEUROLOGY

## 2021-07-14 PROCEDURE — 90853 GROUP PSYCHOTHERAPY: CPT

## 2021-07-14 RX ORDER — BISACODYL 5 MG
10 TABLET, DELAYED RELEASE (ENTERIC COATED) ORAL ONCE
Status: COMPLETED | OUTPATIENT
Start: 2021-07-14 | End: 2021-07-14

## 2021-07-14 RX ORDER — TRAZODONE HYDROCHLORIDE 50 MG/1
50 TABLET, FILM COATED ORAL
Qty: 60 TABLET | Refills: 1 | Status: ON HOLD | OUTPATIENT
Start: 2021-07-14 | End: 2022-02-07

## 2021-07-14 RX ORDER — OLANZAPINE 5 MG/1
5 TABLET ORAL AT BEDTIME
Qty: 30 TABLET | Refills: 1 | Status: ON HOLD | OUTPATIENT
Start: 2021-07-14 | End: 2022-02-07

## 2021-07-14 RX ORDER — ALBUTEROL SULFATE 90 UG/1
2 AEROSOL, METERED RESPIRATORY (INHALATION) EVERY 6 HOURS PRN
Qty: 8.5 G | Refills: 0 | Status: ON HOLD | OUTPATIENT
Start: 2021-07-14 | End: 2022-02-07

## 2021-07-14 RX ORDER — AMOXICILLIN 250 MG
2 CAPSULE ORAL 2 TIMES DAILY
Qty: 120 TABLET | Refills: 0 | Status: SHIPPED | OUTPATIENT
Start: 2021-07-14 | End: 2022-02-02

## 2021-07-14 RX ORDER — BUPROPION HYDROCHLORIDE 150 MG/1
150 TABLET, EXTENDED RELEASE ORAL DAILY
Qty: 30 TABLET | Refills: 1 | Status: ON HOLD | OUTPATIENT
Start: 2021-07-15 | End: 2022-02-07

## 2021-07-14 RX ORDER — HYDROXYZINE HYDROCHLORIDE 25 MG/1
25 TABLET, FILM COATED ORAL 3 TIMES DAILY PRN
Qty: 60 TABLET | Refills: 0 | Status: ON HOLD | OUTPATIENT
Start: 2021-07-14 | End: 2022-02-07

## 2021-07-14 RX ORDER — BUPRENORPHINE AND NALOXONE 8; 2 MG/1; MG/1
1 FILM, SOLUBLE BUCCAL; SUBLINGUAL DAILY
Qty: 5 FILM | Refills: 0 | Status: ON HOLD | OUTPATIENT
Start: 2021-07-15 | End: 2022-02-07

## 2021-07-14 RX ORDER — AMOXICILLIN 250 MG
1 CAPSULE ORAL DAILY
Qty: 30 TABLET | Refills: 1 | Status: SHIPPED | OUTPATIENT
Start: 2021-07-15 | End: 2021-07-14

## 2021-07-14 RX ORDER — AMOXICILLIN 250 MG
2 CAPSULE ORAL 2 TIMES DAILY
Status: DISCONTINUED | OUTPATIENT
Start: 2021-07-14 | End: 2021-07-15 | Stop reason: HOSPADM

## 2021-07-14 RX ORDER — ARIPIPRAZOLE 2 MG/1
2 TABLET ORAL DAILY
Qty: 30 TABLET | Refills: 0 | Status: ON HOLD | OUTPATIENT
Start: 2021-07-15 | End: 2022-02-07

## 2021-07-14 RX ADMIN — BISACODYL 10 MG: 5 TABLET, COATED ORAL at 19:59

## 2021-07-14 RX ADMIN — NICOTINE 1 PATCH: 14 PATCH, EXTENDED RELEASE TRANSDERMAL at 08:52

## 2021-07-14 RX ADMIN — DOCUSATE SODIUM 286 ML: 50 LIQUID ORAL at 19:04

## 2021-07-14 RX ADMIN — OLANZAPINE 5 MG: 5 TABLET, FILM COATED ORAL at 20:32

## 2021-07-14 RX ADMIN — MAGNESIUM HYDROXIDE 30 ML: 400 SUSPENSION ORAL at 10:46

## 2021-07-14 RX ADMIN — NICOTINE POLACRILEX 4 MG: 2 GUM, CHEWING ORAL at 10:46

## 2021-07-14 RX ADMIN — ARIPIPRAZOLE 2 MG: 2 TABLET ORAL at 08:43

## 2021-07-14 RX ADMIN — BUPRENORPHINE AND NALOXONE 1 FILM: 8; 2 FILM BUCCAL; SUBLINGUAL at 08:43

## 2021-07-14 RX ADMIN — NICOTINE POLACRILEX 4 MG: 2 GUM, CHEWING ORAL at 20:32

## 2021-07-14 RX ADMIN — TRAZODONE HYDROCHLORIDE 50 MG: 50 TABLET ORAL at 23:35

## 2021-07-14 RX ADMIN — NICOTINE POLACRILEX 4 MG: 2 GUM, CHEWING ORAL at 12:13

## 2021-07-14 RX ADMIN — NICOTINE POLACRILEX 4 MG: 2 GUM, CHEWING ORAL at 18:40

## 2021-07-14 RX ADMIN — ESCITALOPRAM 5 MG: 5 TABLET, FILM COATED ORAL at 08:43

## 2021-07-14 RX ADMIN — OLANZAPINE 10 MG: 10 TABLET, FILM COATED ORAL at 12:12

## 2021-07-14 RX ADMIN — DOCUSATE SODIUM AND SENNOSIDES 1 TABLET: 8.6; 5 TABLET ORAL at 08:43

## 2021-07-14 RX ADMIN — HYDROXYZINE HYDROCHLORIDE 25 MG: 25 TABLET, FILM COATED ORAL at 20:34

## 2021-07-14 RX ADMIN — BUPROPION HYDROCHLORIDE 150 MG: 150 TABLET, EXTENDED RELEASE ORAL at 08:43

## 2021-07-14 NOTE — PROGRESS NOTES
"[facility], [place]   Psychiatric Progress Note        Interim History:     The patient's care was discussed with the treatment team during the daily team meeting and/or staff's chart notes were reviewed.  Staff report patient reports to be doing better but continues to have constipation, nurse plans to offer patient PRN constipation medications and prune juice. Per CTC[facility]. Plan to discharge patient tomorrow for [facility] CD treatment. Patient denies SI and SIB.     Met with patient: in the lounge. Patient was calm and cooperative. Romeo states that he is doing better and that his mental health is in a better spot compared to admission. Patient states that he continues to be constipated and is agreeable to Internal Medicine consultation. Romeo says that in the past he has also been constipated with Suboxone but says he has no plans to stop the medication. Patient expresses concern regarding stopping her selective serotonin reuptake inhibitor, fluoxetine, but was reassured that he likely will not have severe side effects as he has tolerated the taper so far. Patient had no further question or concern.          Medications:      ARIPiprazole  2 mg Oral Daily    buprenorphine HCl-naloxone HCl  1 Film Sublingual Daily    buPROPion  150 mg Oral Daily    nicotine  1 patch Transdermal Daily    nicotine   Transdermal Q8H    OLANZapine  5 mg Oral At Bedtime    senna-docusate  1 tablet Oral Daily          Allergies:     Allergies   Allergen Reactions    Ambien [Zolpidem] Shortness Of Breath     wheezing    Pollen Extract Unknown          Labs:   No results found for this or any previous visit (from the past 24 hour(s)).       Psychiatric Examination:     /77 (BP Location: Left arm)   Pulse 69   Temp 97.2  F (36.2  C) (Tympanic)   Resp 16   Ht 1.778 m (5' 10\")   Wt 88.2 kg (194 lb 6.4 oz)   SpO2 96%   BMI 27.89 kg/m    Weight is 194 lbs 6.4 oz  " Body mass index is 27.89 kg/m .  Orthostatic Vitals         Most Recent      Sitting Orthostatic /77 07/14 0901    Sitting Orthostatic Pulse (bpm) 69 07/14 0901    Standing Orthostatic /78 07/14 0901    Standing Orthostatic Pulse (bpm) 82 07/14 0901          Appearance: dressed in hospital scrubs, appeared as age stated, well groomed and unshaven , alert, unshaven  Attitude:  cooperative  Eye Contact:  good  Mood:  good   Affect:  appropriate and in normal range  Speech:  clear, coherent  Psychomotor Behavior:  no evidence of tardive dyskinesia, dystonia, or tics  Throught Process:  linear and goal oriented  Associations:  no loose associations  Thought Content:  no evidence of suicidal ideation or homicidal ideation, no auditory hallucinations present and no visual hallucinations present. Patient contracts for safety.   Insight:  fair, improving  Judgement:  fair, improving  Oriented to:  time, person, and place  Attention Span and Concentration:  intact  Recent and Remote Memory:  fair, improving    Clinical Global Impressions  First: 5/4 7/8/2021      Most recent:            Precautions:     Behavioral Orders   Procedures    Code 3     Okay for patient to go to [place]    Routine Programming     As clinically indicated    Status 15     Every 15 minutes.    Suicide precautions     Patients on Suicide Precautions should have a Combination Diet ordered that includes a Diet selection(s) AND a Behavioral Tray selection for Safe Tray - with utensils, or Safe Tray - NO utensils            DIagnoses:     MDD, recurrent, moderate severity,  IV heroin user  Unspecified anxiety disorder.           Plan:     No medication changes today. Plan to discharge to [facility] tomorrow, likely in the morning around 10am. Will place medication orders today for a 30 day supply. Will supply patient with a 5 days of Suboxone, [place] to take over prescription and dosing upon discharge. Will be seen by IM to address  constipation prior to discharge.    ALEXIA Quintana-Student     I was present with PA student who participated in the service and in the documentation of the note. I have verified the history and personally performed the physical exam and medical decision making. I agree with the assessment and plan of care as documented in the note.     Jhon Kramer MD  [facility]

## 2021-07-14 NOTE — PLAN OF CARE
Patient spent majority of the time in the lounge. Mood is calm. Affect is neutral. Patient reports feeling much better than when he first came to the hospital. Patient reports concentration has improved. Patient is social with peers and staff. Patient denies SI/SIB/hallucinations . He endorses depression and anxiety of 5 out of 10. Patient received MOM for constipation, no results yet. He is medication compliant. Plan is for patient to discharge tomorrow to [facility].

## 2021-07-14 NOTE — PLAN OF CARE
Problem: Sleep Disturbance  Goal: Adequate Sleep/Rest  Outcome: No Change  Note: Pt continues to have a few interruptions in sleep throughout the night, coming out to get snacks.     NOC Shift Report    Pt in bed at beginning of shift, breathing quiet and unlabored. Pt slept through majority of shift. Pt slept 5.75 hours.     Pt came out of room at the beginning of the shift to request a snack. At this time writer offered pt PRN for sleep, pt declined. Pt was polite and presented with a flat, blunted affect and depressed mood. Pt came out for a snack x2 throughout the night. No additional pt complaints or concerns at this time.     No PRNs given. Will continue to monitor.     Precautions: Suicide

## 2021-07-14 NOTE — PLAN OF CARE
Assessment/Intervention/Current Symtoms and Care Coordination  JOE (writer) met with trx team, provided update, and reviewed pt's chart. JOE heard back from [facility] intake this morning, Liz [phone], who said pt was accepted and they were hoping to admit tomorrow. JOE called her back after trx team met to confirm that pt would be able to admit tomorrow. [facility] requested that pt be sent with a 30 day supply of meds in hand. Pt was able to arrange his own ride directly from the hospital to the [facility], as he doesn't have med ride coverage with his insurance plan. [facility] confirmed that as long as pt remains in their program, including after his ip program stay, that they could manage pt's Suboxone. JOE cancelled [facility] appointment due to pt being in CD trx now during that appointment. Their contact info was left in AVS for pt's convenience. AVS was updated and finalized.    Discharge Plan or Goal  Pt will be discharged to [facility] for inpatient CD trx once stabilized and a bed becomes available.     Barriers to Discharge   Safe discharge plan, ongoing symptom severity (Depression Symptoms), and medication evaluation/assessment.     Referral Status  Pt was referred to [facility] for Suboxone intake (7/9/21)  [facility] will refer pt internally for therapy at intake appointment (7/9/21)  [facility] intake referral, med rec. faxed over (7/12/21)  [facility] intake assessment (7/13/21) completed, waiting on callback     Legal Status  VOLUNTARY

## 2021-07-14 NOTE — CONSULTS
Consult Date: 07/14/2021    INTERNAL MEDICINE CONSULTATION    HISTORY OF PRESENT ILLNESS:  The patient is a 30-year-old male with history of major depression, IV heroin use and anxiety, admitted initially to Medicine in early 07/2021 for a heroin overdose and then transferred to Inpatient Psychiatry thereafter for ongoing treatment of his depression.  Please refer to Dr. Kramer's H and P dated 07/07/2021 for further details.  Internal Medicine was consulted today urgently for constipation, as the patient is discharging tomorrow.  The patient admits to constipation, with last bowel movement approximately 8-9 days ago.  Denies currently having nausea and abdominal pain, but yesterday had pretty bad abdominal pain.  Denies fever or chills.  Denies other physical issues at this time.    PAST MEDICAL HISTORY:    1.  Major depression, heroin dependence and other psychiatric history per Dr. Kramer.  2.  Asthma.  3.  Allergic rhinitis.  4.  Hepatitis C.  5.  Status post I and D abscess upper extremity, 2012.  6.  Denies history of other chronic medical problems and surgeries.    Current Facility-Administered Medications   Medication     acetaminophen (TYLENOL) tablet 650 mg     albuterol (PROAIR HFA/PROVENTIL HFA/VENTOLIN HFA) 108 (90 Base) MCG/ACT inhaler 2 puff     alum & mag hydroxide-simethicone (MAALOX) suspension 30 mL     ARIPiprazole (ABILIFY) tablet 2 mg     bisacodyl (DULCOLAX) EC tablet 10 mg     buprenorphine HCl-naloxone HCl (SUBOXONE) 8-2 MG per film 1 Film     buPROPion (WELLBUTRIN SR) 12 hr tablet 150 mg     hydrOXYzine (ATARAX) tablet 25 mg     magnesium hydroxide (MILK OF MAGNESIA) suspension 30 mL     nicotine (NICODERM CQ) 14 MG/24HR 24 hr patch 1 patch     nicotine (NICORETTE) gum 2-4 mg     nicotine Patch in Place     OLANZapine (zyPREXA) tablet 10 mg    Or     OLANZapine (zyPREXA) injection 10 mg     OLANZapine (zyPREXA) tablet 5 mg     pink lady enema (COMPOUNDED: docusate, magnesium  citrate, mineral oil, sodium phosphate)     senna-docusate (SENOKOT-S/PERICOLACE) 8.6-50 MG per tablet 2 tablet     traZODone (DESYREL) tablet 50 mg     Facility-Administered Medications Ordered in Other Encounters   Medication     Self Administer Medications: Behavioral Services       ALLERGIES AND ADVERSE EFFECTS:  AMBIEN AND POLLEN.    Social History     Socioeconomic History     Marital status: Single     Spouse name: Not on file     Number of children: Not on file     Years of education: Not on file     Highest education level: Not on file   Occupational History     Not on file   Tobacco Use     Smoking status: Current Some Day Smoker     Packs/day: 0.25     Smokeless tobacco: Never Used   Substance and Sexual Activity     Alcohol use: Yes     Comment: occasional     Drug use: Yes     Types: Methamphetamines, Opiates, IV     Comment: heroin daily, meth a couple of times in the last 2 weks     Sexual activity: Never   Other Topics Concern     Not on file   Social History Narrative     Not on file     Social Determinants of Health     Financial Resource Strain:      Difficulty of Paying Living Expenses:    Food Insecurity:      Worried About Running Out of Food in the Last Year:      Ran Out of Food in the Last Year:    Transportation Needs:      Lack of Transportation (Medical):      Lack of Transportation (Non-Medical):    Physical Activity:      Days of Exercise per Week:      Minutes of Exercise per Session:    Stress:      Feeling of Stress :    Social Connections:      Frequency of Communication with Friends and Family:      Frequency of Social Gatherings with Friends and Family:      Attends Mandaeism Services:      Active Member of Clubs or Organizations:      Attends Club or Organization Meetings:      Marital Status:    Intimate Partner Violence:      Fear of Current or Ex-Partner:      Emotionally Abused:      Physically Abused:      Sexually Abused:         Family History   Problem Relation Age of  Onset     Hypertension Father         REVIEW OF SYSTEMS:  A 10-point review of systems is negative except for stated above in History of Present Illness.    PHYSICAL EXAMINATION:    GENERAL:  Nontoxic-appearing gentleman in no acute distress.    VITAL SIGNS:  Stable.  ABDOMEN:  Soft, nontender, nondistended.         Lab Results   Component Value Date     07/05/2021     07/04/2021     06/27/2021     06/26/2021     03/25/2021     03/24/2021     12/14/2015     09/24/2015     08/14/2015     05/29/2015    Lab Results   Component Value Date    CHLORIDE 106 07/05/2021    CHLORIDE 109 07/04/2021    CHLORIDE 109 06/27/2021    CHLORIDE 102 06/26/2021    CHLORIDE 105 03/25/2021    CHLORIDE 103 03/24/2021    CHLORIDE 103 12/14/2015    CHLORIDE 107 09/24/2015    CHLORIDE 105 08/14/2015    CHLORIDE 99 05/29/2015    Lab Results   Component Value Date    BUN 17 07/05/2021    BUN 18 07/04/2021    BUN 11 06/27/2021    BUN 18 06/26/2021    BUN 22 03/25/2021    BUN 22 03/24/2021    BUN 15 12/14/2015    BUN 15 09/24/2015    BUN 14 08/14/2015    BUN 27 05/29/2015      Lab Results   Component Value Date    POTASSIUM 4.0 07/05/2021    POTASSIUM 3.8 07/04/2021    POTASSIUM 4.3 06/27/2021    POTASSIUM 3.9 06/26/2021    POTASSIUM 3.6 03/25/2021    POTASSIUM 3.5 03/24/2021    POTASSIUM 3.9 03/24/2021    POTASSIUM 3.7 12/14/2015    POTASSIUM 3.5 09/24/2015    POTASSIUM 3.7 08/14/2015    Lab Results   Component Value Date    CO2 29 07/05/2021    CO2 29 07/04/2021    CO2 27 06/27/2021    CO2 32 06/26/2021    CO2 29 03/25/2021    CO2 27 03/24/2021    CO2 29 12/14/2015    CO2 25 09/24/2015    CO2 34 08/14/2015    CO2 30 05/29/2015    Lab Results   Component Value Date    CR 0.79 07/05/2021    CR 1.35 07/04/2021    CR 0.68 06/27/2021    CR 1.04 06/26/2021    CR 0.76 03/25/2021    CR 0.79 03/24/2021    CR 1.04 12/14/2015    CR 0.55 09/24/2015    CR 0.69 08/14/2015    CR 1.05 05/29/2015         Lab Results   Component Value Date    WBC 6.0 07/05/2021    WBC 11.9 (H) 07/04/2021    WBC 7.9 06/27/2021    WBC 13.0 (H) 06/26/2021    WBC 7.6 03/25/2021    WBC 11.4 (H) 03/24/2021    WBC 7.9 12/14/2015    WBC 8.4 09/24/2015    WBC 5.9 08/15/2015    WBC 11.5 (H) 05/31/2015    HGB 14.3 07/05/2021    HGB 16.0 07/04/2021    HGB 13.8 06/27/2021    HGB 13.2 (L) 06/26/2021    HGB 13.7 03/25/2021    HGB 15.6 03/24/2021    HGB 15.8 12/14/2015    HGB 14.9 09/24/2015    HGB 15.6 08/15/2015    HGB 13.2 (L) 05/31/2015    HCT 43.7 07/05/2021    HCT 47.7 07/04/2021    HCT 43.1 06/27/2021    HCT 41.3 06/26/2021    HCT 40.2 03/25/2021    HCT 44.7 03/24/2021    HCT 44.3 12/14/2015    HCT 43.5 09/24/2015    HCT 44.9 08/15/2015    HCT 39.6 (L) 05/31/2015    MCV 91 07/05/2021    MCV 88 07/04/2021    MCV 89 06/27/2021    MCV 90 06/26/2021    MCV 87 03/25/2021    MCV 86 03/24/2021    MCV 84 12/14/2015    MCV 87 09/24/2015    MCV 86 08/15/2015    MCV 86 05/31/2015     07/05/2021     07/04/2021     06/27/2021     06/26/2021     03/25/2021     03/24/2021     12/14/2015     09/24/2015     08/15/2015     05/31/2015       ASSESSMENT:  1.  Depression, heroin abuse per Dr. Kramer.  2.  Acute constipation, most likely Suboxone-induced.  The patient currently asymptomatic with benign abdomen.  3.  Asthma, well compensated at this time.    PLAN:  Will give the patient a stat Pink Lady enema and oral Dulcolax pill now and follow up on results.  I will increase his scheduled Senokot S from 1 tab daily to 2 tabs b.i.d.  No further medical recommendations at this time.  Medicine will follow up and visit the patient tomorrow morning before discharge to see how much results he had with aforementioned treatment.  Please feel free to call with questions.    Thank you for this consultation.    Chung Cabrera PA-C        D: 07/14/2021   T: 07/14/2021   MT: EMERSON    Name:      TYSHAWN SUAREZ  MRN:      [id number]        Account:      [id number]   :      1990           Consult Date: 2021     Document: [id number]

## 2021-07-14 NOTE — PROGRESS NOTES
07/14/21 1500   Groups   Details Processing Group   Number of patients attending the group:  3  Group Length:  1 Hours    Group Therapy     Summary of Group / Topics Discussed:      The  Psychotherapy group goal is to promote insight to positive choice and change. Group processing is within a supportive and safe environment. Patients will process emotions using verbal group and expressive psychotherapy interventions.        Assessment- CTC (writer) lead group about stress and coping skills development. CTC encouraged group to discuss how stress impact their ability to manage their mental health and overall wellbeing. Life stressors were identified and physical responses to stress experiences was discussed as well. CTC asked the group questions related to current coping skills, finding new coping skills, and how to identify helpful coping skills, to determine effectiveness of learning material.             Patient Response- Pt actively participated in group and demonstrated a good understanding of the topic matter by providing feedback to questions. Pt was able to engage with others by providing personal opinions and reflecting on personal experiences related to the topic matter.

## 2021-07-14 NOTE — PLAN OF CARE
Problem: Adult Inpatient Plan of Care  Goal: Plan of Care Review  7/13/2021 2117 by Maia Foley, RN  Outcome: No Change  Flowsheets (Taken 7/13/2021 2117)  Plan of Care Reviewed With: patient  Progress: no change  7/13/2021 2117 by Maia Foley, RN  Outcome: No Change  Flowsheets  Taken 7/13/2021 2117  Plan of Care Reviewed With: patient  Progress: no change    Patient is out in the milieu at early part of shift. Flat and blunted affect but smiles upon approach. Stated he feels better today than yesterday but still depressed and anxious, both rated 5 to 6/10. Denies SI, SIB, hallucinations and HI. Denies pain. Still complained of constipation but declined further PRN medications. Stated that he will talk to the doctor tomorrow morning. Compliant with taking his night time meds. PRN Nicotine gums given per his request. He removed his nicotine patch at bed time. Will continue to monitor and assess pt.

## 2021-07-15 VITALS
OXYGEN SATURATION: 95 % | RESPIRATION RATE: 16 BRPM | SYSTOLIC BLOOD PRESSURE: 117 MMHG | BODY MASS INDEX: 27.77 KG/M2 | TEMPERATURE: 97.7 F | HEIGHT: 70 IN | DIASTOLIC BLOOD PRESSURE: 77 MMHG | WEIGHT: 194 LBS | HEART RATE: 60 BPM

## 2021-07-15 PROCEDURE — 99239 HOSP IP/OBS DSCHRG MGMT >30: CPT | Performed by: PSYCHIATRY & NEUROLOGY

## 2021-07-15 PROCEDURE — 250N000013 HC RX MED GY IP 250 OP 250 PS 637: Performed by: PSYCHIATRY & NEUROLOGY

## 2021-07-15 PROCEDURE — 250N000013 HC RX MED GY IP 250 OP 250 PS 637: Performed by: PHYSICIAN ASSISTANT

## 2021-07-15 RX ADMIN — NICOTINE 1 PATCH: 14 PATCH, EXTENDED RELEASE TRANSDERMAL at 08:49

## 2021-07-15 RX ADMIN — NICOTINE POLACRILEX 4 MG: 2 GUM, CHEWING ORAL at 09:33

## 2021-07-15 RX ADMIN — BUPRENORPHINE AND NALOXONE 1 FILM: 8; 2 FILM BUCCAL; SUBLINGUAL at 08:51

## 2021-07-15 RX ADMIN — BUPROPION HYDROCHLORIDE 150 MG: 150 TABLET, EXTENDED RELEASE ORAL at 08:51

## 2021-07-15 RX ADMIN — DOCUSATE SODIUM AND SENNOSIDES 2 TABLET: 8.6; 5 TABLET ORAL at 08:51

## 2021-07-15 RX ADMIN — ARIPIPRAZOLE 2 MG: 2 TABLET ORAL at 08:51

## 2021-07-15 ASSESSMENT — ACTIVITIES OF DAILY LIVING (ADL)
LAUNDRY: WITH SUPERVISION
ORAL_HYGIENE: INDEPENDENT
DRESS: STREET CLOTHES;INDEPENDENT
DRESS: SCRUBS (BEHAVIORAL HEALTH)
HYGIENE/GROOMING: HANDWASHING;SHOWER;INDEPENDENT
HYGIENE/GROOMING: INDEPENDENT
ORAL_HYGIENE: INDEPENDENT

## 2021-07-15 ASSESSMENT — MIFFLIN-ST. JEOR: SCORE: 1836.23

## 2021-07-15 NOTE — DISCHARGE INSTRUCTIONS
Behavioral Discharge Planning and Instructions    Summary: You were admitted on 7/4/2021  due to Depression, Suicidal Ideations and Chemical Use Issues.  You were treated by Dr. Jhon Kramer MD and discharged on 07/15/21 from [facility] to Substance Abuse Treatment Program [facility]    Main Diagnosis:  MDD, recurrent, moderate severity  IV heroin user  Unspecified anxiety disorder.    Health Care Follow-up:   [facility] Intake Appointment   HUC Please Fax AVS  Date/Time: 7/15/21 at 10:00am, you will be picked up by your friend and brought directly to [facility] at time stated.  Address: [address] [place] [address]  Phone: [phone]  Fax: [phone]    Psychiatry/Med-Management Provider, [facility]  Provider: Dr. Brandon Menezes  Address: [address], [place] [address]  Phone: [phone]    [facility] (Therapy and Suboxone providers)  As discussed with your treatment team, your appointment was cancelled due to you going to [facility].  Provider: Dr. Yin  Address: [address] [place] [address]  Phone: [phone]  Fax: [phone]  You may call them if you need a Suboxone provider in the community and/or therapy in the future.    [facility] Crisis Stabilization Program  The Crisis Stabilization Program helps people who have an urgent concern related to their mental health or substance use disorder connect to case management to help them get the support and services they need. Care coordinators, peer recovery specialists, and health professionals address clients' physical health, mental health, addiction issues including housing, health insurance, general financial assistance, Rule 25 assessments and other resources.  Services are available on a walk-in basis.  Hours:10:00 am - 5:00 pm  Phone Number: [phone]  Location: [address], [address]     [facility]  Phone: [phone]  Please  call this number to get connected with [facility] services, including being set up with [facility] Case Management.     Adult Shelter Connect  Individuals seeking shelter in [facility] need to visit the Adult Shelter Connect for an assessment and placement at one of the [facility].    Location:  [address]  Phone: [phone]  After hours: [phone]  Hours:  Monday-Friday 10:00am-5:00pm  Weekends and Holidays 1:00pm-5:00pm    [facility]  Address: [address] [place] [address], [address]  Phone: [phone] (Call for hours of operation due to Covid)  Free services and counseling    Attend all scheduled appointments with your outpatient providers. Call at least 24 hours in advance if you need to reschedule an appointment to ensure continued access to your outpatient providers.     Major Treatments, Procedures and Findings:  You were provided with: a psychiatric assessment, assessed for medical stability, medication evaluation and/or management, group therapy, CD evaluation/assessment, milieu management and medical interventions    Symptoms to Report: increased confusion, losing more sleep, mood getting worse or thoughts of suicide    Early warning signs can include: increased depression or anxiety sleep disturbances increased thoughts or behaviors of suicide or self-harm     Safety and Wellness:  Take all medicines as directed.  Make no changes unless your doctor suggests them.      Follow treatment recommendations.  Refrain from alcohol and non-prescribed drugs.  If there is a concern for safety, call [phone].    Resources:   Crisis Intervention: 453.841.8976 or 026-405-1444 (TTY: 228.167.4451).  Call anytime for help.  National [place] on Mental Illness (www.mn.berto.org): 344.591.1154 or 045-948-7042.  [place] Association for Children's Mental Health (www.macmh.org): 600.443.3258.  Suicide Awareness Voices of Education (SAVE) (www.save.org):  "888-511-SAVE (7283)  National Suicide Prevention Line (www.mentalhealthmn.org): 858-233-DVWF (0717)  Mental Health Consumer/Survivor Network of [place] (www.mhcsn.net): 497.674.2575 or 594-231-1014  Mental Health Association of [place] (www.mentalhealth.org): 332.310.1496 or 839-196-5822  Self- Management and Recovery Training., SMART-- Toll free: 118.809.7754  www.[x+1]  [facility] Crisis (COPE) Response - Adult 359 634-8332  Text 4 Life: txt \"LIFE\" to 93369 for immediate support and crisis intervention    General Medication Instructions:   See your medication sheet(s) for instructions.   Take all medicines as directed.  Make no changes unless your doctor suggests them.   Go to all your doctor visits.  Be sure to have all your required lab tests. This way, your medicines can be refilled on time.  Do not use any drugs not prescribed by your doctor.  Avoid alcohol.    Advance Directives:   Scanned document on file with [employer]? No scanned doc  Is document scanned? Pt states no documents  Honoring Choices Your Rights Handout: Minor - N/A  Was more information offered? Pt declined    The Treatment team has appreciated the opportunity to work with you. If you have any questions or concerns about your recent admission, you can contact the unit which can receive your call 24 hours a day, 7 days a week. They will be able to get in touch with a Provider if needed. The unit number is [phone].  "

## 2021-07-15 NOTE — PROGRESS NOTES
" 07/14/21 1800   Groups   Details Psychotherapy   Number of patients attending the group:  3    Group Length:  1 Hours    Group Therapy Type: Psychotherapy/ art     Summary of Group / Topics Discussed:      The  Psychotherapy group goal is to promote insight to positive choice and change. Group processing is within a supportive and safe environment. Patients will process emotions using verbal group and expressive psychotherapy interventions including visual art/writing interventions.    Group interventions support patients by: fostering self awareness, creative self expression, self esteem and hope/optimism    Modalities to reach these goals include: Expressive Arts Therapies    Subjective -patient report of mood today- \"hopeful\"    Objective/ Intervention- Goal of group and Therapeutic modality utilized- Collage of images to represent self    Group Response- Group was engaged in art making and appropriate conversation    Patient Response- Pt engaged in conversation about identity, sharing that his birthday is this Friday. Pt participated in art making, creating a collage with the theme \"before and after.\" He shared his collage with the group, explaining that the \"before\" images represent feelings of shame while the \"after\" images represent feelings of hope. Pt engaged in appropriate conversation with the group. He reported he would work on making new people feel welcome tonight.         Daren Hamilton, ROCK, ATR-BC        "

## 2021-07-15 NOTE — PROGRESS NOTES
Patient has been calm and cooperative all shift. He is in and out of his room but mostly prefers laying in bed. He went to groups and ate dinner. He received an enema after dinner and patient reported a bowel movement about 45minutes to 1hour later. He denies SI, SIB, HI or hallucinations. Vitals are WNL and patient is medication compliant.

## 2021-07-15 NOTE — PLAN OF CARE
NOC Shift Report     Pt in room at beginning of shift, came to desk asking for PRN Trazadone, 50 mg given. Intervention appears effective as pt returned to bed. Pt slept 6.5 hours.      No pt complaints or concerns at this time.      Will continue to monitor.    Eric Ortiz RN

## 2021-07-15 NOTE — PROGRESS NOTES
"Pt discharged as per avs at 1110; stated friend Marcellus was to transport. He verbalized understanding of discharge instructions, had discharge and home meds, and all belongings. Pt was pleasant and calm, affect FR, mood \"optimistic.\" Depression was rated 6, anxiety 4, denied si; no soamtic concerns, w/d sx. See discharge edinson, also avs.  "

## 2021-07-16 ENCOUNTER — PATIENT OUTREACH (OUTPATIENT)
Dept: CARE COORDINATION | Facility: CLINIC | Age: 31
End: 2021-07-16

## 2021-07-16 DIAGNOSIS — Z71.89 OTHER SPECIFIED COUNSELING: ICD-10-CM

## 2021-07-16 NOTE — DISCHARGE SUMMARY
Service Date: 07/15/2021  Discharge Date: 07/15/2021    The patient was hospitalized between 07/04/2021-07/15/2021.  On the day of discharge, the patient was seen for 33 minutes.  Greater than 50% of the time was spent on discussing his discharge medications.  Recommendations for taking Suboxone, his concerns of constipation with opiate, and about his followup in community.    CHIEF COMPLAINT AND REASON FOR ADMISSION:  The patient is a 30-year-old gentleman who was recently hospitalized on the medical floor after he overdosed on heroin.  The patient had mild pulmonary edema.  He was discharged, stabilized, reported that he continued to feel pretty anxious, severely depressed, was struggling with stressors, and strained relationship with mother of his daughter.  While being on medical floor, he was seen by psychiatric consult and was recommended to go to inpatient psychiatry.  The patient was admitted and stayed at this hospital voluntarily.  He presented as a reasonably reliable historian.  Reported that he was getting close to getting  to mother of their daughter, but then found out that she was seeing someone else.  She kicked him out.  The patient went to his own mother, said that he started using and then his mother asked him to leave.  He is currently homeless.  Reports that he has been using opiates on pretty much a regular basis.  Said that he has been using Adderall as well and reported that he has been having suicidal thoughts in presence of severe depression.  For more details about patient's presentation and past psychiatric history, please refer to Dr. Jhon Kramer's note from 07/07/2021.    CONSULTS:  The patient was seen by chemical dependency counselor.  He said that he would like to go to [facility].  He was also seen by internal medicine who specifically address patient's problems with constipation.  Internal Medicine recommended to give the patient  Pink Lady enema, use oral Dulcolax now, recommended to increase Senokot from 1 tablet daily to 2 tablets 2 times a day.  I appreciate Internal Medicine and Chemical Dependency counselor's help with this patient.    LAB WORK:  The patient's CBC with differential was completely unremarkable.  Glucose was slightly elevated at 104.  Basic metabolic panel was unremarkable.  CK was 532 on 07/05/2021.  Cholesterol was normal, but low-density cholesterol elevated at 102, the rest of test was unremarkable.  TSH was 137.    HOSPITAL COURSE:  The patient presented overall as pretty cooperative.  Reported that he did not benefit from Lexapro, was agreeable with that he has been started on Wellbutrin.  His dose of Lexapro was decreased to 10 mg daily with plan to eventually taper him off and stop.  He was put on opiate withdrawal scale (COWS) and Suboxone.  The patient on a number of occasions needed Suboxone per protocol.  He reported that medication was very helpful.  Reported that anxiety and depression were still pretty high and required p.r.n. Zyprexa.  He was pretty proactive, said that he would like to pursue sober living would like to go first to [facility] and said that he would rather go to inpatient treatment at [facility].  [facility] called him and was accepted. The patient indicated that he would like to continue to take Suboxone as a scheduled medication, not only for detoxification.  After discussion with him risks and benefits involved, the patient was put on 8 mg of Suboxone daily.  He did request on one occasion to increase dose.  I recommended him not to do so, as he already started developing constipation, most likely from Suboxone.  There were some additional changes in his medications.  He was started on 5 mg of Zyprexa to help him sleep and also with mood stabilization was per his request.  Start on Abilify.  Our plan was to discontinue Lexapro, but he asked not to do so.  Said that he would prefer to stay  on 5 mg of Lexapro.      The day prior to discharge, the patient was seen by Internal Medicine due to concerns of his still present constipation.  He did not have any other concerns.  Said that he felt pretty safe to be discharged.  On the day of discharge, he was seen again.  Again, said that he would prefer to continue to be on 5 mg of Lexapro at least now.  We informed him that he was provided with 5 doses of Suboxone and after that his psychiatrist at [facility] would prescribe Suboxone.  The patient indicated that he understood, was okay with this.  He again denied suicidal and homicidal thoughts, appeared to be more future focused and happy about his discharge, so that he was satisfied with his progress.    DISCHARGE MEDICATIONS:  Albuterol 2 puffs into lungs every 6 hours as needed, Abilify 2 mg daily, Suboxone 8 mg film under the tongue daily, bupropion slow release 150 mg daily, hydroxyzine 25 mg 2 times a day p.r.n. for agitation/anxiety ,Zyprexa 5 mg at bedtime, Senokot, Lelo-Colace, 8.6/50 mg 2 tablets by mouth 2 times a day, and trazodone  mg nightly as needed for sleep.  Medications were refilled at [facility] with the exception of Suboxone.  The patient was given printed prescription for Suboxone.      He was later sent to with the help of his friend to [facility] in [place], [place] with plan to be admitted there today on 07/15/2021.      He will continue to follow up appointment with Dr. Brandon Menezes at [facility] in [place].  Outpatient appointment at [facility] for Suboxone was canceled due to him going to [facility].  The patient was provided with phone number for clinic and advised to call them if he needed outpatient Suboxone prescriber.    Jhon Kramer MD        D: 07/15/2021   T: 07/15/2021   MT: [id number]    Name:     ERICKTYSHAWNDivina  MRN:      [id number]        Account:       [id number]   :      1990           Service Date: 07/15/2021                                  Discharge Date: 07/15/2021     Document: [id number]

## 2021-07-16 NOTE — PROGRESS NOTES
Clinic Care Coordination Contact    Background: Care Coordination referral placed from [facility] discharge report for reason of patient meeting criteria for a TCM outreach call by [facility] team.    Assessment: Upon chart review, [facility] Team member will cancel/close the referral for TCM outreach due to reason below:       Patient has discharged to [facility] treatment [facility].        Plan: Care Coordination referral for TCM outreach canceled.    Diana Shelley RN  [facility], [facility]

## 2021-09-18 ENCOUNTER — HEALTH MAINTENANCE LETTER (OUTPATIENT)
Age: 31
End: 2021-09-18

## 2022-02-02 ENCOUNTER — TELEPHONE (OUTPATIENT)
Dept: BEHAVIORAL HEALTH | Facility: CLINIC | Age: 32
End: 2022-02-02

## 2022-02-02 ENCOUNTER — HOSPITAL ENCOUNTER (INPATIENT)
Facility: CLINIC | Age: 32
LOS: 5 days | Discharge: HOME | DRG: 897 | End: 2022-02-07
Attending: FAMILY MEDICINE | Admitting: PSYCHIATRY & NEUROLOGY
Payer: COMMERCIAL

## 2022-02-02 DIAGNOSIS — F51.01 PRIMARY INSOMNIA: ICD-10-CM

## 2022-02-02 DIAGNOSIS — Z20.822 COVID-19 RULED OUT BY LABORATORY TESTING: ICD-10-CM

## 2022-02-02 DIAGNOSIS — F19.10 POLYSUBSTANCE ABUSE (H): ICD-10-CM

## 2022-02-02 DIAGNOSIS — F32.9 CURRENT EPISODE OF MAJOR DEPRESSIVE DISORDER WITHOUT PRIOR EPISODE, UNSPECIFIED DEPRESSION EPISODE SEVERITY: ICD-10-CM

## 2022-02-02 DIAGNOSIS — F11.220 OPIOID DEPENDENCE WITH UNCOMPLICATED INTOXICATION (H): ICD-10-CM

## 2022-02-02 DIAGNOSIS — F10.20 UNCOMPLICATED ALCOHOL DEPENDENCE (H): ICD-10-CM

## 2022-02-02 DIAGNOSIS — T40.2X1A OPIOID OVERDOSE, ACCIDENTAL OR UNINTENTIONAL, INITIAL ENCOUNTER (H): Primary | ICD-10-CM

## 2022-02-02 DIAGNOSIS — N17.9 ACUTE RENAL FAILURE, UNSPECIFIED ACUTE RENAL FAILURE TYPE (H): ICD-10-CM

## 2022-02-02 DIAGNOSIS — R09.02 HYPOXIA: ICD-10-CM

## 2022-02-02 LAB
ALBUMIN SERPL-MCNC: 4 G/DL (ref 3.4–5)
ALCOHOL BREATH TEST: 0 (ref 0–0.01)
ALP SERPL-CCNC: 63 U/L (ref 40–150)
ALT SERPL W P-5'-P-CCNC: 31 U/L (ref 0–70)
AMPHETAMINES UR QL SCN: ABNORMAL
ANION GAP SERPL CALCULATED.3IONS-SCNC: 6 MMOL/L (ref 3–14)
AST SERPL W P-5'-P-CCNC: 29 U/L (ref 0–45)
BARBITURATES UR QL: ABNORMAL
BASOPHILS # BLD AUTO: 0 10E3/UL (ref 0–0.2)
BASOPHILS NFR BLD AUTO: 0 %
BENZODIAZ UR QL: ABNORMAL
BILIRUB SERPL-MCNC: 0.8 MG/DL (ref 0.2–1.3)
BUN SERPL-MCNC: 21 MG/DL (ref 7–30)
CALCIUM SERPL-MCNC: 9 MG/DL (ref 8.5–10.1)
CANNABINOIDS UR QL SCN: ABNORMAL
CHLORIDE BLD-SCNC: 103 MMOL/L (ref 94–109)
CO2 SERPL-SCNC: 28 MMOL/L (ref 20–32)
COCAINE UR QL: ABNORMAL
CREAT SERPL-MCNC: 0.7 MG/DL (ref 0.66–1.25)
EOSINOPHIL # BLD AUTO: 0 10E3/UL (ref 0–0.7)
EOSINOPHIL NFR BLD AUTO: 0 %
ERYTHROCYTE [DISTWIDTH] IN BLOOD BY AUTOMATED COUNT: 11.9 % (ref 10–15)
GFR SERPL CREATININE-BSD FRML MDRD: >90 ML/MIN/1.73M2
GGT SERPL-CCNC: 9 U/L (ref 0–75)
GLUCOSE BLD-MCNC: 135 MG/DL (ref 70–99)
HCT VFR BLD AUTO: 40.7 % (ref 40–53)
HGB BLD-MCNC: 13.9 G/DL (ref 13.3–17.7)
IMM GRANULOCYTES # BLD: 0 10E3/UL
IMM GRANULOCYTES NFR BLD: 0 %
LYMPHOCYTES # BLD AUTO: 1.7 10E3/UL (ref 0.8–5.3)
LYMPHOCYTES NFR BLD AUTO: 22 %
MCH RBC QN AUTO: 28.1 PG (ref 26.5–33)
MCHC RBC AUTO-ENTMCNC: 34.2 G/DL (ref 31.5–36.5)
MCV RBC AUTO: 82 FL (ref 78–100)
MONOCYTES # BLD AUTO: 0.9 10E3/UL (ref 0–1.3)
MONOCYTES NFR BLD AUTO: 12 %
NEUTROPHILS # BLD AUTO: 5.3 10E3/UL (ref 1.6–8.3)
NEUTROPHILS NFR BLD AUTO: 66 %
NRBC # BLD AUTO: 0 10E3/UL
NRBC BLD AUTO-RTO: 0 /100
OPIATES UR QL SCN: ABNORMAL
PLATELET # BLD AUTO: 238 10E3/UL (ref 150–450)
POTASSIUM BLD-SCNC: 3.6 MMOL/L (ref 3.4–5.3)
PROT SERPL-MCNC: 7.6 G/DL (ref 6.8–8.8)
RBC # BLD AUTO: 4.94 10E6/UL (ref 4.4–5.9)
SARS-COV-2 RNA RESP QL NAA+PROBE: NEGATIVE
SODIUM SERPL-SCNC: 137 MMOL/L (ref 133–144)
WBC # BLD AUTO: 8 10E3/UL (ref 4–11)

## 2022-02-02 PROCEDURE — HZ2ZZZZ DETOXIFICATION SERVICES FOR SUBSTANCE ABUSE TREATMENT: ICD-10-PCS | Performed by: FAMILY MEDICINE

## 2022-02-02 PROCEDURE — 99284 EMERGENCY DEPT VISIT MOD MDM: CPT | Performed by: FAMILY MEDICINE

## 2022-02-02 PROCEDURE — 80307 DRUG TEST PRSMV CHEM ANLYZR: CPT | Performed by: FAMILY MEDICINE

## 2022-02-02 PROCEDURE — 87635 SARS-COV-2 COVID-19 AMP PRB: CPT | Performed by: FAMILY MEDICINE

## 2022-02-02 PROCEDURE — 87340 HEPATITIS B SURFACE AG IA: CPT | Performed by: PHYSICIAN ASSISTANT

## 2022-02-02 PROCEDURE — 86704 HEP B CORE ANTIBODY TOTAL: CPT | Performed by: PHYSICIAN ASSISTANT

## 2022-02-02 PROCEDURE — 250N000013 HC RX MED GY IP 250 OP 250 PS 637: Performed by: FAMILY MEDICINE

## 2022-02-02 PROCEDURE — 82075 ASSAY OF BREATH ETHANOL: CPT | Performed by: FAMILY MEDICINE

## 2022-02-02 PROCEDURE — 87389 HIV-1 AG W/HIV-1&-2 AB AG IA: CPT | Performed by: PHYSICIAN ASSISTANT

## 2022-02-02 PROCEDURE — 80053 COMPREHEN METABOLIC PANEL: CPT | Performed by: FAMILY MEDICINE

## 2022-02-02 PROCEDURE — 82977 ASSAY OF GGT: CPT | Performed by: PSYCHIATRY & NEUROLOGY

## 2022-02-02 PROCEDURE — 85014 HEMATOCRIT: CPT | Performed by: FAMILY MEDICINE

## 2022-02-02 PROCEDURE — C9803 HOPD COVID-19 SPEC COLLECT: HCPCS | Performed by: FAMILY MEDICINE

## 2022-02-02 PROCEDURE — 128N000001 HC R&B CD/MH ADULT

## 2022-02-02 PROCEDURE — 99285 EMERGENCY DEPT VISIT HI MDM: CPT | Mod: 25 | Performed by: FAMILY MEDICINE

## 2022-02-02 PROCEDURE — 128N000004 HC R&B CD ADULT

## 2022-02-02 PROCEDURE — 36415 COLL VENOUS BLD VENIPUNCTURE: CPT | Performed by: PSYCHIATRY & NEUROLOGY

## 2022-02-02 PROCEDURE — 36415 COLL VENOUS BLD VENIPUNCTURE: CPT | Performed by: FAMILY MEDICINE

## 2022-02-02 RX ORDER — HYDROXYZINE HYDROCHLORIDE 25 MG/1
25 TABLET, FILM COATED ORAL 3 TIMES DAILY PRN
Status: DISCONTINUED | OUTPATIENT
Start: 2022-02-02 | End: 2022-02-02

## 2022-02-02 RX ORDER — IBUPROFEN 600 MG/1
600 TABLET, FILM COATED ORAL EVERY 6 HOURS PRN
Status: DISCONTINUED | OUTPATIENT
Start: 2022-02-02 | End: 2022-02-07 | Stop reason: HOSPADM

## 2022-02-02 RX ORDER — MULTIPLE VITAMINS W/ MINERALS TAB 9MG-400MCG
1 TAB ORAL DAILY
Status: DISCONTINUED | OUTPATIENT
Start: 2022-02-03 | End: 2022-02-07 | Stop reason: HOSPADM

## 2022-02-02 RX ORDER — HYDROXYZINE HYDROCHLORIDE 25 MG/1
25 TABLET, FILM COATED ORAL EVERY 4 HOURS PRN
Status: DISCONTINUED | OUTPATIENT
Start: 2022-02-02 | End: 2022-02-07 | Stop reason: HOSPADM

## 2022-02-02 RX ORDER — OLANZAPINE 10 MG/1
10 TABLET ORAL 3 TIMES DAILY PRN
Status: DISCONTINUED | OUTPATIENT
Start: 2022-02-02 | End: 2022-02-07 | Stop reason: HOSPADM

## 2022-02-02 RX ORDER — AMOXICILLIN 250 MG
1 CAPSULE ORAL 2 TIMES DAILY PRN
Status: DISCONTINUED | OUTPATIENT
Start: 2022-02-02 | End: 2022-02-07 | Stop reason: HOSPADM

## 2022-02-02 RX ORDER — METHOCARBAMOL 500 MG/1
500 TABLET, FILM COATED ORAL 3 TIMES DAILY PRN
Status: DISCONTINUED | OUTPATIENT
Start: 2022-02-02 | End: 2022-02-07 | Stop reason: HOSPADM

## 2022-02-02 RX ORDER — OLANZAPINE 10 MG/2ML
10 INJECTION, POWDER, FOR SOLUTION INTRAMUSCULAR 3 TIMES DAILY PRN
Status: DISCONTINUED | OUTPATIENT
Start: 2022-02-02 | End: 2022-02-07 | Stop reason: HOSPADM

## 2022-02-02 RX ORDER — TRAZODONE HYDROCHLORIDE 50 MG/1
50 TABLET, FILM COATED ORAL
Status: DISCONTINUED | OUTPATIENT
Start: 2022-02-02 | End: 2022-02-02

## 2022-02-02 RX ORDER — PRAZOSIN HYDROCHLORIDE 1 MG/1
1 CAPSULE ORAL DAILY
Status: ON HOLD | COMMUNITY
Start: 2021-12-29 | End: 2022-02-07

## 2022-02-02 RX ORDER — CLONIDINE HYDROCHLORIDE 0.1 MG/1
0.1 TABLET ORAL EVERY 6 HOURS PRN
Status: DISCONTINUED | OUTPATIENT
Start: 2022-02-02 | End: 2022-02-07 | Stop reason: HOSPADM

## 2022-02-02 RX ORDER — PROPRANOLOL HYDROCHLORIDE 10 MG/1
1 TABLET ORAL 3 TIMES DAILY PRN
Status: ON HOLD | COMMUNITY
Start: 2021-11-16 | End: 2022-02-07

## 2022-02-02 RX ORDER — ONDANSETRON 4 MG/1
4 TABLET, ORALLY DISINTEGRATING ORAL EVERY 6 HOURS PRN
Status: DISCONTINUED | OUTPATIENT
Start: 2022-02-02 | End: 2022-02-07 | Stop reason: HOSPADM

## 2022-02-02 RX ORDER — TRAZODONE HYDROCHLORIDE 50 MG/1
50 TABLET, FILM COATED ORAL
Status: DISCONTINUED | OUTPATIENT
Start: 2022-02-02 | End: 2022-02-07 | Stop reason: HOSPADM

## 2022-02-02 RX ORDER — LOPERAMIDE HCL 2 MG
2 CAPSULE ORAL EVERY 4 HOURS PRN
Status: DISCONTINUED | OUTPATIENT
Start: 2022-02-02 | End: 2022-02-07 | Stop reason: HOSPADM

## 2022-02-02 RX ORDER — BUPRENORPHINE 2 MG/1
2 TABLET SUBLINGUAL
Status: DISCONTINUED | OUTPATIENT
Start: 2022-02-02 | End: 2022-02-03

## 2022-02-02 RX ORDER — ALBUTEROL SULFATE 90 UG/1
2 AEROSOL, METERED RESPIRATORY (INHALATION) EVERY 6 HOURS PRN
Status: DISCONTINUED | OUTPATIENT
Start: 2022-02-02 | End: 2022-02-07 | Stop reason: HOSPADM

## 2022-02-02 RX ORDER — FOLIC ACID 1 MG/1
1 TABLET ORAL DAILY
Status: DISCONTINUED | OUTPATIENT
Start: 2022-02-03 | End: 2022-02-07 | Stop reason: HOSPADM

## 2022-02-02 RX ORDER — DICYCLOMINE HYDROCHLORIDE 10 MG/1
20 CAPSULE ORAL 3 TIMES DAILY PRN
Status: DISCONTINUED | OUTPATIENT
Start: 2022-02-02 | End: 2022-02-07 | Stop reason: HOSPADM

## 2022-02-02 RX ORDER — LORAZEPAM 1 MG/1
1-4 TABLET ORAL EVERY 30 MIN PRN
Status: DISCONTINUED | OUTPATIENT
Start: 2022-02-02 | End: 2022-02-02

## 2022-02-02 RX ORDER — PRAZOSIN HYDROCHLORIDE 1 MG/1
1 CAPSULE ORAL DAILY
Status: DISCONTINUED | OUTPATIENT
Start: 2022-02-03 | End: 2022-02-07 | Stop reason: HOSPADM

## 2022-02-02 RX ORDER — MAGNESIUM HYDROXIDE/ALUMINUM HYDROXICE/SIMETHICONE 120; 1200; 1200 MG/30ML; MG/30ML; MG/30ML
30 SUSPENSION ORAL EVERY 4 HOURS PRN
Status: DISCONTINUED | OUTPATIENT
Start: 2022-02-02 | End: 2022-02-07 | Stop reason: HOSPADM

## 2022-02-02 RX ORDER — PROPRANOLOL HYDROCHLORIDE 10 MG/1
10 TABLET ORAL 3 TIMES DAILY PRN
Status: DISCONTINUED | OUTPATIENT
Start: 2022-02-02 | End: 2022-02-07 | Stop reason: HOSPADM

## 2022-02-02 RX ORDER — GABAPENTIN 100 MG/1
200 CAPSULE ORAL 3 TIMES DAILY PRN
Status: DISCONTINUED | OUTPATIENT
Start: 2022-02-02 | End: 2022-02-07 | Stop reason: HOSPADM

## 2022-02-02 RX ORDER — ATENOLOL 50 MG/1
50 TABLET ORAL DAILY PRN
Status: DISCONTINUED | OUTPATIENT
Start: 2022-02-02 | End: 2022-02-02

## 2022-02-02 RX ORDER — DIAZEPAM 5 MG
5-20 TABLET ORAL EVERY 30 MIN PRN
Status: DISCONTINUED | OUTPATIENT
Start: 2022-02-02 | End: 2022-02-07 | Stop reason: HOSPADM

## 2022-02-02 RX ADMIN — LORAZEPAM 2 MG: 1 TABLET ORAL at 16:24

## 2022-02-02 ASSESSMENT — ACTIVITIES OF DAILY LIVING (ADL)
HEARING_DIFFICULTY_OR_DEAF: NO
TOILETING_ISSUES: NO
DRESSING/BATHING_DIFFICULTY: NO
ADL_ASSESSMENT: WDL
CONCENTRATING,_REMEMBERING_OR_MAKING_DECISIONS_DIFFICULTY: NO
DOING_ERRANDS_INDEPENDENTLY_DIFFICULTY: NO
NUMBER_OF_TIMES_PATIENT_HAS_FALLEN_WITHIN_LAST_SIX_MONTHS: 0
DIFFICULTY_EATING/SWALLOWING: NO
WEAR_GLASSES_OR_BLIND: NO
FALL_HISTORY_WITHIN_LAST_SIX_MONTHS: NO
WALKING_OR_CLIMBING_STAIRS_DIFFICULTY: NO
DIFFICULTY_COMMUNICATING: NO

## 2022-02-02 ASSESSMENT — MIFFLIN-ST. JEOR: SCORE: 1781.33

## 2022-02-02 NOTE — ED PROVIDER NOTES
ED Provider Note  [facility]      History     Chief Complaint   Patient presents with     Alcohol Intoxication     pt reported drinking daily for past couple weeks and is seeking detox today, last drink around 6AM     HPI  Romeo Maldonado is a 31 year old male who is presenting seeking detox from multiple substances.  Patient states he has a longstanding addiction to opiates, primarily heroin.  He will use up to a gram per day.  He last used at 9 AM.  Has been intermittently on Suboxone.  Unfortunately now is in a prolonged relapse, had an unintentional overdose approximately 2 weeks ago.  States he is also drinking alcohol up to half a liter to a liter per day.  His last drink was this morning.  He has no DTs or seizures in his history, but does feel restless, sweaty anxious and shaky when he stops drinking.  Admits that he also uses GHB 1-3 times per week, estimates he used only once earlier this week, states he uses this when he cannot get alcohol.  Also has used methamphetamine or Adderall 2-3 times per week.  He does admit to significant anxiety and paranoia when using stimulants.  Denies any hallucinations or delusions, denies any suicidal thoughts.  Would like to get into detox and subsequently into some type of longstanding treatment program.    Past Medical History  Past Medical History:   Diagnosis Date     Allergic state      Anxiety      Depressive disorder      Hepatitis C      Substance abuse (H)     meth use     Uncomplicated asthma      Past Surgical History:   Procedure Laterality Date     INCISION AND DRAINAGE SOFT TISSUE UPPER EXTREMITY, COMBINED  8/11/2012    Procedure: COMBINED INCISION AND DRAINAGE SOFT TISSUE UPPER EXTREMITY;  Incision and drainage Right Arm Abscess;  Surgeon: Cheli White MD;  Location: [facility] OR     albuterol (PROAIR HFA/PROVENTIL HFA/VENTOLIN HFA) 108 (90 Base) MCG/ACT inhaler  ARIPiprazole (ABILIFY) 2 MG tablet  buprenorphine HCl-naloxone HCl  (SUBOXONE) 8-2 MG per film  buPROPion (WELLBUTRIN SR) 150 MG 12 hr tablet  hydrOXYzine (ATARAX) 25 MG tablet  OLANZapine (ZYPREXA) 5 MG tablet  senna-docusate (SENOKOT-S/PERICOLACE) 8.6-50 MG tablet  traZODone (DESYREL) 50 MG tablet      Allergies   Allergen Reactions     Ambien [Zolpidem] Shortness Of Breath     wheezing     Pollen Extract Unknown     Family History  Family History   Problem Relation Age of Onset     Hypertension Father      Social History   Social History     Tobacco Use     Smoking status: Current Some Day Smoker     Packs/day: 0.25     Smokeless tobacco: Never Used   Substance Use Topics     Alcohol use: Yes     Comment: occasional     Drug use: Yes     Types: Methamphetamines, Opiates, IV     Comment: heroin daily, meth a couple of times in the last 2 weks      Past medical history, past surgical history, medications, allergies, family history, and social history were reviewed with the patient. No additional pertinent items.       Review of Systems  A complete review of systems was performed with pertinent positives and negatives noted in the HPI, and all other systems negative.    Physical Exam   BP: (!) 139/91  Pulse: 89  Temp: 97.6  F (36.4  C)  Resp: 18  Weight: 84.2 kg (185 lb 9.6 oz)  SpO2: 100 %  Physical Exam  Vitals and nursing note reviewed.   Constitutional:       General: He is not in acute distress.     Appearance: He is not diaphoretic.   HENT:      Head: Atraumatic.   Eyes:      General: No scleral icterus.     Pupils: Pupils are equal, round, and reactive to light.   Cardiovascular:      Heart sounds: Normal heart sounds.   Pulmonary:      Effort: No respiratory distress.      Breath sounds: Normal breath sounds.   Abdominal:      General: Bowel sounds are normal.      Palpations: Abdomen is soft.      Tenderness: There is no abdominal tenderness.   Musculoskeletal:         General: No tenderness.   Skin:     General: Skin is warm.      Findings: No rash.   Neurological:       General: No focal deficit present.      Mental Status: He is oriented to person, place, and time.   Psychiatric:         Attention and Perception: Attention normal.         Mood and Affect: Mood is anxious.         Speech: Speech normal.         Behavior: Behavior normal.         Thought Content: Thought content normal.         Cognition and Memory: Cognition normal.         Judgment: Judgment normal.         ED Course      Procedures       The medical record was reviewed and interpreted.  Current labs reviewed and interpreted.  Previous labs reviewed and interpreted.              Results for orders placed or performed during the hospital encounter of 02/02/22   Comprehensive metabolic panel     Status: Abnormal   Result Value Ref Range    Sodium 137 133 - 144 mmol/L    Potassium 3.6 3.4 - 5.3 mmol/L    Chloride 103 94 - 109 mmol/L    Carbon Dioxide (CO2) 28 20 - 32 mmol/L    Anion Gap 6 3 - 14 mmol/L    Urea Nitrogen 21 7 - 30 mg/dL    Creatinine 0.70 0.66 - 1.25 mg/dL    Calcium 9.0 8.5 - 10.1 mg/dL    Glucose 135 (H) 70 - 99 mg/dL    Alkaline Phosphatase 63 40 - 150 U/L    AST 29 0 - 45 U/L    ALT 31 0 - 70 U/L    Protein Total 7.6 6.8 - 8.8 g/dL    Albumin 4.0 3.4 - 5.0 g/dL    Bilirubin Total 0.8 0.2 - 1.3 mg/dL    GFR Estimate >90 >60 mL/min/1.73m2   CBC with platelets and differential     Status: None   Result Value Ref Range    WBC Count 8.0 4.0 - 11.0 10e3/uL    RBC Count 4.94 4.40 - 5.90 10e6/uL    Hemoglobin 13.9 13.3 - 17.7 g/dL    Hematocrit 40.7 40.0 - 53.0 %    MCV 82 78 - 100 fL    MCH 28.1 26.5 - 33.0 pg    MCHC 34.2 31.5 - 36.5 g/dL    RDW 11.9 10.0 - 15.0 %    Platelet Count 238 150 - 450 10e3/uL    % Neutrophils 66 %    % Lymphocytes 22 %    % Monocytes 12 %    % Eosinophils 0 %    % Basophils 0 %    % Immature Granulocytes 0 %    NRBCs per 100 WBC 0 <1 /100    Absolute Neutrophils 5.3 1.6 - 8.3 10e3/uL    Absolute Lymphocytes 1.7 0.8 - 5.3 10e3/uL    Absolute Monocytes 0.9 0.0 - 1.3 10e3/uL     Absolute Eosinophils 0.0 0.0 - 0.7 10e3/uL    Absolute Basophils 0.0 0.0 - 0.2 10e3/uL    Absolute Immature Granulocytes 0.0 <=0.4 10e3/uL    Absolute NRBCs 0.0 10e3/uL   Drug abuse screen 1 urine (ED)     Status: Abnormal   Result Value Ref Range    Amphetamines Urine Screen Positive (A) Screen Negative    Barbiturates Urine Screen Negative Screen Negative    Benzodiazepines Urine Screen Negative Screen Negative    Cannabinoids Urine Screen Negative Screen Negative    Cocaine Urine Screen Negative Screen Negative    Opiates Urine Screen Positive (A) Screen Negative   Alcohol breath test POCT     Status: Normal   Result Value Ref Range    Alcohol Breath Test 0.000 0.00 - 0.01   Urine Drugs of Abuse Screen     Status: Abnormal    Narrative    The following orders were created for panel order Urine Drugs of Abuse Screen.  Procedure                               Abnormality         Status                     ---------                               -----------         ------                     Drug abuse screen 1 urin...[[id number]]  Abnormal            Final result                 Please view results for these tests on the individual orders.   CBC with platelets differential     Status: None    Narrative    The following orders were created for panel order CBC with platelets differential.  Procedure                               Abnormality         Status                     ---------                               -----------         ------                     CBC with platelets and d...[[id number]]                      Final result                 Please view results for these tests on the individual orders.     Medications   LORazepam (ATIVAN) tablet 1-4 mg (has no administration in time range)        Assessments & Plan (with Medical Decision Making)   31-year-old with a history of polysubstance abuse and dependence, primarily opiates but also alcohol, methamphetamine and GHB presenting seeking detox.  Also reported  history of depression and anxiety.  Patient admits he has been using all of the above, with increased use of alcohol up to a liter a day in the last 2 to 4 weeks.  Reports physically symptomatic withdrawal from both alcohol and opiates, but no history of DTs or seizures.  He denies other medical or psychiatric symptoms acutely.  His labs were obtained and were unremarkable.  He will consent to voluntary detox admission and appears to be an appropriate candidate.  Patient observed for several hours in ED, per nursing did score 14 on MSSA protocol and was given Ativan.  Mental health intake informed.  I have reviewed the nursing notes. I have reviewed the findings, diagnosis, plan and need for follow up with the patient.    New Prescriptions    No medications on file       Final diagnoses:   Uncomplicated alcohol dependence (H)   Opioid dependence with uncomplicated intoxication (H)   Polysubstance abuse (H) - Including methamphetamine and GHB       --  Boby Mccabe MD  [facility] EMERGENCY DEPARTMENT  2/2/2022     Boby Mccabe MD  02/02/22 [id number]       Boby Mccabe MD  02/02/22 [id number]

## 2022-02-02 NOTE — TELEPHONE ENCOUNTER
S:Eliot, [place] [facility], 31/M, alcohol detox     B: Pt reports alcohol, half to L daily, CYRUS yesterday. Alc level 0   Pt denies hx of sz or DTs  Pt report heroin 1 gram daily, CYRUS this morning 9am   Pt reports meth use 2-3x a week, CYRUS 9a this morning   Pt reports GHB 1-3 times a week, CYRUS 3 days ago   Hx of dep, anx, opiate abuse   Pt denies acute MH concerns   Pt reports wanting tx     Medically cleared, eating, drinking, ambulating indep  Patient cleared and ready for behavioral bed placement: Yes   covid pending   UDS pos for amphetamines, opiates     A: Voluntary     R: CD/3A/Mayelin     332pm - Mayelin paged   335pm - Mayelin wants MSSA or CIWA done, can go to recovery clinic for opiates, but unclear if pt is in active alc withdrawal   340pm - Intake called ED, spoke w MD, Eliot reports the pt is restless and anxious, will have the RN do the MSSA score and call intake back   420pm - RN reports MSSA score of 14, and started ativan   420pm - Mayelin paged   448pm - Mayelin accepts   Pt placed in queue   456pm - unit charge notified, 6pm for report   456pm - ED notified

## 2022-02-03 ENCOUNTER — APPOINTMENT (OUTPATIENT)
Dept: GENERAL RADIOLOGY | Facility: CLINIC | Age: 32
DRG: 897 | End: 2022-02-03
Attending: PHYSICIAN ASSISTANT
Payer: COMMERCIAL

## 2022-02-03 LAB
HBV CORE AB SERPL QL IA: NONREACTIVE
HBV SURFACE AG SERPL QL IA: NONREACTIVE
HIV 1+2 AB+HIV1 P24 AG SERPL QL IA: NONREACTIVE
T PALLIDUM AB SER QL: NONREACTIVE
TROPONIN I SERPL HS-MCNC: 4 NG/L

## 2022-02-03 PROCEDURE — 250N000013 HC RX MED GY IP 250 OP 250 PS 637: Performed by: PSYCHIATRY & NEUROLOGY

## 2022-02-03 PROCEDURE — 84484 ASSAY OF TROPONIN QUANT: CPT | Performed by: PHYSICIAN ASSISTANT

## 2022-02-03 PROCEDURE — 86803 HEPATITIS C AB TEST: CPT | Performed by: PHYSICIAN ASSISTANT

## 2022-02-03 PROCEDURE — 86780 TREPONEMA PALLIDUM: CPT | Performed by: PHYSICIAN ASSISTANT

## 2022-02-03 PROCEDURE — 93010 ELECTROCARDIOGRAM REPORT: CPT | Performed by: INTERNAL MEDICINE

## 2022-02-03 PROCEDURE — 128N000004 HC R&B CD ADULT

## 2022-02-03 PROCEDURE — 87529 HSV DNA AMP PROBE: CPT | Performed by: PHYSICIAN ASSISTANT

## 2022-02-03 PROCEDURE — 99223 1ST HOSP IP/OBS HIGH 75: CPT | Mod: AI | Performed by: PSYCHIATRY & NEUROLOGY

## 2022-02-03 PROCEDURE — 36415 COLL VENOUS BLD VENIPUNCTURE: CPT | Performed by: PHYSICIAN ASSISTANT

## 2022-02-03 PROCEDURE — 87522 HEPATITIS C REVRS TRNSCRPJ: CPT | Performed by: PHYSICIAN ASSISTANT

## 2022-02-03 PROCEDURE — 71045 X-RAY EXAM CHEST 1 VIEW: CPT

## 2022-02-03 PROCEDURE — 99223 1ST HOSP IP/OBS HIGH 75: CPT | Performed by: PHYSICIAN ASSISTANT

## 2022-02-03 PROCEDURE — 99207 PR CONSULT E&M CHANGED TO INITIAL LEVEL: CPT | Performed by: PHYSICIAN ASSISTANT

## 2022-02-03 PROCEDURE — 93005 ELECTROCARDIOGRAM TRACING: CPT

## 2022-02-03 PROCEDURE — 71045 X-RAY EXAM CHEST 1 VIEW: CPT | Mod: 26 | Performed by: RADIOLOGY

## 2022-02-03 RX ORDER — BUPRENORPHINE AND NALOXONE 8; 2 MG/1; MG/1
1 FILM, SOLUBLE BUCCAL; SUBLINGUAL DAILY
Status: DISCONTINUED | OUTPATIENT
Start: 2022-02-03 | End: 2022-02-04

## 2022-02-03 RX ORDER — ACETAMINOPHEN 500 MG
500 TABLET ORAL EVERY 6 HOURS PRN
Status: DISCONTINUED | OUTPATIENT
Start: 2022-02-03 | End: 2022-02-07 | Stop reason: HOSPADM

## 2022-02-03 RX ADMIN — OLANZAPINE 10 MG: 10 TABLET, FILM COATED ORAL at 13:10

## 2022-02-03 RX ADMIN — DIAZEPAM 10 MG: 5 TABLET ORAL at 09:47

## 2022-02-03 RX ADMIN — BUPRENORPHINE HCL 2 MG: 2 TABLET SUBLINGUAL at 09:47

## 2022-02-03 RX ADMIN — IBUPROFEN 600 MG: 600 TABLET ORAL at 13:17

## 2022-02-03 RX ADMIN — DIAZEPAM 10 MG: 5 TABLET ORAL at 12:20

## 2022-02-03 RX ADMIN — PRAZOSIN HYDROCHLORIDE 1 MG: 1 CAPSULE ORAL at 09:47

## 2022-02-03 RX ADMIN — DIAZEPAM 10 MG: 5 TABLET ORAL at 16:40

## 2022-02-03 RX ADMIN — BUPRENORPHINE AND NALOXONE 1 FILM: 8; 2 FILM BUCCAL; SUBLINGUAL at 11:44

## 2022-02-03 ASSESSMENT — ACTIVITIES OF DAILY LIVING (ADL)
LAUNDRY: UNABLE TO COMPLETE
DRESS: INDEPENDENT
HYGIENE/GROOMING: INDEPENDENT
HYGIENE/GROOMING: INDEPENDENT
ORAL_HYGIENE: INDEPENDENT
ORAL_HYGIENE: INDEPENDENT
DRESS: INDEPENDENT
LAUNDRY: UNABLE TO COMPLETE

## 2022-02-03 NOTE — PROGRESS NOTES
02/02/22 1919   Patient Belongings   Did you bring any home meds/supplements to the hospital?  No   Patient Belongings other (see comments)   Patient Belongings Put in Hospital Secure Location (Security or Locker, etc.) other (see comments)   Belongings Search Yes   Clothing Search Yes   Second Staff Bridger Rebolledo     STORAGE BIN:   Shoes, coat, hat, belt, wallet, keys    MED ROOM BIN:   Cell phone    SECURITY:   3 id, 2 MC, discovr, josue almaguer, smile  A             Admission:  I am responsible for any personal items that are not sent to the safe or pharmacy.  [facility] is not responsible for loss, theft or damage of any property in my possession.    Signature:  _________________________________ Date: _______  Time: _____                                              Staff Signature:  ____________________________ Date: ________  Time: _____      2nd Staff person, if patient is unable/unwilling to sign:    Signature: ________________________________ Date: ________  Time: _____   Discharge:  [facility] has returned all of my personal belongings:    Signature: _________________________________ Date: ________  Time: _____                                          Staff Signature:  ____________________________ Date: ________  Time: _____

## 2022-02-03 NOTE — PLAN OF CARE
"  Problem: Opioid Dependence or Withdrawal  Goal: Withdrawal Symptoms Managed  Outcome: Improving     RN Assessment:  SI/Self harm:  pt reports having thoughts of wanting to be dead, though denies any plans or intent. When asked if he had thoughts of SIB, pt shook his head and stated \"no, I don't know\".   Aggression/agitation/HI:  See below   AVH:  none  Sleep: aedquate  PRN Med: subutex 2 mg this morning, diazepam 20 mg  for EtOH/benzo withdrawal, ibuprofen for pain (chest- unwitnessed fall PTA), olanzapine for anxiety/agitation.  Medication AE: none reported or observed  Physical Complaints/Issues: pt reports pain in chest area related to having fallen PTA. Pt VSS, X-ray ordered.   I & O: eating and drinking is improving  ADLs: independent  Visits: none this shift  Vitals:  WNL  COVID 19 Assessment:  negative  Milieu Participation: none, pt has spent the shift in his room  Behavior: restless, agitated, anxious, depressed, thoughts of wanting to die without plan or intent.     Pt is being monitored and treated for opiate and EtOH/Benzo withdrawal.   COWS scores today: 12, 10 and 7.   MSSA scores 8, 8 and 7.     pt approached writer at appx 1300, reported \"I don't feel good, I should be feeling better than this, the room is closing in on me\". Pt had difficulty explaining symptoms to writer and appeared to be having high anxiety. At that time VS were assessed (WNL), MSSA and COWS scores were both 7, pt had been treated today with 2 mg subutex and 8-2mg suboxone, as well as 20 mg diazepam. Pt was not showing diaphoresis or tremor at that time. Pt has no history of seizure or DT from withdrawal. Pt's intake has been improving throughout the day. Writer gave pt PRN olanzapine for increasing anxiety and agitation. Appx 30 minutes later, pt was resting quietly in bed and reported feeling \"better\". Was not observed as restless at that time.     Currently resting quietly. Nursing will continue to monitor and assess.   "

## 2022-02-03 NOTE — PLAN OF CARE
Behavioral Team Discussion: (2/3/2022)    Continued Stay Criteria/Rationale: Patient admitted for Chemical Use Issues.  Plan: The following services will be provided to the patient; psychiatric assessment, medication management, therapeutic milieu, individual and group support, and skills groups.   Participants: 3A Provider: Dr. Madhav Dick MD; 3A RN: Tessy Alfred, RN; 3A CM's: Venita Chavez  and Za Bello.  Summary/Recommendation: Providers will assess today for treatment recommendations, discharge planning, and aftercare plans. CM will meet with pt for discharge planning.   Medical/Physical: Per ED note:  Past Medical History:   Diagnosis Date     Allergic state       Anxiety       Depressive disorder       Hepatitis C       Substance abuse (H)       meth use     Uncomplicated asthma      Precautions:   Behavioral Orders   Procedures     Code 1 - Restrict to Unit     Routine Programming     As clinically indicated     Status 15     Every 15 minutes.     Withdrawal precautions     Rationale for change in precautions or plan: N/A  Progress: Initial.

## 2022-02-03 NOTE — PROGRESS NOTES
"Patient is a 31 years old male admitted to the unit from [facility], seeking detox from alcohol and other multiple substances.     Patient's report during admission assessment on history of substance use inconsistent with report from ED. Patient sleepy during assessment but is alert and oriented to person, place, time and situation.      Per patient, in his own words, \"I need to get back to my healthy self, this is not a realistic way of living, I need medication management, I have a good shot.\"    Patient reports using \"half a bottle to half a litre of alcohol\" a day with the last drink being \"more than a day ago.\" Labs show  0.00 alcohol Breath Test. Positive for amphetamines and positive of opiates.      Patient admits to using various drugs this morning including GHB (3 ml); Heroin (1 Gram); Xanax (half a pill); Meth (very small amount 0.2 gm).  Patient reports using Ecstasy \"a couple of pills yesterday.\"    Patient reports that he was on Subutex, \"last 9 months prior to relapsing in December.\"     Patient is not a smoker.    MSSA: 5.    Anxiety 7/10, depression 10/10, denies suicidal ideation, denies hallucinations.    Right rib cage  Pain, 7/10. Other vital signs stable.    Staff will continue to monitor patient closely for withdrawal.          "

## 2022-02-03 NOTE — CONSULTS
See progress note by Viola Marsh PA-C on this date for formal consult. This note to fulfill consult order.    Sandra Johnson PA-C  Internal Medicine [facility] Hospitalist  [facility]

## 2022-02-03 NOTE — PLAN OF CARE
Problem: Suicidal Behavior  Goal: Suicidal Behavior is Absent or Managed  Outcome: No Change     Problem: Alcohol Withdrawal  Goal: Alcohol Withdrawal Symptom Control  Outcome: No Change     Problem: Opioid Dependence or Withdrawal  Goal: Withdrawal Symptoms Managed  Outcome: No Change

## 2022-02-03 NOTE — PROGRESS NOTES
02/03/22 0600   Sleep/Rest/Relaxation   Sleep/Rest/Relaxation (WDL) WDL   Sleep/Rest/Relaxation appears asleep   Night Time # Hours 6.75 hours     MSSA=4; mild sweats. We'll continue to monitor.

## 2022-02-03 NOTE — PROGRESS NOTES
[place] Prescription Drug Control Monitoring Note:      Narx Scores  Narcotic  440  Sedative  190  Stimulant  000  Explanation and Guidance  Overdose Risk Score  550  (Range 000-999)  Explanation and Guidance  State Indicators (1)  >= 5 opioid or sedative providers in any year in the last 2 years  Details  RX Graph   Narcotic   Buprenorphine   Sedative   Stimulant   Other  Learn how to use graph  All Prescribers  Prescribers  7 - Emily A Brunner,  6 - Pavithra Talley  5 - Mya Ugarte  4 - Brandon Menezes  3 - Jhon Ashley  2 - Rick Munoz  1 - Esau Salinas  Timeline  02/03  2m  6m  1y  2y  Disclaimer  Morphine Milligram Equivalent Prescribed Over Time  Last 30 Days  Last 60 Days  Last 90 Days  Last 1 Year  Last 2 Years  MME  Timeframe  0  1  2  1/5/22  1/20/22  2/3/22  0  MME per Day Avg.  0  MME per RX  Disclaimer  Lorazepam MgEq (LME) Prescribed Over Time  Last 30 Days  Last 60 Days  Last 90 Days  Last 1 Year  Last 2 Years  LME  Timeframe  0  1  2  1/5/22  1/20/22  2/3/22  0  LME Per Day Avg.  0  LME mg Per Rx  Disclaimer  Buprenorphine (mg) Prescribed Over Time  Last 30 Days  Last 60 Days  Last 90 Days  Last 1 Year  Last 2 Years  mg  Timeframe  0  4  7  1/5/22  1/20/22  2/3/22  3.4  mg Per Day Avg.  51.7  Avg mg Per Rx  Disclaimer  RX Summary  Summary  Total Prescriptions 15  Total Private Pay 0  Total Prescribers 7  Total Pharmacies 7  Opioids* (excluding Buprenorphine)  Current Qty 0  Current MME/day 0.00  30 Day Avg MME/day 0.00  Buprenorphine*  Current Qty 0  Current mg/day 3.33  30 Day Avg mg/day 3.67  RX Summary Expanded  Narcotics (excluding Buprenorphine)  30 Day Avg. MME 0.00  90 Day Avg. MME 0.00  Rx Count/12 Months 0  Prescriber #/6 Months 0  Pharmacy #/6 Months 0  Current Quantity 0  Buprenorphine  30 Day Avg. mg/day 3.44  90 Day Avg. mg/day 2.89  Rx Count/12 Months 9  Prescriber #/6 Months 3  Pharmacy #/6 Months 2  Current Quantity 0  Sedatives  30 Day Avg. LME 0.00  90 Day Avg.  LME 0.00  Rx Count/12 Months 0  Prescriber #/6 Months 0  Pharmacy #/6 Months 0  Current Quantity 0  Stimulants  30 Day Avg. mg/day 0.00  90 Day Avg. mg/day 0.00  Rx Count/12 Months 0  Prescriber #/6 Months 0  Pharmacy #/6 Months 0  Current Quantity 0  Prescriptions  Total: 15  Private Pay: 0  Showing 1-15 of 15 Items View 15 Items  1 of 1   Filled  Written  Sold  ID  Drug  QTY  Days  Prescriber  RX #  Dispenser  Refill  Daily Dose*  Pymt Type     01/04/2022 10/18/2021 01/04/2022 4   Sublocade 100 Mg/0.5 Ml Syring  1.00 30 Em Bru [id number] Acc (1167) 1/5 3.33 mg [place]  12/08/2021 10/18/2021 12/08/2021 4   Sublocade 100 Mg/0.5 Ml Syring  1.00 30 Em Bru [id number] Acc (1167) 0/5 3.33 mg [place]  10/25/2021 09/21/2021 10/25/2021 4   Sublocade 100 Mg/0.5 Ml Syring  1.00 30 An Pyl [id number] Acc (1167) 1/5 3.33 mg [place]  09/27/2021 09/21/2021 09/27/2021 4   Sublocade 100 Mg/0.5 Ml Syring  1.00 30 An Pyl [id number] Acc (1167) 0/5 3.33 mg [place]  08/30/2021 08/18/2021 08/30/2021 4   Sublocade 300 Mg/1.5 Ml Syring  1.00 30 An Pyl [id number] Acc (1167) 0/0 10.00 mg [place]  08/02/2021 07/22/2021 08/02/2021 3   Sublocade 300 Mg/1.5 Ml Syring  1.00 30 Ny Pid [id number] Acc (1167) 0/1 10.00 mg [place]  07/22/2021 07/22/2021 07/22/2021 2   Buprenorphine-Nalox 8-2mg Film  60.00 30 Ny Pid [id number] [place] (3829) 0/0 16.00 mg [place]  07/15/2021 07/14/2021 07/16/2021 5   Buprenorphine-Nalox 8-2mg Film  5.00 5 Josep Omaira [id number] [place] (1359) 0/0 8.00 mg [place]  07/15/2021 06/16/2021 07/15/2021 1   Gabapentin 800 Mg Tablet  90.00 30 Mi Paul [id number] [facility] (0138) 0/2  [place]  06/16/2021 06/16/2021 06/16/2021 1   Gabapentin 800 Mg Tablet  90.00 30 Mi Paul [id number] [facility] (5978) 0/3  [place]  05/28/2021 01/29/2021 05/28/2021 1   Gabapentin 600 Mg Tablet  30.00 30 Mi Paul [id number] [facility] (5112) 0/0  [place]  04/02/2021 04/02/2021 04/02/2021 1   Gabapentin  300 Mg Capsule  20.00 10 Ne Phi [id number] [place] (0349[place] 0/0  [place]  03/04/2021 01/29/2021 03/04/2021 1   Gabapentin 600 Mg Tablet  30.00 30 Mi Paul [id number] [facility] (1799) 0/1  [place]  03/02/2021 03/02/2021 03/03/2021 1   Buprenorphine-Nalox 8-2mg Film  60.00 30 Fe Par [id number] [facility] (9381) 0/0 16.00 mg [place]  02/11/2021 01/29/2021 02/11/2021 6   Gabapentin 600 Mg Tablet  30.00 30 Mi Paul [id number] [facility] (5633) 1/3  [place]  Disclaimer  Showing 1-15 of 15 Items View 15 Items  1 of 1   Providers  Total: 7  Showing 1-7 of 7 Items View 15 Items  1 of 1   Name  Address  [facility]  State  Zipcode  Phone   Rick Munoz [address] [facility] [address] [phone]  Mya Temple [address] [facility] [place] [address] [phone]  Pavithra Talley [address] [place] [address] [phone]  Emily A Brunner, MD [address] [place] [address] [phone]  Jhon Kramer [address] [facility] [address] [phone]  Esau Kaufman MD [address] [facility] [address] [phone]  Brandon Menezes [address] [facility] [address] [phone]  Showing 1-7 of 7 Items View 15 Items  1 of 1   Pharmacies  Total: 7  Showing 1-7 of 7 Items View 15 Items  1 of 1   Name  Address  [facility]  State  Zipcode  Phone   [facility] (3060) [address] [facility] [place] [address] [phone]  [facility] (5161) [address] [facility] [address] [phone]  [facility] (3364) [address] [facility] [address] [phone]  [facility] (0356) [address] [place] [address] [phone]  [facility] (7717) [address] [facility] [address] [phone]  [facility] (7635) [address] [facility] [address] [phone]  [facility] (5484) [address] [place] [address] [phone]  Showing 1-7 of 7 Items View 15 Items  1 of 1   The report provided is  based upon the search criteria entered and the corresponding data as it has been reported by dispenser(s). If erroneous information is identified or additional information is needed, please contact the dispenser or the prescriber provided on the report. Date Sold signifies the date the prescription was sold (left the pharmacy). The absence of Date Sold does not necessarily indicate the prescription was not dispensed. Fill Date represents the date the medication was filled or prepared by the pharmacy. Note, federal regulation (CFR Title 42: Part 2) requires patient consent prior to releasing certain patient data from federally funded opioid treatment programs (OTPs). As such, controlled substances dispensed from OTPs for medication-assisted treatment may not appear in the [place]  report. Morphine milligram equivalent (MME) conversion factors published by the CDC are used in the MME calculation. Per the CDC, the MME conversion factor is intended only for analytic purposes where prescription data are used to retrospectively calculate daily MME to inform analyses of risks associated with opioid prescribing. This value does not constitute clinical guidance or recommendations for converting patients from one form of opioid analgesic to another. Per the CDC, the conversion factors for drugs prescribed or provided, as part of medication-assisted treatment for opioid use disorder should not be used to benchmark against MME dosage thresholds meant for opioids prescribed for pain. Buprenorphine products listed in the CDC s MME file do not have an associated conversion factor. Lastly, the CDC notes, in clinical practice, calculating MME for methadone often involves a sliding-scale approach, whereby the conversion factor increases with increasing dose. The conversion factor of 3 for methadone presented in this file could underestimate MME for a given patient. This report contains confidential information, including patient  identifiers, and is not a public record. The information on this report must be treated as protected health information and is only to be disclosed to others as authorized by applicable state and Federal regulations.

## 2022-02-03 NOTE — H&P
Romeo Maldonado is a 31 year old male   History was provided by PATEINT who was a poor historian.   CHIEF COMPLAINT: Alcohol methamphetamine    HISTORY OF PRESENT ILLNESS:    Patient is a 31-year-old  male who came to the emergency room wanting help.  He is a poor historian vague about details.  Patient reports he was hospitalized in July he went to [place] was sober for 6 months and then relapsed 2 months ago.    Patient reports that he relapsed 2 months ago.  He has been using alcohol methamphetamine heroin GHB.  He was on Sublocade last time that he got Sublocade shot was in December.  He has been drinking alcohol half a liter to a liter per day he has no history of DTs or seizures    He has been using GHB 1-3 times per week using 1 to 2 ounces  He has been using Xanax half a bar occasionally  He has been using methamphetamine daily  2 pints to 1 g he smokes it and snorts it.  He was on Suboxone maintenance but got off of it 2 months ago and has been using IV heroin.      Patient has tolerance, withdrawal, progressive use, loss of control, spending more time and more amount than intended to all the above drugs. Patient has made attempts to quit, is experiencing cravings, and reports negative consequences to all the above drugs.       His urine drug screen was positive for amphetamines and opiates only breathalyzer is 0                   Patient reports that he has passive suicidal ideation and feels depressed lack of sleep lack of energy lack of motivation lack of interest lack of appetite.  He denies any active suicide lesion plan or intent    Denies auditory or visual hallucinations.     Patient does not smokes    Patient denied any gambling  Patient stopped taking all his medications    PSYCHIATRIC REVIEW OF SYSTEMS:         Psychiatric Review of Systems:   Depression:    As above  Len[place]:       Denies: sleeplessness, increased goal-directed activities, abrupt increase in energy, pressured speech    impulsiveness, racing thoughts, increased goal-directed activities, pressured speech, increase in energy  Lenka Feeling euphoric,Distractible,Impulsive,Grandiose,Talking excessively,Have energy without sleeping,Mood swings,Irritability  Psychosis:     Denies: visual hallucinations, auditory hallucinations, paranoia  Anxiety:     Patient reports he gets anxiety feels panicky fearful scared  PTSD:  Denies: re-experiencing past trauma, nightmares, increased arousal, avoidance of traumatic stimuli, impaired function.  OCD:     Denies: obsessions, checking, symmetry, cleaning, skin picking.  ED:     Denies: restriction, binging, purging.                    PSYCHIATRIC HISTORY     Previous diagnoses:     Started with pain pills at the age of 15; at age 17, he switched to heroin  He also has a previous history of abusing benzodiazepines      Past court commitments: none  SIB /SUICIDE ATTEMPTS NONE  Psych Hosp : Numerous psychiatric hospitalizations last one was in July  Patient never had any electroconvulsive therapy  Inpatient cd trt 10  Out pt cd trt  Detoxes  PAST PSYCH MED TRIALS       SOCIAL HISTORY                                                                           Born in [place].  Only child.  Father was [place].  Parents .  He was taken care of by his mother.  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 [place]      Family History:   FAMILY HISTORY:   Family History   Problem Relation Age of Onset     Hypertension Father      Family Mental Health History-mother has anxiety    Substance Use Problems - present for paternal grandfather having alcoholism             PTA Medications:     Medications Prior to Admission   Medication Sig Dispense Refill Last Dose     albuterol (PROAIR HFA/PROVENTIL HFA/VENTOLIN HFA) 108 (90 Base) MCG/ACT inhaler Inhale 2 puffs into the lungs every 6 hours as needed 8.5 g 0 More than a month at Unknown time     hydrOXYzine (ATARAX) 25 MG tablet  Take 1 tablet (25 mg) by mouth 3 times daily as needed for itching 60 tablet 0 More than a month at Unknown time     prazosin (MINIPRESS) 1 MG capsule Take 1 capsule by mouth daily   Past Week at Unknown time     propranolol (INDERAL) 10 MG tablet Take 1 tablet by mouth 3 times daily as needed   Past Week at Unknown time     traZODone (DESYREL) 50 MG tablet Take 1 tablet (50 mg) by mouth nightly as needed for sleep (may repeat after 60 minutes) 60 tablet 1 More than a month at Unknown time     ARIPiprazole (ABILIFY) 2 MG tablet Take 1 tablet (2 mg) by mouth daily 30 tablet 0      buprenorphine HCl-naloxone HCl (SUBOXONE) 8-2 MG per film Place 1 Film under the tongue daily 5 Film 0      buPROPion (WELLBUTRIN SR) 150 MG 12 hr tablet Take 1 tablet (150 mg) by mouth daily 30 tablet 1      OLANZapine (ZYPREXA) 5 MG tablet Take 1 tablet (5 mg) by mouth At Bedtime 30 tablet 1           Allergies:     Allergies   Allergen Reactions     Ambien [Zolpidem] Shortness Of Breath     wheezing     Pollen Extract Unknown          Labs:     Recent Results (from the past 48 hour(s))   Comprehensive metabolic panel    Collection Time: 02/02/22 12:40 PM   Result Value Ref Range    Sodium 137 133 - 144 mmol/L    Potassium 3.6 3.4 - 5.3 mmol/L    Chloride 103 94 - 109 mmol/L    Carbon Dioxide (CO2) 28 20 - 32 mmol/L    Anion Gap 6 3 - 14 mmol/L    Urea Nitrogen 21 7 - 30 mg/dL    Creatinine 0.70 0.66 - 1.25 mg/dL    Calcium 9.0 8.5 - 10.1 mg/dL    Glucose 135 (H) 70 - 99 mg/dL    Alkaline Phosphatase 63 40 - 150 U/L    AST 29 0 - 45 U/L    ALT 31 0 - 70 U/L    Protein Total 7.6 6.8 - 8.8 g/dL    Albumin 4.0 3.4 - 5.0 g/dL    Bilirubin Total 0.8 0.2 - 1.3 mg/dL    GFR Estimate >90 >60 mL/min/1.73m2   Asymptomatic COVID-19 Virus (Coronavirus) by PCR Nasopharyngeal    Collection Time: 02/02/22 12:40 PM    Specimen: Nasopharyngeal; Swab   Result Value Ref Range    SARS CoV2 PCR Negative Negative   CBC with platelets and differential     "Collection Time: 02/02/22 12:40 PM   Result Value Ref Range    WBC Count 8.0 4.0 - 11.0 10e3/uL    RBC Count 4.94 4.40 - 5.90 10e6/uL    Hemoglobin 13.9 13.3 - 17.7 g/dL    Hematocrit 40.7 40.0 - 53.0 %    MCV 82 78 - 100 fL    MCH 28.1 26.5 - 33.0 pg    MCHC 34.2 31.5 - 36.5 g/dL    RDW 11.9 10.0 - 15.0 %    Platelet Count 238 150 - 450 10e3/uL    % Neutrophils 66 %    % Lymphocytes 22 %    % Monocytes 12 %    % Eosinophils 0 %    % Basophils 0 %    % Immature Granulocytes 0 %    NRBCs per 100 WBC 0 <1 /100    Absolute Neutrophils 5.3 1.6 - 8.3 10e3/uL    Absolute Lymphocytes 1.7 0.8 - 5.3 10e3/uL    Absolute Monocytes 0.9 0.0 - 1.3 10e3/uL    Absolute Eosinophils 0.0 0.0 - 0.7 10e3/uL    Absolute Basophils 0.0 0.0 - 0.2 10e3/uL    Absolute Immature Granulocytes 0.0 <=0.4 10e3/uL    Absolute NRBCs 0.0 10e3/uL   Drug abuse screen 1 urine (ED)    Collection Time: 02/02/22 12:40 PM   Result Value Ref Range    Amphetamines Urine Screen Positive (A) Screen Negative    Barbiturates Urine Screen Negative Screen Negative    Benzodiazepines Urine Screen Negative Screen Negative    Cannabinoids Urine Screen Negative Screen Negative    Cocaine Urine Screen Negative Screen Negative    Opiates Urine Screen Positive (A) Screen Negative   Alcohol breath test POCT    Collection Time: 02/02/22  2:41 PM   Result Value Ref Range    Alcohol Breath Test 0.000 0.00 - 0.01   GGT    Collection Time: 02/02/22  9:41 PM   Result Value Ref Range    GGT 9 0 - 75 U/L         BP (!) 138/91   Pulse 69   Temp 97.3  F (36.3  C) (Temporal)   Resp 16   Ht 1.778 m (5' 10\")   Wt 83 kg (183 lb)   SpO2 100%   BMI 26.26 kg/m    Weight is 183 lbs 0 oz  Body mass index is 26.26 kg/m .    Physical Exam:     ROS: 10 point ROS neg other than the symptoms noted above in the HPI.            Past Medical History:   PAST MEDICAL HISTORY:   Past Medical History:   Diagnosis Date     Allergic state      Anxiety      Depressive disorder      Hepatitis C      " Substance abuse (H)     meth use     Uncomplicated asthma        PAST SURGICAL HISTORY:   Past Surgical History:   Procedure Laterality Date     INCISION AND DRAINAGE SOFT TISSUE UPPER EXTREMITY, COMBINED  8/11/2012    Procedure: COMBINED INCISION AND DRAINAGE SOFT TISSUE UPPER EXTREMITY;  Incision and drainage Right Arm Abscess;  Surgeon: Cheli White MD;  Location:  OR       -    -           MENTAL STATUS EXAM:      Constitutional: General appearance of patient:  Appearance:  awake, alert, appeared as age stated, adequate groomed and slightly unkempt  Attitude:  cooperative  Eye Contact:  good  Mood:   Depressed  Affect:  congruent   Speech:  clear, coherent normal rate   Psychomotor Behavior:  no evidence of tardive dyskinesia, dystonia, or tics  Thought Process:  logical, linear and goal oriented  Associations:  no loose associations  Thought Content:  no evidence of psychotic thought and active suicidal ideation present  Denied any active suicidal /homicidation ideation plan intent   Insight:  fair  Judgment:  fair  Oriented to:  time, person, and place  Attention Span and Concentration:  intact  Recent and Remote Memory:  intact  Language:  english with appropriate syntax and vocabulary  Fund of Knowledge: appropriate  Muscle Strength and Tone: normal  Gait and Station: Normal     There are no abnormal or psychotic thoughts, no preoccupations, no overvalued ideas, no rumination, no obsessions, no compulsions, no somatic concerns, no hypochrondriasis, no ideas of reference, and no delusions.  Patient denies homicidal thoughts.   Patient denies suicidal thoughts.  Patient appears to have good judgment and good insight.     Musculoskeletal: Patient shows no abnormalities of motor activity: there is no tremor, no tic, and no dystonia.  There is no apparent muscle atrophy, strength and tone appear normal, and there are no abnormal movements.  Patient has normal gait and stance.    DISCUSSION:          Assessment:       Patient has a biological predisposition with family history positive for alcoholism mental illness  Psychologically patient is experiencing polysubstance abuse abusing alcohol methamphetamine GHB heroin  He has neurovegetative symptoms of depression    Patient has these particular stressors noncompliance with medication    Patient has chronic illness exacerbation leading to hospitalization progression as described.     Patient has been unable to stop using drugs in the community due to both physical and psychological symptoms.  Continued use will put the patient at risk for medical and/or psychiatric complications.      Inpatient psychiatric hospitalization is warranted at this time for safety, stabilization, and possible adjustment in medications.       Diagnoses:    Alcohol use disorder severe  Alcohol withdrawal severe  Methamphetamine use disorder severe  Methamphetamine withdrawal severe  Opiate use disorder severe  Opiate withdrawal severe  History of Sublocade on 12/2021  GHB abuse          Plan:   Problem list  1#patient with detox of alcohol using MSSA protocol on Valium     - MSSA protocol using Valium for management of alcohol withdrawal    - Continue thiamine, folate, and multivitamin daily    MSSA    Eating Disturbances: 2  Tremor: 2  Sleep Disturbance: slept through the night or not applicable  Clouding of Sensorium: no evidence  Hallucinations: 0 - none  Quality of Contact: 0 - awareness of examiner and people around him/her  Agitation: 2  Paroxysmal Sweats: 1 - barely perceptible sweating  Temperature: 99.5 or below  Pulse: 0 - 69 or below  Total MSSA Score: 8   Patient's vitals are as below blood pressure 138/91 pulse of 69  He has received 10 mg of diazepam  2#patient report back on Suboxone 8 mg daily    3#patient's use of used to be sporadic MSSA protocol will help him with his withdrawal    4#he will have symptomatic detox from methamphetamine    5#we will restart Wellbutrin  once patient is out of alcohol detox  We will add Minipress    - Consider anti-craving medications prior to discharge. Pt willing to review additional information about both naltrexone and Antabuse.    Alcohol withdrawal nausea prn Zofran as needed for nausea     hydroxyzine 25 mg q4h prn for acute anxiety  Trazodone 50 mg at bedtime prn for sleep disturbances       Patient has been unable to stop using drugs in the community due to both physical and psychological symptoms.  Continued use will put the patient at risk for medical and/or psychiatric complications.    I HAVE REVIEWED LABS WITH PT AND TALKED ABOUT RESULTS WITH PT  I HAVE REVIEWED AND SUMMARIZED OLD RECORDS including his medication reconcilation of his home medications  and PDMP   I HAVE SPOKEN WITH RN ABOUT MEDICATIONS AND DETOX SCORES  I HAVE SPOKEN WITH CM ABOUT PTS TREATMENT OPTIONS     Discussed in detail about patient's smoking patient was advised to quit patient was told about the impact of smoking.  Patient's willingness to quit was assessed.  I provided methods and skills for cessation including medication management nicotine gum patch.  Patient did not set a quit date.  Patient is interested in quitting .we discussed pharmacotherapy options .patient agreed to take nicotine gum patch lozenge.  We discussed behavioral change techniques when craving nicotine including deep breathing drinking glass of water, taking a walk.  This discussion was about 15 minutes          Laboratory/Imaging:    Liver Function Studies -   Recent Labs   Lab Test 02/02/22  1240   PROTTOTAL 7.6   ALBUMIN 4.0   BILITOTAL 0.8   ALKPHOS 63   AST 29   ALT 31      Last Comprehensive Metabolic Panel:  Sodium   Date Value Ref Range Status   02/02/2022 137 133 - 144 mmol/L Final   07/05/2021 138 133 - 144 mmol/L Final     Potassium   Date Value Ref Range Status   02/02/2022 3.6 3.4 - 5.3 mmol/L Final   07/05/2021 4.0 3.4 - 5.3 mmol/L Final     Chloride   Date Value Ref Range  Status   02/02/2022 103 94 - 109 mmol/L Final   07/05/2021 106 94 - 109 mmol/L Final     Carbon Dioxide   Date Value Ref Range Status   07/05/2021 29 20 - 32 mmol/L Final     Carbon Dioxide (CO2)   Date Value Ref Range Status   02/02/2022 28 20 - 32 mmol/L Final     Anion Gap   Date Value Ref Range Status   02/02/2022 6 3 - 14 mmol/L Final   07/05/2021 3 3 - 14 mmol/L Final     Glucose   Date Value Ref Range Status   02/02/2022 135 (H) 70 - 99 mg/dL Final   07/05/2021 104 (H) 70 - 99 mg/dL Final     Urea Nitrogen   Date Value Ref Range Status   02/02/2022 21 7 - 30 mg/dL Final   07/05/2021 17 7 - 30 mg/dL Final     Creatinine   Date Value Ref Range Status   02/02/2022 0.70 0.66 - 1.25 mg/dL Final   07/05/2021 0.79 0.66 - 1.25 mg/dL Final     GFR Estimate   Date Value Ref Range Status   02/02/2022 >90 >60 mL/min/1.73m2 Final     Comment:     Effective December 21, 2021 eGFRcr in adults is calculated using the 2021 CKD-EPI creatinine equation which includes age and gender (Juarez et al., [place], DOI: 10.1056/QHWKya5704152)   07/05/2021 >90 >60 mL/min/[1.73_m2] Final     Comment:     Non  GFR Calc  Starting 12/18/2018, serum creatinine based estimated GFR (eGFR) will be   calculated using the Chronic Kidney Disease Epidemiology Collaboration   (CKD-EPI) equation.       Calcium   Date Value Ref Range Status   02/02/2022 9.0 8.5 - 10.1 mg/dL Final   07/05/2021 8.6 8.5 - 10.1 mg/dL Final     Bilirubin Total   Date Value Ref Range Status   02/02/2022 0.8 0.2 - 1.3 mg/dL Final   07/04/2021 0.9 0.2 - 1.3 mg/dL Final     Alkaline Phosphatase   Date Value Ref Range Status   02/02/2022 63 40 - 150 U/L Final   07/04/2021 92 40 - 150 U/L Final     ALT   Date Value Ref Range Status   02/02/2022 31 0 - 70 U/L Final   07/04/2021 27 0 - 70 U/L Final     AST   Date Value Ref Range Status   02/02/2022 29 0 - 45 U/L Final   07/04/2021 36 0 - 45 U/L Final                   Medical treatment/interventions:  Medical concerns:  "As above    - Consults: IM consult placed. Appreciate assistance.     Legal Status: Voluntary     Safety Assessment:   Checks: Status 15  Pt has not required locked seclusion or restraints in the past 24 hours to maintain safety, please refer to RN documentation for further details.    The risks, benefits, alternatives and side effects have been discussed and are understood by the patient.       Patient will be treated in therapeutic milieu with appropriate individual and group therapies as described.  Disposition: Pending clinical stabilization. Pt does  appear interested in COMPLETE DETOX AND DO TRT  Length of stay 3-5 days        \"Much or all of the text in this note was generated through the use of Dragon Dictate voice to text software. Errors in spelling or words which appear to be out of contact are unintentional, may be present due having escaped editing\"     "

## 2022-02-03 NOTE — PROGRESS NOTES
Internal Medicine Initial Visit      Romeo Maldonado MRN# [id number]   YOB: 1990 Age: 31 year old   Date of Admission: 2/2/2022  PCP: Jean Pierre Simon    Referring Provider: Behavioral Health - Jhon Kramer MD  Reason for Visit: General Medical Evaluation, EtOH intoxication and withdrawal         Assessment and Recommendations:   Romeo Maldonado is a 31 year old year old man with a history of Hx IVDU, Polysubstance use d/o (EtOH, amphetamine, cocaine, opiates), anxiety, depression, asthma, Hepatitis C who is admitted to station 3A with EtOH intoxication and withdrawal.        Polysubstance use d/o (EtOH, amphetamine, cocaine, opiates)  EtOH intoxication and withdrawal  Hx IVDU  Anxiety  Depression.   Tox screen: *+ amphetamine, + opiates, otherwise neg. EtOH breath 0.000.  - Management per psychiatry team.     Unwitnessed fall   Chest pain  Headache  Patient has not had prior assessment since admission for fall/injury and says chest pain feels as though he broke a rib and suspects striking his head from falling.   - CT non-con head  - CXR  - supportive mng: agree with ibuprofen prn and added APAP 500mg q6h prn    Chest pain, acute sharp  Given polysubstance use can not rule out cardiopulmonary causes, though they are less likely and patient appears to have generalized pain  - EKG  - troponin  - CXR as above    Asthma  CTAB on exam  - continue prn albuterol     Mild diastolic bp elevation  dbp occasionally marginally elevated to 91 otherwise normotensive. Occurs in the setting of above.  - no indication for new anti-HTN medication at this time  - monitor    Hepatitis C, treated  IVDU  Hx of normal RUQ US in 2021. Patient continues with IVDU and endorses potential repeat exposure to STIs/ IV infections. Patient also injecting into bilateral jugular veins in neck which are somewhat firm and without induration, erythema or fluctuance  - STI testing: HIV, HSV, RPR, HepC, HepB, GCC  - low  "threshold to obtain US venous doppler of neck if any new vertigo, neck pain/swelling/ mass or concerns for clot or infection     Mild hyperglycemia  No dx of DM  - monitor    Medicine will follow up on trop, STI testing, CT head, EKG, CXR, vitals, please page with any additional concerns.     Addendum:  CXR clear and no apparent fractures  ekg nsr with prominent precordial T-waves, no inversions or ST segment elevations  Trop flat    Viola Salma NANCE  Hospitalist Service   Pager:   [facility] Paging/Directory     Reason for Admission:   EtOH intoxication and withdrawal         History of Present Illness:   History is obtained from the patient and medical record.     Romeo Maldonado is a 31 year old year old man with a history of Hx IVDU, Polysubstance use d/o (EtOH, amphetamine, cocaine, opiates), anxiety, depression, asthma, Hepatitis C who is admitted to station 3A with EtOH intoxication and withdrawal.     Internal Medicine service was asked to see patient for a general medication evaluation. Currently, patient is having generalized pain including a sharp chest pain and says \"I think I broke my ribs\" and also has generalized abdominal pain and nausea without vomiting. He notes no BM since  Admission.  He endorses sensation of fevers and chills and shaking and cold sweats.  He notes sensation of shortness of breath and chest tightness.     His last EtOH-containing drink was 2 days ago and he drinks vodka ~0.5-1L/day and has a  History of 6 months of sobriety previously.No hx of DT or seizures. On 2/2 he reports orally taking \"GHB\" and injecting  Methamphetamine, which he injects either in the bilateral arms or bilateral neck and notes hard and mildly tender areas on the bilateral neck veins.              Review of Systems:   Denies vomiting, diarrhea, urinary complaints (including dysuria), bleeding or other complaints.     10 point ROS was performed and negative unless otherwise noted in HPI.           Past Medical " History:   Reviewed and updated in Epic.  Past Medical History:   Diagnosis Date     Allergic state      Anxiety      Depressive disorder      Hepatitis C      Substance abuse (H)     meth use     Uncomplicated asthma              Past Surgical History:   Reviewed and updated in Epic.  Past Surgical History:   Procedure Laterality Date     INCISION AND DRAINAGE SOFT TISSUE UPPER EXTREMITY, COMBINED  8/11/2012    Procedure: COMBINED INCISION AND DRAINAGE SOFT TISSUE UPPER EXTREMITY;  Incision and drainage Right Arm Abscess;  Surgeon: Cheli White MD;  Location:  OR             Social History:     Social History     Tobacco Use     Smoking status: Current Some Day Smoker     Packs/day: 0.25     Smokeless tobacco: Never Used   Substance Use Topics     Alcohol use: Yes     Comment: occasional     Drug use: Yes     Types: Methamphetamines, Opiates, IV     Comment: heroin daily, meth a couple of times in the last 2 weks             Family History:   Reviewed and updated in Epic.  Family History   Problem Relation Age of Onset     Hypertension Father              Allergies:     Allergies   Allergen Reactions     Ambien [Zolpidem] Shortness Of Breath     wheezing     Pollen Extract Unknown             Medications:     Medications Prior to Admission   Medication Sig Dispense Refill Last Dose     albuterol (PROAIR HFA/PROVENTIL HFA/VENTOLIN HFA) 108 (90 Base) MCG/ACT inhaler Inhale 2 puffs into the lungs every 6 hours as needed 8.5 g 0 More than a month at Unknown time     hydrOXYzine (ATARAX) 25 MG tablet Take 1 tablet (25 mg) by mouth 3 times daily as needed for itching 60 tablet 0 More than a month at Unknown time     prazosin (MINIPRESS) 1 MG capsule Take 1 capsule by mouth daily   Past Week at Unknown time     propranolol (INDERAL) 10 MG tablet Take 1 tablet by mouth 3 times daily as needed   Past Week at Unknown time     traZODone (DESYREL) 50 MG tablet Take 1 tablet (50 mg) by mouth nightly as needed for sleep  "(may repeat after 60 minutes) 60 tablet 1 More than a month at Unknown time     ARIPiprazole (ABILIFY) 2 MG tablet Take 1 tablet (2 mg) by mouth daily 30 tablet 0      buprenorphine HCl-naloxone HCl (SUBOXONE) 8-2 MG per film Place 1 Film under the tongue daily 5 Film 0      buPROPion (WELLBUTRIN SR) 150 MG 12 hr tablet Take 1 tablet (150 mg) by mouth daily 30 tablet 1      OLANZapine (ZYPREXA) 5 MG tablet Take 1 tablet (5 mg) by mouth At Bedtime 30 tablet 1         Current Facility-Administered Medications   Medication     albuterol (PROVENTIL HFA/VENTOLIN HFA) inhaler     alum & mag hydroxide-simethicone (MAALOX) suspension 30 mL     buprenorphine (SUBUTEX) sublingual tablet 2 mg     cloNIDine (CATAPRES) tablet 0.1 mg     diazepam (VALIUM) tablet 5-20 mg     dicyclomine (BENTYL) capsule 20 mg     folic acid (FOLVITE) tablet 1 mg     gabapentin (NEURONTIN) capsule 200 mg     hydrOXYzine (ATARAX) tablet 25 mg     ibuprofen (ADVIL/MOTRIN) tablet 600 mg     loperamide (IMODIUM) capsule 2 mg     methocarbamol (ROBAXIN) tablet 500 mg     multivitamin w/minerals (THERA-VIT-M) tablet 1 tablet     nicotine (NICORETTE) gum 4 mg     OLANZapine (zyPREXA) tablet 10 mg    Or     OLANZapine (zyPREXA) injection 10 mg     ondansetron (ZOFRAN-ODT) ODT tab 4 mg     prazosin (MINIPRESS) capsule 1 mg     propranolol (INDERAL) tablet 10 mg     senna-docusate (SENOKOT-S/PERICOLACE) 8.6-50 MG per tablet 1 tablet     thiamine (B-1) tablet 100 mg     traZODone (DESYREL) tablet 50 mg     Facility-Administered Medications Ordered in Other Encounters   Medication     Self Administer Medications: Behavioral Services            Physical Exam:   Blood pressure (!) 138/91, pulse 69, temperature 97.3  F (36.3  C), temperature source Temporal, resp. rate 16, height 1.778 m (5' 10\"), weight 83 kg (183 lb), SpO2 100 %.  Body mass index is 26.26 kg/m .  GENERAL: Alert and oriented. NAD, laying supine in bed and appears uncomfortable, " cooperative  HEENT: ~  5cm above clavical bilaterally over jugular vein region is a small firm area that is mildly tender to palpation without fluctuance, erythema, edema, or callor. Anicteric sclera. Pupils equal and round. NC. AT.  CV: RRR. S1, S2. No murmurs appreciated.   RESPIRATORY: Effort normal on room air. Lungs CTAB with no wheezing, rales, rhonchi.   GI: + diffuse tenderness to palpation, no lesions or masses.  Abdomen soft, NABS  MUSCULOSKELETAL: No joint swelling or tenderness.  NEUROLOGICAL: No focal deficits. Moves all extremities.   EXTREMITIES: No peripheral edema. Intact bilateral pedal pulses.   SKIN: No jaundice. No rashes on exposed skin.           Data:   CBC:  Recent Labs   Lab Test 02/02/22  1240   WBC 8.0   RBC 4.94   HGB 13.9   HCT 40.7   MCV 82   MCH 28.1   MCHC 34.2   RDW 11.9          CMP:  Recent Labs   Lab Test 02/02/22  1240      POTASSIUM 3.6   CHLORIDE 103   NILA 9.0   CO2 28   BUN 21   CR 0.70   *   AST 29   ALT 31   BILITOTAL 0.8   ALBUMIN 4.0   PROTTOTAL 7.6   ALKPHOS 63       TSH:  TSH   Date Value Ref Range Status   07/07/2021 0.45 0.40 - 4.00 mU/L Final       Tox screen: *+ amphetamine, + opiates, otherwise neg. EtOH breath 0.000  HCG: n/a    Unresulted Labs Ordered in the Past 30 Days of this Admission     No orders found for last 31 day(s).

## 2022-02-03 NOTE — PROGRESS NOTES
Writer met with patient on 2/3/22 to discuss aftercare plans. Patient stated that he is interested in psychiatry services for SUBLOCADE injections, and individual therapy. Patient has [employer] insurance (private).    Writer will inform patient that SUBLOCADE injections require a 7 day SUBOXONE maintenance first. Writer will assist patient in making an appointment for the SUBLOCADE injection and provide resources for individual therapy.    UPDATE:    Writer attempted to make appointments for patient for SUBLOCADE injections at [facility] and [facility], but the  provided (address, phone number, or insurance) did not match what they had on record and could not be verified. Patient will have to call them directly at [phone] to make an appointment.    Patient mentioned that he had received SUBLOCADE from [facility]. Writer is leaving for the day, a follow up is needed.

## 2022-02-04 LAB
HCV AB SERPL QL IA: REACTIVE
HSV1 DNA SPEC QL NAA+PROBE: NEGATIVE
HSV2 DNA SPEC QL NAA+PROBE: NEGATIVE

## 2022-02-04 PROCEDURE — 250N000013 HC RX MED GY IP 250 OP 250 PS 637: Performed by: PSYCHIATRY & NEUROLOGY

## 2022-02-04 PROCEDURE — 128N000004 HC R&B CD ADULT

## 2022-02-04 PROCEDURE — 99233 SBSQ HOSP IP/OBS HIGH 50: CPT | Performed by: PSYCHIATRY & NEUROLOGY

## 2022-02-04 PROCEDURE — 250N000011 HC RX IP 250 OP 636: Performed by: PSYCHIATRY & NEUROLOGY

## 2022-02-04 RX ORDER — BUPRENORPHINE AND NALOXONE 8; 2 MG/1; MG/1
1 FILM, SOLUBLE BUCCAL; SUBLINGUAL 2 TIMES DAILY
Status: DISCONTINUED | OUTPATIENT
Start: 2022-02-04 | End: 2022-02-07 | Stop reason: HOSPADM

## 2022-02-04 RX ADMIN — DIAZEPAM 10 MG: 5 TABLET ORAL at 10:42

## 2022-02-04 RX ADMIN — IBUPROFEN 600 MG: 600 TABLET ORAL at 21:52

## 2022-02-04 RX ADMIN — THIAMINE HCL TAB 100 MG 100 MG: 100 TAB at 09:29

## 2022-02-04 RX ADMIN — BUPRENORPHINE AND NALOXONE 1 FILM: 8; 2 FILM BUCCAL; SUBLINGUAL at 09:29

## 2022-02-04 RX ADMIN — BUPRENORPHINE AND NALOXONE 1 FILM: 8; 2 FILM BUCCAL; SUBLINGUAL at 21:54

## 2022-02-04 RX ADMIN — ONDANSETRON 4 MG: 4 TABLET, ORALLY DISINTEGRATING ORAL at 10:42

## 2022-02-04 RX ADMIN — CLONIDINE HYDROCHLORIDE 0.1 MG: 0.1 TABLET ORAL at 16:27

## 2022-02-04 RX ADMIN — GABAPENTIN 200 MG: 100 CAPSULE ORAL at 21:52

## 2022-02-04 RX ADMIN — Medication 1 TABLET: at 09:29

## 2022-02-04 RX ADMIN — PRAZOSIN HYDROCHLORIDE 1 MG: 1 CAPSULE ORAL at 09:30

## 2022-02-04 RX ADMIN — FOLIC ACID 1 MG: 1 TABLET ORAL at 09:29

## 2022-02-04 RX ADMIN — DIAZEPAM 5 MG: 5 TABLET ORAL at 16:27

## 2022-02-04 ASSESSMENT — ACTIVITIES OF DAILY LIVING (ADL)
ORAL_HYGIENE: INDEPENDENT
LAUNDRY: WITH SUPERVISION
DRESS: INDEPENDENT
HYGIENE/GROOMING: INDEPENDENT

## 2022-02-04 NOTE — PROGRESS NOTES
[facility], [place]   Psychiatric Progress Note        Interim history   This is a 31 year old male with Alcohol use disorder severe  Alcohol withdrawal severe  Methamphetamine use disorder severe  Methamphetamine withdrawal severe  Opiate use disorder severe  Opiate withdrawal severe  History of Sublocade on 12/2021  GHB abuse.Pt seen in rounds.   The patient's care was discussed with the treatment team during the daily team meeting and/or staff's chart notes were reviewed.  Staff report patient has been visible in the milieu,  no acute eventsovernight.       Patient reports that he feels anxious, he reports he cannot get comfortable.  He feels depressed.  He feels like he lost trust.  He cannot see his daughter.  He he reports his appetite is poor his energy and motivation are poor his sleep is poor.    He has passive suicidal ideation but no active suicidal ideation plan or intent  No homicidal ideation  He denies any auditory visual hallucinations    Pt is in alcohol withdrawal still being monitered every 4 hrs for it,   Pt mssa score are monitered  Tolerating meds and has no side effects.              Medications:     Current Facility-Administered Medications   Medication     acetaminophen (TYLENOL) tablet 500 mg     albuterol (PROVENTIL HFA/VENTOLIN HFA) inhaler     alum & mag hydroxide-simethicone (MAALOX) suspension 30 mL     buprenorphine HCl-naloxone HCl (SUBOXONE) 8-2 MG per film 1 Film     cloNIDine (CATAPRES) tablet 0.1 mg     diazepam (VALIUM) tablet 5-20 mg     dicyclomine (BENTYL) capsule 20 mg     folic acid (FOLVITE) tablet 1 mg     gabapentin (NEURONTIN) capsule 200 mg     hydrOXYzine (ATARAX) tablet 25 mg     ibuprofen (ADVIL/MOTRIN) tablet 600 mg     loperamide (IMODIUM) capsule 2 mg     methocarbamol (ROBAXIN) tablet 500 mg     multivitamin w/minerals (THERA-VIT-M) tablet 1 tablet     nicotine (NICORETTE) gum 4 mg     OLANZapine (zyPREXA) tablet 10 mg    Or      "OLANZapine (zyPREXA) injection 10 mg     ondansetron (ZOFRAN-ODT) ODT tab 4 mg     prazosin (MINIPRESS) capsule 1 mg     propranolol (INDERAL) tablet 10 mg     senna-docusate (SENOKOT-S/PERICOLACE) 8.6-50 MG per tablet 1 tablet     thiamine (B-1) tablet 100 mg     traZODone (DESYREL) tablet 50 mg     Facility-Administered Medications Ordered in Other Encounters   Medication     Self Administer Medications: Behavioral Services             Allergies:     Allergies   Allergen Reactions     Ambien [Zolpidem] Shortness Of Breath     wheezing     Pollen Extract Unknown            Psychiatric Examination:   Blood pressure (!) 164/89, pulse 75, temperature 97.5  F (36.4  C), temperature source Temporal, resp. rate 16, height 1.778 m (5' 10\"), weight 83 kg (183 lb), SpO2 96 %.  Weight is 183 lbs 0 oz  Body mass index is 26.26 kg/m .    Appearance:  awake, alert and adequately groomed  Attitude:  cooperative  Eye Contact:  good  Mood:  anxious and depressed  Affect:  appropriate and in normal range and mood congruent  Speech:  clear, coherent rate /rhythm are good  Psychomotor Behavior:  no evidence of tardive dyskinesia, dystonia, or tics and intact station, gait and muscle tone  Throught Process:  logical  Associations:  no loose associations  Thought Content:  no evidence of suicidal ideation or homicidal ideation, no evidence of psychotic thought, no auditory hallucinations present and no visual hallucinations present  Insight:  limited  Judgement:  limited  Oriented to:  time, person, and place  Attention Span and Concentration:  intact  Recent and Remote Memory:  intact  Language fund of knowledge are adequate         Labs:   No results found for this or any previous visit (from the past 24 hour(s)).      DX   Alcohol use disorder severe  Alcohol withdrawal severe  Methamphetamine use disorder severe  Methamphetamine withdrawal severe  Opiate use disorder severe  Opiate withdrawal severe  History of Sublocade on " 12/2021  B abuse   PLAN   Alcohol intoxication/withdrawal, presently is on MSSA protocol with Valium. Continue the same MSSA protocol as ordered. Continue thiamine 100 mg p.o. daily, M.V.I. one p.o. daily and folate 1 mg p.o. Daily  Will continue mssa protocal to detox off alcohol on valium,  Pt is c/o of termor , agitation poor sleep and poor appetite, he has sweats, feels shakey  On mssa client scored scored 9 today and 10 needed needed  mg po as of yet , total dose since admission was 40    MSSA    Eating Disturbances: ate and enjoyed all of it or not applicable  Tremor: 2  Sleep Disturbance: slept through the night or not applicable  Clouding of Sensorium: no evidence  Hallucinations: 0 - none  Quality of Contact: 0 - awareness of examiner and people around him/her  Agitation: 5  Paroxysmal Sweats: 0 - no observed sweating  Temperature: 99.5 or below  Pulse: 1 - 70 to 79  Total MSSA Score: 9    We will increase Suboxone to 8 mg twice a day  Patient gets Sublocade injections at [facility].    Patient has passive suicidal ideation  Will have  Suicide precautions    Patient's urine drug screen is positive for amphetamines and opiates only    Patient had refused head CT      Plan to start Wellbutrin patient is out of alcohol detox  Laboratory/Imaging: reviewed with patient   Consults: internal medicine consult reviewed  Patient will be treated in therapeutic milieu with appropriate individual and group therapies as described.  PDMP CHECKED     Supportive psychotheraoy provided, annie talked about recovery enviroment, relapse prevention, triggers to use.  Discussed with patient many issues of addiction,triggers, relapse, and establishing a solid recovery program.  Asked pt to be med complinat   Medical diagnoses to be addressed this admission:    Plan:    Follow up of STI testing shows negative for: HIV, syphillis, HepB, HepC   HSV is pending at this time  Head CT is still pending      Patient is not Hep B immune  and should be considered for vaccination     Addendum:     Patient has so far refused head CT and does not have overt or new focal or concerning deficits     Defer head CT and will order if patient prefers to proceed     Medicine will sign off at this time, please call with quesitons or concerns     Legal Status: voluntary    Safety Assessment:   Checks:  15 min  Precautions: withdrawal precautions  Pt has not required locked seclusion or restraints in the past 24 hours to maintain safety, please refer to RN documentation for further details.  Discussed with patient many issues of addiction,triggers, relapse, and establishing a solid recovery program.  Able to give informed consent:  YES   Discussed Risks/Benefits/Side Effects/Alternatives: YES    After discussion of the indications, risks, benefits, alternatives and consequences of no treatment, the patient elects to complete detox and is ambivalent about treatment

## 2022-02-04 NOTE — PROGRESS NOTES
Follow up of STI testing shows negative for: HIV, syphillis, HepB, HepC   HSV is pending at this time  Head CT is still pending     Patient is not Hep B immune and should be considered for vaccination    Addendum:    Patient has so far refused head CT and does not have overt or new focal or concerning deficits    Defer head CT and will order if patient prefers to proceed    Medicine will sign off at this time, please call with quesitons or concerns    Viola Marsh PA-C  [facility]  Contact information available via [facility] Paging/Directory

## 2022-02-04 NOTE — PLAN OF CARE
"Problem: Alcohol Withdrawal  Goal: Alcohol Withdrawal Symptom Control  Outcome: Improving     Problem: Opioid Dependence or Withdrawal  Goal: Withdrawal Symptoms Managed  Outcome: Improving     MSSA 9 and 3.  COWS 8 and 2.  PRN diazepam 10 mg administered x1 this shift.  Patient is isolative to his room.  Endorses anxiety and depression but states he feels \"better than earlier.\"  Denies SI, HI, SIB, and hallucinations.  At both vital sign checks, pulse via monitor was low at 43 and 47.  Rechecked radially and pulse was 56 and 57.  Will continue to monitor.  Barb Brambila RN   "

## 2022-02-04 NOTE — PLAN OF CARE
Patient appeared to be asleep during safety checks this shift. MSSA scores were 2 and 1. Patient did not receive prn Valium. Will continue to monitor and update if there are changes.

## 2022-02-04 NOTE — PROGRESS NOTES
"Pt came to desk stating he does not feel well. Very restless. Unable to stand still.   Pt VS and detox status assessed.   BP elevated.   MSSA 9 - Pt medicated with 10 mg of valium .  \    Pt states he has been using xanax 2 mg bars 1/2 to 1 bar daily x past 2-3 months.     Pt reporting nausea. Prn Zofran given.   Fluids provided and encouraged.   Pt given urine specimen cup for STD testing.     Pt rating his depression 9  And his anxiety level a 6-7 on a 0-10 severe scale.   Admits to having suicide ideation. \"I just feel like I screwed up my whole life.\"  Pt contracts that he feels safe here on the unit.         "

## 2022-02-04 NOTE — PROGRESS NOTES
Pt in bed much of shift.   Pt provided contact information for [facility] to make a follow up suboxone/sublocade appointment.     Pt endorsing mild to moderate opiate withdrawal symptoms. Pt is on suboxone 8 mg SL.   Pt medicated x 1 with valium 10 mg.   Pt has received a total of 40 mg of valium and 2 mg of ativan since admission.     Discharge plans pending.

## 2022-02-04 NOTE — PLAN OF CARE
CT called to take patient tonight at 2022.  Patient became irritated with the idea of leaving his bed and refused to go to CT.  Attempted to educate patient on reasoning for this but he stated that he will do it tomorrow.  Barb Brambila RN

## 2022-02-04 NOTE — PROGRESS NOTES
CT called to see if patient willing to come for CT.   Romeo states he is not feeling well and does not want to have CT scan done at this time.     CT said they will cancel CT for now and to reordered it when patient able to do it.     Will notify medicine.

## 2022-02-04 NOTE — PROGRESS NOTES
Clarified Pt was receiving [facility] from [facility] and not [facility].  Pt will be provided number to his provider and instructed to make an appointment.

## 2022-02-05 PROCEDURE — 250N000013 HC RX MED GY IP 250 OP 250 PS 637: Performed by: PSYCHIATRY & NEUROLOGY

## 2022-02-05 PROCEDURE — 250N000013 HC RX MED GY IP 250 OP 250 PS 637: Performed by: PHYSICIAN ASSISTANT

## 2022-02-05 PROCEDURE — 128N000004 HC R&B CD ADULT

## 2022-02-05 RX ADMIN — DIAZEPAM 10 MG: 5 TABLET ORAL at 20:51

## 2022-02-05 RX ADMIN — GABAPENTIN 200 MG: 100 CAPSULE ORAL at 17:09

## 2022-02-05 RX ADMIN — ACETAMINOPHEN 500 MG: 500 TABLET ORAL at 21:00

## 2022-02-05 RX ADMIN — DIAZEPAM 5 MG: 5 TABLET ORAL at 17:10

## 2022-02-05 RX ADMIN — PRAZOSIN HYDROCHLORIDE 1 MG: 1 CAPSULE ORAL at 08:38

## 2022-02-05 RX ADMIN — BUPRENORPHINE AND NALOXONE 1 FILM: 8; 2 FILM BUCCAL; SUBLINGUAL at 20:51

## 2022-02-05 RX ADMIN — BUPRENORPHINE AND NALOXONE 1 FILM: 8; 2 FILM BUCCAL; SUBLINGUAL at 08:38

## 2022-02-05 RX ADMIN — THIAMINE HCL TAB 100 MG 100 MG: 100 TAB at 08:38

## 2022-02-05 RX ADMIN — FOLIC ACID 1 MG: 1 TABLET ORAL at 08:38

## 2022-02-05 RX ADMIN — Medication 1 TABLET: at 08:38

## 2022-02-05 ASSESSMENT — ACTIVITIES OF DAILY LIVING (ADL)
DRESS: INDEPENDENT
HYGIENE/GROOMING: INDEPENDENT
ORAL_HYGIENE: INDEPENDENT

## 2022-02-05 NOTE — PLAN OF CARE
Patient in bed at the beginning of shift breathing quiet and unlabored. No complain of pain noted or observed, and no medical concerns at this time.  MSSA of 4. Will continue to monitor.      Problem: Alcohol Withdrawal  Goal: Alcohol Withdrawal Symptom Control  Outcome: Improving

## 2022-02-05 NOTE — PLAN OF CARE
Problem: Alcohol Withdrawal  Goal: Alcohol Withdrawal Symptom Control  Outcome: No Change     Patient has withdrawn and isolative. Patient endorsed anxiety 9/10; 200mg of gabapentin was administered. Patient was given ibuprofen 600mg for generalized pain.     MSSA=9 and 6; 5mg of valium was administered for the score of 9. COWS=10 and 7; clonidine 0.1mg was administered.    Patient is unsure about treatment.    We'll continue to monitor.

## 2022-02-06 LAB — HCV RNA SERPL NAA+PROBE-ACNC: NOT DETECTED IU/ML

## 2022-02-06 PROCEDURE — 250N000013 HC RX MED GY IP 250 OP 250 PS 637: Performed by: PSYCHIATRY & NEUROLOGY

## 2022-02-06 PROCEDURE — 128N000004 HC R&B CD ADULT

## 2022-02-06 RX ADMIN — Medication 1 TABLET: at 08:26

## 2022-02-06 RX ADMIN — BUPRENORPHINE AND NALOXONE 1 FILM: 8; 2 FILM BUCCAL; SUBLINGUAL at 08:26

## 2022-02-06 RX ADMIN — TRAZODONE HYDROCHLORIDE 50 MG: 50 TABLET ORAL at 20:21

## 2022-02-06 RX ADMIN — BUPRENORPHINE AND NALOXONE 1 FILM: 8; 2 FILM BUCCAL; SUBLINGUAL at 20:21

## 2022-02-06 RX ADMIN — PRAZOSIN HYDROCHLORIDE 1 MG: 1 CAPSULE ORAL at 08:26

## 2022-02-06 RX ADMIN — DIAZEPAM 10 MG: 5 TABLET ORAL at 08:49

## 2022-02-06 RX ADMIN — THIAMINE HCL TAB 100 MG 100 MG: 100 TAB at 08:26

## 2022-02-06 RX ADMIN — IBUPROFEN 600 MG: 600 TABLET ORAL at 08:46

## 2022-02-06 RX ADMIN — HYDROXYZINE HYDROCHLORIDE 25 MG: 25 TABLET, FILM COATED ORAL at 20:21

## 2022-02-06 RX ADMIN — FOLIC ACID 1 MG: 1 TABLET ORAL at 08:26

## 2022-02-06 NOTE — PLAN OF CARE
Patient in bed at the beginning of shift breathing quiet and unlabored. Pt slept through the night. MASSA score of 5. Will continue to monitor.    Problem: Alcohol Withdrawal  Goal: Alcohol Withdrawal Symptom Control  Outcome: Improving

## 2022-02-06 NOTE — PLAN OF CARE
Problem: Alcohol Withdrawal  Goal: Alcohol Withdrawal Symptom Control  Outcome: No Change     Patient reported having anxiety and depression; declined PRN medication. He stated that his anxiety is due to discharging; vistaril 25mg was administered. He hopes to discharge tomorrow. He is hoping to get a Suboxone for a few days prior to his sublocade appointment.     MSSA=6 and 4. COWS=3 and 2.    Trazodone 50mg was administered for sleep.    We'll continue to monitor.

## 2022-02-06 NOTE — PROGRESS NOTES
Ivanar met with patient to see if he had made his Sublocade appointment through [facility] yet. He reports they are closed on the weekend and therefore he has been unable to make it yet. Will attempt tomorrow. Patient still in detox and presented as appearing ill during meeting w/ .

## 2022-02-06 NOTE — PLAN OF CARE
"Romeo is completely hopeless an depressed this evening (unchanged from yesterday). He also is still having withdrawal symptoms, aches/pains, tremors, sweating and elevted HR. He has racing thoughts. He says he wished he would have \" out there\" and is disappointed and confused that he didn't die because he \"took more than I ever have before\".   All he wants to do is go to bed.   Isolated/withdrawn  Med compliant.    "

## 2022-02-07 VITALS
RESPIRATION RATE: 16 BRPM | BODY MASS INDEX: 26.2 KG/M2 | HEIGHT: 70 IN | HEART RATE: 58 BPM | SYSTOLIC BLOOD PRESSURE: 147 MMHG | TEMPERATURE: 97.5 F | WEIGHT: 183 LBS | OXYGEN SATURATION: 100 % | DIASTOLIC BLOOD PRESSURE: 98 MMHG

## 2022-02-07 LAB
ATRIAL RATE - MUSE: 69 BPM
DIASTOLIC BLOOD PRESSURE - MUSE: NORMAL MMHG
INTERPRETATION ECG - MUSE: NORMAL
P AXIS - MUSE: 45 DEGREES
PR INTERVAL - MUSE: 168 MS
QRS DURATION - MUSE: 94 MS
QT - MUSE: 426 MS
QTC - MUSE: 456 MS
R AXIS - MUSE: 34 DEGREES
SYSTOLIC BLOOD PRESSURE - MUSE: NORMAL MMHG
T AXIS - MUSE: 44 DEGREES
VENTRICULAR RATE- MUSE: 69 BPM

## 2022-02-07 PROCEDURE — 99239 HOSP IP/OBS DSCHRG MGMT >30: CPT | Performed by: PSYCHIATRY & NEUROLOGY

## 2022-02-07 PROCEDURE — 250N000013 HC RX MED GY IP 250 OP 250 PS 637: Performed by: PSYCHIATRY & NEUROLOGY

## 2022-02-07 RX ORDER — LANOLIN ALCOHOL/MO/W.PET/CERES
100 CREAM (GRAM) TOPICAL DAILY
Qty: 30 TABLET | Refills: 0 | Status: SHIPPED | OUTPATIENT
Start: 2022-02-08 | End: 2022-04-25

## 2022-02-07 RX ORDER — FOLIC ACID 1 MG/1
1 TABLET ORAL DAILY
Qty: 30 TABLET | Refills: 0 | Status: SHIPPED | OUTPATIENT
Start: 2022-02-08 | End: 2022-04-25

## 2022-02-07 RX ORDER — HYDROXYZINE HYDROCHLORIDE 25 MG/1
25 TABLET, FILM COATED ORAL EVERY 4 HOURS PRN
Qty: 30 TABLET | Refills: 0 | Status: SHIPPED | OUTPATIENT
Start: 2022-02-07 | End: 2022-04-25

## 2022-02-07 RX ORDER — PROPRANOLOL HYDROCHLORIDE 10 MG/1
10 TABLET ORAL 3 TIMES DAILY PRN
Qty: 30 TABLET | Refills: 3 | Status: SHIPPED | OUTPATIENT
Start: 2022-02-07 | End: 2022-04-25

## 2022-02-07 RX ORDER — GABAPENTIN 100 MG/1
200 CAPSULE ORAL 3 TIMES DAILY PRN
Qty: 30 CAPSULE | Refills: 1 | Status: SHIPPED | OUTPATIENT
Start: 2022-02-07 | End: 2022-04-25

## 2022-02-07 RX ORDER — BUPRENORPHINE AND NALOXONE 8; 2 MG/1; MG/1
1 FILM, SOLUBLE BUCCAL; SUBLINGUAL 2 TIMES DAILY
Qty: 60 FILM | Refills: 0 | Status: SHIPPED | OUTPATIENT
Start: 2022-02-07 | End: 2022-03-09

## 2022-02-07 RX ORDER — ALBUTEROL SULFATE 90 UG/1
2 AEROSOL, METERED RESPIRATORY (INHALATION) EVERY 6 HOURS PRN
Qty: 8.5 G | Refills: 0 | Status: ON HOLD | OUTPATIENT
Start: 2022-02-07 | End: 2022-11-02

## 2022-02-07 RX ORDER — TRAZODONE HYDROCHLORIDE 50 MG/1
50 TABLET, FILM COATED ORAL
Qty: 60 TABLET | Refills: 1 | Status: SHIPPED | OUTPATIENT
Start: 2022-02-07 | End: 2022-04-25

## 2022-02-07 RX ORDER — BUPROPION HYDROCHLORIDE 150 MG/1
150 TABLET, EXTENDED RELEASE ORAL DAILY
Qty: 30 TABLET | Refills: 0 | Status: SHIPPED | OUTPATIENT
Start: 2022-02-07 | End: 2022-04-25 | Stop reason: CLARIF

## 2022-02-07 RX ORDER — PRAZOSIN HYDROCHLORIDE 1 MG/1
1 CAPSULE ORAL DAILY
Qty: 30 CAPSULE | Refills: 0 | Status: SHIPPED | OUTPATIENT
Start: 2022-02-07 | End: 2022-04-25

## 2022-02-07 RX ADMIN — IBUPROFEN 600 MG: 600 TABLET ORAL at 08:02

## 2022-02-07 RX ADMIN — PRAZOSIN HYDROCHLORIDE 1 MG: 1 CAPSULE ORAL at 07:44

## 2022-02-07 RX ADMIN — Medication 1 TABLET: at 07:44

## 2022-02-07 RX ADMIN — THIAMINE HCL TAB 100 MG 100 MG: 100 TAB at 07:44

## 2022-02-07 RX ADMIN — FOLIC ACID 1 MG: 1 TABLET ORAL at 07:44

## 2022-02-07 RX ADMIN — BUPRENORPHINE AND NALOXONE 1 FILM: 8; 2 FILM BUCCAL; SUBLINGUAL at 11:22

## 2022-02-07 RX ADMIN — BUPRENORPHINE AND NALOXONE 1 FILM: 8; 2 FILM BUCCAL; SUBLINGUAL at 07:44

## 2022-02-07 NOTE — PLAN OF CARE
Patient has experienced a largely positive day on [facility].  He reports absence of all suicidal ideation while on miller.  He is on track for an uneventful discharge this AM, with all personal effects in hand, follow up medical appointments made, and medications sent to patient's own personal off-site pharmacy.  Will continue to monitor as prudent.

## 2022-02-07 NOTE — DISCHARGE SUMMARY
Romeo Maldonado MRN# [id number]   Age: 31 year old YOB: 1990     Date of Admission:  2/2/2022  Date of Discharge:  2/7/2022  Admitting Physician:  Jhon Kramer MD  Discharge Physician:  Madhav Dick MD      DISCHARGE  DX    Alcohol use disorder severe    Methamphetamine use disorder severe    Opiate use disorder severe    History of Sublocade on 12/2021  GHB abuse    MDD MODERATE WITHOUT PSYCHOSIS       Event Leading to Hospitalization:     See Admission note by admitting provider for patient encounter. for additional details.          Hospital Course:   PATIENT was admitted to [facility] attending  under DR dick, please review the detailed admit note on 2/3/22   The patient was placed under status 15 (15 minute checks) to ensure patient safety.   MSSA protocol was initiated due to the patient's history of alcohol abuse and concern for withdrawal symptoms.  CBC, BMP and utox obtained.  Patient was started on Suboxone increased to 60 mg  Wellbutrin was started to help with depression at 150 mg  Minipress 1 mg added  All outpatient medications were continued    PATIENTdid participate in groups and was visible in the milieu.     The patient's symptoms of alcohol withdrawal improved.     Patients energy motivation , sleep appetite improved.  Pt completed detox . It was un eventful.      Discussed with patient medications for craving.  Spoke with patient about triggers coping skills relapse prevention.    CONSULTS DONE DURING PATIENTS HOSPITALIZATION.  Patient was seen by medicine on date2/3/22         Assessment and Recommendations:   Romeo Maldonado is a 31 year old year old man with a history of Hx IVDU, Polysubstance use d/o (EtOH, amphetamine, cocaine, opiates), anxiety, depression, asthma, Hepatitis C who is admitted to [facility] with EtOH intoxication and withdrawal.        Polysubstance use d/o (EtOH, amphetamine, cocaine, opiates)  EtOH intoxication and withdrawal  Hx  IVDU  Anxiety  Depression.   Tox screen: *+ amphetamine, + opiates, otherwise neg. EtOH breath 0.000.  - Management per psychiatry team.      Unwitnessed fall   Chest pain  Headache  Patient has not had prior assessment since admission for fall/injury and says chest pain feels as though he broke a rib and suspects striking his head from falling.   - CT non-con head  - CXR  - supportive mng: agree with ibuprofen prn and added APAP 500mg q6h prn     Chest pain, acute sharp  Given polysubstance use can not rule out cardiopulmonary causes, though they are less likely and patient appears to have generalized pain  - EKG  - troponin  - CXR as above     Asthma  CTAB on exam  - continue prn albuterol      Mild diastolic bp elevation  dbp occasionally marginally elevated to 91 otherwise normotensive. Occurs in the setting of above.  - no indication for new anti-HTN medication at this time  - monitor     Hepatitis C, treated  IVDU  Hx of normal RUQ US in 2021. Patient continues with IVDU and endorses potential repeat exposure to STIs/ IV infections. Patient also injecting into bilateral jugular veins in neck which are somewhat firm and without induration, erythema or fluctuance  - STI testing: HIV, HSV, RPR, HepC, HepB, GCC  - low threshold to obtain US venous doppler of neck if any new vertigo, neck pain/swelling/ mass or concerns for clot or infection      Mild hyperglycemia  No dx of DM  - monitor     Medicine will follow up on trop, STI testing, CT head, EKG, CXR, vitals, please page with any additional concerns.      Addendum:  CXR clear and no apparent fractures  ekg nsr with prominent precordial T-waves, no inversions or ST segment elevations  Trop flat         This as per their medical consult        Follow up of STI testing shows negative for: HIV, syphillis, HepB, HepC   HSV is pending at this time  Head CT is still pending      Patient is not Hep B immune and should be considered for  vaccination     Addendum:     Patient has so far refused head CT and does not have overt or new focal or concerning deficits     Defer head CT and will order if patient prefers to proceed     Medicine will sign off at this time, please call with quesitons or concerns    Pt was seen by cm  As per recommendations from cm    Writer met with patient to see if he had made his Sublocade appointment through [facility] yet. He reports they are closed on the weekend and therefore he has been unable to make it yet. Will attempt tomorrow. Patient still in detox and presented as appearing ill during meeting w/ writer.          Labs:reviewed with patient     No results found for this or any previous visit (from the past 48 hour(s)).      Recent Results (from the past 240 hour(s))   Comprehensive metabolic panel    Collection Time: 02/02/22 12:40 PM   Result Value Ref Range    Sodium 137 133 - 144 mmol/L    Potassium 3.6 3.4 - 5.3 mmol/L    Chloride 103 94 - 109 mmol/L    Carbon Dioxide (CO2) 28 20 - 32 mmol/L    Anion Gap 6 3 - 14 mmol/L    Urea Nitrogen 21 7 - 30 mg/dL    Creatinine 0.70 0.66 - 1.25 mg/dL    Calcium 9.0 8.5 - 10.1 mg/dL    Glucose 135 (H) 70 - 99 mg/dL    Alkaline Phosphatase 63 40 - 150 U/L    AST 29 0 - 45 U/L    ALT 31 0 - 70 U/L    Protein Total 7.6 6.8 - 8.8 g/dL    Albumin 4.0 3.4 - 5.0 g/dL    Bilirubin Total 0.8 0.2 - 1.3 mg/dL    GFR Estimate >90 >60 mL/min/1.73m2   Asymptomatic COVID-19 Virus (Coronavirus) by PCR Nasopharyngeal    Collection Time: 02/02/22 12:40 PM    Specimen: Nasopharyngeal; Swab   Result Value Ref Range    SARS CoV2 PCR Negative Negative   CBC with platelets and differential    Collection Time: 02/02/22 12:40 PM   Result Value Ref Range    WBC Count 8.0 4.0 - 11.0 10e3/uL    RBC Count 4.94 4.40 - 5.90 10e6/uL    Hemoglobin 13.9 13.3 - 17.7 g/dL    Hematocrit 40.7 40.0 - 53.0 %    MCV 82 78 - 100 fL    MCH 28.1 26.5 - 33.0 pg    MCHC 34.2 31.5 - 36.5 g/dL    RDW 11.9 10.0 - 15.0 %     Platelet Count 238 150 - 450 10e3/uL    % Neutrophils 66 %    % Lymphocytes 22 %    % Monocytes 12 %    % Eosinophils 0 %    % Basophils 0 %    % Immature Granulocytes 0 %    NRBCs per 100 WBC 0 <1 /100    Absolute Neutrophils 5.3 1.6 - 8.3 10e3/uL    Absolute Lymphocytes 1.7 0.8 - 5.3 10e3/uL    Absolute Monocytes 0.9 0.0 - 1.3 10e3/uL    Absolute Eosinophils 0.0 0.0 - 0.7 10e3/uL    Absolute Basophils 0.0 0.0 - 0.2 10e3/uL    Absolute Immature Granulocytes 0.0 <=0.4 10e3/uL    Absolute NRBCs 0.0 10e3/uL   Drug abuse screen 1 urine (ED)    Collection Time: 02/02/22 12:40 PM   Result Value Ref Range    Amphetamines Urine Screen Positive (A) Screen Negative    Barbiturates Urine Screen Negative Screen Negative    Benzodiazepines Urine Screen Negative Screen Negative    Cannabinoids Urine Screen Negative Screen Negative    Cocaine Urine Screen Negative Screen Negative    Opiates Urine Screen Positive (A) Screen Negative   Alcohol breath test POCT    Collection Time: 02/02/22  2:41 PM   Result Value Ref Range    Alcohol Breath Test 0.000 0.00 - 0.01   GGT    Collection Time: 02/02/22  9:41 PM   Result Value Ref Range    GGT 9 0 - 75 U/L   HIV Antigen Antibody Combo    Collection Time: 02/02/22  9:41 PM   Result Value Ref Range    HIV Antigen Antibody Combo Nonreactive Nonreactive   Hepatitis B core antibody    Collection Time: 02/02/22  9:41 PM   Result Value Ref Range    Hepatitis B Core Antibody Total Nonreactive Nonreactive   Hepatitis B surface antigen    Collection Time: 02/02/22  9:41 PM   Result Value Ref Range    Hepatitis B Surface Antigen Nonreactive Nonreactive   EKG 12-lead, tracing only    Collection Time: 02/03/22 11:38 AM   Result Value Ref Range    Systolic Blood Pressure  mmHg    Diastolic Blood Pressure  mmHg    Ventricular Rate 69 BPM    Atrial Rate 69 BPM    TX Interval 168 ms    QRS Duration 94 ms     ms    QTc 456 ms    P Axis 45 degrees    R AXIS 34 degrees    T Axis 44 degrees     Interpretation ECG       Sinus rhythm  Minimal voltage criteria for LVH, may be normal variant  Early repolarization  Borderline ECG  When compared with ECG of 04-JUL-2021 17:32,  No significant change was found  Confirmed by fellow Chaudhry, Mohsan ([id number]) on 2/4/2022 11:39:08 AM     HCV Antibody w/Reflex to HCV RNA    Collection Time: 02/03/22 12:00 PM   Result Value Ref Range    Hepatitis C Antibody Reactive (A) Nonreactive   Treponema Abs w Reflex to RPR and Titer    Collection Time: 02/03/22 12:00 PM   Result Value Ref Range    Treponema Antibody Total Nonreactive Nonreactive   Troponin I    Collection Time: 02/03/22 12:00 PM   Result Value Ref Range    Troponin I High Sensitivity 4 <79 ng/L   Herpes Simplex Virus 1&2 by PCR    Collection Time: 02/03/22 12:00 PM    Specimen: Hand, Right; Blood   Result Value Ref Range    Herpes Simplex Virus 1 DNA Negative Negative    Herpes Simplex Virus 2 DNA Negative Negative   Hepatitis C RNA, Quantitative by PCR    Collection Time: 02/03/22 12:00 PM   Result Value Ref Range    Hepatitis C RNA IU/mL Not Detected Not Detected IU/mL            Because this patient meets criteria for an Alcohol Use Disorder, I performed the following brief intervention on the date of this note:              1) Expressed concern that the patient is drinking at unhealthy levels known to increase their risk of alcohol related problems              2) Gave feedback linking alcohol use and health, including personalized feedback explaining how alcohol use can interact with their medical and/or psychiatric problems, and with prescribed medications.              3) Advised patient to abstain.    PT counseled on nicotine cessation and nicotine replacement provided    Discussed with patient many issues of addiction,triggers, relapse, and establishing a solid recovery program.    DISCHARGE MENTAL STATUS EXAMINATION:  The patient is alert, oriented x3.  Good fund of knowledge.  Good use of language.   Recent and remote memory, language, fund of knowledge are all adequate.  Euthymic mood congruent affect  Speech normal rate/rhythm linear tp no loose asso,The patient does not have any active suicidal or homicidal ideation.  Does not have any auditory or visual hallucination.  Fair insight/judgment At this time, the patient was stable to be discharged.        Pt was not determined to not be a danger to himself or others. At the current time of discharge, the patient does not meet criteria for involuntary hospitalization. On the day of discharge, the patient reports that they do not have suicidal or homicidal ideation and would never hurt themselves or others. Steps taken to minimize risk include: assessing patient s behavior and thought process daily during hospital stay, discharging patient with adequate plan for follow up for mental and physical health and discussing safety plan of returning to the hospital should the patient ever have thoughts of harming themselves or others. Therefore, based on all available evidence including the factors cited above, the patient does not appear to be at imminent risk for self-harm, and is appropriate for outpatient level of care.     Educated about side effects/risk vs benefits /alternative including non treatment.Pt consented to be on medication.     .Total time spent on discharge summary more than 35 min  More than  20 min  planning, coordination of care, medication reconciliation and performance of physical exam on day of discharge.Care was coordinated with unit RN and unit therapist         Review of your medicines      START taking      Dose / Directions   folic acid 1 MG tablet  Commonly known as: FOLVITE  Used for: Opioid overdose, accidental or unintentional, initial encounter (H)      Dose: 1 mg  Start taking on: February 8, 2022  Take 1 tablet (1 mg) by mouth daily  Quantity: 30 tablet  Refills: 0     gabapentin 100 MG capsule  Commonly known as: NEURONTIN  Used for:  Opioid overdose, accidental or unintentional, initial encounter (H)      Dose: 200 mg  Take 2 capsules (200 mg) by mouth 3 times daily as needed (anxiety)  Quantity: 30 capsule  Refills: 1     nicotine polacrilex 4 MG gum  Commonly known as: NICORETTE  Used for: Opioid overdose, accidental or unintentional, initial encounter (H)      Dose: 4 mg  Place 1 each (4 mg) inside cheek every hour as needed for other (nicotine withdrawal symptoms)  Quantity: 30 each  Refills: 0     thiamine 100 MG tablet  Commonly known as: B-1  Used for: Opioid overdose, accidental or unintentional, initial encounter (H)      Dose: 100 mg  Start taking on: February 8, 2022  Take 1 tablet (100 mg) by mouth daily  Quantity: 30 tablet  Refills: 0        CONTINUE these medicines which may have CHANGED, or have new prescriptions. If we are uncertain of the size of tablets/capsules you have at home, strength may be listed as something that might have changed.      Dose / Directions   buprenorphine HCl-naloxone HCl 8-2 MG per film  Commonly known as: SUBOXONE  This may have changed: when to take this  Used for: Opioid overdose, accidental or unintentional, initial encounter (H)      Dose: 1 Film  Place 1 Film under the tongue 2 times daily  Quantity: 60 Film  Refills: 0     hydrOXYzine 25 MG tablet  Commonly known as: ATARAX  This may have changed:     when to take this    reasons to take this  Used for: Opioid overdose, accidental or unintentional, initial encounter (H)      Dose: 25 mg  Take 1 tablet (25 mg) by mouth every 4 hours as needed for anxiety  Quantity: 30 tablet  Refills: 0        CONTINUE these medicines which have NOT CHANGED      Dose / Directions   albuterol 108 (90 Base) MCG/ACT inhaler  Commonly known as: PROAIR HFA/PROVENTIL HFA/VENTOLIN HFA  Used for: Hypoxia      Dose: 2 puff  Inhale 2 puffs into the lungs every 6 hours as needed  Quantity: 8.5 g  Refills: 0     buPROPion 150 MG 12 hr tablet  Commonly known as: WELLBUTRIN  SR  Used for: Current episode of major depressive disorder without prior episode, unspecified depression episode severity      Dose: 150 mg  Take 1 tablet (150 mg) by mouth daily  Quantity: 30 tablet  Refills: 0     prazosin 1 MG capsule  Commonly known as: MINIPRESS  Used for: Opioid overdose, accidental or unintentional, initial encounter (H)      Dose: 1 mg  Take 1 capsule (1 mg) by mouth daily  Quantity: 30 capsule  Refills: 0     propranolol 10 MG tablet  Commonly known as: INDERAL  Used for: Opioid overdose, accidental or unintentional, initial encounter (H)      Dose: 10 mg  Take 1 tablet (10 mg) by mouth 3 times daily as needed  Quantity: 30 tablet  Refills: 3     traZODone 50 MG tablet  Commonly known as: DESYREL  Used for: Primary insomnia      Dose: 50 mg  Take 1 tablet (50 mg) by mouth nightly as needed for sleep (may repeat after 60 minutes)  Quantity: 60 tablet  Refills: 1        STOP taking    ARIPiprazole 2 MG tablet  Commonly known as: ABILIFY        OLANZapine 5 MG tablet  Commonly known as: zyPREXA              Where to get your medicines      These medications were sent to [id number] - [place], [place] - [address] AT [facility] [address]  [address], [facility] [address]    Phone: [phone]     albuterol 108 (90 Base) MCG/ACT inhaler    buprenorphine HCl-naloxone HCl 8-2 MG per film    buPROPion 150 MG 12 hr tablet    folic acid 1 MG tablet    gabapentin 100 MG capsule    hydrOXYzine 25 MG tablet    nicotine polacrilex 4 MG gum    prazosin 1 MG capsule    propranolol 10 MG tablet    thiamine 100 MG tablet    traZODone 50 MG tablet          Disposition: HOME     Facts about COVID19 at www.cdc.gov/COVID19 and www.[place].gov/covid19     Keeping hands clean is one of the most important steps we can take to avoid getting sick and spreading germs to others.  Please wash your hands frequently and lather with soap for at least 20 seconds!     Medical  "Follow-Up: Primary care in 2/15/2021  Patient is going to get his Sublocade shot on 222     Treatment Follow-Up: [facility] 2/15/2021  .  Medical Provider Follow Up: Patient has scheduled appointments at [facility] for medical and Suboxone follow up     Out patient intake: Date & Time: Tuesday, February 15 2022 @ 2:00 PM     SUBLOCADE appointment: on Tuesday, February 22, 2022 @ 11:00 AM     Phone number: [phone]  Opt 1 for the Addiction Medicine Clinic  Opt 2 for the [facility]  Opt 3 for the Primary Care Clinic  Clinic Fax: [phone]  Treatment Center Fax: [phone]  Email: [email or web address]  [facility] [place]  [address] [address]        \"Much or all of the text in this note was generated through the use of Dragon Dictate voice to text software. Errors in spelling or words which appear to be out of contact are unintentional, may be present due having escaped editing\"     "

## 2022-02-07 NOTE — PROGRESS NOTES
met with patient on 2/7/22 to do a follow up from the weekend. Patient has an appointment with [facility] for OP on Feb. 15 th at 2:00 p.m. and an appointment for his SUBLOCADE injection on Feb. 22nd at 11:00 a.m.    AVS updated.

## 2022-02-07 NOTE — PLAN OF CARE
Patient in bed at the beginning of shift breathing quiet and unlabored. Pt slept through the night. No complain of pain noted or observed, and no medical concerns at this time. Will continue to monitor.      Problem: Alcohol Withdrawal  Goal: Alcohol Withdrawal Symptom Control  Outcome: Improving

## 2022-02-07 NOTE — DISCHARGE INSTRUCTIONS
Behavioral Discharge Planning and Instructions  THANK YOU FOR CHOOSING THE [facility]  [facility] [place]  Summary: You were admitted to [facility] [place] on 2/2/22 for detoxification from polysubstance abuse  Recommendation: Outpatient treatment     Main Diagnoses: Alcohol withdrawal severe; Methamphetamine withdrawal severe; Opiate withdrawal severe; History of Sublocade on 12/2021; GHB abuse    Major Treatments, Procedures and Findings: Your *** was treated with***.  You were followed by Psychiatry and Medical. You met with Case Management to complete a chemical health assessment and for discharge planning. You were given a copy of your lab results which were reviewed with you. Please bring your lab results with you to your primary follow up appointment. Make your recovery a priority.    Symptoms to Report: If  you experience feeling more anxiety, confusion, losing more sleep, mood declining or thoughts of suicide, notify your treatment team or notify your primary physician. IF ANY OF THE SYMPTOMS YOU ARE EXPERIENCING ARE A MEDICAL EMERGENCY CALL 911 IMMEDIATELY.    Lifestyle Adjustment: Adjust your lifestyle to get adequate sleep, relaxation, exercise and  good nutrition. Continue to develop healthy coping skills to  decrease stress and promote a sober living environment. No use of alcohol, illegal drugs or addictive medications other than what is currently prescribed. AA, NA, and  Sponsor are excellent resources for support.    Medical Provider Follow Up: Patient has scheduled appointments at [facility] for medical and Suboxone follow up    Out patient intake: Date & Time: Tuesday, February 15 2022 @ 2:00 PM    SUBLOCADE appointment: on Tuesday, February 22, 2022 @ 11:00 AM    Phone number: [phone]  Opt 1 for the Addiction Medicine Clinic  Opt 2 for the [facility]  Opt 3 for the Primary Care Clinic  Clinic Fax: [phone]  Treatment Center Fax: [phone]  Email:  [email or web address]  [facility]  [facility]  [place]  [address] [place] [address]    Support Group:  AA/NA and Sponsor contact/support  Resources:  Crisis Intervention: 561.626.3767 or 538-832-2766 (TTY: 832.198.6344).  Call anytime for help.  Suicide Awareness Voices of Education (SAVE) (www.save.org): 378-215-FPEC (5734)  National Suicide Prevention Line (www.mentalhealthmn.org): 869-002-EMQJ (8295)  National [place] on Mental Illness (www.[place].berto.org): 428.806.8787 or 683-341-2909.  Alcoholics Anonymous (www.alcoholics-anonymous.org): Or local information can be found 24 hours a day at:   AA Intergroup service office in [place] (http://www.aastpaul.org/) 355.156.8100  AA Intergroup service office in [facility]: 727.285.9698. (http://www.a[place].org/)  Narcotics Anonymous (www.nam[place].org)1-885.697.4424   [facility] Behavioral Intake 535-142-6346    [facility] ([facility])  [facility] connects people seeking recovery to resources that help foster and sustain long-term recovery.  Whether you are seeking resources for treatment, transportation, housing, job training, education, health care or other pathways to recovery, [facility] is a great place to start.  393.107.7882. Www.[facility].org    General Medication Instructions:  See your medication sheet(s) for instructions. Take all medicines as directed.  Make no changes unless your doctor directs them. Go to all your doctor visits. Be sure to have all your required lab tests. This way, your medicines can be refilled in timely fashion. Do not use any drugs not prescribed by your provider. AA/NA and Sponsors are excellent resources for support. Avoid alcohol.    Please Note: If you have any questions at anytime after you are discharged please call the [facility] detox unit 3AW unit at [phone].  [facility], Behavioral Intake [phone]  Please  take this discharge folder with you to all your follow up appointments, it contains your lab results, diagnosis, medication list and discharge recommendations.    THANK YOU FOR CHOOSING THE [facility], HEALTH                                   You are not immunized to Hepatitis B, please see your primary care provider to consider

## 2022-02-08 ENCOUNTER — PATIENT OUTREACH (OUTPATIENT)
Dept: CARE COORDINATION | Facility: CLINIC | Age: 32
End: 2022-02-08
Payer: COMMERCIAL

## 2022-02-08 DIAGNOSIS — Z71.89 OTHER SPECIFIED COUNSELING: ICD-10-CM

## 2022-02-08 NOTE — PROGRESS NOTES
Clinic Care Coordination Contact  [facility]/Voicemail       Clinical Data: Care Coordinator Outreach  Outreach attempted x 1.  Left message on patient's voicemail with call back information and requested return call.  Plan: CC JAKOB will attempt to reach patient again in 1-2 business days.    ERMELINDA Kang   Social Work Clinic Care Coordinator   [facility]  PH: [phone]  [email or web address]

## 2022-02-09 NOTE — PROGRESS NOTES
Clinic Care Coordination Contact  [facility]/Voicemail       Clinical Data: Care Coordinator Outreach  Outreach attempted x 2.  Left message on patient's voicemail with call back information and requested return call.  Plan: CC JAKOB will make no further outreaches at this time.    ERMELINDA Kang   Social Work Clinic Care Coordinator   [facility] [place]  PH: [phone]  [email or web address]

## 2022-02-15 ENCOUNTER — APPOINTMENT (OUTPATIENT)
Dept: CT IMAGING | Facility: CLINIC | Age: 32
End: 2022-02-15
Attending: EMERGENCY MEDICINE
Payer: COMMERCIAL

## 2022-02-15 ENCOUNTER — HOSPITAL ENCOUNTER (OUTPATIENT)
Facility: CLINIC | Age: 32
Setting detail: OBSERVATION
Discharge: NURSING, REHAB OR LONG-TERM CARE | End: 2022-02-18
Attending: EMERGENCY MEDICINE | Admitting: EMERGENCY MEDICINE
Payer: COMMERCIAL

## 2022-02-15 DIAGNOSIS — L03.221 CELLULITIS OF NECK: ICD-10-CM

## 2022-02-15 DIAGNOSIS — F19.10 POLYSUBSTANCE ABUSE (H): ICD-10-CM

## 2022-02-15 DIAGNOSIS — Z79.899 ON PRE-EXPOSURE PROPHYLAXIS FOR HIV: Primary | ICD-10-CM

## 2022-02-15 DIAGNOSIS — Z20.6 EXPOSURE TO HUMAN IMMUNODEFICIENCY VIRUS: ICD-10-CM

## 2022-02-15 DIAGNOSIS — L02.11 ABSCESS OF NECK: ICD-10-CM

## 2022-02-15 LAB
ALBUMIN SERPL-MCNC: 3.4 G/DL (ref 3.4–5)
ALP SERPL-CCNC: 59 U/L (ref 40–150)
ALT SERPL W P-5'-P-CCNC: 38 U/L (ref 0–70)
ANION GAP SERPL CALCULATED.3IONS-SCNC: 7 MMOL/L (ref 3–14)
AST SERPL W P-5'-P-CCNC: 15 U/L (ref 0–45)
BASOPHILS # BLD AUTO: 0.1 10E3/UL (ref 0–0.2)
BASOPHILS NFR BLD AUTO: 1 %
BILIRUB SERPL-MCNC: 0.2 MG/DL (ref 0.2–1.3)
BUN SERPL-MCNC: 16 MG/DL (ref 7–30)
CALCIUM SERPL-MCNC: 8.6 MG/DL (ref 8.5–10.1)
CHLORIDE BLD-SCNC: 102 MMOL/L (ref 94–109)
CO2 SERPL-SCNC: 29 MMOL/L (ref 20–32)
CREAT SERPL-MCNC: 0.66 MG/DL (ref 0.66–1.25)
CRP SERPL-MCNC: 12.6 MG/L (ref 0–8)
EOSINOPHIL # BLD AUTO: 0.1 10E3/UL (ref 0–0.7)
EOSINOPHIL NFR BLD AUTO: 1 %
ERYTHROCYTE [DISTWIDTH] IN BLOOD BY AUTOMATED COUNT: 12.3 % (ref 10–15)
ERYTHROCYTE [SEDIMENTATION RATE] IN BLOOD BY WESTERGREN METHOD: 13 MM/HR (ref 0–15)
GFR SERPL CREATININE-BSD FRML MDRD: >90 ML/MIN/1.73M2
GLUCOSE BLD-MCNC: 121 MG/DL (ref 70–99)
GLUCOSE BLDC GLUCOMTR-MCNC: 100 MG/DL (ref 70–99)
HCT VFR BLD AUTO: 42.7 % (ref 40–53)
HGB BLD-MCNC: 13.8 G/DL (ref 13.3–17.7)
HIV 1+2 AB+HIV1 P24 AG SERPL QL IA: NONREACTIVE
IMM GRANULOCYTES # BLD: 0 10E3/UL
IMM GRANULOCYTES NFR BLD: 0 %
LYMPHOCYTES # BLD AUTO: 3.3 10E3/UL (ref 0.8–5.3)
LYMPHOCYTES NFR BLD AUTO: 30 %
MCH RBC QN AUTO: 27.8 PG (ref 26.5–33)
MCHC RBC AUTO-ENTMCNC: 32.3 G/DL (ref 31.5–36.5)
MCV RBC AUTO: 86 FL (ref 78–100)
MONOCYTES # BLD AUTO: 1 10E3/UL (ref 0–1.3)
MONOCYTES NFR BLD AUTO: 9 %
NEUTROPHILS # BLD AUTO: 6.6 10E3/UL (ref 1.6–8.3)
NEUTROPHILS NFR BLD AUTO: 59 %
NRBC # BLD AUTO: 0 10E3/UL
NRBC BLD AUTO-RTO: 0 /100
PLATELET # BLD AUTO: 313 10E3/UL (ref 150–450)
POTASSIUM BLD-SCNC: 3.4 MMOL/L (ref 3.4–5.3)
PROT SERPL-MCNC: 7.3 G/DL (ref 6.8–8.8)
RBC # BLD AUTO: 4.97 10E6/UL (ref 4.4–5.9)
SARS-COV-2 RNA RESP QL NAA+PROBE: NEGATIVE
SODIUM SERPL-SCNC: 138 MMOL/L (ref 133–144)
WBC # BLD AUTO: 11 10E3/UL (ref 4–11)

## 2022-02-15 PROCEDURE — 258N000003 HC RX IP 258 OP 636: Performed by: INTERNAL MEDICINE

## 2022-02-15 PROCEDURE — 250N000011 HC RX IP 250 OP 636: Performed by: INTERNAL MEDICINE

## 2022-02-15 PROCEDURE — 82040 ASSAY OF SERUM ALBUMIN: CPT | Performed by: EMERGENCY MEDICINE

## 2022-02-15 PROCEDURE — 85025 COMPLETE CBC W/AUTO DIFF WBC: CPT | Performed by: EMERGENCY MEDICINE

## 2022-02-15 PROCEDURE — G0378 HOSPITAL OBSERVATION PER HR: HCPCS

## 2022-02-15 PROCEDURE — 87040 BLOOD CULTURE FOR BACTERIA: CPT | Performed by: EMERGENCY MEDICINE

## 2022-02-15 PROCEDURE — 87389 HIV-1 AG W/HIV-1&-2 AB AG IA: CPT | Performed by: EMERGENCY MEDICINE

## 2022-02-15 PROCEDURE — 250N000011 HC RX IP 250 OP 636: Performed by: EMERGENCY MEDICINE

## 2022-02-15 PROCEDURE — 36415 COLL VENOUS BLD VENIPUNCTURE: CPT | Performed by: EMERGENCY MEDICINE

## 2022-02-15 PROCEDURE — 99213 OFFICE O/P EST LOW 20 MIN: CPT | Performed by: PSYCHIATRY & NEUROLOGY

## 2022-02-15 PROCEDURE — 99220 PR INITIAL OBSERVATION CARE,LEVEL III: CPT | Performed by: INTERNAL MEDICINE

## 2022-02-15 PROCEDURE — 99285 EMERGENCY DEPT VISIT HI MDM: CPT | Mod: 25

## 2022-02-15 PROCEDURE — 85652 RBC SED RATE AUTOMATED: CPT | Performed by: EMERGENCY MEDICINE

## 2022-02-15 PROCEDURE — 250N000013 HC RX MED GY IP 250 OP 250 PS 637: Performed by: HOSPITALIST

## 2022-02-15 PROCEDURE — 250N000013 HC RX MED GY IP 250 OP 250 PS 637: Performed by: INTERNAL MEDICINE

## 2022-02-15 PROCEDURE — 250N000009 HC RX 250: Performed by: EMERGENCY MEDICINE

## 2022-02-15 PROCEDURE — 80053 COMPREHEN METABOLIC PANEL: CPT | Performed by: EMERGENCY MEDICINE

## 2022-02-15 PROCEDURE — 87635 SARS-COV-2 COVID-19 AMP PRB: CPT | Performed by: EMERGENCY MEDICINE

## 2022-02-15 PROCEDURE — 82962 GLUCOSE BLOOD TEST: CPT

## 2022-02-15 PROCEDURE — 70491 CT SOFT TISSUE NECK W/DYE: CPT

## 2022-02-15 PROCEDURE — 96365 THER/PROPH/DIAG IV INF INIT: CPT | Mod: 59

## 2022-02-15 PROCEDURE — 96376 TX/PRO/DX INJ SAME DRUG ADON: CPT | Mod: XU

## 2022-02-15 PROCEDURE — 86140 C-REACTIVE PROTEIN: CPT | Performed by: EMERGENCY MEDICINE

## 2022-02-15 PROCEDURE — 87070 CULTURE OTHR SPECIMN AEROBIC: CPT | Performed by: EMERGENCY MEDICINE

## 2022-02-15 PROCEDURE — 99226 PR SUBSEQUENT OBSERVATION CARE,LEVEL III: CPT | Performed by: HOSPITALIST

## 2022-02-15 PROCEDURE — C9803 HOPD COVID-19 SPEC COLLECT: HCPCS

## 2022-02-15 RX ORDER — AMOXICILLIN 250 MG
2 CAPSULE ORAL 2 TIMES DAILY PRN
Status: DISCONTINUED | OUTPATIENT
Start: 2022-02-15 | End: 2022-02-18 | Stop reason: HOSPADM

## 2022-02-15 RX ORDER — TRAZODONE HYDROCHLORIDE 50 MG/1
50 TABLET, FILM COATED ORAL
Status: DISCONTINUED | OUTPATIENT
Start: 2022-02-15 | End: 2022-02-18 | Stop reason: HOSPADM

## 2022-02-15 RX ORDER — BUPRENORPHINE AND NALOXONE 8; 2 MG/1; MG/1
1 FILM, SOLUBLE BUCCAL; SUBLINGUAL 2 TIMES DAILY
Status: DISCONTINUED | OUTPATIENT
Start: 2022-02-15 | End: 2022-02-18 | Stop reason: HOSPADM

## 2022-02-15 RX ORDER — IOPAMIDOL 755 MG/ML
80 INJECTION, SOLUTION INTRAVASCULAR ONCE
Status: COMPLETED | OUTPATIENT
Start: 2022-02-15 | End: 2022-02-15

## 2022-02-15 RX ORDER — BUPROPION HYDROCHLORIDE 150 MG/1
150 TABLET, EXTENDED RELEASE ORAL DAILY
Status: DISCONTINUED | OUTPATIENT
Start: 2022-02-15 | End: 2022-02-18 | Stop reason: HOSPADM

## 2022-02-15 RX ORDER — LIDOCAINE 40 MG/G
CREAM TOPICAL
Status: DISCONTINUED | OUTPATIENT
Start: 2022-02-15 | End: 2022-02-18 | Stop reason: HOSPADM

## 2022-02-15 RX ORDER — GABAPENTIN 100 MG/1
200 CAPSULE ORAL 3 TIMES DAILY PRN
Status: DISCONTINUED | OUTPATIENT
Start: 2022-02-15 | End: 2022-02-18 | Stop reason: HOSPADM

## 2022-02-15 RX ORDER — ONDANSETRON 2 MG/ML
4 INJECTION INTRAMUSCULAR; INTRAVENOUS EVERY 6 HOURS PRN
Status: DISCONTINUED | OUTPATIENT
Start: 2022-02-15 | End: 2022-02-18 | Stop reason: HOSPADM

## 2022-02-15 RX ORDER — PROCHLORPERAZINE MALEATE 10 MG
10 TABLET ORAL EVERY 6 HOURS PRN
Status: DISCONTINUED | OUTPATIENT
Start: 2022-02-15 | End: 2022-02-18 | Stop reason: HOSPADM

## 2022-02-15 RX ORDER — DESVENLAFAXINE 50 MG/1
50 TABLET, FILM COATED, EXTENDED RELEASE ORAL DAILY
COMMUNITY
End: 2022-04-25

## 2022-02-15 RX ORDER — ONDANSETRON 4 MG/1
4 TABLET, ORALLY DISINTEGRATING ORAL EVERY 6 HOURS PRN
Status: DISCONTINUED | OUTPATIENT
Start: 2022-02-15 | End: 2022-02-18 | Stop reason: HOSPADM

## 2022-02-15 RX ORDER — LANOLIN ALCOHOL/MO/W.PET/CERES
3 CREAM (GRAM) TOPICAL
Status: DISCONTINUED | OUTPATIENT
Start: 2022-02-15 | End: 2022-02-18 | Stop reason: HOSPADM

## 2022-02-15 RX ORDER — AMOXICILLIN 250 MG
1 CAPSULE ORAL 2 TIMES DAILY PRN
Status: DISCONTINUED | OUTPATIENT
Start: 2022-02-15 | End: 2022-02-18 | Stop reason: HOSPADM

## 2022-02-15 RX ORDER — PRAZOSIN HYDROCHLORIDE 1 MG/1
1 CAPSULE ORAL DAILY
Status: DISCONTINUED | OUTPATIENT
Start: 2022-02-15 | End: 2022-02-18 | Stop reason: HOSPADM

## 2022-02-15 RX ORDER — PIPERACILLIN SODIUM, TAZOBACTAM SODIUM 4; .5 G/20ML; G/20ML
4.5 INJECTION, POWDER, LYOPHILIZED, FOR SOLUTION INTRAVENOUS ONCE
Status: COMPLETED | OUTPATIENT
Start: 2022-02-15 | End: 2022-02-15

## 2022-02-15 RX ORDER — MULTIPLE VITAMINS W/ MINERALS TAB 9MG-400MCG
1 TAB ORAL DAILY
COMMUNITY
End: 2022-04-25

## 2022-02-15 RX ORDER — PROCHLORPERAZINE 25 MG
25 SUPPOSITORY, RECTAL RECTAL EVERY 12 HOURS PRN
Status: DISCONTINUED | OUTPATIENT
Start: 2022-02-15 | End: 2022-02-18 | Stop reason: HOSPADM

## 2022-02-15 RX ORDER — EMTRICITABINE AND TENOFOVIR DISOPROXIL FUMARATE 200; 300 MG/1; MG/1
1 TABLET, FILM COATED ORAL DAILY
Status: DISCONTINUED | OUTPATIENT
Start: 2022-02-15 | End: 2022-02-18 | Stop reason: HOSPADM

## 2022-02-15 RX ORDER — SODIUM CHLORIDE, SODIUM LACTATE, POTASSIUM CHLORIDE, CALCIUM CHLORIDE 600; 310; 30; 20 MG/100ML; MG/100ML; MG/100ML; MG/100ML
INJECTION, SOLUTION INTRAVENOUS CONTINUOUS
Status: DISCONTINUED | OUTPATIENT
Start: 2022-02-15 | End: 2022-02-17

## 2022-02-15 RX ORDER — HYDROXYZINE HYDROCHLORIDE 25 MG/1
25 TABLET, FILM COATED ORAL EVERY 4 HOURS PRN
Status: DISCONTINUED | OUTPATIENT
Start: 2022-02-15 | End: 2022-02-18 | Stop reason: HOSPADM

## 2022-02-15 RX ORDER — PIPERACILLIN SODIUM, TAZOBACTAM SODIUM 3; .375 G/15ML; G/15ML
3.38 INJECTION, POWDER, LYOPHILIZED, FOR SOLUTION INTRAVENOUS EVERY 6 HOURS
Status: DISCONTINUED | OUTPATIENT
Start: 2022-02-15 | End: 2022-02-18

## 2022-02-15 RX ORDER — ACETAMINOPHEN 325 MG/1
325-650 TABLET ORAL EVERY 4 HOURS PRN
Status: DISCONTINUED | OUTPATIENT
Start: 2022-02-15 | End: 2022-02-18 | Stop reason: HOSPADM

## 2022-02-15 RX ADMIN — PIPERACILLIN SODIUM AND TAZOBACTAM SODIUM 4.5 G: 4; .5 INJECTION, POWDER, LYOPHILIZED, FOR SOLUTION INTRAVENOUS at 03:38

## 2022-02-15 RX ADMIN — SODIUM CHLORIDE, POTASSIUM CHLORIDE, SODIUM LACTATE AND CALCIUM CHLORIDE 2000 ML: 600; 310; 30; 20 INJECTION, SOLUTION INTRAVENOUS at 05:19

## 2022-02-15 RX ADMIN — EMTRICITABINE AND TENOFOVIR DISOPROXIL FUMARATE 1 TABLET: 200; 300 TABLET, FILM COATED ORAL at 09:47

## 2022-02-15 RX ADMIN — PIPERACILLIN SODIUM AND TAZOBACTAM SODIUM 3.38 G: 3; .375 INJECTION, POWDER, LYOPHILIZED, FOR SOLUTION INTRAVENOUS at 16:53

## 2022-02-15 RX ADMIN — PIPERACILLIN SODIUM AND TAZOBACTAM SODIUM 3.38 G: 3; .375 INJECTION, POWDER, LYOPHILIZED, FOR SOLUTION INTRAVENOUS at 21:19

## 2022-02-15 RX ADMIN — BUPROPION HYDROCHLORIDE 150 MG: 150 TABLET, EXTENDED RELEASE ORAL at 09:47

## 2022-02-15 RX ADMIN — PIPERACILLIN SODIUM AND TAZOBACTAM SODIUM 3.38 G: 3; .375 INJECTION, POWDER, LYOPHILIZED, FOR SOLUTION INTRAVENOUS at 09:47

## 2022-02-15 RX ADMIN — ACETAMINOPHEN 650 MG: 325 TABLET, FILM COATED ORAL at 23:48

## 2022-02-15 RX ADMIN — ACETAMINOPHEN 650 MG: 325 TABLET, FILM COATED ORAL at 11:23

## 2022-02-15 RX ADMIN — DOLUTEGRAVIR SODIUM 50 MG: 50 TABLET, FILM COATED ORAL at 09:47

## 2022-02-15 RX ADMIN — SODIUM CHLORIDE, POTASSIUM CHLORIDE, SODIUM LACTATE AND CALCIUM CHLORIDE: 600; 310; 30; 20 INJECTION, SOLUTION INTRAVENOUS at 23:41

## 2022-02-15 RX ADMIN — IOPAMIDOL 80 ML: 755 INJECTION, SOLUTION INTRAVENOUS at 02:09

## 2022-02-15 RX ADMIN — BUPRENORPHINE AND NALOXONE 1 FILM: 8; 2 FILM, SOLUBLE BUCCAL; SUBLINGUAL at 09:45

## 2022-02-15 RX ADMIN — BUPRENORPHINE AND NALOXONE 1 FILM: 8; 2 FILM, SOLUBLE BUCCAL; SUBLINGUAL at 21:19

## 2022-02-15 RX ADMIN — PRAZOSIN HYDROCHLORIDE 1 MG: 1 CAPSULE ORAL at 09:47

## 2022-02-15 RX ADMIN — SODIUM CHLORIDE 60 ML: 9 INJECTION, SOLUTION INTRAVENOUS at 02:09

## 2022-02-15 ASSESSMENT — MIFFLIN-ST. JEOR: SCORE: 1767.72

## 2022-02-15 ASSESSMENT — ENCOUNTER SYMPTOMS
FEVER: 0
WOUND: 1
CHILLS: 1

## 2022-02-15 NOTE — CONSULTS
Care Management Initial Consult    General Information  Assessment completed with: Patient,    Type of CM/SW Visit: Initial Assessment    Primary Care Provider verified and updated as needed:  (Does not have PCP)   Readmission within the last 30 days: previous discharge plan unsuccessful   Return Category: Exacerbation of disease  Reason for Consult: discharge planning  Advance Care Planning:            Communication Assessment  Patient's communication style: spoken language (English or Bilingual)    Hearing Difficulty or Deaf: no   Wear Glasses or Blind: no    Cognitive  Cognitive/Neuro/Behavioral: WDL  Level of Consciousness: somnolent                   Living Environment:   People in home: alone (pt reports his dad is staying with him for now.)     Current living Arrangements: apartment      Able to return to prior arrangements: yes       Family/Social Support:  Care provided by: self  Provides care for: no one  Marital Status: Single  Parent(s)          Description of Support System: Supportive,Involved         Current Resources:   Patient receiving home care services:       Community Resources:    Equipment currently used at home:    Supplies currently used at home:      Employment/Financial:  Employment Status: employed full-time        Financial Concerns:             Lifestyle & Psychosocial Needs:  Social Determinants of Health     Tobacco Use: High Risk     Smoking Tobacco Use: Current Some Day Smoker     Smokeless Tobacco Use: Never Used   Alcohol Use: Not on file   Financial Resource Strain: Not on file   Food Insecurity: Not on file   Transportation Needs: Not on file   Physical Activity: Not on file   Stress: Not on file   Social Connections: Not on file   Intimate Partner Violence: Not on file   Depression: Not on file   Housing Stability: Not on file       Functional Status:  Prior to admission patient needed assistance:              Mental Health Status:  Mental Health Status: Current Concern  Mental  Health Management: Medication,Psychiatrist    Chemical Dependency Status:  Chemical Dependency Status: Current Concern  Chemical Dependency Management: Other (see comment) (plans in process for in patient treatment)          Values/Beliefs:  Spiritual, Cultural Beliefs, Congregation Practices, Values that affect care:                 Additional Information:  Writer met with patient at bedside, introduced self and role in discharge planning.  Patient has already been in touch with [facility] for his Chem Dep.  He also stated he has a phone assessment with Chris today that he and his mother are going to do together, however his mom is not here with his phone yet.  Patient gave writer permission to phone his mom to gather more info on when the assessment will take place.  During this conversation, patient c/o feeling very weak and tired and stated he had to get help.  Patients phone number and address were confirmed with him.    CC will continue to follow for safe discharge plan to in patient detox and chem dep rehab.    Cherise Bradshaw RN

## 2022-02-15 NOTE — PROGRESS NOTES
RECEIVING UNIT ED HANDOFF REVIEW    ED Nurse Handoff Report was reviewed by: Gregory Castillo RN on February 15, 2022 at 4:36 AM

## 2022-02-15 NOTE — ED NOTES
[facility]  ED Nurse Handoff Report    ED Chief complaint: Wound Infection      ED Diagnosis:   Final diagnoses:   Cellulitis of neck   Abscess of neck   Polysubstance abuse (H)       Code Status: as per hospitalist    Allergies:   Allergies   Allergen Reactions     Ambien [Zolpidem] Shortness Of Breath     wheezing     Pollen Extract Unknown       Patient Story:  Yesterday (Sunday) patient was using meth via an IV. He says that he did not use his own syringe, so he used his friend's, supposedly clean, narcan syringe at his house. The person's house that he was using the syringe at is HIV positive and Romeo does not know if the needle he used was contaminated or not. He tried to hit a vein in his neck, but believes he missed. Today Romeo presents to the ED over concern of a right neck wound. hx. Of substance abuse and overdose.     Focused Assessment:  Ambulatory, somnolent but oriented x3. Skin dry, warm, color wnl. Right sided neck abscess like wound, red, warmth and painful to touch. Reports chills but no fever, concerned that he might be going into withdrawals.   Results for orders placed or performed during the hospital encounter of 02/15/22   CT Soft Tissue Neck w Contrast     Status: None    Narrative    EXAM: CT SOFT TISSUE NECK W CONTRAST  LOCATION: [facility]  DATE/TIME: 2/15/2022 2:23 AM    INDICATION: Neck abscess, deep tissue  COMPARISON: None.  CONTRAST: 80  ml Isovue 370  TECHNIQUE: Routine CT Soft Tissue Neck with IV contrast. Multiplanar reformats. Dose reduction techniques were used.    FINDINGS:   Right anterior lateral sternocleidomastoid muscle at the level of the thyroid gland demonstrate a rim-enhancing fluid collection measuring 12 mm concerning for abscess. The fluid collection is approximately 5 mm from the skin surface. This is likely   drainable. Right sternocleidomastoid muscle is enlarge concerning for myositis. There is surrounding fat  stranding consistent with cellulitis.    MUCOSAL SPACES/SOFT TISSUES: Normal mucosal spaces of the upper aerodigestive tract. No mucosal mass or inflammation identified. Normal vocal cords and infraglottic trachea. Normal parapharyngeal space and subcutaneous soft tissues. Normal oral cavity,    spaces, and floor of mouth structures.    LYMPH NODES: Multiple small and prominent lymph nodes within the neck. No pathologic lymph nodes by size or morphology criteria.     SALIVARY GLANDS: Normal parotid and submandibular glands.    THYROID: Normal.     VESSELS: Vascular structures of the neck are patent. 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    VISUALIZED INTRACRANIAL/ORBITS/SINUSES: No abnormality of the visualized intracranial compartment or orbits. Visualized paranasal sinuses and mastoid air cells are clear.    OTHER: Mild degenerative changes spine. No destructive osseous lesion. The included lung apices are clear. Infrahyoid small thyroglossal duct cyst measuring 2 mm. Multiple cavities.      Impression    IMPRESSION:   1.  Right anterior lateral sternocleidomastoid muscle at the level of the thyroid gland demonstrate a rim-enhancing fluid collection measuring 12 mm concerning for abscess. The fluid collection is approximately 5 mm from the skin surface. This is likely   drainable.   2.  Right sternocleidomastoid muscle is enlarge concerning for myositis.   3.  There is surrounding fat stranding consistent with cellulitis.   Comprehensive metabolic panel     Status: Abnormal   Result Value Ref Range    Sodium 138 133 - 144 mmol/L    Potassium 3.4 3.4 - 5.3 mmol/L    Chloride 102 94 - 109 mmol/L    Carbon Dioxide (CO2) 29 20 - 32 mmol/L    Anion Gap 7 3 - 14 mmol/L    Urea Nitrogen 16 7 - 30 mg/dL    Creatinine 0.66 0.66 - 1.25 mg/dL    Calcium 8.6 8.5 - 10.1 mg/dL    Glucose 121 (H) 70 - 99 mg/dL    Alkaline Phosphatase 59 40 - 150 U/L    AST 15 0 - 45 U/L    ALT 38 0 -  70 U/L    Protein Total 7.3 6.8 - 8.8 g/dL    Albumin 3.4 3.4 - 5.0 g/dL    Bilirubin Total 0.2 0.2 - 1.3 mg/dL    GFR Estimate >90 >60 mL/min/1.73m2   CRP inflammation     Status: Abnormal   Result Value Ref Range    CRP Inflammation 12.6 (H) 0.0 - 8.0 mg/L   Erythrocyte sedimentation rate auto     Status: Normal   Result Value Ref Range    Erythrocyte Sedimentation Rate 13 0 - 15 mm/hr   CBC with platelets and differential     Status: None   Result Value Ref Range    WBC Count 11.0 4.0 - 11.0 10e3/uL    RBC Count 4.97 4.40 - 5.90 10e6/uL    Hemoglobin 13.8 13.3 - 17.7 g/dL    Hematocrit 42.7 40.0 - 53.0 %    MCV 86 78 - 100 fL    MCH 27.8 26.5 - 33.0 pg    MCHC 32.3 31.5 - 36.5 g/dL    RDW 12.3 10.0 - 15.0 %    Platelet Count 313 150 - 450 10e3/uL    % Neutrophils 59 %    % Lymphocytes 30 %    % Monocytes 9 %    % Eosinophils 1 %    % Basophils 1 %    % Immature Granulocytes 0 %    NRBCs per 100 WBC 0 <1 /100    Absolute Neutrophils 6.6 1.6 - 8.3 10e3/uL    Absolute Lymphocytes 3.3 0.8 - 5.3 10e3/uL    Absolute Monocytes 1.0 0.0 - 1.3 10e3/uL    Absolute Eosinophils 0.1 0.0 - 0.7 10e3/uL    Absolute Basophils 0.1 0.0 - 0.2 10e3/uL    Absolute Immature Granulocytes 0.0 <=0.4 10e3/uL    Absolute NRBCs 0.0 10e3/uL   CBC with platelets differential     Status: None    Narrative    The following orders were created for panel order CBC with platelets differential.  Procedure                               Abnormality         Status                     ---------                               -----------         ------                     CBC with platelets and d...[[id number]]                      Final result                 Please view results for these tests on the individual orders.       Treatments and/or interventions provided: I&D to the wound, IV abx.   Patient's response to treatments and/or interventions: VSS, comfortably resting in stretcher.     To be done/followed up on inpatient unit:  continue with POC.  " consult? Pt is pending to go to a detox/rehab facility.     Does this patient have any cognitive concerns?: Alcohol/Drugs temporary cognitive concerns    Activity level - Baseline/Home:  Independent  Activity Level - Current:   Independent    Patient's Preferred language: English   Needed?: No    Isolation: None  Infection: Not Applicable  Patient tested for COVID 19 prior to admission: YES  Bariatric?: No    Vital Signs:   Vitals:    02/15/22 0031   BP: 134/89   Pulse: 78   Resp: 18   Temp: 97.8  F (36.6  C)   TempSrc: Temporal   SpO2: 98%   Weight: 81.6 kg (180 lb)   Height: 1.778 m (5' 10\")       Cardiac Rhythm:     Was the PSS-3 completed:   Yes  What interventions are required if any?      Family Comments: pt by himself in ED  OBS brochure/video discussed/provided to patient/family: N/A             For the majority of the shift this patient's behavior was Green.   Behavioral interventions performed were n/a.    ED NURSE PHONE NUMBER: [phone]       "

## 2022-02-15 NOTE — H&P
[facility]    History and Physical - Hospitalist Service       Date of Admission:  2/15/2022    Assessment & Plan      Romeo Maldonado is a 31 year old male admitted on 2/15/2022. He presents to the emergency department with right neck abscess associated with injection drug use as feeling generally unwell for the past 2 days with cold sweats, some mild abdominal discomfort.    Injection site abscess: Patient with approximately 1.5 cm superficial abscess over right sternocleidomastoid at site of missed injection with methamphetamine.  Reports feeling increasingly unwell over the past 2 days.  -Blood culture x2 pending  -Psychiatry consulted for anxiety and depression, heroin dependence  -Care management consult to assist with facilitating transfer to inpatient chemical dependency treatment; patient believes that his bed will be available in the next 1 to 2 days based on discussions with intake nurse  -Approximately 0.5 mL purulent/sanguinous aspirate obtained from abscess in the emergency department.  Sent for culture  -Monitor for fevers, rigors.  Currently patient's diaphoresis and feeling generally unwell could be secondary to systemic illness associated with abscess or opiate withdrawal as below.  Admitting to observation for monitoring currently  -For now, continue IV Zosyn every 6 hours; no history of MRSA.  Cultures pending as above.  If clinically improving, could switch to Augmentin at discharge unless other culture data available.  -CRP obtained in the emergency department; can trend if difficult to ascertain clinical improvement    Methamphetamine use disorder:  GHB use disorder: Typically utilizes to come down from methamphetamine  Benzodiazepine use disorder: Reports he will take Xanax 2 mg once every few days to help with anxiety.  Does not use daily, several days since last use  Opiate dependence with mild opiate withdrawal: Patient reports diaphoresis, some mild abdominal  discomfort, generally feeling unwell with cold sweats over the past 2 days, worse today.  Last IV heroin and methamphetamine use 2/13/2022.  Could be early sepsis associated with injection site abscess versus opiate withdrawal.  Patient reports feeling slightly better with Suboxone dose administered by his mother yesterday  -Continue prior to admission Suboxone 8-2 twice daily  -Psychiatry consulted    High risk HIV exposure: 48 hours ago patient used a needle that he found at an HIV-positive acquaintance's home for IV drug use.  He is uncertain if this was a clean needle.  Believes his friend has an undetectable viral load.  -Initiating postexposure prophylaxis with emtricitabine tenofovir and dolutegravir daily.  Discussed regimen with patient on admission, and he is agreeable; tells me that he will be able to remain on this medication as he plans to discharge directly to inpatient chemical dependency  -Patient was tested and negative for HIV as of February 2.  Not repeating baseline testing at this juncture.  Follow-up testing can be performed following postexposure prophylaxis  -Discussed with patient contacting his friend regarding possible blood draw or recent laboratory studies regarding viral load as may be able to discontinue postexposure prophylaxis if undetectable viral load.    Anxiety with depression:  Insomnia:  -Trazodone 50 mg at bedtime as needed for sleep  -Gabapentin 200 mg 3 times daily as needed for anxiety  -As needed hydroxyzine every 4 hours  -Prazosin 1 mg daily  -Wellbutrin  mg daily  -Psychiatry consult as above    History of hepatitis C status post treatment: Recent hep C RNA undetectable 2/2/2022       Diet:   Regular adult diet as tolerated  DVT Prophylaxis: Ambulate every shift  Marie Catheter: Not present  Central Lines: None  Cardiac Monitoring: None  Code Status:  Full code.  Confirmed with patient on admission    Clinically Significant Risk Factors Present on Admission         "         # Overweight: Estimated body mass index is 25.83 kg/m  as calculated from the following:    Height as of this encounter: 1.778 m (5' 10\").    Weight as of this encounter: 81.6 kg (180 lb).      Disposition Plan   Expected Discharge:  potentially 2/16/2022 pending improvement in injection site abscess and no evidence of systemic illness.  Anticipated discharge location:  Awaiting care coordination huddle  Delays:           The patient's care was discussed with the Patient and Dr. Martinez in the emergency department.    Don Miller MD  Hospitalist Service  [facility]  Securely message with the Vocera Web Console (learn more here)  Text page via [employer] Paging/Directory         ______________________________________________________________________    Chief Complaint   Cold sweats, right neck swelling    History obtained from patient, chart review, discussion with Dr. Martinez in the emergency department    History of Present Illness   Romeo Maldonado is a 31 year old male who presents to the emergency department for evaluation of right neck swelling and diaphoresis.     Patient has a history of polysubstance abuse and dependence; underlying anxiety and depression.  Recently hospitalized at [facility] for substance detox.  Started on Suboxone, Wellbutrin, and Minipress at that time.  Hospitalization from February 2 until February 7.  During this hospitalization, patient was being referred to inpatient chemical dependency treatment versus a bridge to outpatient therapy.  He was prescribed Suboxone at discharge.  He tells me that he took 1 dose of this, though subsequently back to using heroin and methamphetamines IV; after his detox during hospitalization for 4 days, tells me that he quickly ramped up his substance use after discharge despite discharge to mother's house.    On Sunday, patient was at a friend's house who provides him methamphetamine and GHB to counteract the anxiety " associated with methamphetamine.  He tells me that he used heroin, and subsequently methamphetamine via injection into right IJ.  He has had difficulty getting access on his arms, and has been injecting into his neck now for at least several weeks.  He tells me he feels that he is dehydrated, potentially secondary to his ongoing methamphetamine abuse, and this has resulted in him being unable to find IV access on his arms.  Patient tells me that he did not have a needle, so he used a needle that he found at his friend's house.  He tells me that this was a naloxone needle.  He is uncertain if this needle was used before.  Patient also tells me that his friend is HIV positive, though believes friends viral load is undetectable.  This said, patient also tells me that he was not in a great state of mind at the time of using the needle, so cannot be entirely sure as to exact details surrounding his needle use.  Patient also tells me that this friend is not entirely reliable in terms of contact when we are discussing postexposure prophylaxis and obtaining details regarding friends viral load; patient tells me he has been trying to call his friend and has been unable to reach him recently.  He believes he used a large bore naloxone needle which was out for emergency use.    Patient, in his attempt to inject methamphetamine, missed the vein and had some swelling in his neck.  Subsequently with increased pain and swelling in this area over right sternocleidomastoid.  On Monday he felt generally unwell.  He describes having some sweats as well as some chills along his spine.  No rigors.  No fevers.  His mother gave him a Suboxone sublingual film, and he felt somewhat better.  Today, he continued to feel unwell, and presented to the emergency department for evaluation.  One of the reasons he presented to the emergency department is that he is due to go to inpatient chemical dependency treatment in the coming days as a bed  "becomes available, and he wanted to be sure that he was well enough to go to treatment.  He believes a bed will be available today or tomorrow based on conversations with intake nurse he and his mother have had.    In the ER, CT neck confirms a small abscess over SCM.  A small amount of purulence/blood product was drained in the emergency department and sent for culture.  Blood cultures were also obtained.  Discussed with patient.  No fevers, though feels unwell.  He has some mild diffuse abdominal discomfort.  His symptoms improved with Suboxone dosing.  He may have opiate withdrawal rather than systemic illness currently.  Somewhat difficult to tell.  He is diaphoretic and warm, though afebrile with no leukocytosis.  When discussing opiate withdrawal with patient, he agrees that this might be a possibility as he had been using heroin daily.    Patient tells me that he will occasionally use benzodiazepines for his anxiety.  He tells me that he bought 100 tablets of 2 mg Xanax, though he believes that this is not true prescription Xanax, rather than pressed pills.  He tells me that he takes 1 of these every few days to help with anxiety.  Does not use daily, has not had seizures or a history of benzodiazepine withdrawal.    Patient describes anxiety associated with possible HIV exposure.  He discussed this with his parents as well.  Parents are aware of his drug use issues.  Father has flown in, he is staying with his mother.  They are aware of his plan for inpatient chemical dependency treatment.  He tells me that his boss is also aware.  He works in an administrative role in finance, and has recognized that his job performance has been deteriorating.  He wants to become sober because he wants to continue to be \"a functional member of society.\"    Patient is very forthcoming regarding details of his recent use.    Discussed postexposure prophylaxis as patient is within 72 hours of possible HIV exposure.  Patient " amenable to initiating treatment.  Discussed potential side effects as well as hoped to obtain information regarding friends HIV status.  It may not be known if needle was previously used or not.  Again, patient has had some difficulty contacting this friend already.  If patient's viral load is undetectable and there is not thought to be a possibility that someone else may have also used this needle, could potentially discontinue postexposure prophylaxis.  This was also discussed with patient on admission.     Review of Systems    The 10 point Review of Systems is negative other than noted in the HPI or here.  No fevers  No shaking chills, though has had some cold sweats  Diaphoresis  Occasional headaches which patient reports are intermittent and chronic.  Not a new finding    Past Medical History    I have reviewed this patient's medical history and updated it with pertinent information if needed.   Past Medical History:   Diagnosis Date     Allergic state      Anxiety      Depressive disorder      Hepatitis C      Substance abuse (H)     meth use     Uncomplicated asthma        Past Surgical History   I have reviewed this patient's surgical history and updated it with pertinent information if needed.  Past Surgical History:   Procedure Laterality Date     INCISION AND DRAINAGE SOFT TISSUE UPPER EXTREMITY, COMBINED  8/11/2012    Procedure: COMBINED INCISION AND DRAINAGE SOFT TISSUE UPPER EXTREMITY;  Incision and drainage Right Arm Abscess;  Surgeon: Cheli White MD;  Location:  OR       Social History   I have reviewed this patient's social history and updated it with pertinent information if needed.  Social History     Tobacco Use     Smoking status:  Former smoker     Packs/day:  Previously using approximately 1/4 pack/day by prior charting.  Tells me that he no longer uses tobacco     Smokeless tobacco: Never Used   Substance Use Topics     Alcohol use: Yes     Comment: occasional.     Drug use: Yes      Types: Methamphetamines, Opiates, IV     Comment: heroin daily, meth a couple of times in the last 2 weks; last use of methamphetamine and heroin on February 13       Family History   I have reviewed this patient's family history and updated it with pertinent information if needed.  Family History   Problem Relation Age of Onset     Hypertension Father        Prior to Admission Medications   Prior to Admission Medications   Prescriptions Last Dose Informant Patient Reported? Taking?   albuterol (PROAIR HFA/PROVENTIL HFA/VENTOLIN HFA) 108 (90 Base) MCG/ACT inhaler   No No   Sig: Inhale 2 puffs into the lungs every 6 hours as needed   buPROPion (WELLBUTRIN SR) 150 MG 12 hr tablet   No No   Sig: Take 1 tablet (150 mg) by mouth daily   buprenorphine HCl-naloxone HCl (SUBOXONE) 8-2 MG per film   No No   Sig: Place 1 Film under the tongue 2 times daily   folic acid (FOLVITE) 1 MG tablet   No No   Sig: Take 1 tablet (1 mg) by mouth daily   gabapentin (NEURONTIN) 100 MG capsule   No No   Sig: Take 2 capsules (200 mg) by mouth 3 times daily as needed (anxiety)   hydrOXYzine (ATARAX) 25 MG tablet   No No   Sig: Take 1 tablet (25 mg) by mouth every 4 hours as needed for anxiety   naloxone (NARCAN) 4 MG/0.1ML nasal spray   No No   Sig: Spray 1 spray (4 mg) into one nostril alternating nostrils once as needed every 2-3 minutes until assistance arrives   nicotine (NICORETTE) 4 MG gum   No No   Sig: Place 1 each (4 mg) inside cheek every hour as needed for other (nicotine withdrawal symptoms)   prazosin (MINIPRESS) 1 MG capsule   No No   Sig: Take 1 capsule (1 mg) by mouth daily   propranolol (INDERAL) 10 MG tablet   No No   Sig: Take 1 tablet (10 mg) by mouth 3 times daily as needed   thiamine (B-1) 100 MG tablet   No No   Sig: Take 1 tablet (100 mg) by mouth daily   traZODone (DESYREL) 50 MG tablet   No No   Sig: Take 1 tablet (50 mg) by mouth nightly as needed for sleep (may repeat after 60 minutes)      Facility-Administered  Medications: None     Allergies   Allergies   Allergen Reactions     Ambien [Zolpidem] Shortness Of Breath     wheezing     Pollen Extract Unknown       Physical Exam   Vital Signs: Temp: 97.8  F (36.6  C) Temp src: Temporal BP: (!) 144/86 Pulse: 62   Resp: 10 SpO2: 100 % O2 Device: None (Room air)    Weight: 180 lbs 0 oz    General Appearance: Diaphoretic and slightly unwell appearing 31-year-old male.  He is not in acute distress  Eyes: No scleral icterus.  Mild scleral injection bilaterally  HEENT: Patient with swelling over right sternocleidomastoid, discrete, approximately 1.5 inches diameter at site of abscess.  No overt surrounding cellulitis, though patient is slightly flushed overall.  Dentition is good.  Moist oral mucosa.  Respiratory: Breath sounds are pristine bilaterally to auscultation.  No wheezes or crackles.  Cardiovascular: Regular rate and rhythm, no murmur.  Heart rate in the 70 range.  GI: Abdomen soft, diffusely with mild tenderness to palpation.  Normal active bowel sounds.  No palpable mass  Lymph/Hematologic: No lower extremity edema  Genitourinary: Not examined  Skin: No clear cellulitis over abscess, though skin is flushed and diaphoretic.  No jaundice.  Scars from prior abscess drainages on bilateral AC  Musculoskeletal: Muscular tone and bulk intact in all extremities.  Appropriate for age  Neurologic: Alert, conversant, appropriate conversation.  Mental status is grossly intact.  No tremor  Psychiatric: Very pleasant, normal affect    Data   Data reviewed today: I reviewed all medications, new labs and imaging results over the last 24 hours. I personally reviewed the CT neck image(s) showing Small abscess over right SCM adjacent to jugular.    Recent Labs   Lab 02/15/22  0145   WBC 11.0   HGB 13.8   MCV 86         POTASSIUM 3.4   CHLORIDE 102   CO2 29   BUN 16   CR 0.66   ANIONGAP 7   NILA 8.6   *   ALBUMIN 3.4   PROTTOTAL 7.3   BILITOTAL 0.2   ALKPHOS 59   ALT 38    AST 15

## 2022-02-15 NOTE — PLAN OF CARE
AxOx4. VSS on Rm Air, BP trending high at times. C/o muscle weakness/aches and soreness to R neck area. PRN Tylenol given w/ little effect. Pt appears diaphoretic and c/o chills; remains afebrile. Cultures pending for R neck and blood cultures. Continues w/ IV abx. Appetite good. Bed alarm on d/t weakness, stand-by asst. Spoke w/ nurse at [facility] pending medical clearance and discharge to inpatient chemical dependency program.

## 2022-02-15 NOTE — ED PROVIDER NOTES
History   Chief Complaint:  Wound Infection       The history is provided by the patient.      Romeo Maldonado is a 31 year old male with history of chemical dependency and overdose who presents with wound infection. Yesterday Romeo was using methamphetamine via an IV route. He says that he did not use his own syringe, so he used his friend's, supposedly clean, narcan syringe at his house. The person's house that he was using the syringe at is HIV positive and Romeo does not know if the needle he used was contaminated or not. He tried to hit a vein in his neck, but believes he missed. He says that he did not inject more than once. Today Romeo presents to the ED over concern of a right neck wound and chills. He says that he has been having these chills all day. No fever.            Review of Systems   Constitutional: Positive for chills. Negative for fever.   Skin: Positive for wound.   All other systems reviewed and are negative.      Allergies:  Ambien [Zolpidem]  Pollen Extract    Medications:  Albuterol    Buprenorphine HCl-naloxone HCl  Bupropion    Folic acid   Gabapentin   Hydroxyzine    Naloxone   Nicotine   Prazosin   Propranolol   Thiamine   Trazodone      Past Medical History:     Allergic state  Anxiety  Depressive disorder  Hepatitis C  Substance abuse  Uncomplicated asthma  Overdose drug  Cellulitis and abscess of upper arm and forearm  Opiate addiction  Chemical dependency  Drug dependence  Heroin dependence  Psychosis  Opioid withdrawal  Fever  Insomnia  Suicidal ideation  Alcoholic hepatitis without ascites  Opiate overdose, accidental or intentional  Hyperthermia  Acute renal failure, unspecified acute renal failure type  Polysubstance abuse  Uncomplicated alcohol dependence    Past Surgical History:    Incision and drainage soft tissue upper extremity, combined    Family History:    Hypertension    Social History:  Patient unaccompanied  PCP: Jean Pierre Simon     Physical Exam     Patient  "Vitals for the past 24 hrs:   BP Temp Temp src Pulse Resp SpO2 Height Weight   02/15/22 0422 (!) 144/86 -- -- 62 10 100 % -- --   02/15/22 0031 134/89 97.8  F (36.6  C) Temporal 78 18 98 % 1.778 m (5' 10\") 81.6 kg (180 lb)       Physical Exam  GENERAL: well developed, pleasant  HEAD: atraumatic  EYES: pupils reactive, extraocular muscles intact, conjunctivae normal  ENT:  mucus membranes moist  NECK:  trachea midline, normal range of motion. Tenderness and swelling to the right neck is noted in the photo  RESPIRATORY: no tachypnea, breath sounds clear to auscultation   CVS: normal S1/S2, no murmurs, intact distal pulses  ABDOMEN: soft, nontender, nondistention  MUSCULOSKELETAL: no deformities  SKIN: warm and dry, no acute rashes or ulceration  NEURO: GCS 15, cranial nerves intact, alert and oriented x3  PSYCH:  Mood/affect normal     Emergency Department Course     Imaging:  CT Soft Tissue Neck w Contrast   Final Result   IMPRESSION:    1.  Right anterior lateral sternocleidomastoid muscle at the level of the thyroid gland demonstrate a rim-enhancing fluid collection measuring 12 mm concerning for abscess. The fluid collection is approximately 5 mm from the skin surface. This is likely    drainable.    2.  Right sternocleidomastoid muscle is enlarge concerning for myositis.    3.  There is surrounding fat stranding consistent with cellulitis.        Report per radiology    Laboratory:  Labs Ordered and Resulted from Time of ED Arrival to Time of ED Departure   COMPREHENSIVE METABOLIC PANEL - Abnormal       Result Value    Sodium 138      Potassium 3.4      Chloride 102      Carbon Dioxide (CO2) 29      Anion Gap 7      Urea Nitrogen 16      Creatinine 0.66      Calcium 8.6      Glucose 121 (*)     Alkaline Phosphatase 59      AST 15      ALT 38      Protein Total 7.3      Albumin 3.4      Bilirubin Total 0.2      GFR Estimate >90     CRP INFLAMMATION - Abnormal    CRP Inflammation 12.6 (*)    ERYTHROCYTE " SEDIMENTATION RATE AUTO - Normal    Erythrocyte Sedimentation Rate 13     CBC WITH PLATELETS AND DIFFERENTIAL    WBC Count 11.0      RBC Count 4.97      Hemoglobin 13.8      Hematocrit 42.7      MCV 86      MCH 27.8      MCHC 32.3      RDW 12.3      Platelet Count 313      % Neutrophils 59      % Lymphocytes 30      % Monocytes 9      % Eosinophils 1      % Basophils 1      % Immature Granulocytes 0      NRBCs per 100 WBC 0      Absolute Neutrophils 6.6      Absolute Lymphocytes 3.3      Absolute Monocytes 1.0      Absolute Eosinophils 0.1      Absolute Basophils 0.1      Absolute Immature Granulocytes 0.0      Absolute NRBCs 0.0     HIV ANTIGEN ANTIBODY COMBO   COVID-19 VIRUS (CORONAVIRUS) BY PCR   BLOOD CULTURE   BLOOD CULTURE   AEROBIC BACTERIAL CULTURE ROUTINE        Procedures  None    Emergency Department Course:         Reviewed:  I reviewed nursing notes, vitals, past medical history and Care Everywhere    Assessments/Consults:  ED Course as of 02/15/22 0426   Tue Feb 15, 2022   0138 Obtained history and examined the patient as noted above.   0321 Rechecked the patient.   0323 Consulted with Dr. Miller, Hospitalist regarding the patient's history and presentation here in the emergency department.   0328 Drainage      Interventions:  0338 Zosyn 4.5 g vial to attach to  mL, IV    Disposition:  The patient was admitted to the hospital under the care of Dr. Miller.     Impression & Plan     CMS Diagnoses: None    Medical Decision Making:    Patient presents with not feeling well, right neck pain and injection with a Narcan needle at a HIV-positive person's house yesterday.  He has pain and tenderness on the right anterior neck.  He admits that he thinks he likely missed.  CT shows findings as above.  The skin was prepped with Betadine 18-gauge needle was inserted with little bit of bloody material.  Patient had blood cultures obtained.  Was given IV antibiotics.  Spoke with the hospitalist regarding admission  for observation.  HIV testing and treatment was discussed with Dr. Miller and the patient.      Diagnosis:    ICD-10-CM    1. Cellulitis of neck  L03.221    2. Abscess of neck  L02.11    3. Polysubstance abuse (H)  F19.10        Scribe Disclosure:  Qasim WILLIAM, am serving as a scribe at 1:28 AM on 2/15/2022 to document services personally performed by Óscar Martinez MD based on my observations and the provider's statements to me.           Óscar Martinez MD  02/15/22 0442

## 2022-02-15 NOTE — PHARMACY-ADMISSION MEDICATION HISTORY
Pharmacy Medication History  Admission medication history interview status for the 2/15/2022  admission is complete. See EPIC admission navigator for prior to admission medications     Location of Interview: Patient room  Medication history sources: Patient, Surescripts and Care Everywhere    Significant changes made to the medication list:  Added multivitamin, desvenlafaxine    In the past week, patient estimated taking medication this percent of the time: less than 50% due to other (multiple new medications prescribed 2/7/22, not yet started any except suboxone)     Additional medication history information:   Patient is a reliable historian. Most items on PTA med list were newly prescribed; patient states his mother picked up these prescriptions but the only medication he took PTA was one dose of suboxone film.   Desvenlafaxine added to PTA med list, no chart notes indicating this was discontinued, filled within last 90 days (11/16/21 #90ds). Patient states not taking desvenlafaxine, prazosin, or propranolol since 12/2021.   Sublocade shows fill in  1/4/22 #30ds/100 mg; patient states last dose was the first week 12/2021. Patient unsure if he will continue on monthly injections, but from interview it does not appear he received 1/2022 dose.     Medication reconciliation completed by provider prior to medication history? Yes    Time spent in this activity: 20 minutes    Prior to Admission medications    Medication Sig Last Dose Taking? Auth Provider   albuterol (PROAIR HFA/PROVENTIL HFA/VENTOLIN HFA) 108 (90 Base) MCG/ACT inhaler Inhale 2 puffs into the lungs every 6 hours as needed More than a month at Unknown time Yes Madhav Dick MD   buprenorphine HCl-naloxone HCl (SUBOXONE) 8-2 MG per film Place 1 Film under the tongue 2 times daily 2/14/2022 at PM Yes Madhav Dick MD   buPROPion (WELLBUTRIN SR) 150 MG 12 hr tablet Take 1 tablet (150 mg) by mouth daily  at new Rx, not yet started Yes  Madhav Dick MD   desvenlafaxine (PRISTIQ) 50 MG 24 hr tablet Take 50 mg by mouth daily More than a month at Unknown time Yes Unknown, Entered By History   folic acid (FOLVITE) 1 MG tablet Take 1 tablet (1 mg) by mouth daily  at new Rx, not yet started Yes Madhav Dick MD   gabapentin (NEURONTIN) 100 MG capsule Take 2 capsules (200 mg) by mouth 3 times daily as needed (anxiety)  at new Rx, not yet started Yes Madhav Dick MD   hydrOXYzine (ATARAX) 25 MG tablet Take 1 tablet (25 mg) by mouth every 4 hours as needed for anxiety  at new Rx, not yet started Yes Madhav Dick MD   multivitamin w/minerals (THERA-VIT-M) tablet Take 1 tablet by mouth daily Past Month at Unknown time Yes Unknown, Entered By History   naloxone (NARCAN) 4 MG/0.1ML nasal spray Spray 1 spray (4 mg) into one nostril alternating nostrils once as needed every 2-3 minutes until assistance arrives  at [facility] home supply, never used Yes Madhav Dick MD   nicotine (NICORETTE) 4 MG gum Place 1 each (4 mg) inside cheek every hour as needed for other (nicotine withdrawal symptoms)  at new Rx, not yet started Yes Madhav Dick MD   prazosin (MINIPRESS) 1 MG capsule Take 1 capsule (1 mg) by mouth daily More than a month at Unknown time Yes Madhav Dick MD   propranolol (INDERAL) 10 MG tablet Take 1 tablet (10 mg) by mouth 3 times daily as needed More than a month at Unknown time Yes Madhav Dick MD   thiamine (B-1) 100 MG tablet Take 1 tablet (100 mg) by mouth daily  at new Rx, not yet started Yes Madhav Dick MD   traZODone (DESYREL) 50 MG tablet Take 1 tablet (50 mg) by mouth nightly as needed for sleep (may repeat after 60 minutes)  at new Rx, not yet started Yes Madhav Dick MD       The information provided in this note is only as accurate as the sources available at the time of update(s)   Daniela Pineda PharmD

## 2022-02-15 NOTE — CONSULTS
Patient seen this AM, dictation to follow    Plan:  --Patient can discharge once medically cleared. He does not meet criteria for inpatient psychiatry admission  --Patient planning on continuing in detox facility and then plans to attend residential treatment at [facility]

## 2022-02-15 NOTE — PROGRESS NOTES
Pt came up from ED around 0500. A&Ox4, SBA. VSS on RA except elevated /108; was rechecked after half hour /81. C/o being cold and sore neck initially but subsided after an hour or so. Pt appears comfortable in bed.

## 2022-02-15 NOTE — ED TRIAGE NOTES
Shot meth into R neck Saturday with large bore narcan syringe. Pt states he now has pressure in his head.

## 2022-02-16 LAB
ANION GAP SERPL CALCULATED.3IONS-SCNC: 3 MMOL/L (ref 3–14)
BASOPHILS # BLD AUTO: 0 10E3/UL (ref 0–0.2)
BASOPHILS NFR BLD AUTO: 0 %
BUN SERPL-MCNC: 7 MG/DL (ref 7–30)
CALCIUM SERPL-MCNC: 7.8 MG/DL (ref 8.5–10.1)
CHLORIDE BLD-SCNC: 105 MMOL/L (ref 94–109)
CO2 SERPL-SCNC: 25 MMOL/L (ref 20–32)
CREAT SERPL-MCNC: 0.66 MG/DL (ref 0.66–1.25)
CRP SERPL-MCNC: 43.6 MG/L (ref 0–8)
CRP SERPL-MCNC: 59.3 MG/L (ref 0–8)
EOSINOPHIL # BLD AUTO: 0.1 10E3/UL (ref 0–0.7)
EOSINOPHIL NFR BLD AUTO: 2 %
ERYTHROCYTE [DISTWIDTH] IN BLOOD BY AUTOMATED COUNT: 12.4 % (ref 10–15)
ERYTHROCYTE [DISTWIDTH] IN BLOOD BY AUTOMATED COUNT: 12.5 % (ref 10–15)
GFR SERPL CREATININE-BSD FRML MDRD: >90 ML/MIN/1.73M2
GLUCOSE BLD-MCNC: 102 MG/DL (ref 70–99)
HCT VFR BLD AUTO: 38.1 % (ref 40–53)
HCT VFR BLD AUTO: 40.3 % (ref 40–53)
HGB BLD-MCNC: 12.7 G/DL (ref 13.3–17.7)
HGB BLD-MCNC: 13.1 G/DL (ref 13.3–17.7)
IMM GRANULOCYTES # BLD: 0 10E3/UL
IMM GRANULOCYTES NFR BLD: 0 %
LYMPHOCYTES # BLD AUTO: 1.5 10E3/UL (ref 0.8–5.3)
LYMPHOCYTES NFR BLD AUTO: 23 %
MCH RBC QN AUTO: 27.6 PG (ref 26.5–33)
MCH RBC QN AUTO: 28 PG (ref 26.5–33)
MCHC RBC AUTO-ENTMCNC: 32.5 G/DL (ref 31.5–36.5)
MCHC RBC AUTO-ENTMCNC: 33.3 G/DL (ref 31.5–36.5)
MCV RBC AUTO: 84 FL (ref 78–100)
MCV RBC AUTO: 85 FL (ref 78–100)
MONOCYTES # BLD AUTO: 0.6 10E3/UL (ref 0–1.3)
MONOCYTES NFR BLD AUTO: 9 %
MRSA DNA SPEC QL NAA+PROBE: NEGATIVE
NEUTROPHILS # BLD AUTO: 4.3 10E3/UL (ref 1.6–8.3)
NEUTROPHILS NFR BLD AUTO: 66 %
NRBC # BLD AUTO: 0 10E3/UL
NRBC BLD AUTO-RTO: 0 /100
PLATELET # BLD AUTO: 220 10E3/UL (ref 150–450)
PLATELET # BLD AUTO: 243 10E3/UL (ref 150–450)
POTASSIUM BLD-SCNC: 3.4 MMOL/L (ref 3.4–5.3)
RBC # BLD AUTO: 4.53 10E6/UL (ref 4.4–5.9)
RBC # BLD AUTO: 4.74 10E6/UL (ref 4.4–5.9)
SA TARGET DNA: NEGATIVE
SODIUM SERPL-SCNC: 133 MMOL/L (ref 133–144)
WBC # BLD AUTO: 6.5 10E3/UL (ref 4–11)
WBC # BLD AUTO: 6.6 10E3/UL (ref 4–11)

## 2022-02-16 PROCEDURE — G0378 HOSPITAL OBSERVATION PER HR: HCPCS

## 2022-02-16 PROCEDURE — 86140 C-REACTIVE PROTEIN: CPT | Mod: 91 | Performed by: OTOLARYNGOLOGY

## 2022-02-16 PROCEDURE — 87641 MR-STAPH DNA AMP PROBE: CPT | Performed by: INTERNAL MEDICINE

## 2022-02-16 PROCEDURE — 87340 HEPATITIS B SURFACE AG IA: CPT | Performed by: INTERNAL MEDICINE

## 2022-02-16 PROCEDURE — 36415 COLL VENOUS BLD VENIPUNCTURE: CPT | Performed by: HOSPITALIST

## 2022-02-16 PROCEDURE — 96376 TX/PRO/DX INJ SAME DRUG ADON: CPT

## 2022-02-16 PROCEDURE — 86803 HEPATITIS C AB TEST: CPT | Performed by: INTERNAL MEDICINE

## 2022-02-16 PROCEDURE — 99226 PR SUBSEQUENT OBSERVATION CARE,LEVEL III: CPT | Performed by: HOSPITALIST

## 2022-02-16 PROCEDURE — 250N000013 HC RX MED GY IP 250 OP 250 PS 637: Performed by: INTERNAL MEDICINE

## 2022-02-16 PROCEDURE — 250N000013 HC RX MED GY IP 250 OP 250 PS 637: Performed by: HOSPITALIST

## 2022-02-16 PROCEDURE — 36415 COLL VENOUS BLD VENIPUNCTURE: CPT | Performed by: OTOLARYNGOLOGY

## 2022-02-16 PROCEDURE — 96361 HYDRATE IV INFUSION ADD-ON: CPT

## 2022-02-16 PROCEDURE — 250N000011 HC RX IP 250 OP 636: Performed by: INTERNAL MEDICINE

## 2022-02-16 PROCEDURE — 82310 ASSAY OF CALCIUM: CPT | Performed by: HOSPITALIST

## 2022-02-16 PROCEDURE — 86706 HEP B SURFACE ANTIBODY: CPT | Performed by: INTERNAL MEDICINE

## 2022-02-16 PROCEDURE — 86140 C-REACTIVE PROTEIN: CPT | Performed by: HOSPITALIST

## 2022-02-16 PROCEDURE — 85027 COMPLETE CBC AUTOMATED: CPT | Mod: 91 | Performed by: OTOLARYNGOLOGY

## 2022-02-16 PROCEDURE — 85027 COMPLETE CBC AUTOMATED: CPT | Performed by: HOSPITALIST

## 2022-02-16 PROCEDURE — 258N000003 HC RX IP 258 OP 636: Performed by: INTERNAL MEDICINE

## 2022-02-16 RX ADMIN — BUPRENORPHINE AND NALOXONE 1 FILM: 8; 2 FILM, SOLUBLE BUCCAL; SUBLINGUAL at 10:16

## 2022-02-16 RX ADMIN — PRAZOSIN HYDROCHLORIDE 1 MG: 1 CAPSULE ORAL at 10:15

## 2022-02-16 RX ADMIN — ACETAMINOPHEN 650 MG: 325 TABLET, FILM COATED ORAL at 18:51

## 2022-02-16 RX ADMIN — EMTRICITABINE AND TENOFOVIR DISOPROXIL FUMARATE 1 TABLET: 200; 300 TABLET, FILM COATED ORAL at 10:14

## 2022-02-16 RX ADMIN — DOLUTEGRAVIR SODIUM 50 MG: 50 TABLET, FILM COATED ORAL at 10:14

## 2022-02-16 RX ADMIN — PIPERACILLIN SODIUM AND TAZOBACTAM SODIUM 3.38 G: 3; .375 INJECTION, POWDER, LYOPHILIZED, FOR SOLUTION INTRAVENOUS at 16:12

## 2022-02-16 RX ADMIN — BUPROPION HYDROCHLORIDE 150 MG: 150 TABLET, EXTENDED RELEASE ORAL at 10:14

## 2022-02-16 RX ADMIN — PIPERACILLIN SODIUM AND TAZOBACTAM SODIUM 3.38 G: 3; .375 INJECTION, POWDER, LYOPHILIZED, FOR SOLUTION INTRAVENOUS at 10:02

## 2022-02-16 RX ADMIN — PIPERACILLIN SODIUM AND TAZOBACTAM SODIUM 3.38 G: 3; .375 INJECTION, POWDER, LYOPHILIZED, FOR SOLUTION INTRAVENOUS at 22:26

## 2022-02-16 RX ADMIN — PIPERACILLIN SODIUM AND TAZOBACTAM SODIUM 3.38 G: 3; .375 INJECTION, POWDER, LYOPHILIZED, FOR SOLUTION INTRAVENOUS at 04:47

## 2022-02-16 RX ADMIN — SODIUM CHLORIDE, POTASSIUM CHLORIDE, SODIUM LACTATE AND CALCIUM CHLORIDE: 600; 310; 30; 20 INJECTION, SOLUTION INTRAVENOUS at 23:05

## 2022-02-16 RX ADMIN — BUPRENORPHINE AND NALOXONE 1 FILM: 8; 2 FILM, SOLUBLE BUCCAL; SUBLINGUAL at 22:04

## 2022-02-16 NOTE — CONSULTS
Consult Date: 02/15/2022    PSYCHIATRY CONSULTATION  REASON FOR REFERRAL:  Polysubstance abuse, anxiety, depression.  The patient on Suboxone.    REFERRING PROVIDER:  Hospitalist service    HISTORY OF PRESENT ILLNESS:  Mr. 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  He initially had come into the Emergency Department with right neck abscess associated with injection drug use and generally feeling unwell.  Notably, the patient has been restarted on Suboxone for opioid use disorder.  Psychiatry is asked to assess in the above setting.    The patient does confirm that he was back at his mother's hoping to get back into a treatment setting.  While they were trying to organize this, he started to feel generally unwell and was having cold sweats and neck discomfort.  He also was having abdominal discomfort, having withdrawal symptoms.  He did restart Suboxone, which he was prescribed at a recent presentation to [facility] a few weeks ago.  The patient was also on a number of medications that he was discharged on from [place], although did not continue any of those other than recently had started the Suboxone again.  Among his medications, he feels like the Wellbutrin is the only 1 that seems to have some benefit in terms of lifting his depression to some extent.    The patient does report a history of depression which he describes as mild in intensity and in context of his drug use and relapses.  He gets very demoralized about this and, at times, has hopelessness and that he does not want to live like this anymore in terms of continued addiction; however, he adamantly denies that he is having suicidal intent or planning.  He does feel safe and he could utilize crisis resources if necessary in the future.  He denies any history of bipolar or racheal  symptomatology other than that he is using cocaine.  He has had some psychotic symptoms only when he was using cocaine.  Historically, he does have a history of ADHD that was diagnosed when he was younger.  He was treated, but did not like how the stimulants caused lack of appetite or in general how he felt.  No history of PTSD, eating disorder, OCD.  He does describe some anxiety as well, a tendency to be a worrier and have generalized anxiety and a tendency to ruminate.    PAST PSYCHIATRIC HISTORY:  No prior psychiatric admissions or prior suicidal behavior.  He has been on numerous medication trials per his recollection including several SSRIs as well as buspirone.  He is not feeling them particularly effective in general from those interventions.  He did try Wellbutrin as well, which he thinks is the most helpful in terms of lifting his depression to some extent and lifting his energy level.  He uses Xanax up to 2 mg, which he purchases off the street up to once every few days or so as a way to combat the elevation he gets from cocaine.    For chemical treatments, he has been in treatment a number of times per his report.  He was in [facility] most recently.  He had a period of sobriety up to about 4 years at a time.  Most recent sobriety was about 8 months after treatment.  He was in the process of getting into a detox program in [place] and then the plan was to go to [facility] afterwards and he actually had [facility] evaluation scheduled for today before feeling medically ill and coming to the hospital.    MEDICAL HISTORY:  Reviewed from the EMR.    SOCIAL HISTORY:  The patient resides in an apartment in [place].  He also has been staying with his mother recently.  He has a 4-year-old daughter who lives with her mother.  The patient had to move out after they broke up due to a combination of relationship problems and his drug use in 2021.  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  He did do some college and graduated high school.    FAMILY HISTORY:  Reviewed from the EMR.  PRIOR TO ADMISSION MEDICATIONS:  Albuterol, bupropion 150 daily, buprenorphine, naloxone 8 and 2 times a day, folic acid, gabapentin, hydroxyzine, duloxetine, nicotine, prazosin, propranolol, thiamine, trazodone.  PHYSICAL EXAMINATION:  VITAL SIGNS:  Temperature 97.8, blood pressure 144/86, pulse 60, respiratory rate 10, SpO2 100% on room air.    MENTAL STATUS EXAMINATION:  He is dressed in a hospital gown, lying down in bed.  He is looking uncomfortable, generally feeling unwell.  He is slightly disheveled appearing.  He is very articulate, has good speech pattern, good rate, no pressured speech.  He is a bit diaphoretic, reporting that he is in bit of withdrawal.  Muscle tone appears normal grossly on close observation.  Thought form linear and logical, goal directed.  No flight of ideas.  Not responding to internal stimuli.  Denies perceptual disturbances.  Denies suicidal or homicidal ideation.  Short and long-term memory appeared intact.  Attention and concentration appear intact.  Insight and judgment appear moderate in the setting of continued drug use, although he is currently demonstrating improvement with seeking help and being collaborative with the treatment team.    IMPRESSION:  1.  Opioid use disorder, severe.  2.  Cocaine use disorder, severe.  3.  Unspecified depressive disorder.  4.  Unspecified anxiety disorder.    FORMULATION:  This is a pleasant gentleman with history of opioid use disorder, cocaine use disorder, presenting with neck abscess secondary to IV drug use.  He was in the process of trying to get into detox and go to [facility] as he does feel like it is necessary to get back into treatment.  Around the first of the year, he stopped his long-acting Suboxone injection partly due to the high cost.  Unfortunately, he relapsed pretty quickly after that.  He is now back on oral  Suboxone and is been evaluated for the neck abscess here on the observation setting in the hospital.    RESULTS REVIEW:  Reviewed labs from the EMR.     PLAN:    1.  The patient does not require inpatient psychiatric admission.  Once he is medically stabilized and treated, recommendation would be to proceed with treatment as he already is in the process of planning.  If he is unable to detox medically here, for instance if he is medically cleared before that, then the patient would like to go to the detox facility he has already arranged in [place] and then he would like to follow up with [facility], which all sounds reasonable; however, if the patient continues to be monitored in a medical setting, we will continue to monitor for detox here in the hospital setting.  2.  The patient has been continued on his outpatient Suboxone.  3.  Continue bupropion 150 mg daily, gabapentin 200 mg 3 times a day, hydroxyzine as needed, trazodone and prazosin can may be continued.      Les Wright MD        D: 02/15/2022   T: 02/15/2022   MT: CHIKIA    Name:     KOKO SUAREZKARSON NGUYEN  MRN:      [id number]        Account:      [id number]   :      1990           Consult Date: 02/15/2022     Document: [id number]

## 2022-02-16 NOTE — PROGRESS NOTES
Care Management Follow Up    Length of Stay (days): 0    Expected Discharge Date: 02/17/2022     Concerns to be Addressed: discharge planning, substance/tobacco abuse/use     Patient plan of care discussed at interdisciplinary rounds: Yes    Anticipated Discharge Disposition: Inpatient Chemical Dependency     Anticipated Discharge Services:    Anticipated Discharge DME:      Patient/family educated on Medicare website which has current facility and service quality ratings: no  Education Provided on the Discharge Plan:    Patient/Family in Agreement with the Plan: yes    Referrals Placed by CM/SW:    Private pay costs discussed: Not applicable    Additional Information:  Discharge plans are in progress.  Writer met with patients mom and bedside RN today.  RN stated info was sent yesterday and she received a call today from  Trupti at [facility] and they are reviewing info.    Plan is for patient to go to [facility] first, then got to [place] from there. The contact info was given to me by pts mom:  Adrienne/[facility] p: [phone]  F: [phone]  Trupti/[facility]: [phone]    Writer called Adrienne at [facility] to confirm receipt of the needed documents.  Adrienne stated they have not received anything from us.  She gave me the above fax number and writer faxed FS, H & P, ER note, Psych note to [phone].  Writer let Adrienne know there will be an ID consult and labs are currently pending.  CC will fax these items as soon as they are available.      Cherise Bradshaw RN

## 2022-02-16 NOTE — PROGRESS NOTES
[facility]    Medicine Progress Note - Hospitalist Service    Date of Admission:  2/15/2022    Assessment & Plan        Romeo Maldonado is a 31 year old male admitted on 2/15/2022. He presents to the emergency department with right neck abscess associated with injection drug use as feeling generally unwell for the past 2 days with cold sweats, some mild abdominal discomfort.    Injection site abscess  Patient with approximately 1.5 cm superficial abscess over right sternocleidomastoid at site of missed injection with methamphetamine.  Reports feeling increasingly unwell over the past 2 days.  - Admitted to inpatient.  - Abscess aspirated in ER, culture pending.  - Blood culture x2 pending.  - Continue Zosyn for now.  - ID consult, appreciate their assistance.  - Consult ENT, appreciate their assistance.  - Psychiatry consulted for anxiety and depression, heroin dependence.  - Care management consult to assist with facilitating transfer to inpatient chemical dependency treatment; patient believes that his bed will be available in the next 1 to 2 days based on discussions with intake nurse.    Methamphetamine use disorder  GHB use disorder - Typically utilizes to come down from methamphetamine  Benzodiazepine use disorder - Reports he will take Xanax 2 mg once every few days to help with anxiety.  Does not use daily, several days since last use  Opiate dependence with mild opiate withdrawal - Patient reported diaphoresis, some mild abdominal discomfort, generally feeling unwell with cold sweats over the past 2 days, worse today.  Last IV heroin and methamphetamine use 2/13/2022.  Could be early sepsis associated with injection site abscess versus opiate withdrawal.  Patient reports feeling slightly better with Suboxone dose administered by his mother yesterday  - Continue prior to admission Suboxone 8-2 twice daily.  - Psychiatry consulted, appreciate their assistance.  - Patient planning to go  to [facility] after discharge and then to [place] for chemical dependency treatment.    High risk HIV exposure  48 hours ago patient used a needle that he found at an HIV-positive acquaintance's home for IV drug use.  He is uncertain if this was a clean needle.  Believes his friend has an undetectable viral load.  - Continue postexposure prophylaxis with emtricitabine tenofovir and dolutegravir daily.  Discussed regimen with patient on admission, and he is agreeable; stated he will be able to remain on this medication as he plans to discharge directly to inpatient chemical dependency.  - Patient was tested and negative for HIV as of February 2.  - HIV antigen/antibody testing negative on 2/15/22.  - ID consulted, appreciate their assistance.    Anxiety with depression  Insomnia  - Trazodone 50 mg at bedtime as needed for sleep.  - Gabapentin 200 mg 3 times daily as needed for anxiety.  - As needed hydroxyzine every 4 hours.  - Prazosin 1 mg daily.  - Wellbutrin  mg daily.  - Psychiatry consulted as noted above.    History of hepatitis C status post treatment  - Recent hep C RNA undetectable 2/2/2022.       Diet: Regular Diet Adult    DVT Prophylaxis: Ambulate every shift  Marie Catheter: Not present  Central Lines: None  Cardiac Monitoring: None  Code Status: Full Code      Disposition Plan   Expected Discharge: discharge in 1-2 days pending continued improvement and ID/ENT recommendations.  Anticipated discharge location: inpatient rehabilitation facility (in patient chem dep)    Delays:            The patient's care was discussed with the Bedside Nurse, Patient and ID Consultant.    Jonh Loaiza MD  Hospitalist Service  [facility]  Securely message with the Vocera Web Console (learn more here)  Text page via [employer] Paging/Directory         Clinically Significant Risk Factors Present on Admission          # Hypocalcemia: Ca = 7.8 mg/dL (Ref range: 8.5 - 10.1 mg/dL) and/or  "iCa = N/A on admission, will replace as needed        # Overweight: Estimated body mass index is 25.83 kg/m  as calculated from the following:    Height as of this encounter: 1.778 m (5' 10\").    Weight as of this encounter: 81.6 kg (180 lb).      ______________________________________________________________________    Interval History   Romeo Maldonado was seen this afternoon. He is feeling better. Still some chills, but improving overall. Neck pain persists, but has improved. No difficulty breathing or swallowing. Denies chest pain, shortness of breath, nausea, abdominal pain.    Data reviewed today: I reviewed all medications, new labs and imaging results over the last 24 hours. I personally reviewed no images or EKG's today.    Physical Exam   Vital Signs: Temp: 99.2  F (37.3  C) Temp src: Oral BP: (!) 145/96 Pulse: 70   Resp: 16 SpO2: 98 % O2 Device: None (Room air)    Weight: 180 lbs 0 oz  Constitutional: awake, alert, cooperative, no apparent distress, laying in the hospital bed  Neck: area of erythema and tenderness on right anterior neck  Respiratory: clear to auscultation bilaterally, no crackles or wheezing  Cardiovascular: regular rate and rhythm, normal S1 and S2, no murmur noted  GI: normal bowel sounds, soft, non-distended, non-tender  Skin: warm, dry  Musculoskeletal: no lower extremity pitting edema present  Neurologic: awake, alert, oriented to name, place and time, motor is 5 out of 5 bilaterally, sensory is intact    Data   Recent Labs   Lab 02/16/22  0701 02/15/22  1359 02/15/22  0145   WBC 6.6  --  11.0   HGB 13.1*  --  13.8   MCV 85  --  86     --  313     --  138   POTASSIUM 3.4  --  3.4   CHLORIDE 105  --  102   CO2 25  --  29   BUN 7  --  16   CR 0.66  --  0.66   ANIONGAP 3  --  7   NILA 7.8*  --  8.6   * 100* 121*   ALBUMIN  --   --  3.4   PROTTOTAL  --   --  7.3   BILITOTAL  --   --  0.2   ALKPHOS  --   --  59   ALT  --   --  38   AST  --   --  15     Medications     " lactated ringers 50 mL/hr at 02/15/22 2341       buprenorphine HCl-naloxone HCl  1 Film Sublingual BID     buPROPion  150 mg Oral Daily     dolutegravir  50 mg Oral Daily    And     emtricitabine-tenofovir  1 tablet Oral Daily     piperacillin-tazobactam  3.375 g Intravenous Q6H     prazosin  1 mg Oral Daily     sodium chloride (PF)  3 mL Intracatheter Q8H

## 2022-02-16 NOTE — PROVIDER NOTIFICATION
Provider notification 2/15/22 3642 - Pt admitted for heroin withdrawal, no EKG checked upon admission, not on tele. Pt drowsy, profusely sweating. Afebrile, VSS. Poor oral intake, no urination charted since 0800. Bladder scan 288.    Call back, pt on suboxone, should help with withdrawal symptoms - no need for tele at this time. Call back if pt hasn't voided in 6 hours, stated he will probably just hold urine until bladder is full. Started on LR @ 50 mL/hr for poor intake.

## 2022-02-16 NOTE — CONSULTS
[facility]    Infectious Disease Consultation     Date of Admission:  2/15/2022  Date of Consult (When I saw the patient): 02/16/22    Assessment & Plan   Romeo Maldonado is a 31 year old male who was admitted on 2/15/2022.     Impression:  1. 31 y.o with substance abuse.   2. Admitted with neck abscesses at the site of recent meth injection use.   3. Also concern for blood exposure by using dirty needle from a known HIV positive patient. (though patient not sure if needle was dirty )   4. Started on zosyn and Post exposure prophylaxis.   5. CT neck with superficial abscesses   6. Cultures are pending     Recommendations:   1. Continue zosyn   2. Get MRS pcr from the nares   3. Consult ENT   4. Get the full exposure work up including viral hepatitis serology       Malick Mcgowan MD    Reason for Consult   Reason for consult: I was asked to evaluate this patient for neck abscess and dirty needle use from a known HIV positive patient.    Primary Care Physician   Jean Pierre Simon    Chief Complaint   Neck pain     History is obtained from the patient and medical records    History of Present Illness   Romeo Maldonado is a 31 year old male admitted with right neck abscess associated with injection drug use as feeling generally unwell for the past 2 days with cold sweats, some mild abdominal discomfort.    Past Medical History   I have reviewed this patient's medical history and updated it with pertinent information if needed.   Past Medical History:   Diagnosis Date     Allergic state      Anxiety      Depressive disorder      Hepatitis C      Substance abuse (H)     meth use     Uncomplicated asthma        Past Surgical History   I have reviewed this patient's surgical history and updated it with pertinent information if needed.  Past Surgical History:   Procedure Laterality Date     INCISION AND DRAINAGE SOFT TISSUE UPPER EXTREMITY, COMBINED  8/11/2012    Procedure: COMBINED INCISION AND DRAINAGE  SOFT TISSUE UPPER EXTREMITY;  Incision and drainage Right Arm Abscess;  Surgeon: Cheli White MD;  Location: [facility] OR       Prior to Admission Medications   Prior to Admission Medications   Prescriptions Last Dose Informant Patient Reported? Taking?   albuterol (PROAIR HFA/PROVENTIL HFA/VENTOLIN HFA) 108 (90 Base) MCG/ACT inhaler More than a month at Unknown time Self No Yes   Sig: Inhale 2 puffs into the lungs every 6 hours as needed   buPROPion (WELLBUTRIN SR) 150 MG 12 hr tablet  at new Rx, not yet started Self No Yes   Sig: Take 1 tablet (150 mg) by mouth daily   buprenorphine HCl-naloxone HCl (SUBOXONE) 8-2 MG per film 2/14/2022 at PM Self No Yes   Sig: Place 1 Film under the tongue 2 times daily   desvenlafaxine (PRISTIQ) 50 MG 24 hr tablet More than a month at Unknown time Self Yes Yes   Sig: Take 50 mg by mouth daily   folic acid (FOLVITE) 1 MG tablet  at new Rx, not yet started Self No Yes   Sig: Take 1 tablet (1 mg) by mouth daily   gabapentin (NEURONTIN) 100 MG capsule  at new Rx, not yet started Self No Yes   Sig: Take 2 capsules (200 mg) by mouth 3 times daily as needed (anxiety)   hydrOXYzine (ATARAX) 25 MG tablet  at new Rx, not yet started Self No Yes   Sig: Take 1 tablet (25 mg) by mouth every 4 hours as needed for anxiety   multivitamin w/minerals (THERA-VIT-M) tablet Past Month at Unknown time Self Yes Yes   Sig: Take 1 tablet by mouth daily   naloxone (NARCAN) 4 MG/0.1ML nasal spray  at has home supply, never used Self No Yes   Sig: Spray 1 spray (4 mg) into one nostril alternating nostrils once as needed every 2-3 minutes until assistance arrives   nicotine (NICORETTE) 4 MG gum  at new Rx, not yet started Self No Yes   Sig: Place 1 each (4 mg) inside cheek every hour as needed for other (nicotine withdrawal symptoms)   prazosin (MINIPRESS) 1 MG capsule More than a month at Unknown time Self No Yes   Sig: Take 1 capsule (1 mg) by mouth daily   propranolol (INDERAL) 10 MG tablet More than a month  at Unknown time Self No Yes   Sig: Take 1 tablet (10 mg) by mouth 3 times daily as needed   thiamine (B-1) 100 MG tablet  at new Rx, not yet started Self No Yes   Sig: Take 1 tablet (100 mg) by mouth daily   traZODone (DESYREL) 50 MG tablet  at new Rx, not yet started Self No Yes   Sig: Take 1 tablet (50 mg) by mouth nightly as needed for sleep (may repeat after 60 minutes)      Facility-Administered Medications: None     Allergies   Allergies   Allergen Reactions     Ambien [Zolpidem] Shortness Of Breath     wheezing     Pollen Extract Unknown       Immunization History   Immunization History   Administered Date(s) Administered     COVID-19,PF,Moderna 11/30/2021     COVID-19,PF,Pfizer (12+ Yrs) 01/26/2021, 02/16/2021     TDAP Vaccine (Adacel) 08/11/2012       Social History   I have reviewed this patient's social history and updated it with pertinent information if needed. Romeo NOE Maldonado  reports that he has been smoking. He has been smoking about 0.25 packs per day. He has never used smokeless tobacco. He reports current alcohol use. He reports current drug use. Drugs: Methamphetamines, Opiates, and IV.    Family History   I have reviewed this patient's family history and updated it with pertinent information if needed.   Family History   Problem Relation Age of Onset     Hypertension Father        Review of Systems   The 10 point Review of Systems is negative other than noted in the HPI or here.     Physical Exam   Temp: 99.2  F (37.3  C) Temp src: Oral BP: (!) 145/96 Pulse: 70   Resp: 16 SpO2: 98 % O2 Device: None (Room air)    Vital Signs with Ranges  Temp:  [98.5  F (36.9  C)-100  F (37.8  C)] 99.2  F (37.3  C)  Pulse:  [62-77] 70  Resp:  [16-18] 16  BP: (131-154)/(75-96) 145/96  SpO2:  [94 %-98 %] 98 %  180 lbs 0 oz  Body mass index is 25.83 kg/m .    GENERAL APPEARANCE:  awake  EYES: Eyes grossly normal to inspection  NECK: area of swelling on the right neck ? Bigger then pic last night   RESP: lungs clear    CV: regular rates and rhythm  LYMPHATICS: normal ant/post cervical and supraclavicular nodes  ABDOMEN: soft, nontender  MS: extremities normal  SKIN: ok  Psych: affect normal       Data   Lab Results   Component Value Date    WBC 6.6 02/16/2022    HGB 13.1 (L) 02/16/2022    HCT 40.3 02/16/2022     02/16/2022     02/16/2022    POTASSIUM 3.4 02/16/2022    CHLORIDE 105 02/16/2022    CO2 25 02/16/2022    BUN 7 02/16/2022    CR 0.66 02/16/2022     (H) 02/16/2022    SED 13 02/15/2022    NTBNPI 112 06/26/2021    TROPI <0.015 06/26/2021    AST 15 02/15/2022    ALT 38 02/15/2022    GGT 9 02/02/2022    ALKPHOS 59 02/15/2022    BILITOTAL 0.2 02/15/2022    DEJAN <9 (L) 11/01/2010    INR 1.01 03/24/2021     No results for input(s): CULT in the last 168 hours.  Recent Labs   Lab Test 07/04/21  1710 05/29/15  1138 05/29/15  1135 03/03/14  2225 03/03/14  2159   CULT No growth  No growth No Beta Streptococcus isolated No growth No growth No growth

## 2022-02-16 NOTE — CONSULTS
"[facility] Consultation by ENT Specialty Care    Romeo Maldonado MRN# [id number]   Age: 31 year old YOB: 1990     Date of Admission:  2/15/2022  Date of Consult:    02/16/22    Reason for consult: Neck Abscess       Requesting physician: No admitting provider for patient encounter.                           Chief Complaint:   Wound Infection              HPI:      Romeo Maldonado is a 31 year old male admitted on 2/15/2022. He presents to the emergency department with right neck abscess associated with injection drug use as feeling generally unwell for the past 2 days with cold sweats, some mild abdominal discomfort.     Injection site abscess  Patient with approximately 1.5 cm superficial abscess over right sternocleidomastoid at site of missed injection with methamphetamine.  Reports feeling increasingly unwell over the past 2 days.  - Admitted to inpatient.  - Abscess aspirated in ER, culture pending.  - Blood culture x2 pending.  - Continue Zosyn for now.  - ID consult, appreciate their assistance.  - Consult ENT, appreciate their assistance.  - Psychiatry consulted for anxiety and depression, heroin dependence.  - Care management consult to assist with facilitating transfer to inpatient chemical dependency treatment; patient believes that his bed will be available in the next 1 to 2 days based on discussions with intake nurse.    Today, he continues to have right neck pain.   He notes feeling slightly improved, though with persistent chills and malaise.  He has h/o arm abscess.    Previous tests and diagnostic procedures: see \"Tests and Procedures\" and \"PFSH\".               Past Medical History:     Past Medical History:   Diagnosis Date     Allergic state      Anxiety      Depressive disorder      Hepatitis C      Substance abuse (H)     meth use     Uncomplicated asthma                Past Surgical History:     Past Surgical History:   Procedure Laterality Date     INCISION AND DRAINAGE SOFT " TISSUE UPPER EXTREMITY, COMBINED  8/11/2012    Procedure: COMBINED INCISION AND DRAINAGE SOFT TISSUE UPPER EXTREMITY;  Incision and drainage Right Arm Abscess;  Surgeon: Cheli White MD;  Location:  OR               Social History:     Social History     Socioeconomic History     Marital status: Single     Spouse name: Not on file     Number of children: Not on file     Years of education: Not on file     Highest education level: Not on file   Occupational History     Not on file   Tobacco Use     Smoking status: Current Some Day Smoker     Packs/day: 0.25     Smokeless tobacco: Never Used   Substance and Sexual Activity     Alcohol use: Yes     Comment: occasional     Drug use: Yes     Types: Methamphetamines, Opiates, IV     Comment: heroin daily, meth a couple of times in the last 2 weks     Sexual activity: Never   Other Topics Concern     Not on file   Social History Narrative     Not on file     Social Determinants of Health     Financial Resource Strain: Not on file   Food Insecurity: Not on file   Transportation Needs: Not on file   Physical Activity: Not on file   Stress: Not on file   Social Connections: Not on file   Intimate Partner Violence: Not on file   Housing Stability: Not on file               Family History:     Family History   Problem Relation Age of Onset     Hypertension Father                Immunizations:     Immunization History   Administered Date(s) Administered     COVID-19,PF,Moderna 11/30/2021     COVID-19,PF,Pfizer (12+ Yrs) 01/26/2021, 02/16/2021     TDAP Vaccine (Adacel) 08/11/2012               Allergies:   Ambien [zolpidem] and Pollen extract          Medications:     Current Facility-Administered Medications:      acetaminophen (TYLENOL) tablet 325-650 mg, 325-650 mg, Oral, Q4H PRN, Jonh Loaiza MD, 650 mg at 02/15/22 1392     buprenorphine HCl-naloxone HCl (SUBOXONE) 8-2 MG per film 1 Film, 1 Film, Sublingual, BID, Don Miller MD, 1 Film at 02/16/22  1016     buPROPion (WELLBUTRIN SR) 12 hr tablet 150 mg, 150 mg, Oral, Daily, Don Miller MD, 150 mg at 02/16/22 1014     dolutegravir (TIVICAY) tablet 50 mg, 50 mg, Oral, Daily, 50 mg at 02/16/22 1014 **AND** emtricitabine-tenofovir (TRUVADA) 200-300 MG per tablet 1 tablet, 1 tablet, Oral, Daily, Don Miller MD, 1 tablet at 02/16/22 1014     gabapentin (NEURONTIN) capsule 200 mg, 200 mg, Oral, TID PRN, Don Miller MD     hydrOXYzine (ATARAX) tablet 25 mg, 25 mg, Oral, Q4H PRN, Don Miller MD     lactated ringers infusion, , Intravenous, Continuous, Don Miller MD, Last Rate: 50 mL/hr at 02/15/22 2341, New Bag at 02/15/22 2341     lidocaine (LMX4) cream, , Topical, Q1H PRN, Don Miller MD     lidocaine 1 % 0.1-1 mL, 0.1-1 mL, Other, Q1H PRN, Don Miller MD     melatonin tablet 3 mg, 3 mg, Oral, At Bedtime PRN, Don Miller MD     ondansetron (ZOFRAN-ODT) ODT tab 4 mg, 4 mg, Oral, Q6H PRN **OR** ondansetron (ZOFRAN) injection 4 mg, 4 mg, Intravenous, Q6H PRN, Don Miller MD     piperacillin-tazobactam (ZOSYN) 3.375 g vial to attach to  mL bag, 3.375 g, Intravenous, Q6H, Don Miller MD, Last Rate: 200 mL/hr at 02/15/22 1653, 3.375 g at 02/16/22 1612     prazosin (MINIPRESS) capsule 1 mg, 1 mg, Oral, Daily, Don Miller MD, 1 mg at 02/16/22 1015     prochlorperazine (COMPAZINE) injection 10 mg, 10 mg, Intravenous, Q6H PRN **OR** prochlorperazine (COMPAZINE) tablet 10 mg, 10 mg, Oral, Q6H PRN **OR** prochlorperazine (COMPAZINE) suppository 25 mg, 25 mg, Rectal, Q12H PRN, Don Miller MD     senna-docusate (SENOKOT-S/PERICOLACE) 8.6-50 MG per tablet 1 tablet, 1 tablet, Oral, BID PRN **OR** senna-docusate (SENOKOT-S/PERICOLACE) 8.6-50 MG per tablet 2 tablet, 2 tablet, Oral, BID PRN, Don Miller MD     sodium chloride (PF) 0.9% PF flush 3 mL, 3 mL, Intracatheter, q1 min prn, Don Miller MD     sodium chloride (PF) 0.9% PF  "flush 3 mL, 3 mL, Intracatheter, Q8H, Don Miller MD, 3 mL at 02/15/22 1830     traZODone (DESYREL) tablet 50 mg, 50 mg, Oral, At Bedtime PRN, Don Miller MD    Facility-Administered Medications Ordered in Other Encounters:      Self Administer Medications: Behavioral Services, , Does not apply, See Admin Instructions, Mian Hurley MD          Review of Systems:   Review Of Systems  Skin: as above  Eyes: negative  Ears/Nose/Throat: negative  Respiratory: No shortness of breath, dyspnea on exertion, cough, or hemoptysis  Cardiovascular: negative  Gastrointestinal: negative  Genitourinary: negative  Musculoskeletal: as above  Neurologic: negative  Psychiatric: anxiety, depression and illegal drug usage  Hematologic/Lymphatic/Immunologic: chills  Endocrine: negative          Physical exam:   CONSTITUTION:    /76 (BP Location: Left arm)   Pulse 79   Temp 98.8  F (37.1  C) (Oral)   Resp 16   Ht 1.778 m (5' 10\")   Wt 81.6 kg (180 lb)   SpO2 95%   BMI 25.83 kg/m       General appearance:Well developed, well nourished and groomed. No apparent acute or chronic distress.    Ability to communicate: normal.       HEAD, FACE, SALIVARY GLANDS AND TMJ:    Inspection of Head and Face: Normal contour and symmetry. No masses, lesions, or significant scars observed.    Palpation/Percussion of the Face: No tenderness, deformity or instability.    Palpation of Parotid and Submaxillary glands: Normal.    Facial Mobility: Normal.       EYES: Ocular Motility: gaze appears conjugate in all positions; no evident nystagmus.       EAR, NOSE, MOUTH AND THROAT:    Pinnas and External Nose: Normal.    Otoscopic exam: Normal canals and tympanic membranes, bilaterally.    Hearing: Conversational speech rarely or never misunderstands words.    Nasal Interior:    Septum - Midline.    Turbinates and middle meatus - Normal.    Rhinorrhea - None.    Vestibular skin - Normal bilaterally.    Mucosa - Normal.    Lips, " Teeth and Gums: Normal.    Oral Cavity and Oropharynx: Normal.    Base of tongue, Pharyngeal walls, Vallecula and Pyriform Sinuses: Unable to complete mirror examination because of hyperactive gag.    Larynx: Unable to visualize with mirror because of hyperactive gag.    Nasopharynx examination: Could not visualize with the mirror due to gag.       NECK AND THYROID:    Neck: Right inferior neck induration and swelling deep neck, skin cellulitis; trachea midline; normal laryngeal crepitation; no adenopathy; no neck masses; no skin lesions; no scars.         RESPIRATORY: Chest Wall expands normally and no deformities noted. Respiratory Effort normal. Auscultation: normal breath sounds.       CARDIOVASCULAR:    Auscultation: normal S1, S2, no murmurs or abnormal sounds.    Peripheral Vascular System: normal pulses with no peripheral vascular swelling or varicosities, tenderness or edema. Skin warm and dry.       LYMPH NODES: Neck Nodes: normal, no adenopathy.       NEUROLOGIC:    Level of orientation: normal to time, place, person and situation.    Cranial nerves: Cranial nerves II-XII grossly intact and symmetrical.       PSYCHIATRIC:    Level of consciousness: awake and alert.    Judgment and insight: normal.    Mood and affect: normal and appropriate to the situation.     PROCEDURE:  I&D Right neck abscess  Effected area cleaned with betadine x 3 then wiped away with alcoholol.  1 pecent lidocaine with epineprhine used to infiltrate the area with good anethesia.  Number 18 blade was used to make a nicking incion.  Purlent drainage was evacuated. No gauze was needed as area was small. Appropraite wound care dressing and bacitracin ointment applied.  Pt tolerated preocedure well.            Data:     Results for orders placed or performed during the hospital encounter of 02/15/22   CT Soft Tissue Neck w Contrast     Status: None    Narrative    EXAM: CT SOFT TISSUE NECK W CONTRAST  LOCATION: [facility]  [facility]  DATE/TIME: 2/15/2022 2:23 AM    INDICATION: Neck abscess, deep tissue  COMPARISON: None.  CONTRAST: 80  ml Isovue 370  TECHNIQUE: Routine CT Soft Tissue Neck with IV contrast. Multiplanar reformats. Dose reduction techniques were used.    FINDINGS:   Right anterior lateral sternocleidomastoid muscle at the level of the thyroid gland demonstrate a rim-enhancing fluid collection measuring 12 mm concerning for abscess. The fluid collection is approximately 5 mm from the skin surface. This is likely   drainable. Right sternocleidomastoid muscle is enlarge concerning for myositis. There is surrounding fat stranding consistent with cellulitis.    MUCOSAL SPACES/SOFT TISSUES: Normal mucosal spaces of the upper aerodigestive tract. No mucosal mass or inflammation identified. Normal vocal cords and infraglottic trachea. Normal parapharyngeal space and subcutaneous soft tissues. Normal oral cavity,    spaces, and floor of mouth structures.    LYMPH NODES: Multiple small and prominent lymph nodes within the neck. No pathologic lymph nodes by size or morphology criteria.     SALIVARY GLANDS: Normal parotid and submandibular glands.    THYROID: Normal.     VESSELS: Vascular structures of the neck are patent. 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    VISUALIZED INTRACRANIAL/ORBITS/SINUSES: No abnormality of the visualized intracranial compartment or orbits. Visualized paranasal sinuses and mastoid air cells are clear.    OTHER: Mild degenerative changes spine. No destructive osseous lesion. The included lung apices are clear. Infrahyoid small thyroglossal duct cyst measuring 2 mm. Multiple cavities.      Impression    IMPRESSION:   1.  Right anterior lateral sternocleidomastoid muscle at the level of the thyroid gland demonstrate a rim-enhancing fluid collection measuring 12 mm concerning for abscess. The fluid collection is approximately 5 mm from the skin surface.  This is likely   drainable.   2.  Right sternocleidomastoid muscle is enlarge concerning for myositis.   3.  There is surrounding fat stranding consistent with cellulitis.   Comprehensive metabolic panel     Status: Abnormal   Result Value Ref Range    Sodium 138 133 - 144 mmol/L    Potassium 3.4 3.4 - 5.3 mmol/L    Chloride 102 94 - 109 mmol/L    Carbon Dioxide (CO2) 29 20 - 32 mmol/L    Anion Gap 7 3 - 14 mmol/L    Urea Nitrogen 16 7 - 30 mg/dL    Creatinine 0.66 0.66 - 1.25 mg/dL    Calcium 8.6 8.5 - 10.1 mg/dL    Glucose 121 (H) 70 - 99 mg/dL    Alkaline Phosphatase 59 40 - 150 U/L    AST 15 0 - 45 U/L    ALT 38 0 - 70 U/L    Protein Total 7.3 6.8 - 8.8 g/dL    Albumin 3.4 3.4 - 5.0 g/dL    Bilirubin Total 0.2 0.2 - 1.3 mg/dL    GFR Estimate >90 >60 mL/min/1.73m2   CRP inflammation     Status: Abnormal   Result Value Ref Range    CRP Inflammation 12.6 (H) 0.0 - 8.0 mg/L   Erythrocyte sedimentation rate auto     Status: Normal   Result Value Ref Range    Erythrocyte Sedimentation Rate 13 0 - 15 mm/hr   HIV Antigen Antibody Combo     Status: Normal   Result Value Ref Range    HIV Antigen Antibody Combo Nonreactive Nonreactive   CBC with platelets and differential     Status: None   Result Value Ref Range    WBC Count 11.0 4.0 - 11.0 10e3/uL    RBC Count 4.97 4.40 - 5.90 10e6/uL    Hemoglobin 13.8 13.3 - 17.7 g/dL    Hematocrit 42.7 40.0 - 53.0 %    MCV 86 78 - 100 fL    MCH 27.8 26.5 - 33.0 pg    MCHC 32.3 31.5 - 36.5 g/dL    RDW 12.3 10.0 - 15.0 %    Platelet Count 313 150 - 450 10e3/uL    % Neutrophils 59 %    % Lymphocytes 30 %    % Monocytes 9 %    % Eosinophils 1 %    % Basophils 1 %    % Immature Granulocytes 0 %    NRBCs per 100 WBC 0 <1 /100    Absolute Neutrophils 6.6 1.6 - 8.3 10e3/uL    Absolute Lymphocytes 3.3 0.8 - 5.3 10e3/uL    Absolute Monocytes 1.0 0.0 - 1.3 10e3/uL    Absolute Eosinophils 0.1 0.0 - 0.7 10e3/uL    Absolute Basophils 0.1 0.0 - 0.2 10e3/uL    Absolute Immature Granulocytes 0.0 <=0.4  10e3/uL    Absolute NRBCs 0.0 10e3/uL   Asymptomatic COVID-19 Virus (Coronavirus) by PCR Nasopharyngeal     Status: Normal    Specimen: Nasopharyngeal; Swab   Result Value Ref Range    SARS CoV2 PCR Negative Negative    Narrative    Testing was performed using the gigi  SARS-CoV-2 & Influenza A/B Assay on the gigi  [employer]  System.  This test should be ordered for the detection of SARS-COV-2 in individuals who meet SARS-CoV-2 clinical and/or epidemiological criteria. Test performance is unknown in asymptomatic patients.  This test is for in vitro diagnostic use under the FDA EUA for laboratories certified under CLIA to perform moderate and/or high complexity testing. This test has not been FDA cleared or approved.  A negative test does not rule out the presence of PCR inhibitors in the specimen or target RNA in concentration below the limit of detection for the assay. The possibility of a false negative should be considered if the patient's recent exposure or clinical presentation suggests COVID-19.  [facility] are certified under the Clinical Laboratory Improvement Amendments of 1988 (CLIA-88) as qualified to perform moderate and/or high complexity laboratory testing.   Glucose by meter     Status: Abnormal   Result Value Ref Range    GLUCOSE BY METER POCT 100 (H) 70 - 99 mg/dL   CBC with platelets     Status: Abnormal   Result Value Ref Range    WBC Count 6.6 4.0 - 11.0 10e3/uL    RBC Count 4.74 4.40 - 5.90 10e6/uL    Hemoglobin 13.1 (L) 13.3 - 17.7 g/dL    Hematocrit 40.3 40.0 - 53.0 %    MCV 85 78 - 100 fL    MCH 27.6 26.5 - 33.0 pg    MCHC 32.5 31.5 - 36.5 g/dL    RDW 12.5 10.0 - 15.0 %    Platelet Count 220 150 - 450 10e3/uL   Basic metabolic panel     Status: Abnormal   Result Value Ref Range    Sodium 133 133 - 144 mmol/L    Potassium 3.4 3.4 - 5.3 mmol/L    Chloride 105 94 - 109 mmol/L    Carbon Dioxide (CO2) 25 20 - 32 mmol/L    Anion Gap 3 3 - 14 mmol/L    Urea Nitrogen 7 7 - 30 mg/dL     Creatinine 0.66 0.66 - 1.25 mg/dL    Calcium 7.8 (L) 8.5 - 10.1 mg/dL    Glucose 102 (H) 70 - 99 mg/dL    GFR Estimate >90 >60 mL/min/1.73m2   CRP inflammation     Status: Abnormal   Result Value Ref Range    CRP Inflammation 43.6 (H) 0.0 - 8.0 mg/L   Care Management / Social Work IP Consult     Status: None ()    Cherise Payne RN     2/15/2022 11:43 AM  Care Management Initial Consult    General Information  Assessment completed with: Patient,    Type of CM/SW Visit: Initial Assessment    Primary Care Provider verified and updated as needed:  (Does not   have PCP)   Readmission within the last 30 days: previous discharge plan   unsuccessful   Return Category: Exacerbation of disease  Reason for Consult:   discharge planning  Advance Care Planning:            Communication Assessment  Patient's communication style: spoken language (English or   Bilingual)    Hearing Difficulty or Deaf: no   Wear Glasses or Blind: no    Cognitive  Cognitive/Neuro/Behavioral: WDL  Level of Consciousness:   somnolent                   Living Environment:   People in home: alone (pt reports his dad is staying with him for   now.)     Current living Arrangements: apartment      Able to return to prior arrangements: yes       Family/Social Support:  Care provided by: self  Provides care for: no one  Marital Status: Single  Parent(s)          Description of Support System: Supportive,Involved         Current Resources:   Patient receiving home care services:       Community Resources:    Equipment currently used at home:    Supplies currently used at home:      Employment/Financial:  Employment Status: employed full-time        Financial Concerns:             Lifestyle & Psychosocial Needs:  Social Determinants of Health     Tobacco Use: High Risk     Smoking Tobacco Use: Current Some Day Smoker     Smokeless Tobacco Use: Never Used   Alcohol Use: Not on file   Financial Resource Strain: Not on file   Food Insecurity: Not on  file   Transportation Needs: Not on file   Physical Activity: Not on file   Stress: Not on file   Social Connections: Not on file   Intimate Partner Violence: Not on file   Depression: Not on file   Housing Stability: Not on file       Functional Status:  Prior to admission patient needed assistance:              Mental Health Status:  Mental Health Status: Current Concern  Mental Health Management:   Medication,Psychiatrist    Chemical Dependency Status:  Chemical Dependency Status: Current Concern  Chemical Dependency   Management: Other (see comment) (plans in process for in patient   treatment)          Values/Beliefs:  Spiritual, Cultural Beliefs, [employer] Practices, Values that   affect care:                 Additional Information:  Writer met with patient at bedside, introduced self and role in   discharge planning.  Patient has already been in touch with   [facility] for his Chem Dep.  He also stated he has a phone   assessment with Chris today that he and his mother are going   to do together, however his mom is not here with his phone yet.    Patient gave writer permission to phone his mom to gather more   info on when the assessment will take place.  During this   conversation, patient c/o feeling very weak and tired and stated   he had to get help.  Patients phone number and address were   confirmed with him.    CC will continue to follow for safe discharge plan to in patient   detox and chem dep rehab.    Cherise Bradshaw RN       Psychiatry IP Consult: Depression with anxiety.  Opiate dependence; Consultant may enter orders: Yes; Patient to be seen: Routine; Call back #: .; Requesting provider? Hospitalist (if different from attending physician)     Status: None ()    Narrative    Les Wright MD     2/15/2022  1:41 PM  Patient seen this AM, dictation to follow    Plan:  --Patient can discharge once medically cleared. He does not meet   criteria for inpatient psychiatry admission  --Patient  planning on continuing in detox facility and then plans   to attend residential treatment at [facility]    Infectious Diseases IP Consult: Patient to be seen: Routine - within 24 hours; neck abscess, possible HIV exposure; Consultant may enter orders: Yes; Requesting provider? Attending physician     Status: None ()    Malick Turpin MD     2/16/2022  3:16 PM  [facility]    Infectious Disease Consultation     Date of Admission:  2/15/2022  Date of Consult (When I saw the patient): 02/16/22    Assessment & Plan   Romeo Maldonado is a 31 year old male who was admitted on   2/15/2022.     Impression:  1. 31 y.o with substance abuse.   2. Admitted with neck abscesses at the site of recent meth   injection use.   3. Also concern for blood exposure by using dirty needle from a   known HIV positive patient. (though patient not sure if needle   was dirty )   4. Started on zosyn and Post exposure prophylaxis.   5. CT neck with superficial abscesses   6. Cultures are pending     Recommendations:   1. Continue zosyn   2. Get MRS pcr from the nares   3. Consult ENT   4. Get the full exposure work up including viral hepatitis   serology       Malick Mcgowan MD    Reason for Consult   Reason for consult: I was asked to evaluate this patient for neck   abscess and dirty needle use from a known HIV positive patient.    Primary Care Physician   Jean Pierre Simon    Chief Complaint   Neck pain     History is obtained from the patient and medical records    History of Present Illness   Romeo Maldonado is a 31 year old male admitted with right neck   abscess associated with injection drug use as feeling generally   unwell for the past 2 days with cold sweats, some mild abdominal   discomfort.    Past Medical History   I have reviewed this patient's medical history and updated it   with pertinent information if needed.   Past Medical History:   Diagnosis Date     Allergic state      Anxiety      Depressive  disorder      Hepatitis C      Substance abuse (H)     meth use     Uncomplicated asthma        Past Surgical History   I have reviewed this patient's surgical history and updated it   with pertinent information if needed.  Past Surgical History:   Procedure Laterality Date     INCISION AND DRAINAGE SOFT TISSUE UPPER EXTREMITY, COMBINED    8/11/2012    Procedure: COMBINED INCISION AND DRAINAGE SOFT TISSUE UPPER   EXTREMITY;  Incision and drainage Right Arm Abscess;  Surgeon:   Cheli White MD;  Location:  OR       Prior to Admission Medications   Prior to Admission Medications   Prescriptions Last Dose Informant Patient Reported? Taking?   albuterol (PROAIR HFA/PROVENTIL HFA/VENTOLIN HFA) 108 (90 Base)   MCG/ACT inhaler More than a month at Unknown time Self No Yes   Sig: Inhale 2 puffs into the lungs every 6 hours as needed   buPROPion (WELLBUTRIN SR) 150 MG 12 hr tablet  at new Rx, not yet   started Self No Yes   Sig: Take 1 tablet (150 mg) by mouth daily   buprenorphine HCl-naloxone HCl (SUBOXONE) 8-2 MG per film   2/14/2022 at PM Self No Yes   Sig: Place 1 Film under the tongue 2 times daily   desvenlafaxine (PRISTIQ) 50 MG 24 hr tablet More than a month at   Unknown time Self Yes Yes   Sig: Take 50 mg by mouth daily   folic acid (FOLVITE) 1 MG tablet  at new Rx, not yet started Self   No Yes   Sig: Take 1 tablet (1 mg) by mouth daily   gabapentin (NEURONTIN) 100 MG capsule  at new Rx, not yet started   Self No Yes   Sig: Take 2 capsules (200 mg) by mouth 3 times daily as needed   (anxiety)   hydrOXYzine (ATARAX) 25 MG tablet  at new Rx, not yet started   Self No Yes   Sig: Take 1 tablet (25 mg) by mouth every 4 hours as needed for   anxiety   multivitamin w/minerals (THERA-VIT-M) tablet Past Month at   Unknown time Self Yes Yes   Sig: Take 1 tablet by mouth daily   naloxone (NARCAN) 4 MG/0.1ML nasal spray  at has home supply,   never used Self No Yes   Sig: Spray 1 spray (4 mg) into one nostril alternating  nostrils   once as needed every 2-3 minutes until assistance arrives   nicotine (NICORETTE) 4 MG gum  at new Rx, not yet started Self No   Yes   Sig: Place 1 each (4 mg) inside cheek every hour as needed for   other (nicotine withdrawal symptoms)   prazosin (MINIPRESS) 1 MG capsule More than a month at Unknown   time Self No Yes   Sig: Take 1 capsule (1 mg) by mouth daily   propranolol (INDERAL) 10 MG tablet More than a month at Unknown   time Self No Yes   Sig: Take 1 tablet (10 mg) by mouth 3 times daily as needed   thiamine (B-1) 100 MG tablet  at new Rx, not yet started Self No   Yes   Sig: Take 1 tablet (100 mg) by mouth daily   traZODone (DESYREL) 50 MG tablet  at new Rx, not yet started Self   No Yes   Sig: Take 1 tablet (50 mg) by mouth nightly as needed for sleep   (may repeat after 60 minutes)      Facility-Administered Medications: None     Allergies   Allergies   Allergen Reactions     Ambien [Zolpidem] Shortness Of Breath     wheezing     Pollen Extract Unknown       Immunization History   Immunization History   Administered Date(s) Administered     COVID-19,PF,Moderna 11/30/2021     COVID-19,PF,Pfizer (12+ Yrs) 01/26/2021, 02/16/2021     TDAP Vaccine (Adacel) 08/11/2012       Social History   I have reviewed this patient's social history and updated it with   pertinent information if needed. Romeo Maldonado  reports that he   has been smoking. He has been smoking about 0.25 packs per day.   He has never used smokeless tobacco. He reports current alcohol   use. He reports current drug use. Drugs: Methamphetamines,   Opiates, and IV.    Family History   I have reviewed this patient's family history and updated it with   pertinent information if needed.   Family History   Problem Relation Age of Onset     Hypertension Father        Review of Systems   The 10 point Review of Systems is negative other than noted in   the HPI or here.     Physical Exam   Temp: 99.2  F (37.3  C) Temp src: Oral BP: (!) 145/96  Pulse: 70     Resp: 16 SpO2: 98 % O2 Device: None (Room air)    Vital Signs with Ranges  Temp:  [98.5  F (36.9  C)-100  F (37.8  C)] 99.2  F (37.3  C)  Pulse:  [62-77] 70  Resp:  [16-18] 16  BP: (131-154)/(75-96) 145/96  SpO2:  [94 %-98 %] 98 %  180 lbs 0 oz  Body mass index is 25.83 kg/m .    GENERAL APPEARANCE:  awake  EYES: Eyes grossly normal to inspection  NECK: area of swelling on the right neck ? Bigger then pic last   night   RESP: lungs clear   CV: regular rates and rhythm  LYMPHATICS: normal ant/post cervical and supraclavicular nodes  ABDOMEN: soft, nontender  MS: extremities normal  SKIN: ok  Psych: affect normal       Data   Lab Results   Component Value Date    WBC 6.6 02/16/2022    HGB 13.1 (L) 02/16/2022    HCT 40.3 02/16/2022     02/16/2022     02/16/2022    POTASSIUM 3.4 02/16/2022    CHLORIDE 105 02/16/2022    CO2 25 02/16/2022    BUN 7 02/16/2022    CR 0.66 02/16/2022     (H) 02/16/2022    SED 13 02/15/2022    NTBNPI 112 06/26/2021    TROPI <0.015 06/26/2021    AST 15 02/15/2022    ALT 38 02/15/2022    GGT 9 02/02/2022    ALKPHOS 59 02/15/2022    BILITOTAL 0.2 02/15/2022    DEJAN <9 (L) 11/01/2010    INR 1.01 03/24/2021     No results for input(s): CULT in the last 168 hours.  Recent Labs   Lab Test 07/04/21  1710 05/29/15  1138 05/29/15  1135 03/03/14  2225 03/03/14  2159   CULT No growth  No growth No Beta Streptococcus isolated No   growth No growth No growth            Blood Culture Peripheral Blood     Status: Normal (Preliminary result)    Specimen: Peripheral Blood   Result Value Ref Range    Culture No growth after 1 day    Blood Culture Arm, Right     Status: Normal (Preliminary result)    Specimen: Arm, Right; Blood   Result Value Ref Range    Culture No growth after 1 day    Abscess Aerobic Bacterial Culture Routine with Gram Stain     Status: None (Preliminary result)    Specimen: Neck, Right; Abscess   Result Value Ref Range    Culture No growth, less than 1 day      Gram Stain Result No organisms seen     Gram Stain Result 3+ WBC seen     Gram Stain Result 4+ Red blood cells seen    CBC with platelets differential     Status: None    Narrative    The following orders were created for panel order CBC with platelets differential.  Procedure                               Abnormality         Status                     ---------                               -----------         ------                     CBC with platelets and d...[[id number]]                      Final result                 Please view results for these tests on the individual orders.        Assessment and Plan:   30 y/o with right subplatysmal, intra-SCM  Abscess. CRP increasing.   Incision and Drainage performed at the bedside today.  Approximately 2cc purulent material drained. This was tolerated well.  Await blood cultures.  Continue zosyn for now.  Will recheck CRP and WBC in the AM.  Please call with questions or concerns.      Attestation:  I have reviewed today's vital signs, notes, medications, medication allergies, and PFSH/ROSlabs and imaging.    Иван Ambrosio MD

## 2022-02-16 NOTE — PROGRESS NOTES
VSS on RA, ex febrile (T max 100), hypertension at times. Pt clammy, diaphoretic for most of shift. A&Ox4, pupils 2 mm bilat, PERRLA. 5/5 muscle strength, but weakness noted when OOB. Place on LR 50 mL/hr due to poor intake - no output or intake charted on day shift. Discharge pending upon chemical dependency placement.

## 2022-02-16 NOTE — PLAN OF CARE
A/Ox4. VSS on RA, afebrile. Up SBA to the BR. Some soreness at the R neck injection site but refused PRN med for now since he want to eat supper. Good appetite. Denies chills but a little bit diaphoretic. Pending blood and abscess culture.

## 2022-02-17 LAB
CRP SERPL-MCNC: 50.1 MG/L (ref 0–8)
HBV SURFACE AB SERPL IA-ACNC: 2.59 M[IU]/ML
HBV SURFACE AG SERPL QL IA: NONREACTIVE
HCV AB SERPL QL IA: REACTIVE

## 2022-02-17 PROCEDURE — G0378 HOSPITAL OBSERVATION PER HR: HCPCS

## 2022-02-17 PROCEDURE — 36415 COLL VENOUS BLD VENIPUNCTURE: CPT | Performed by: OTOLARYNGOLOGY

## 2022-02-17 PROCEDURE — 96376 TX/PRO/DX INJ SAME DRUG ADON: CPT

## 2022-02-17 PROCEDURE — 86140 C-REACTIVE PROTEIN: CPT | Performed by: OTOLARYNGOLOGY

## 2022-02-17 PROCEDURE — 96361 HYDRATE IV INFUSION ADD-ON: CPT

## 2022-02-17 PROCEDURE — 250N000013 HC RX MED GY IP 250 OP 250 PS 637: Performed by: INTERNAL MEDICINE

## 2022-02-17 PROCEDURE — 250N000011 HC RX IP 250 OP 636: Performed by: INTERNAL MEDICINE

## 2022-02-17 PROCEDURE — 99226 PR SUBSEQUENT OBSERVATION CARE,LEVEL III: CPT | Performed by: INTERNAL MEDICINE

## 2022-02-17 RX ADMIN — PIPERACILLIN SODIUM AND TAZOBACTAM SODIUM 3.38 G: 3; .375 INJECTION, POWDER, LYOPHILIZED, FOR SOLUTION INTRAVENOUS at 16:53

## 2022-02-17 RX ADMIN — PIPERACILLIN SODIUM AND TAZOBACTAM SODIUM 3.38 G: 3; .375 INJECTION, POWDER, LYOPHILIZED, FOR SOLUTION INTRAVENOUS at 04:09

## 2022-02-17 RX ADMIN — BUPRENORPHINE AND NALOXONE 1 FILM: 8; 2 FILM, SOLUBLE BUCCAL; SUBLINGUAL at 22:20

## 2022-02-17 RX ADMIN — PRAZOSIN HYDROCHLORIDE 1 MG: 1 CAPSULE ORAL at 08:07

## 2022-02-17 RX ADMIN — PIPERACILLIN SODIUM AND TAZOBACTAM SODIUM 3.38 G: 3; .375 INJECTION, POWDER, LYOPHILIZED, FOR SOLUTION INTRAVENOUS at 10:56

## 2022-02-17 RX ADMIN — BUPROPION HYDROCHLORIDE 150 MG: 150 TABLET, EXTENDED RELEASE ORAL at 08:07

## 2022-02-17 RX ADMIN — EMTRICITABINE AND TENOFOVIR DISOPROXIL FUMARATE 1 TABLET: 200; 300 TABLET, FILM COATED ORAL at 08:07

## 2022-02-17 RX ADMIN — PIPERACILLIN SODIUM AND TAZOBACTAM SODIUM 3.38 G: 3; .375 INJECTION, POWDER, LYOPHILIZED, FOR SOLUTION INTRAVENOUS at 22:22

## 2022-02-17 RX ADMIN — BUPRENORPHINE AND NALOXONE 1 FILM: 8; 2 FILM, SOLUBLE BUCCAL; SUBLINGUAL at 08:08

## 2022-02-17 RX ADMIN — DOLUTEGRAVIR SODIUM 50 MG: 50 TABLET, FILM COATED ORAL at 08:07

## 2022-02-17 NOTE — PLAN OF CARE
Pt. A/Ox4. VSS on RA. SBA to the BR. Continent for B/B, uses BR. Complained of fatigue and neck pain, PRN Tylenol given w/ help. CMS intact except baseline numbness per pt. Voiding well per bathroom. ENT came this evening to aspirate R neck abscess.

## 2022-02-17 NOTE — PLAN OF CARE
Pt A&Ox4, VSS, easily aroused by voice and touch, CMS intact;complained of numbness to LLE, pt slept throughout this shift, IV running. Discharge planning for 2/17/22 (no set time) pt to go to [facility] first then to [facility] per RN case manager.

## 2022-02-17 NOTE — UTILIZATION REVIEW
Concurrent stay review; Secondary Review Determination    Under the authority of the Utilization Management Committee, the utilization review process indicated a secondary review on the above patient. The review outcome is based on review of the medical records, discussions with staff, and applying clinical experience noted on the date of the review.    (x) Observation Status Appropriate - Concurrent stay review        RATIONALE FOR DETERMINATION: 31-year-old male with history of Lund phentermine use disorder who presented to the hospital with a right neck abscess associated with injection drug use with 2 days of cold sweats, mild abdominal discomfort and CT imaging revealing a superficial abscess in the right side platysmal muscle that was incised and drained in the emergency room.  Patient has normal white blood count, temperature and shows no signs of progressive cellulitis in the area.  Observation care appropriate awaiting disposition.    Patient is clinically improving and there is no clear indication to change patient's status to inpatient. The severity of illness, intensity of service provided, expected LOS and risk for adverse outcome make the care appropriate for observation.    This document was produced using voice recognition software    The information on this document is developed by the utilization review team in order for the business office to ensure compliance. This only denotes the appropriateness of proper admission status and does not reflect the quality of care rendered.    The definitions of Inpatient Status and Observation Status used in making the determination above are those provided in the CMS Coverage Manual, Chapter 1 and Chapter 6, section 70.4.    Sincerely,    Marbin Cedillo MD  Utilization Review  Physician Advisor  [facility].

## 2022-02-17 NOTE — PROGRESS NOTES
[facility]    Medicine Progress Note - Hospitalist Service        Date of Admission:  2/15/2022  1:25 AM    Assessment & Plan:   Romeo Maldonado is a 31 year old male admitted on 2/15/2022. He presents to the emergency department with right neck abscess associated with injection drug use as feeling generally unwell for the past 2 days with cold sweats, some mild abdominal discomfort.     Injection site abscess of the right neck.  -Patient with approximately 1.5 cm superficial abscess over right sternocleidomastoid at site of missed injection with methamphetamine.  Reports feeling increasingly unwell over the past 2 days.  -Incision and drainage performed at the bedside by ENT, cultures negative thus far  -Trend CRP  -Blood cultures negative  -Continue IV Zosyn  -Appreciate infectious disease and ENT following, awaiting final antibiotics plan, likely will be established in the next 24 hours or so  -Care management consult to assist with facilitating transfer to inpatient chemical dependency treatment     Methamphetamine use disorder  GHB use disorder  Benzodiazepine use disorder  Opiate dependence with mild opiate withdrawal   -Patient reported diaphoresis, some mild abdominal discomfort, generally feeling unwell with cold sweats over the past 2 days  -Last IV heroin and methamphetamine use 2/13/2022.    -Continue prior to admission Suboxone 8-2 twice daily.  -Psychiatry consulted, appreciate their assistance.  They recommend proceed with treatment program once medically stabilized.  -Patient planning to go to [facility] after discharge and then to [facility] for chemical dependency treatment.     High risk HIV exposure  48 hours ago patient used a needle that he found at an HIV-positive acquaintance's home for IV drug use.  He is uncertain if this was a clean needle.  Believes his friend has an undetectable viral load.  -Continue postexposure prophylaxis with emtricitabine tenofovir and dolutegravir  "daily.  Discussed regimen with patient on admission, and he is agreeable; stated he will be able to remain on this medication as he plans to discharge directly to inpatient chemical dependency.  -Patient was tested and negative for HIV as of February 2.  -HIV antigen/antibody testing negative on 2/15/22.     Anxiety with depression  Insomnia  -Trazodone 50 mg at bedtime as needed for sleep.  -Gabapentin 200 mg 3 times daily as needed for anxiety.  -As needed hydroxyzine every 4 hours.  -Prazosin 1 mg daily.  -Wellbutrin  mg daily.  -Psychiatry consulted as noted above.     History of hepatitis C status post treatment  -Recent hep C RNA undetectable 2/2/2022.       Diet: Regular Diet Adult     DVT Prophylaxis: Pneumatic Compression Devices   Marie Catheter: Not present  Code Status: Full Code     Disposition Plan    Expected Discharge: 02/18/2022     Anticipated discharge location: inpatient rehabilitation facility (in patient chem dep)    Delays:       Entered: Shaw Tilley MD 02/17/2022, 9:51 AM        Clinically Significant Risk Factors Present on Admission          # Hypocalcemia: Ca = 7.8 mg/dL (Ref range: 8.5 - 10.1 mg/dL) and/or iCa = N/A on admission, will replace as needed        # Overweight: Estimated body mass index is 25.83 kg/m  as calculated from the following:    Height as of this encounter: 1.778 m (5' 10\").    Weight as of this encounter: 81.6 kg (180 lb).        The patient's care was discussed with the Bedside Nurse, Patient and Patient's Family.    Shaw Tilley MD  Hospitalist Service  [facility]  Text Page 7AM-6PM  Securely message with the Vocera Web Console (learn more here)  Text page via [employer] Paging/Directory    ______________________________________________________________________    Interval History   Overall feels much better.  Pain and range of movement on the right side of the neck much improved.  Afebrile.  No nausea vomiting or " "diarrhea.    Data reviewed today: I reviewed all medications, new labs and imaging results over the last 24 hours. I personally reviewed no images or EKG's today.    Physical Exam   Vital signs:  Temp: 98.2  F (36.8  C) Temp src: Oral BP: 112/75 Pulse: 57   Resp: 16 SpO2: 96 % O2 Device: None (Room air)   Height: 177.8 cm (5' 10\") Weight: 81.6 kg (180 lb)  Estimated body mass index is 25.83 kg/m  as calculated from the following:    Height as of this encounter: 1.778 m (5' 10\").    Weight as of this encounter: 81.6 kg (180 lb).      Wt Readings from Last 2 Encounters:   02/15/22 81.6 kg (180 lb)   02/02/22 83 kg (183 lb)       Gen: AAOX3, NAD, comfortable  HEENT: Right side of the neck minimally swollen with mild tenderness, improved range of motion  Resp: CTA B/L, normal WOB, no crackles, no wheezes  CVS: RRR, no murmur  Abd/GI: Soft, non-tender. BS- normoactive.    Skin: Warm, dry no rashes  MSK: No joint deformities, no pedal edema  Neuro- CN- intact. No focal deficits.       Data   Recent Labs   Lab 02/16/22  1942 02/16/22  0701 02/15/22  1359 02/15/22  0145   WBC 6.5 6.6  --  11.0   HGB 12.7* 13.1*  --  13.8   MCV 84 85  --  86    220  --  313   NA  --  133  --  138   POTASSIUM  --  3.4  --  3.4   CHLORIDE  --  105  --  102   CO2  --  25  --  29   BUN  --  7  --  16   CR  --  0.66  --  0.66   ANIONGAP  --  3  --  7   NILA  --  7.8*  --  8.6   GLC  --  102* 100* 121*   ALBUMIN  --   --   --  3.4   PROTTOTAL  --   --   --  7.3   BILITOTAL  --   --   --  0.2   ALKPHOS  --   --   --  59   ALT  --   --   --  38   AST  --   --   --  15       No results found for this or any previous visit (from the past 24 hour(s)).  Medications     lactated ringers 50 mL/hr at 02/16/22 2305       buprenorphine HCl-naloxone HCl  1 Film Sublingual BID     buPROPion  150 mg Oral Daily     dolutegravir  50 mg Oral Daily    And     emtricitabine-tenofovir  1 tablet Oral Daily     piperacillin-tazobactam  3.375 g Intravenous Q6H     " prazosin  1 mg Oral Daily     sodium chloride (PF)  3 mL Intracatheter Q8H

## 2022-02-17 NOTE — PROGRESS NOTES
Care Management Follow Up    Length of Stay (days): 0    Expected Discharge Date: 02/18/2022     Concerns to be Addressed: discharge planning, substance/tobacco abuse/use     Patient plan of care discussed at interdisciplinary rounds: Yes    Anticipated Discharge Disposition: Inpatient Chemical Dependency     Anticipated Discharge Services:    Anticipated Discharge DME:      Patient/family educated on Medicare website which has current facility and service quality ratings: no  Education Provided on the Discharge Plan:    Patient/Family in Agreement with the Plan: yes    Referrals Placed by CM/SW:    Private pay costs discussed: Not applicable    Additional Information:  Writer faxed the ID note from 2/16 to [phone].    Writer [facility].  Discussed that we are still waiting on culture resutls and likely Romeo will be ready tomorrow for discharge if the cx are negative and/or he does not need IV abx.  Also discussed he needs to be MRSA negative to go to [facility] and we await the cx for that as well.  Writer explained there will be a different CC tomorrow who will be able to reaad my notes and follow the plan.  Lashae (this is her name), RN can be reached at [phone].    Writer spoke with Dr. Tilley by phone and per Dr. SLATER, we are watching the cx and patient most likely will be ready to discharge tomorrow once the ID plan is in place.    Writer called pts christophe Page and let her know the above plan.  CC will keep her updated    Cherise Bradshaw RN

## 2022-02-17 NOTE — PROGRESS NOTES
[facility]    Infectious Disease Progress Note    Date of Service (when I saw the patient): 02/17/2022     Assessment & Plan   Romeo Maldonado is a 31 year old male who was admitted on 2/15/2022.     Impression:  1. 31 y.o with substance abuse.   2. Admitted with neck abscesses at the site of recent meth injection use.   3. Also concern for blood exposure by using dirty needle from a known HIV positive patient. (though patient not sure if needle was dirty )   4. Started on zosyn and Post exposure prophylaxis.   5. CT neck with superficial abscesses   6. Cultures are pending   7. Previously treated hep C    Recommendations:   1. Continue zosyn   2. MRSA PCR from the nares is negative.   3. Seen by ENT s/p bedside I and D     Malick Mcgowan MD    Interval History   Tolerating antibiotics ok   No new rashes or issues with antibiotics   Labs reviewed   Afebrile   No positive micro yet     Physical Exam   Temp: 98.2  F (36.8  C) Temp src: Oral BP: 112/75 Pulse: 57   Resp: 16 SpO2: 96 % O2 Device: None (Room air)    Vitals:    02/15/22 0031   Weight: 81.6 kg (180 lb)     Vital Signs with Ranges  Temp:  [97.8  F (36.6  C)-99.4  F (37.4  C)] 98.2  F (36.8  C)  Pulse:  [56-79] 57  Resp:  [16] 16  BP: (112-145)/(69-96) 112/75  SpO2:  [95 %-98 %] 96 %    Constitutional: Awake, alert, cooperative, no apparent distress  Lungs: Clear to auscultation bilaterally, no crackles or wheezing  Cardiovascular: Regular rate and rhythm, normal S1 and S2, and no murmur noted  Abdomen: Normal bowel sounds, soft, non-distended, non-tender  Skin: No rashes, no cyanosis, no edema  Other:    Medications       buprenorphine HCl-naloxone HCl  1 Film Sublingual BID     buPROPion  150 mg Oral Daily     dolutegravir  50 mg Oral Daily    And     emtricitabine-tenofovir  1 tablet Oral Daily     piperacillin-tazobactam  3.375 g Intravenous Q6H     prazosin  1 mg Oral Daily     sodium chloride (PF)  3 mL Intracatheter Q8H       Data    All microbiology laboratory data reviewed.  Recent Labs   Lab Test 02/16/22  1942 02/16/22  0701 02/15/22  0145   WBC 6.5 6.6 11.0   HGB 12.7* 13.1* 13.8   HCT 38.1* 40.3 42.7   MCV 84 85 86    220 313     Recent Labs   Lab Test 02/16/22  0701 02/15/22  0145 02/02/22  1240   CR 0.66 0.66 0.70     Recent Labs   Lab Test 02/15/22  0145   SED 13     Recent Labs   Lab Test 07/04/21  1710 05/29/15  1138 05/29/15  1135 03/03/14  2225 03/03/14  2159   CULT No growth  No growth No Beta Streptococcus isolated No growth No growth No growth

## 2022-02-18 VITALS
SYSTOLIC BLOOD PRESSURE: 100 MMHG | HEIGHT: 70 IN | DIASTOLIC BLOOD PRESSURE: 72 MMHG | BODY MASS INDEX: 25.77 KG/M2 | OXYGEN SATURATION: 96 % | WEIGHT: 180 LBS | TEMPERATURE: 98.3 F | RESPIRATION RATE: 16 BRPM | HEART RATE: 61 BPM

## 2022-02-18 LAB — CRP SERPL-MCNC: 20.2 MG/L (ref 0–8)

## 2022-02-18 PROCEDURE — G0378 HOSPITAL OBSERVATION PER HR: HCPCS

## 2022-02-18 PROCEDURE — 99217 PR OBSERVATION CARE DISCHARGE: CPT | Performed by: INTERNAL MEDICINE

## 2022-02-18 PROCEDURE — 250N000013 HC RX MED GY IP 250 OP 250 PS 637: Performed by: INTERNAL MEDICINE

## 2022-02-18 PROCEDURE — 96376 TX/PRO/DX INJ SAME DRUG ADON: CPT

## 2022-02-18 PROCEDURE — 36415 COLL VENOUS BLD VENIPUNCTURE: CPT | Performed by: INTERNAL MEDICINE

## 2022-02-18 PROCEDURE — 250N000011 HC RX IP 250 OP 636: Performed by: INTERNAL MEDICINE

## 2022-02-18 PROCEDURE — 86140 C-REACTIVE PROTEIN: CPT | Performed by: INTERNAL MEDICINE

## 2022-02-18 RX ORDER — ALBUTEROL SULFATE 90 UG/1
2 AEROSOL, METERED RESPIRATORY (INHALATION) EVERY 6 HOURS PRN
Status: DISCONTINUED | OUTPATIENT
Start: 2022-02-18 | End: 2022-02-18 | Stop reason: HOSPADM

## 2022-02-18 RX ORDER — EMTRICITABINE AND TENOFOVIR DISOPROXIL FUMARATE 200; 300 MG/1; MG/1
1 TABLET, FILM COATED ORAL DAILY
Qty: 24 TABLET | Refills: 0 | Status: SHIPPED | OUTPATIENT
Start: 2022-02-19 | End: 2022-04-25

## 2022-02-18 RX ORDER — DESVENLAFAXINE 50 MG/1
50 TABLET, FILM COATED, EXTENDED RELEASE ORAL DAILY
Status: DISCONTINUED | OUTPATIENT
Start: 2022-02-18 | End: 2022-02-18 | Stop reason: HOSPADM

## 2022-02-18 RX ORDER — FOLIC ACID 1 MG/1
1 TABLET ORAL DAILY
Status: DISCONTINUED | OUTPATIENT
Start: 2022-02-18 | End: 2022-02-18 | Stop reason: HOSPADM

## 2022-02-18 RX ADMIN — THIAMINE HCL TAB 100 MG 100 MG: 100 TAB at 10:58

## 2022-02-18 RX ADMIN — PIPERACILLIN SODIUM AND TAZOBACTAM SODIUM 3.38 G: 3; .375 INJECTION, POWDER, LYOPHILIZED, FOR SOLUTION INTRAVENOUS at 10:40

## 2022-02-18 RX ADMIN — FOLIC ACID 1 MG: 1 TABLET ORAL at 10:58

## 2022-02-18 RX ADMIN — DESVENLAFAXINE SUCCINATE 50 MG: 50 TABLET, EXTENDED RELEASE ORAL at 12:34

## 2022-02-18 RX ADMIN — BUPROPION HYDROCHLORIDE 150 MG: 150 TABLET, EXTENDED RELEASE ORAL at 09:11

## 2022-02-18 RX ADMIN — BUPRENORPHINE AND NALOXONE 1 FILM: 8; 2 FILM, SOLUBLE BUCCAL; SUBLINGUAL at 09:11

## 2022-02-18 RX ADMIN — EMTRICITABINE AND TENOFOVIR DISOPROXIL FUMARATE 1 TABLET: 200; 300 TABLET, FILM COATED ORAL at 09:18

## 2022-02-18 RX ADMIN — PIPERACILLIN SODIUM AND TAZOBACTAM SODIUM 3.38 G: 3; .375 INJECTION, POWDER, LYOPHILIZED, FOR SOLUTION INTRAVENOUS at 04:11

## 2022-02-18 RX ADMIN — PRAZOSIN HYDROCHLORIDE 1 MG: 1 CAPSULE ORAL at 09:11

## 2022-02-18 RX ADMIN — DOLUTEGRAVIR SODIUM 50 MG: 50 TABLET, FILM COATED ORAL at 09:11

## 2022-02-18 NOTE — PROGRESS NOTES
[facility]    Infectious Disease Progress Note    Date of Service : 02/18/2022    Assessment  1. 31 y.o with substance abuse who was admitted with neck abscesses at the site of recent meth injection. Blood and abscess cxs negative . CT neck with superficial abscess s/p bedside I&D  2. Also concern for blood exposure by using dirty needle from a known HIV positive patient. (though patient not sure if needle was dirty )   3. Previously treated hep C    Recommendations  1. Zosyn to Unasyn.  Treat for 10 days  2. Continue post exposure prophylaxis with Dolutegravir/Truvada for a total of 28 days  3. Will need HIV testing at 6 weeks and 4 months    Erin Gamboa MD    Interval History   Patient was seen and examined, chart reviewed  Resting, feels better, feels neck pain and redness has improved, able to turn head, still has some induration     Physical Exam   Temp: 98.4  F (36.9  C) Temp src: Oral BP: 125/73 Pulse: 55   Resp: 16 SpO2: 96 % O2 Device: None (Room air)    Vitals:    02/15/22 0031   Weight: 81.6 kg (180 lb)     Vital Signs with Ranges  Temp:  [97.7  F (36.5  C)-98.6  F (37  C)] 98.4  F (36.9  C)  Pulse:  [55-67] 55  Resp:  [16-18] 16  BP: (108-134)/(60-80) 125/73  SpO2:  [96 %-98 %] 96 %    Constitutional: Awake, alert, cooperative, no apparent distress  Neck : right neck induration improving, no cellulitis  Lungs: Clear to auscultation bilaterally, no crackles or wheezing  Cardiovascular: Regular rate and rhythm, normal S1 and S2, and no murmur noted  Abdomen: Normal bowel sounds, soft, non-distended, non-tender  Skin: No rashes, no cyanosis, no edema    Other:    Medications       buprenorphine HCl-naloxone HCl  1 Film Sublingual BID     buPROPion  150 mg Oral Daily     desvenlafaxine  50 mg Oral Daily     dolutegravir  50 mg Oral Daily    And     emtricitabine-tenofovir  1 tablet Oral Daily     folic acid  1 mg Oral Daily     piperacillin-tazobactam  3.375 g Intravenous Q6H      prazosin  1 mg Oral Daily     sodium chloride (PF)  3 mL Intracatheter Q8H     thiamine  100 mg Oral Daily       Data   All microbiology laboratory data reviewed.  Recent Labs   Lab Test 02/16/22  1942 02/16/22  0701 02/15/22  0145   WBC 6.5 6.6 11.0   HGB 12.7* 13.1* 13.8   HCT 38.1* 40.3 42.7   MCV 84 85 86    220 313     Recent Labs   Lab Test 02/16/22  0701 02/15/22  0145 02/02/22  1240   CR 0.66 0.66 0.70     Recent Labs   Lab Test 02/15/22  0145   SED 13

## 2022-02-18 NOTE — PROGRESS NOTES
[facility]    Otolaryngology Progress Note     Assessment & Plan   Romeo Maldonado is a 31 year old male who was admitted on 2/15/2022. S/P right neck I&D appx. 36 hours ago for sub-platysmal SCM abscess.  Pain and ROM improved.  Persistent palpable induration with no palpable discrete abscess reformation.  Recommend CRP this morning - pending.  Continue IV Zosyn with conversion to PO per ID tomorrow AM.  No dressing necessary.  No ENT f/u necessary.  Recommend f/u with PMD.  Pt to shower. Call with questions/concerns.      Иван Ambrosio MD    Interval History   Per above    Physical Exam   Temp: 98.2  F (36.8  C) Temp src: Oral BP: 125/73 Pulse: 55   Resp: 16 SpO2: 96 % O2 Device: None (Room air)    Vitals:    02/15/22 0031   Weight: 81.6 kg (180 lb)     Vital Signs with Ranges  Temp:  [97.7  F (36.5  C)-98.6  F (37  C)] 98.2  F (36.8  C)  Pulse:  [55-67] 55  Resp:  [16-18] 16  BP: (108-134)/(60-80) 125/73  SpO2:  [96 %-98 %] 96 %  I/O last 3 completed shifts:  In: 260 [P.O.:260]  Out: 800 [Urine:800]    Constitutional: awake, alert, cooperative, no apparent distress, and appears stated age  Hematologic / Lymphatic: no cervical lymphadenopathy and I&D site intact.  No palpable abscess reformation  Skin: mild redness with tape sensitivity    Medications       buprenorphine HCl-naloxone HCl  1 Film Sublingual BID     buPROPion  150 mg Oral Daily     dolutegravir  50 mg Oral Daily    And     emtricitabine-tenofovir  1 tablet Oral Daily     piperacillin-tazobactam  3.375 g Intravenous Q6H     prazosin  1 mg Oral Daily     sodium chloride (PF)  3 mL Intracatheter Q8H       Data   No results found for this or any previous visit (from the past 24 hour(s)).

## 2022-02-18 NOTE — PROGRESS NOTES
Care Management Discharge Note    Discharge Date: 02/18/2022       Discharge Disposition: Inpatient Treatment Center (see below)    Discharge Services:  [facility] ([phone])-Patient will discharge today to this treatment center first.   [facility] ([phone])-Patient will plan to go here after his pre-authorization has been approved with his Insurance.    Discharge DME:  none    Discharge Transportation: Patient's mother Kaylee will pick him up.    Private pay costs discussed: Not applicable    PAS Confirmation Code:  N/A  Patient/family educated on Medicare website which has current facility and service quality ratings: N/A    Education Provided on the Discharge Plan:  yes  Persons Notified of Discharge Plans: Patient, patient's mother Kaylee, bedside RN Adrienne Mora at [facility], Trupti at [facility].  Patient/Family in Agreement with the Plan: yes    Handoff Referral Completed: No    Additional Information:  Writer was informed that patient is ready to discharge today. Patient's mother will be picking him up at 1600 and taking him directly to [facility]. He will plan to stay there until his Insurance Pre-Authorization has been approved. He will then transfer to [facility].   Writer has been in contact with both Liaisons from [facility] (Trupti) and [facility] (Adrienne). Packets were made for both [facility] and [facility]. Plan is for patient's mother Kaylee to bring these packets to both places when he arrives. Orders were also faxed to Adrienne at [phone], Trupti at [phone]. No further discharge needs.      Shahrzad Felix RN  Care Coordinator  [phone]

## 2022-02-18 NOTE — DISCHARGE SUMMARY
[facility]    Discharge Summary  Hospitalist    Date of Admission:  2/15/2022  Date of Discharge:  2/18/2022  Discharging Provider: Shaw Tilley MD    Discharge Diagnoses     Injection site abscess of the right neck.  Methamphetamine use disorder  GHB use disorder  Benzodiazepine use disorder  Opiate dependence with mild opiate withdrawal   High risk HIV exposure  Anxiety with depression  Insomnia  History of hepatitis C status post treatment      Hospital Course:    Romeo Maldonado is a 31 year old male admitted on 2/15/2022. He presents to the emergency department with right neck abscess associated with injection drug use as feeling generally unwell for the past 2 days with cold sweats, some mild abdominal discomfort.     Injection site abscess of the right neck.  -Patient with approximately 1.5 cm superficial abscess over right sternocleidomastoid at site of missed injection with methamphetamine.  Reports feeling increasingly unwell over the past 2 days.  -Incision and drainage performed at the bedside by ENT, cultures negative thus far  -CRP trending down  -Blood cultures and wound culture negative thus far  -Initially placed on IV Zosyn.  ID recommends 10 more days of Augmentin at the time of discharge.  -Appreciate infectious disease and ENT following  -He will transfer to inpatient chemical dependency treatment      Methamphetamine use disorder  GHB use disorder  Benzodiazepine use disorder  Opiate dependence with mild opiate withdrawal   -Patient reported diaphoresis, some mild abdominal discomfort, generally feeling unwell with cold sweats over the past 2 days  -Last IV heroin and methamphetamine use 2/13/2022.    -Continue prior to admission Suboxone 8-2 twice daily.  -Psychiatry consulted, appreciate their assistance.  They recommend proceed with treatment program once medically stabilized.  -Patient planning to go to [facility] after discharge and then to [place] for  chemical dependency treatment.     High risk HIV exposure  48 hours ago patient used a needle that he found at an HIV-positive acquaintance's home for IV drug use.  He is uncertain if this was a clean needle.  Believes his friend has an undetectable viral load.  -Continue postexposure prophylaxis with emtricitabine tenofovir and dolutegravir daily.   -Patient was tested and negative for HIV as of February 2.  -HIV antigen/antibody testing negative on 2/15/22.  -Patient is recommended to continue high risk postexposure prophylaxis.  He will be on above regimen for another 24 days to complete a 28-day course.  -He will need repeat HIV testing in 6 weeks and 4 months from now     Anxiety with depression  Insomnia  -Trazodone 50 mg at bedtime as needed for sleep.  -Gabapentin 200 mg 3 times daily as needed for anxiety.  -Prazosin 1 mg daily.  -Wellbutrin  mg daily.  -Psychiatry consulted as noted above.     History of hepatitis C status post treatment  -Recent hep C RNA undetectable 2/2/2022.       Shaw Tilley MD    Significant Results and Procedures   See below    Pending Results     Unresulted Labs Ordered in the Past 30 Days of this Admission     Date and Time Order Name Status Description    2/15/2022  3:27 AM Abscess Aerobic Bacterial Culture Routine with Gram Stain Preliminary     2/15/2022  1:34 AM Blood Culture Arm, Right Preliminary     2/15/2022  1:34 AM Blood Culture Peripheral Blood Preliminary           Code Status   Full Code       Primary Care Physician   Jean Pierre Simon    Physical Exam   Temp: 98.3  F (36.8  C) Temp src: Oral BP: 100/72 Pulse: 61   Resp: 16 SpO2: 96 % O2 Device: None (Room air)      Constitutional: AAOX3, NAD  Neck-right-sided neck with small induration, tenderness much improved, range of motion much improved  Respiratory: CTA B/L, Normal WOB, No crackles or wheezes  Cardiovascular: RRR, No murmur  GI: Soft, Non- tender, BS- normoactive  Neuro: CN- grossly intact, Motor  strength 5/5 on all 4 extremities.     Discharge Disposition   Discharged to home  Condition at discharge: Stable    Consultations This Hospital Stay   CARE MANAGEMENT / SOCIAL WORK IP CONSULT  PSYCHIATRY IP CONSULT  INFECTIOUS DISEASES IP CONSULT  ENT IP CONSULT    Time Spent on this Encounter   IShaw MD, personally saw the patient today and spent greater than 30 minutes discharging this patient.    Discharge Orders      Activity    Your activity upon discharge: activity as tolerated     Follow-up and recommended labs and tests    Follow up with primary care provider, Jean Pierre Simon, within 7 days for hospital follow- up.  Needs HIV testing at 6 weeks and 4 months     Diet    Follow this diet upon discharge: Orders Placed This Encounter      Regular Diet Adult     Discharge Medications   Current Discharge Medication List      START taking these medications    Details   amoxicillin-clavulanate (AUGMENTIN) 875-125 MG tablet Take 1 tablet by mouth every 12 hours  Qty: 20 tablet, Refills: 0    Associated Diagnoses: Abscess of neck      dolutegravir (TIVICAY) 50 MG tablet Take 1 tablet (50 mg) by mouth daily  Qty: 24 tablet, Refills: 0    Associated Diagnoses: Exposure to human immunodeficiency virus      emtricitabine-tenofovir (TRUVADA) 200-300 MG per tablet Take 1 tablet by mouth daily  Qty: 24 tablet, Refills: 0    Associated Diagnoses: Exposure to human immunodeficiency virus         CONTINUE these medications which have NOT CHANGED    Details   albuterol (PROAIR HFA/PROVENTIL HFA/VENTOLIN HFA) 108 (90 Base) MCG/ACT inhaler Inhale 2 puffs into the lungs every 6 hours as needed  Qty: 8.5 g, Refills: 0    Comments: Pharmacy may dispense brand covered by insurance (Proair, or proventil or ventolin or generic albuterol inhaler)  Associated Diagnoses: Hypoxia      buprenorphine HCl-naloxone HCl (SUBOXONE) 8-2 MG per film Place 1 Film under the tongue 2 times daily  Qty: 60 Film, Refills: 0    Comments:  BRIAN: [id number]  Associated Diagnoses: Opioid overdose, accidental or unintentional, initial encounter (H)      buPROPion (WELLBUTRIN SR) 150 MG 12 hr tablet Take 1 tablet (150 mg) by mouth daily  Qty: 30 tablet, Refills: 0    Associated Diagnoses: Current episode of major depressive disorder without prior episode, unspecified depression episode severity      desvenlafaxine (PRISTIQ) 50 MG 24 hr tablet Take 50 mg by mouth daily      folic acid (FOLVITE) 1 MG tablet Take 1 tablet (1 mg) by mouth daily  Qty: 30 tablet, Refills: 0    Associated Diagnoses: Opioid overdose, accidental or unintentional, initial encounter (H)      gabapentin (NEURONTIN) 100 MG capsule Take 2 capsules (200 mg) by mouth 3 times daily as needed (anxiety)  Qty: 30 capsule, Refills: 1    Associated Diagnoses: Opioid overdose, accidental or unintentional, initial encounter (H)      hydrOXYzine (ATARAX) 25 MG tablet Take 1 tablet (25 mg) by mouth every 4 hours as needed for anxiety  Qty: 30 tablet, Refills: 0    Associated Diagnoses: Opioid overdose, accidental or unintentional, initial encounter (H)      multivitamin w/minerals (THERA-VIT-M) tablet Take 1 tablet by mouth daily      naloxone (NARCAN) 4 MG/0.1ML nasal spray Spray 1 spray (4 mg) into one nostril alternating nostrils once as needed every 2-3 minutes until assistance arrives  Qty: 0.2 mL, Refills: 1    Associated Diagnoses: Acute renal failure, unspecified acute renal failure type (H)      nicotine (NICORETTE) 4 MG gum Place 1 each (4 mg) inside cheek every hour as needed for other (nicotine withdrawal symptoms)  Qty: 30 each, Refills: 0    Associated Diagnoses: Opioid overdose, accidental or unintentional, initial encounter (H)      prazosin (MINIPRESS) 1 MG capsule Take 1 capsule (1 mg) by mouth daily  Qty: 30 capsule, Refills: 0    Associated Diagnoses: Opioid overdose, accidental or unintentional, initial encounter (H)      propranolol (INDERAL) 10 MG tablet Take 1 tablet (10  mg) by mouth 3 times daily as needed  Qty: 30 tablet, Refills: 3    Associated Diagnoses: Opioid overdose, accidental or unintentional, initial encounter (H)      thiamine (B-1) 100 MG tablet Take 1 tablet (100 mg) by mouth daily  Qty: 30 tablet, Refills: 0    Associated Diagnoses: Opioid overdose, accidental or unintentional, initial encounter (H)      traZODone (DESYREL) 50 MG tablet Take 1 tablet (50 mg) by mouth nightly as needed for sleep (may repeat after 60 minutes)  Qty: 60 tablet, Refills: 1    Associated Diagnoses: Primary insomnia           Allergies   Allergies   Allergen Reactions     Ambien [Zolpidem] Shortness Of Breath     wheezing     Pollen Extract Unknown     Data   Most Recent 3 CBC's:  Recent Labs   Lab Test 02/16/22  1942 02/16/22  0701 02/15/22  0145   WBC 6.5 6.6 11.0   HGB 12.7* 13.1* 13.8   MCV 84 85 86    220 313      Most Recent 3 BMP's:  Recent Labs   Lab Test 02/16/22  0701 02/15/22  1359 02/15/22  0145 02/02/22  1240     --  138 137   POTASSIUM 3.4  --  3.4 3.6   CHLORIDE 105  --  102 103   CO2 25  --  29 28   BUN 7  --  16 21   CR 0.66  --  0.66 0.70   ANIONGAP 3  --  7 6   NILA 7.8*  --  8.6 9.0   * 100* 121* 135*     Most Recent 2 LFT's:  Recent Labs   Lab Test 02/15/22  0145 02/02/22  1240   AST 15 29   ALT 38 31   ALKPHOS 59 63   BILITOTAL 0.2 0.8     Most Recent INR's and Anticoagulation Dosing History:  Anticoagulation Dose History     Recent Dosing and Labs Latest Ref Rng & Units 3/24/2021    INR 0.86 - 1.14 1.01        Most Recent 3 Troponin's:  Recent Labs   Lab Test 06/26/21  1349 06/26/21  1139   TROPI <0.015 0.018     Most Recent Cholesterol Panel:  Recent Labs   Lab Test 07/07/21  0735   CHOL 174   *   HDL 45   TRIG 137     Most Recent 6 Bacteria Isolates From Any Culture (See EPIC Reports for Culture Details):  Recent Labs   Lab Test 07/04/21  1710 05/29/15  1138 05/29/15  1135 03/03/14  2225 03/03/14  2159   CULT No growth  No growth No Beta  Streptococcus isolated No growth No growth No growth     Most Recent TSH, T4 and A1c Labs:  Recent Labs   Lab Test 07/07/21  0735   TSH 0.45       Results for orders placed or performed during the hospital encounter of 02/15/22   CT Soft Tissue Neck w Contrast    Narrative    EXAM: CT SOFT TISSUE NECK W CONTRAST  LOCATION: [facility]  DATE/TIME: 2/15/2022 2:23 AM    INDICATION: Neck abscess, deep tissue  COMPARISON: None.  CONTRAST: 80  ml Isovue 370  TECHNIQUE: Routine CT Soft Tissue Neck with IV contrast. Multiplanar reformats. Dose reduction techniques were used.    FINDINGS:   Right anterior lateral sternocleidomastoid muscle at the level of the thyroid gland demonstrate a rim-enhancing fluid collection measuring 12 mm concerning for abscess. The fluid collection is approximately 5 mm from the skin surface. This is likely   drainable. Right sternocleidomastoid muscle is enlarge concerning for myositis. There is surrounding fat stranding consistent with cellulitis.    MUCOSAL SPACES/SOFT TISSUES: Normal mucosal spaces of the upper aerodigestive tract. No mucosal mass or inflammation identified. Normal vocal cords and infraglottic trachea. Normal parapharyngeal space and subcutaneous soft tissues. Normal oral cavity,    spaces, and floor of mouth structures.    LYMPH NODES: Multiple small and prominent lymph nodes within the neck. No pathologic lymph nodes by size or morphology criteria.     SALIVARY GLANDS: Normal parotid and submandibular glands.    THYROID: Normal.     VESSELS: Vascular structures of the neck are patent. 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    VISUALIZED INTRACRANIAL/ORBITS/SINUSES: No abnormality of the visualized intracranial compartment or orbits. Visualized paranasal sinuses and mastoid air cells are clear.    OTHER: Mild degenerative changes spine. No destructive osseous lesion. The included lung apices are clear.  Infrahyoid small thyroglossal duct cyst measuring 2 mm. Multiple cavities.      Impression    IMPRESSION:   1.  Right anterior lateral sternocleidomastoid muscle at the level of the thyroid gland demonstrate a rim-enhancing fluid collection measuring 12 mm concerning for abscess. The fluid collection is approximately 5 mm from the skin surface. This is likely   drainable.   2.  Right sternocleidomastoid muscle is enlarge concerning for myositis.   3.  There is surrounding fat stranding consistent with cellulitis.

## 2022-02-18 NOTE — PLAN OF CARE
Pt is A&Ox4, VSS, IV saline locked, independent with activities, pt denied pain, neck dressing is clean, dry, and intact. Pt is on regular diet. Pt slept well throughout this shift. Discharge planning is pending awaiting culture results per  RN.

## 2022-02-18 NOTE — PROGRESS NOTES
Patient is set for discharge to a treatment facility today. Discharge instructions reviewed with patient. Patient has been transitioned to oral antibiotics for abscess. Patient to remain on HIV Proph. Treatment.Facility and patient notified. VSS.abscess around neck remain CDI with small induration. Family member to  patient on the floor.

## 2022-02-18 NOTE — PROGRESS NOTES
Pt A&Ox4, VSS, CMS intact, continues to report numbness to LLE (toes), pt slept off and on throughout shift. Dressing on neck CDI. Reports that discomfort is better today. Refused interventions.

## 2022-02-20 LAB
BACTERIA ABSC ANAEROBE+AEROBE CULT: NO GROWTH
BACTERIA BLD CULT: NO GROWTH
BACTERIA BLD CULT: NO GROWTH
GRAM STAIN RESULT: NORMAL

## 2022-03-05 ENCOUNTER — HEALTH MAINTENANCE LETTER (OUTPATIENT)
Age: 32
End: 2022-03-05

## 2022-04-25 ENCOUNTER — OFFICE VISIT (OUTPATIENT)
Dept: BEHAVIORAL HEALTH | Facility: CLINIC | Age: 32
End: 2022-04-25
Payer: COMMERCIAL

## 2022-04-25 VITALS
DIASTOLIC BLOOD PRESSURE: 88 MMHG | BODY MASS INDEX: 28.06 KG/M2 | SYSTOLIC BLOOD PRESSURE: 149 MMHG | HEIGHT: 70 IN | HEART RATE: 73 BPM | WEIGHT: 196 LBS

## 2022-04-25 DIAGNOSIS — F11.20 OPIOID USE DISORDER, SEVERE, DEPENDENCE (H): Primary | ICD-10-CM

## 2022-04-25 DIAGNOSIS — F11.90 OPIOID USE DISORDER: Primary | ICD-10-CM

## 2022-04-25 DIAGNOSIS — F32.9 CURRENT EPISODE OF MAJOR DEPRESSIVE DISORDER WITHOUT PRIOR EPISODE, UNSPECIFIED DEPRESSION EPISODE SEVERITY: ICD-10-CM

## 2022-04-25 DIAGNOSIS — F19.10 POLYSUBSTANCE ABUSE (H): ICD-10-CM

## 2022-04-25 PROCEDURE — 99207 PR SELF-HELP/PEER SVC PER 15 MIN: CPT

## 2022-04-25 PROCEDURE — 99204 OFFICE O/P NEW MOD 45 MIN: CPT | Performed by: FAMILY MEDICINE

## 2022-04-25 RX ORDER — ALBUTEROL SULFATE 90 UG/1
1-2 AEROSOL, METERED RESPIRATORY (INHALATION)
Status: CANCELLED
Start: 2022-04-25

## 2022-04-25 RX ORDER — DIPHENHYDRAMINE HYDROCHLORIDE 50 MG/ML
50 INJECTION INTRAMUSCULAR; INTRAVENOUS
Status: CANCELLED
Start: 2022-04-25

## 2022-04-25 RX ORDER — BUPROPION HYDROCHLORIDE 300 MG/1
300 TABLET ORAL EVERY MORNING
COMMUNITY
Start: 2022-04-22 | End: 2022-05-12

## 2022-04-25 RX ORDER — EPINEPHRINE 1 MG/ML
0.3 INJECTION, SOLUTION, CONCENTRATE INTRAVENOUS EVERY 5 MIN PRN
Status: CANCELLED | OUTPATIENT
Start: 2022-04-25

## 2022-04-25 RX ORDER — BUPRENORPHINE AND NALOXONE 4; 1 MG/1; MG/1
FILM, SOLUBLE BUCCAL; SUBLINGUAL
Qty: 5 FILM | Refills: 0 | Status: ON HOLD | OUTPATIENT
Start: 2022-04-25 | End: 2022-05-12

## 2022-04-25 RX ORDER — LIDOCAINE HYDROCHLORIDE 10 MG/ML
2 INJECTION, SOLUTION EPIDURAL; INFILTRATION; INTRACAUDAL; PERINEURAL ONCE
Status: CANCELLED | OUTPATIENT
Start: 2022-04-25 | End: 2022-04-25

## 2022-04-25 RX ORDER — METHYLPREDNISOLONE SODIUM SUCCINATE 125 MG/2ML
125 INJECTION, POWDER, LYOPHILIZED, FOR SOLUTION INTRAMUSCULAR; INTRAVENOUS
Status: CANCELLED
Start: 2022-04-25

## 2022-04-25 RX ORDER — NALOXONE HYDROCHLORIDE 0.4 MG/ML
0.2 INJECTION, SOLUTION INTRAMUSCULAR; INTRAVENOUS; SUBCUTANEOUS
Status: CANCELLED | OUTPATIENT
Start: 2022-04-25

## 2022-04-25 RX ORDER — ALBUTEROL SULFATE 0.83 MG/ML
2.5 SOLUTION RESPIRATORY (INHALATION)
Status: CANCELLED | OUTPATIENT
Start: 2022-04-25

## 2022-04-25 RX ORDER — LURASIDONE HYDROCHLORIDE 20 MG/1
20 TABLET, FILM COATED ORAL DAILY
Status: ON HOLD | COMMUNITY
Start: 2022-04-24 | End: 2022-05-12

## 2022-04-25 RX ORDER — MEPERIDINE HYDROCHLORIDE 25 MG/ML
25 INJECTION INTRAMUSCULAR; INTRAVENOUS; SUBCUTANEOUS EVERY 30 MIN PRN
Status: CANCELLED | OUTPATIENT
Start: 2022-04-25

## 2022-04-25 NOTE — PATIENT INSTRUCTIONS
I have sent approval request for Sublocade.  Once we have approval from insurance we will call you to set it up.    In the meantime, OK to take 1/2 (2mg) to 1 (4mg) film of Suboxone if you develop opioid cravings or withdrawal symptoms.      Follow-up in 2 weeks but call sooner if questions or concerns.      [facility] (2nd Floor)  [address], [address] [place] [address]  Phone: [phone]

## 2022-04-25 NOTE — PROGRESS NOTES
[facility]    Peer  met with Romeo Maldonado in the Recovery Clinic to introduce himself, detail services provided and discuss current status of recovery. Pt appeared alert, oriented and open to feedback during our discussion.     Pt states recovery is going well. Pt continues to take suboxone as prescribed and feels benefits of reduction in urges and cravings. . PRC states having four years of sobriety, and following a relapse sought residential treatment at [facility] earlier this year.   Pt attends recovery meetings at [facility] several times per week, engages in fellowship after meetings with sober minded network and studying and applying the principles of the Big Book with a sponsor.     PRC and pt discussed handling daily life stressors (issues with mother of chid, job, bills, etc) during recovery remain challenging but utilizing  the tools learned in treatment, calling upon sober network and following guides in the Big Book are helpful in avoidance of retuning to substance use.     PRC commends pt on his focus and dedication to sobriety. PRC provided business card to pt welcoming contact for recovery based support and resources. PRC and pt agree to speak again during an upcoming  visit.     Patient Goal: To utilize suboxone assisted treatment for sobriety and long term recovery.     Goal Progress:   Ongoing.    Key Risk Factors to Recovery:   PRC encourages being aware of risk factors that can lead to re-use which include avoiding isolation, avoiding triggers and managing cravings in a healthy manner. being open and willing to acceptance and change on a daily basis.  PRC encourages pt to establish a sober network calling tree to reach out to when needed.  Continue to practice honesty with ourselves and trusted support person(s).   PRC encourages regular attendance at recovery based meetings as well as finding a sponsor for mentoring and accountability.   JODY  encourages consideration of other services such as counseling for mental health issues which can correlate with our substance use.      Support Needs:   Ongoing care, support and resources for opioid substance use disorder.     Follow up:   PRC has provided pt with his contact information for support and resource needs.    PRC and pt agree to meet during an upcoming  visit.       [facility]  [address], [address]   [place], [place], [address]  Clinic Phone: [phone]  Clinic Fax: [phone]  Peer  phone: [phone]    Open Monday - Friday  9:00am-4:00pm  Walk in hours: 9am-3pm      Michael Austin  April 25, 2022  10:14 AM

## 2022-04-29 ENCOUNTER — TELEPHONE (OUTPATIENT)
Dept: BEHAVIORAL HEALTH | Facility: CLINIC | Age: 32
End: 2022-04-29
Payer: COMMERCIAL

## 2022-04-29 NOTE — TELEPHONE ENCOUNTER
Writer refaxed CHERRIE kacie Valentine at [phone], also left a message for medical records to call [facility] back with this information

## 2022-04-29 NOTE — TELEPHONE ENCOUNTER
Meme calling back stating they need their CHERRIE signed, as our CHERRIE does not need their requirements, before they will release any information.

## 2022-04-29 NOTE — TELEPHONE ENCOUNTER
I received update from infusion finance team that Sublocade approval has gone through.      Per  last Sublocade injection was 3/21/22.  Can we confirm this with Meme (CHERRIE on file)? Once confirmed please contact patient with instructions to schedule.     Thanks!    An Mejia, DO on 4/29/2022 at 7:07 AM

## 2022-05-02 NOTE — TELEPHONE ENCOUNTER
Attempted to call patient. No answer; LVM. Qapa message also sent.    Macey Saladña RN on 5/2/2022 at 11:45 AM

## 2022-05-02 NOTE — TELEPHONE ENCOUNTER
Per , dose of Sublocade was 3/21.  Please call patient and have him schedule ASAP.  Follow-up in recovery clinic within 1 week.  Thanks!    An Mejia, DO on 5/2/2022 at 11:34 AM

## 2022-05-08 ENCOUNTER — HOSPITAL ENCOUNTER (EMERGENCY)
Facility: CLINIC | Age: 32
Discharge: HOME | End: 2022-05-08
Attending: EMERGENCY MEDICINE | Admitting: EMERGENCY MEDICINE
Payer: COMMERCIAL

## 2022-05-08 VITALS
TEMPERATURE: 98.4 F | SYSTOLIC BLOOD PRESSURE: 140 MMHG | WEIGHT: 180 LBS | HEIGHT: 70 IN | RESPIRATION RATE: 18 BRPM | BODY MASS INDEX: 25.77 KG/M2 | OXYGEN SATURATION: 95 % | HEART RATE: 82 BPM | DIASTOLIC BLOOD PRESSURE: 96 MMHG

## 2022-05-08 DIAGNOSIS — F19.10 DRUG ABUSE (H): ICD-10-CM

## 2022-05-08 LAB — SARS-COV-2 RNA RESP QL NAA+PROBE: NEGATIVE

## 2022-05-08 PROCEDURE — C9803 HOPD COVID-19 SPEC COLLECT: HCPCS

## 2022-05-08 PROCEDURE — 99283 EMERGENCY DEPT VISIT LOW MDM: CPT | Mod: CS

## 2022-05-08 PROCEDURE — U0005 INFEC AGEN DETEC AMPLI PROBE: HCPCS | Performed by: EMERGENCY MEDICINE

## 2022-05-08 NOTE — ED NOTES
Pt O2 saturation 86% on RA. Rouses to gentle touch/shake. Dilated pupils. 2L NC applied. Denies drug use.

## 2022-05-08 NOTE — ED PROVIDER NOTES
"  History   Chief Complaint:  Psychiatric Evaluation       HPI   Romeo Maldonado is a 31 year old male with history of polysubstance abuse and psychosis who presents for a psychiatric evaluation. Per triage note, patient was stripping wood in an apartment parking garage so PD was called. On their arrival he was found barefoot, diaphoretic, and sleeping on the ground. There were needles near him. Patient admits to using meth but denies using anything else. EMS reports that he admitted taking 2 Xanax and using marijuana. Denies alcohol use. No recent illness. Patient has no complaints. Denies suicidal ideation.     Review of Systems   Constitutional: Negative for fever.   Respiratory: Negative for shortness of breath.    Cardiovascular: Negative for chest pain.   Gastrointestinal: Negative for abdominal pain.   Psychiatric/Behavioral: Negative for suicidal ideas.   All other systems reviewed and are negative.        Allergies:  Ambien [Zolpidem]    Medications:  Albuterol inhaler   Wellbutrin  Latuda  Suboxone  Narcan    Past Medical History:     Anxiety  Hepatitis C  Depression  Substance abuse  Asthma  Psychosis  Insomnia    Past Surgical History:    I&D soft tissue upper extremity    Family History:    Father- hypertension     Social History:  Presents alone  Meth use    Physical Exam     Patient Vitals for the past 24 hrs:   BP Temp Temp src Pulse Resp SpO2 Height Weight   05/08/22 0600 -- -- -- -- 18 98 % -- --   05/08/22 0400 -- -- -- -- 16 97 % -- --   05/08/22 0213 (!) 135/97 98.4  F (36.9  C) Oral 103 20 96 % 1.778 m (5' 10\") 81.6 kg (180 lb)       Physical Exam  General: Appears well-developed and well-nourished.   Head: No signs of trauma.   CV: Normal rate and regular rhythm.    Resp: Effort normal and breath sounds normal. No respiratory distress.   GI: Soft. There is no tenderness.  No rebound or guarding.  Normal bowel sounds.    MSK: Normal range of motion. no edema. No Calf tenderness.  Neuro: The " patient is alert but sedate. Speech normal.  Skin: Skin is warm and dry. No rash noted.   Psych: normal mood and affect. behavior is normal.       Emergency Department Course     Laboratory:  Labs Ordered and Resulted from Time of ED Arrival to Time of ED Departure   COVID-19 VIRUS (CORONAVIRUS) BY PCR - Normal       Result Value    SARS CoV2 PCR Negative        Emergency Department Course:    Reviewed:  I reviewed nursing notes, vitals and past medical history    Assessments:  0334 I obtained history and examined the patient as noted above.   0640 I rechecked the patient and explained findings.     Disposition:  The patient was discharged to home.     Impression & Plan     Medical Decision Making:  This is a 31-year-old gentleman with history of polysubstance abuse who presents due to erratic behavior.  He apparently was in the parking complex of his apartment with some drug paraphernalia around acting oddly.  On my evaluation he was somewhat somnolent but arousable.  He did endorse drug use.  He denies any thoughts of self-harm or any other complaints.  He was given a number of hours to metabolize at which point he was ambulatory and continuing to be without complaints.  He was discharged home.    Diagnosis:    ICD-10-CM    1. Drug abuse (H)  F19.10        Discharge Medications:  New Prescriptions    No medications on file       Scribe Disclosure:  Clarita WILLIAM, am serving as a scribe at 3:13 AM on 5/8/2022 to document services personally performed by Jeancarlos Guerrero MD based on my observations and the provider's statements to me.            Jeancarlos Guerrero MD  05/10/22 0672

## 2022-05-08 NOTE — ED TRIAGE NOTES
Arrives via ambulance for strange behavior in apartment garage. Per PD hold, pt was reported to be in the garage of his own apartment stripping wood from pillars. Upon PD arrival, pt found to be barefoot, diaphoretic, and sleeping on floor of garage. Aimwell found near his person. Pt reports taking 2 of his prescribed xanax pills and marijuana. Denies alcohol and recreational drug use. VSS on route, on PD hold.

## 2022-05-08 NOTE — ED NOTES
Pt ready for discharge.pt denies SI, Pt is alert and oriented, able to ambulate without difficulty. No other complaints at this time. Reviewed discharge instructions with patient and all questions answered. Pt agreeable with plan.     Charged pts phone so it turns on and pt is able to call a ride.

## 2022-05-08 NOTE — ED NOTES
Bed: ED21  Expected date:   Expected time:   Means of arrival:   Comments:  Megan 593 31M insomnia eta 1740

## 2022-05-09 ENCOUNTER — HOSPITAL ENCOUNTER (INPATIENT)
Facility: CLINIC | Age: 32
LOS: 2 days | Discharge: HOME | DRG: 918 | End: 2022-05-12
Attending: EMERGENCY MEDICINE | Admitting: HOSPITALIST
Payer: COMMERCIAL

## 2022-05-09 ENCOUNTER — APPOINTMENT (OUTPATIENT)
Dept: CT IMAGING | Facility: CLINIC | Age: 32
DRG: 918 | End: 2022-05-09
Attending: EMERGENCY MEDICINE
Payer: COMMERCIAL

## 2022-05-09 ENCOUNTER — APPOINTMENT (OUTPATIENT)
Dept: GENERAL RADIOLOGY | Facility: CLINIC | Age: 32
DRG: 918 | End: 2022-05-09
Attending: EMERGENCY MEDICINE
Payer: COMMERCIAL

## 2022-05-09 DIAGNOSIS — T50.904A DRUG OVERDOSE, UNDETERMINED INTENT, INITIAL ENCOUNTER: ICD-10-CM

## 2022-05-09 DIAGNOSIS — F33.2 SEVERE EPISODE OF RECURRENT MAJOR DEPRESSIVE DISORDER, WITHOUT PSYCHOTIC FEATURES (H): ICD-10-CM

## 2022-05-09 DIAGNOSIS — Z20.6 HIV EXPOSURE: ICD-10-CM

## 2022-05-09 DIAGNOSIS — R41.82 ALTERED MENTAL STATUS, UNSPECIFIED ALTERED MENTAL STATUS TYPE: ICD-10-CM

## 2022-05-09 DIAGNOSIS — F11.90 OPIOID USE DISORDER: Primary | ICD-10-CM

## 2022-05-09 DIAGNOSIS — R00.1 BRADYCARDIA: ICD-10-CM

## 2022-05-09 DIAGNOSIS — R41.89 UNRESPONSIVE STATE: ICD-10-CM

## 2022-05-09 LAB
ALBUMIN SERPL-MCNC: 4 G/DL (ref 3.4–5)
ALP SERPL-CCNC: 80 U/L (ref 40–150)
ALT SERPL W P-5'-P-CCNC: 41 U/L (ref 0–70)
ANION GAP SERPL CALCULATED.3IONS-SCNC: 10 MMOL/L (ref 3–14)
AST SERPL W P-5'-P-CCNC: 26 U/L (ref 0–45)
BASE EXCESS BLDV CALC-SCNC: -2.7 MMOL/L (ref -7.7–1.9)
BASOPHILS # BLD AUTO: 0 10E3/UL (ref 0–0.2)
BASOPHILS NFR BLD AUTO: 0 %
BILIRUB SERPL-MCNC: 0.5 MG/DL (ref 0.2–1.3)
BUN SERPL-MCNC: 17 MG/DL (ref 7–30)
CALCIUM SERPL-MCNC: 8.7 MG/DL (ref 8.5–10.1)
CHLORIDE BLD-SCNC: 109 MMOL/L (ref 94–109)
CO2 SERPL-SCNC: 22 MMOL/L (ref 20–32)
CREAT SERPL-MCNC: 0.99 MG/DL (ref 0.66–1.25)
EOSINOPHIL # BLD AUTO: 0.2 10E3/UL (ref 0–0.7)
EOSINOPHIL NFR BLD AUTO: 2 %
ERYTHROCYTE [DISTWIDTH] IN BLOOD BY AUTOMATED COUNT: 12.8 % (ref 10–15)
ETHANOL SERPL-MCNC: <0.01 G/DL
GFR SERPL CREATININE-BSD FRML MDRD: >90 ML/MIN/1.73M2
GLUCOSE BLD-MCNC: 115 MG/DL (ref 70–99)
HCO3 BLDV-SCNC: 26 MMOL/L (ref 21–28)
HCO3 BLDV-SCNC: 28 MMOL/L (ref 21–28)
HCT VFR BLD AUTO: 46.1 % (ref 40–53)
HGB BLD-MCNC: 14.8 G/DL (ref 13.3–17.7)
IMM GRANULOCYTES # BLD: 0 10E3/UL
IMM GRANULOCYTES NFR BLD: 0 %
LACTATE BLD-SCNC: 1.4 MMOL/L
LACTATE SERPL-SCNC: 1.6 MMOL/L (ref 0.7–2)
LYMPHOCYTES # BLD AUTO: 1.4 10E3/UL (ref 0.8–5.3)
LYMPHOCYTES NFR BLD AUTO: 17 %
MCH RBC QN AUTO: 28.4 PG (ref 26.5–33)
MCHC RBC AUTO-ENTMCNC: 32.1 G/DL (ref 31.5–36.5)
MCV RBC AUTO: 88 FL (ref 78–100)
MONOCYTES # BLD AUTO: 0.6 10E3/UL (ref 0–1.3)
MONOCYTES NFR BLD AUTO: 8 %
NEUTROPHILS # BLD AUTO: 6 10E3/UL (ref 1.6–8.3)
NEUTROPHILS NFR BLD AUTO: 73 %
NRBC # BLD AUTO: 0 10E3/UL
NRBC BLD AUTO-RTO: 0 /100
O2/TOTAL GAS SETTING VFR VENT: 2 %
OXYHGB MFR BLDV: 29 % (ref 70–75)
PCO2 BLDV: 63 MM HG (ref 40–50)
PCO2 BLDV: 70 MM HG (ref 40–50)
PH BLDV: 7.21 [PH] (ref 7.32–7.43)
PH BLDV: 7.23 [PH] (ref 7.32–7.43)
PLATELET # BLD AUTO: 259 10E3/UL (ref 150–450)
PO2 BLDV: 17 MM HG (ref 25–47)
PO2 BLDV: 22 MM HG (ref 25–47)
POTASSIUM BLD-SCNC: 3.3 MMOL/L (ref 3.4–5.3)
PROT SERPL-MCNC: 7.7 G/DL (ref 6.8–8.8)
RBC # BLD AUTO: 5.22 10E6/UL (ref 4.4–5.9)
SALICYLATES SERPL-MCNC: <2 MG/DL
SAO2 % BLDV: 16 % (ref 94–100)
SODIUM SERPL-SCNC: 141 MMOL/L (ref 133–144)
TROPONIN I SERPL HS-MCNC: 11 NG/L
WBC # BLD AUTO: 8.3 10E3/UL (ref 4–11)

## 2022-05-09 PROCEDURE — 258N000003 HC RX IP 258 OP 636: Performed by: EMERGENCY MEDICINE

## 2022-05-09 PROCEDURE — 80143 DRUG ASSAY ACETAMINOPHEN: CPT | Performed by: EMERGENCY MEDICINE

## 2022-05-09 PROCEDURE — 96374 THER/PROPH/DIAG INJ IV PUSH: CPT

## 2022-05-09 PROCEDURE — 99285 EMERGENCY DEPT VISIT HI MDM: CPT | Mod: 25

## 2022-05-09 PROCEDURE — 85004 AUTOMATED DIFF WBC COUNT: CPT | Performed by: EMERGENCY MEDICINE

## 2022-05-09 PROCEDURE — 82565 ASSAY OF CREATININE: CPT | Performed by: HOSPITALIST

## 2022-05-09 PROCEDURE — 84484 ASSAY OF TROPONIN QUANT: CPT | Performed by: EMERGENCY MEDICINE

## 2022-05-09 PROCEDURE — C9803 HOPD COVID-19 SPEC COLLECT: HCPCS

## 2022-05-09 PROCEDURE — 83605 ASSAY OF LACTIC ACID: CPT

## 2022-05-09 PROCEDURE — 36415 COLL VENOUS BLD VENIPUNCTURE: CPT | Performed by: EMERGENCY MEDICINE

## 2022-05-09 PROCEDURE — 93005 ELECTROCARDIOGRAM TRACING: CPT

## 2022-05-09 PROCEDURE — 83605 ASSAY OF LACTIC ACID: CPT | Performed by: EMERGENCY MEDICINE

## 2022-05-09 PROCEDURE — 80053 COMPREHEN METABOLIC PANEL: CPT | Performed by: EMERGENCY MEDICINE

## 2022-05-09 PROCEDURE — 70450 CT HEAD/BRAIN W/O DYE: CPT

## 2022-05-09 PROCEDURE — 250N000011 HC RX IP 250 OP 636: Performed by: EMERGENCY MEDICINE

## 2022-05-09 PROCEDURE — 82077 ASSAY SPEC XCP UR&BREATH IA: CPT | Performed by: EMERGENCY MEDICINE

## 2022-05-09 PROCEDURE — 80179 DRUG ASSAY SALICYLATE: CPT | Performed by: EMERGENCY MEDICINE

## 2022-05-09 PROCEDURE — 71045 X-RAY EXAM CHEST 1 VIEW: CPT

## 2022-05-09 PROCEDURE — 82805 BLOOD GASES W/O2 SATURATION: CPT | Performed by: EMERGENCY MEDICINE

## 2022-05-09 RX ORDER — NALOXONE HYDROCHLORIDE 1 MG/ML
4 INJECTION INTRAMUSCULAR; INTRAVENOUS; SUBCUTANEOUS ONCE
Status: COMPLETED | OUTPATIENT
Start: 2022-05-09 | End: 2022-05-09

## 2022-05-09 RX ADMIN — NALOXONE HYDROCHLORIDE 4 MG: 1 INJECTION PARENTERAL at 23:20

## 2022-05-10 ENCOUNTER — RESULTS ONLY (OUTPATIENT)
Facility: CLINIC | Age: 32
End: 2022-05-10
Payer: COMMERCIAL

## 2022-05-10 PROBLEM — R41.82 ALTERED MENTAL STATUS, UNSPECIFIED ALTERED MENTAL STATUS TYPE: Status: ACTIVE | Noted: 2022-05-10

## 2022-05-10 PROBLEM — R41.89 UNRESPONSIVE STATE: Status: ACTIVE | Noted: 2022-05-10

## 2022-05-10 LAB
AMPHETAMINES UR QL SCN: ABNORMAL
ANION GAP SERPL CALCULATED.3IONS-SCNC: 8 MMOL/L (ref 3–14)
APAP SERPL-MCNC: <2 MG/L (ref 10–30)
ATRIAL RATE - MUSE: 53 BPM
BARBITURATES UR QL: ABNORMAL
BASE EXCESS BLDV CALC-SCNC: -0.1 MMOL/L (ref -7.7–1.9)
BASE EXCESS BLDV CALC-SCNC: -2.3 MMOL/L (ref -7.7–1.9)
BASE EXCESS BLDV CALC-SCNC: -4.4 MMOL/L (ref -7.7–1.9)
BENZODIAZ UR QL: ABNORMAL
BUN SERPL-MCNC: 14 MG/DL (ref 7–30)
CALCIUM SERPL-MCNC: 8.7 MG/DL (ref 8.5–10.1)
CANNABINOIDS UR QL SCN: ABNORMAL
CHLORIDE BLD-SCNC: 111 MMOL/L (ref 94–109)
CO2 SERPL-SCNC: 22 MMOL/L (ref 20–32)
COCAINE UR QL: ABNORMAL
CREAT SERPL-MCNC: 0.7 MG/DL (ref 0.66–1.25)
CREAT SERPL-MCNC: 1.23 MG/DL (ref 0.66–1.25)
DIASTOLIC BLOOD PRESSURE - MUSE: NORMAL MMHG
ERYTHROCYTE [DISTWIDTH] IN BLOOD BY AUTOMATED COUNT: 12.9 % (ref 10–15)
GFR SERPL CREATININE-BSD FRML MDRD: 79 ML/MIN/1.73M2
GFR SERPL CREATININE-BSD FRML MDRD: >90 ML/MIN/1.73M2
GLUCOSE BLD-MCNC: 113 MG/DL (ref 70–99)
GLUCOSE BLDC GLUCOMTR-MCNC: 104 MG/DL (ref 70–99)
GLUCOSE BLDC GLUCOMTR-MCNC: 86 MG/DL (ref 70–99)
HCO3 BLDV-SCNC: 24 MMOL/L (ref 21–28)
HCO3 BLDV-SCNC: 25 MMOL/L (ref 21–28)
HCO3 BLDV-SCNC: 26 MMOL/L (ref 21–28)
HCT VFR BLD AUTO: 44.2 % (ref 40–53)
HGB BLD-MCNC: 14.6 G/DL (ref 13.3–17.7)
HOLD SPECIMEN: NORMAL
INTERPRETATION ECG - MUSE: NORMAL
MCH RBC QN AUTO: 28.5 PG (ref 26.5–33)
MCHC RBC AUTO-ENTMCNC: 33 G/DL (ref 31.5–36.5)
MCV RBC AUTO: 86 FL (ref 78–100)
O2/TOTAL GAS SETTING VFR VENT: 0 %
O2/TOTAL GAS SETTING VFR VENT: 0 %
O2/TOTAL GAS SETTING VFR VENT: 30 %
OPIATES UR QL SCN: ABNORMAL
OXYHGB MFR BLDV: 22 % (ref 70–75)
OXYHGB MFR BLDV: 26 % (ref 70–75)
P AXIS - MUSE: 42 DEGREES
PCO2 BLDV: 44 MM HG (ref 40–50)
PCO2 BLDV: 56 MM HG (ref 40–50)
PCO2 BLDV: 58 MM HG (ref 40–50)
PCP UR QL SCN: ABNORMAL
PH BLDV: 7.23 [PH] (ref 7.32–7.43)
PH BLDV: 7.27 [PH] (ref 7.32–7.43)
PH BLDV: 7.38 [PH] (ref 7.32–7.43)
PLATELET # BLD AUTO: 261 10E3/UL (ref 150–450)
PO2 BLDV: 18 MM HG (ref 25–47)
PO2 BLDV: 21 MM HG (ref 25–47)
PO2 BLDV: 37 MM HG (ref 25–47)
POTASSIUM BLD-SCNC: 3.9 MMOL/L (ref 3.4–5.3)
POTASSIUM BLD-SCNC: 4.1 MMOL/L (ref 3.4–5.3)
POTASSIUM BLD-SCNC: 4.1 MMOL/L (ref 3.4–5.3)
PR INTERVAL - MUSE: 172 MS
QRS DURATION - MUSE: 92 MS
QT - MUSE: 466 MS
QTC - MUSE: 437 MS
R AXIS - MUSE: 51 DEGREES
RBC # BLD AUTO: 5.13 10E6/UL (ref 4.4–5.9)
SARS-COV-2 RNA RESP QL NAA+PROBE: NEGATIVE
SODIUM SERPL-SCNC: 141 MMOL/L (ref 133–144)
SYSTOLIC BLOOD PRESSURE - MUSE: NORMAL MMHG
T AXIS - MUSE: 48 DEGREES
VENTRICULAR RATE- MUSE: 53 BPM
WBC # BLD AUTO: 6.4 10E3/UL (ref 4–11)

## 2022-05-10 PROCEDURE — 36415 COLL VENOUS BLD VENIPUNCTURE: CPT | Performed by: EMERGENCY MEDICINE

## 2022-05-10 PROCEDURE — 87522 HEPATITIS C REVRS TRNSCRPJ: CPT | Performed by: INTERNAL MEDICINE

## 2022-05-10 PROCEDURE — 120N000001 HC R&B MED SURG/OB

## 2022-05-10 PROCEDURE — 84132 ASSAY OF SERUM POTASSIUM: CPT | Performed by: INTERNAL MEDICINE

## 2022-05-10 PROCEDURE — 250N000013 HC RX MED GY IP 250 OP 250 PS 637: Performed by: PSYCHIATRY & NEUROLOGY

## 2022-05-10 PROCEDURE — 82803 BLOOD GASES ANY COMBINATION: CPT | Performed by: INTERNAL MEDICINE

## 2022-05-10 PROCEDURE — 99223 1ST HOSP IP/OBS HIGH 75: CPT | Mod: AI | Performed by: HOSPITALIST

## 2022-05-10 PROCEDURE — U0003 INFECTIOUS AGENT DETECTION BY NUCLEIC ACID (DNA OR RNA); SEVERE ACUTE RESPIRATORY SYNDROME CORONAVIRUS 2 (SARS-COV-2) (CORONAVIRUS DISEASE [COVID-19]), AMPLIFIED PROBE TECHNIQUE, MAKING USE OF HIGH THROUGHPUT TECHNOLOGIES AS DESCRIBED BY CMS-2020-01-R: HCPCS | Performed by: EMERGENCY MEDICINE

## 2022-05-10 PROCEDURE — 258N000003 HC RX IP 258 OP 636: Performed by: HOSPITALIST

## 2022-05-10 PROCEDURE — 84132 ASSAY OF SERUM POTASSIUM: CPT | Performed by: HOSPITALIST

## 2022-05-10 PROCEDURE — 250N000011 HC RX IP 250 OP 636: Performed by: HOSPITALIST

## 2022-05-10 PROCEDURE — 87389 HIV-1 AG W/HIV-1&-2 AB AG IA: CPT | Performed by: INTERNAL MEDICINE

## 2022-05-10 PROCEDURE — 36415 COLL VENOUS BLD VENIPUNCTURE: CPT | Performed by: HOSPITALIST

## 2022-05-10 PROCEDURE — 250N000013 HC RX MED GY IP 250 OP 250 PS 637: Performed by: STUDENT IN AN ORGANIZED HEALTH CARE EDUCATION/TRAINING PROGRAM

## 2022-05-10 PROCEDURE — 99221 1ST HOSP IP/OBS SF/LOW 40: CPT | Performed by: PSYCHIATRY & NEUROLOGY

## 2022-05-10 PROCEDURE — 5A09357 ASSISTANCE WITH RESPIRATORY VENTILATION, LESS THAN 24 CONSECUTIVE HOURS, CONTINUOUS POSITIVE AIRWAY PRESSURE: ICD-10-PCS | Performed by: HOSPITALIST

## 2022-05-10 PROCEDURE — 85027 COMPLETE CBC AUTOMATED: CPT | Performed by: HOSPITALIST

## 2022-05-10 PROCEDURE — 36415 COLL VENOUS BLD VENIPUNCTURE: CPT | Performed by: INTERNAL MEDICINE

## 2022-05-10 PROCEDURE — 250N000011 HC RX IP 250 OP 636: Performed by: INTERNAL MEDICINE

## 2022-05-10 PROCEDURE — 80307 DRUG TEST PRSMV CHEM ANLYZR: CPT | Performed by: EMERGENCY MEDICINE

## 2022-05-10 PROCEDURE — 250N000011 HC RX IP 250 OP 636: Performed by: STUDENT IN AN ORGANIZED HEALTH CARE EDUCATION/TRAINING PROGRAM

## 2022-05-10 PROCEDURE — 99233 SBSQ HOSP IP/OBS HIGH 50: CPT | Performed by: INTERNAL MEDICINE

## 2022-05-10 PROCEDURE — 999N000157 HC STATISTIC RCP TIME EA 10 MIN

## 2022-05-10 PROCEDURE — 80048 BASIC METABOLIC PNL TOTAL CA: CPT | Performed by: HOSPITALIST

## 2022-05-10 PROCEDURE — 93005 ELECTROCARDIOGRAM TRACING: CPT

## 2022-05-10 PROCEDURE — 82805 BLOOD GASES W/O2 SATURATION: CPT | Performed by: EMERGENCY MEDICINE

## 2022-05-10 PROCEDURE — 93010 ELECTROCARDIOGRAM REPORT: CPT | Performed by: INTERNAL MEDICINE

## 2022-05-10 RX ORDER — NITROGLYCERIN 0.4 MG/1
0.4 TABLET SUBLINGUAL EVERY 5 MIN PRN
Status: DISCONTINUED | OUTPATIENT
Start: 2022-05-10 | End: 2022-05-12 | Stop reason: HOSPADM

## 2022-05-10 RX ORDER — CARBOXYMETHYLCELLULOSE SODIUM 5 MG/ML
1 SOLUTION/ DROPS OPHTHALMIC
Status: DISCONTINUED | OUTPATIENT
Start: 2022-05-10 | End: 2022-05-12 | Stop reason: HOSPADM

## 2022-05-10 RX ORDER — ACETAMINOPHEN 325 MG/1
975 TABLET ORAL EVERY 8 HOURS PRN
Status: DISCONTINUED | OUTPATIENT
Start: 2022-05-10 | End: 2022-05-12 | Stop reason: HOSPADM

## 2022-05-10 RX ORDER — LIDOCAINE 40 MG/G
CREAM TOPICAL
Status: DISCONTINUED | OUTPATIENT
Start: 2022-05-10 | End: 2022-05-12 | Stop reason: HOSPADM

## 2022-05-10 RX ORDER — ONDANSETRON 2 MG/ML
4 INJECTION INTRAMUSCULAR; INTRAVENOUS EVERY 6 HOURS PRN
Status: DISCONTINUED | OUTPATIENT
Start: 2022-05-10 | End: 2022-05-12 | Stop reason: HOSPADM

## 2022-05-10 RX ORDER — MULTIVITAMIN,THERAPEUTIC
1 TABLET ORAL DAILY
Status: ON HOLD | COMMUNITY
End: 2022-10-25

## 2022-05-10 RX ORDER — POTASSIUM CHLORIDE 7.45 MG/ML
10 INJECTION INTRAVENOUS
Status: DISPENSED | OUTPATIENT
Start: 2022-05-10 | End: 2022-05-10

## 2022-05-10 RX ORDER — SODIUM CHLORIDE 9 MG/ML
INJECTION, SOLUTION INTRAVENOUS CONTINUOUS
Status: DISCONTINUED | OUTPATIENT
Start: 2022-05-10 | End: 2022-05-11

## 2022-05-10 RX ORDER — ENOXAPARIN SODIUM 100 MG/ML
40 INJECTION SUBCUTANEOUS EVERY 24 HOURS
Status: DISCONTINUED | OUTPATIENT
Start: 2022-05-10 | End: 2022-05-12

## 2022-05-10 RX ORDER — LIDOCAINE 40 MG/G
CREAM TOPICAL
Status: DISCONTINUED | OUTPATIENT
Start: 2022-05-10 | End: 2022-05-10

## 2022-05-10 RX ORDER — LURASIDONE HYDROCHLORIDE 20 MG/1
20 TABLET, FILM COATED ORAL EVERY EVENING
Status: DISCONTINUED | OUTPATIENT
Start: 2022-05-10 | End: 2022-05-12 | Stop reason: HOSPADM

## 2022-05-10 RX ADMIN — SODIUM CHLORIDE: 9 INJECTION, SOLUTION INTRAVENOUS at 05:59

## 2022-05-10 RX ADMIN — ONDANSETRON 4 MG: 2 INJECTION INTRAMUSCULAR; INTRAVENOUS at 18:58

## 2022-05-10 RX ADMIN — ACETAMINOPHEN 975 MG: 325 TABLET ORAL at 18:56

## 2022-05-10 RX ADMIN — POTASSIUM CHLORIDE 10 MEQ: 7.46 INJECTION, SOLUTION INTRAVENOUS at 05:59

## 2022-05-10 RX ADMIN — ENOXAPARIN SODIUM 40 MG: 40 INJECTION SUBCUTANEOUS at 08:16

## 2022-05-10 RX ADMIN — POTASSIUM CHLORIDE 10 MEQ: 7.46 INJECTION, SOLUTION INTRAVENOUS at 07:04

## 2022-05-10 RX ADMIN — SODIUM CHLORIDE: 9 INJECTION, SOLUTION INTRAVENOUS at 15:36

## 2022-05-10 RX ADMIN — LURASIDONE HYDROCHLORIDE 20 MG: 20 TABLET, FILM COATED ORAL at 20:32

## 2022-05-10 ASSESSMENT — ACTIVITIES OF DAILY LIVING (ADL)
ADLS_ACUITY_SCORE: 25
CHANGE_IN_FUNCTIONAL_STATUS_SINCE_ONSET_OF_CURRENT_ILLNESS/INJURY: NO
FALL_HISTORY_WITHIN_LAST_SIX_MONTHS: YES
ADLS_ACUITY_SCORE: 37
CONCENTRATING,_REMEMBERING_OR_MAKING_DECISIONS_DIFFICULTY: NO
ADLS_ACUITY_SCORE: 25
ADLS_ACUITY_SCORE: 37
ADLS_ACUITY_SCORE: 39
ADLS_ACUITY_SCORE: 25
TOILETING_ISSUES: NO
ADLS_ACUITY_SCORE: 25
NUMBER_OF_TIMES_PATIENT_HAS_FALLEN_WITHIN_LAST_SIX_MONTHS: 1
WALKING_OR_CLIMBING_STAIRS_DIFFICULTY: NO
ADLS_ACUITY_SCORE: 25
WEAR_GLASSES_OR_BLIND: NO
ADLS_ACUITY_SCORE: 25
DOING_ERRANDS_INDEPENDENTLY_DIFFICULTY: NO
DIFFICULTY_EATING/SWALLOWING: NO
ADLS_ACUITY_SCORE: 25
ADLS_ACUITY_SCORE: 25
ADLS_ACUITY_SCORE: 39
ADLS_ACUITY_SCORE: 25
DRESSING/BATHING_DIFFICULTY: NO
ADLS_ACUITY_SCORE: 37
ADLS_ACUITY_SCORE: 37
ADLS_ACUITY_SCORE: 25

## 2022-05-10 ASSESSMENT — ENCOUNTER SYMPTOMS
ABDOMINAL PAIN: 0
FEVER: 0
SHORTNESS OF BREATH: 0

## 2022-05-10 NOTE — PHARMACY-ADMISSION MEDICATION HISTORY
Pharmacy Medication History  Admission medication history interview status for the 5/9/2022  admission is complete. See EPIC admission navigator for prior to admission medications     Location of Interview: Patient room  Medication history sources: Patient and Surescripts    Significant changes made to the medication list:  Pt states he does not have suboxone films but received a buprenorphine 300mg injection 6 weeks ago.      In the past week, patient estimated taking medication this percent of the time: greater than 90%    Additional medication history information:   Pt has been prescribed several medications over the last year but states he is currently only on bupropion and Latuda and has only been taking these for a few days.      Medication reconciliation completed by provider prior to medication history? No    Time spent in this activity: 15 min    Prior to Admission medications    Medication Sig Last Dose Taking? Auth Provider   albuterol (PROAIR HFA/PROVENTIL HFA/VENTOLIN HFA) 108 (90 Base) MCG/ACT inhaler Inhale 2 puffs into the lungs every 6 hours as needed  at prn Yes Madhav Dick MD   buprenorphine ER (SUBLOCADE) 300 MG/1.5ML prefilled syringe Inject 300 mg Subcutaneous every 30 days 3/29/2022 Yes Unknown, Entered By History   buPROPion (WELLBUTRIN XL) 300 MG 24 hr tablet Take 300 mg by mouth every morning 5/9/2022 at am Yes Reported, Patient   LATUDA 20 MG TABS tablet Take 20 mg by mouth daily 5/9/2022 at am Yes Reported, Patient   multivitamin, therapeutic (THERA-VIT) TABS tablet Take 1 tablet by mouth daily 5/9/2022 at am Yes Unknown, Entered By History   naloxone (NARCAN) 4 MG/0.1ML nasal spray Spray 1 spray (4 mg) into one nostril alternating nostrils once as needed for opioid reversal every 2-3 minutes until assistance arrives  at prn Yes An Mejia, DO   buprenorphine HCl-naloxone HCl (SUBOXONE) 4-1 MG per film Take 1/2 to 1 film daily as needed for symptoms of opioid  withdrawal.  Patient not taking: Reported on 5/10/2022 Not Taking at Unknown time  An Mejia, DO       The information provided in this note is only as accurate as the sources available at the time of update(s)

## 2022-05-10 NOTE — PLAN OF CARE
Transfer in/out    [place] [facility] [place] [facility] [place]  Report given to/received from WANDA Guallpa     Brief note about patient status upon transfer      Code status: Full Code  Skin: clammy, flushed, intact  Fall Risk: Yes- Department fall risk interventions implemented.  Isolation: None  Patient belongings: clothing, shoes, cell phone, phone , credit cards    If patient is a 72 hour hold/Commitment are belongings removed from room and locked up? NA  Medication drips upon transfer: NS  Sign and held orders released? NA

## 2022-05-10 NOTE — ED NOTES
Bed: ST01  Expected date:   Expected time:   Means of arrival:   Comments:  516 25m over dse HI Rick O2 no response to Narcan

## 2022-05-10 NOTE — PROGRESS NOTES
Pt has gone from in disbelief of what happened last night to anxious/irritable after finding out his credit cards were used last night racking up charges to irritable and slightly fidgety. Dr samayoa notified that pt is now stating he did take last heroin last night now and GHB; that it wasn't a few days ago. Will monitor s/s and report to providers if needed further.

## 2022-05-10 NOTE — PROGRESS NOTES
[facility]    Medicine Progress Note - Hospitalist Service    Date of Admission:  5/9/2022    Assessment & Plan          Romeo Maldonado is a 31 year old male with a past medical history of polysubstance abuse, presents to hospital with altered mental status.    Altered mental status  Patient found obtunded in a parking lot, did not improve with narcan. Likely due to overdose. poinson control thinks bradycardia and mental status change could be due to Xanax or Ambien overdose.  Use of flumazenil was not recommended given that patient is on Wellbutrin.  Patient currently withdraws to painful stimuli. Placed on bipap in the er with improvement of blood gas.   -5/10: AMS resolved.  We will keep bilevel as standby.  Continue to monitor closely.  Await psychiatry consult   On one-to-one sitter    Hypokalemia  -Replace per protocol    History of polysubstance abuse  History of injection drug use  Patient has a history of opiate use, benzodiazepine use, methamphetamine use, GHB use.  Patient reportedly recently left [place] for chemical dependency treatment.    Hold PTA Wellbutrin, Suboxone for now    History of hepatitis C status posttreatment  Hep C RNA undetectable on February 2, 2022  Recheck HIV and hepatitis C today     Diet: Regular Diet Adult    DVT Prophylaxis: Pneumatic Compression Devices  Marie Catheter: Not present  Central Lines: None  Cardiac Monitoring: ACTIVE order. Indication: Drug overdose (24 hours)  Code Status: Full Code      Disposition Plan   Expected Discharge: 05/14/2022     Anticipated discharge location:  Awaiting care coordination huddle  Delays:         The patient's care was discussed with the Bedside Nurse and Patient.    Ramone Aldana MD, MD  Hospitalist Service  [facility]  Securely message with the Vocera Web Console (learn more here)  Text page via [email or web address] Paging/Directory     Clinically Significant Risk Factors Present on Admission        "           # Overweight: Estimated body mass index is 25.83 kg/m  as calculated from the following:    Height as of 5/8/22: 1.778 m (5' 10\").    Weight as of 5/8/22: 81.6 kg (180 lb).      ______________________________________________________________________    Interval History   Feels better.  Woke up this morning  History revisited.  He used heroin, Xanax around 3 days ago.  Denies any chest pain/palpitations/fever/shortness of breath  Does not recall events over yesterday  Denies any suicidal/homicidal ideation.  Denies any hallucinations  Says recently completed 3 detox programs that included inpatient and outpatient     4 point ROS done and negative unless mentioned     Data reviewed today: I reviewed all medications, new labs and imaging results over the last 24 hours. I personally reviewed the head CT image(s) showing As below.    Physical Exam   BP (!) 140/88 (BP Location: Left arm)   Pulse 82   Temp 97.8  F (36.6  C) (Oral)   Resp 18   SpO2 100%   Gen- pleasant sitting on chair  HEENT- NAD, EBONY  Neck- supple, no JVD elevation, no thyromegaly  CVS- I+II+ no m/r/g  RS- CTAB  Abdo- soft, no tenderness . No g/r/r  Ext- no edema   CNS- no gross focal deficit    Data    BMPRecent Labs   Lab 05/10/22  1057 05/10/22  0730 05/10/22  0530 05/10/22  0508 05/09/22  2317 05/09/22  2303   NA  --   --  141  --   --  141   POTASSIUM 3.9  --  4.1  4.1  --   --  3.3*   CHLORIDE  --   --  111*  --   --  109   NILA  --   --  8.7  --   --  8.7   CO2  --   --  22  --   --  22   BUN  --   --  14  --   --  17   CR  --   --  0.70  --  1.23 0.99   GLC  --  86 113* 104*  --  115*     CBC  Recent Labs   Lab 05/10/22  0530 05/09/22  2303   WBC 6.4 8.3   RBC 5.13 5.22   HGB 14.6 14.8   HCT 44.2 46.1   MCV 86 88   MCH 28.5 28.4   MCHC 33.0 32.1   RDW 12.9 12.8    259     INRNo lab results found in last 7 days.  LFTs  Recent Labs   Lab 05/09/22  2303   ALKPHOS 80   AST 26   ALT 41   BILITOTAL 0.5   PROTTOTAL 7.7   ALBUMIN " 4.0      PANCNo lab results found in last 7 days.    Recent Results (from the past 24 hour(s))   CT Head w/o Contrast    Narrative    EXAM: CT HEAD W/O CONTRAST  LOCATION: [facility]  DATE/TIME: 5/9/2022 11:11 PM    INDICATION: Mental status change, unknown cause. Confusion.  COMPARISON: None.  TECHNIQUE: Routine CT Head without IV contrast. Multiplanar reformats. Dose reduction techniques were used.    FINDINGS:  INTRACRANIAL CONTENTS: No intracranial hemorrhage, extraaxial collection, or mass effect.  No CT evidence of acute infarct. Normal parenchymal attenuation. Normal ventricles and sulci.     VISUALIZED ORBITS/SINUSES/MASTOIDS: No intraorbital abnormality. Minimal left ethmoid air cell mucosal thickening. No middle ear or mastoid effusion.    BONES/SOFT TISSUES: No acute abnormality.      Impression    IMPRESSION:  1.  No acute intracranial process.   XR Chest Port 1 View    Narrative    EXAM: XR CHEST PORT 1 VIEW  LOCATION: [facility]  DATE/TIME: 5/9/2022 11:17 PM    INDICATION: ams  COMPARISON: 02/03/2022      Impression    IMPRESSION: Cardiomediastinal silhouette within normal limits. No focal consolidation or pleural effusion.

## 2022-05-10 NOTE — PROGRESS NOTES
Felicia from poison control updated on pt status. No other recommendations given improved state. Will sign off for now. Recommendations would be to send pt home on discharge with a prescription for narcan.

## 2022-05-10 NOTE — ED PROVIDER NOTES
History   Chief Complaint:  Altered Mental Status (Grocery store staff saw pt laying in bushes outside store. Approx. 30 minutes. , not responding after 4mg intra nasal narcan and 1.6mg IV Narcan PTA in ED. Initial SpO2 80% upon ems arrival. )       JUDY Maldonado is a 31 year old male with history of polysubstance abuse and frequent overdoses, who presents the emergency room after being found altered and lying on the ground in front of a grocery store.  He arrives and he is unable to participate in the history.  Medics state that there was no evidence of trauma, but that it was hard to get an IV because of his track marks, and noticed no improvement with 4mg of Narcan intranasally, and 1.2 mg of Narcan through an IV.  Blood sugar was over 100, and the patient was alone.  He arrives on an oxygen mask, breathing independently.    Review of Systems   Unable to perform ROS: Patient unresponsive       Allergies:  Ambien [Zolpidem]  Pollen Extract    Medications:  albuterol (PROAIR HFA/PROVENTIL HFA/VENTOLIN HFA) 108 (90 Base) MCG/ACT inhaler  buprenorphine HCl-naloxone HCl (SUBOXONE) 4-1 MG per film  buPROPion (WELLBUTRIN XL) 300 MG 24 hr tablet  LATUDA 20 MG TABS tablet  naloxone (NARCAN) 4 MG/0.1ML nasal spray        Past Medical History:     Past Medical History:   Diagnosis Date     Allergic state      Anxiety      Depressive disorder      Hepatitis C      Substance abuse (H)      Uncomplicated asthma      Patient Active Problem List   Diagnosis     Overdose drug     Cellulitis and abscess of upper arm and forearm     Opiate addiction (H)     Chemical dependency (H)     Heroin dependence (H)     Psychosis (H)     Opioid withdrawal (H)     Substance abuse (H)     Anxiety state     Cellulitis and abscess     Drug dependence (H)     Fever     Opioid type dependence, abuse (H)     Insomnia     Nondependent amphetamine or related acting sympathomimetic abuse     Poisoning by drug or medicinal substance      Suicidal ideation     Alcoholic hepatitis without ascites     Benzodiazepine withdrawal with complication (H)     MDD (major depressive disorder)     Hypoxia     Opiate overdose, accidental or unintentional, initial encounter (H)     Opioid overdose, accidental or unintentional, initial encounter (H)     Hyperthermia     Acute renal failure, unspecified acute renal failure type (H)     Polysubstance abuse (H)     Uncomplicated alcohol dependence (H)     Abscess of neck     Cellulitis of neck     Opioid use disorder         Past Surgical History:    Past Surgical History:   Procedure Laterality Date     INCISION AND DRAINAGE SOFT TISSUE UPPER EXTREMITY, COMBINED  8/11/2012    Procedure: COMBINED INCISION AND DRAINAGE SOFT TISSUE UPPER EXTREMITY;  Incision and drainage Right Arm Abscess;  Surgeon: Cheli White MD;  Location:  OR        Family History:    Family History   Problem Relation Age of Onset     Hypertension Father          Social History:  Social History     Socioeconomic History     Marital status: Single     Spouse name: Not on file     Number of children: Not on file     Years of education: Not on file     Highest education level: Not on file   Occupational History     Not on file   Tobacco Use     Smoking status: Former Smoker     Packs/day: 0.25     Smokeless tobacco: Never Used   Vaping Use     Vaping Use: Every day   Substance and Sexual Activity     Alcohol use: Yes     Comment: occasional     Drug use: Yes     Types: Methamphetamines, Opiates, IV     Comment: heroin daily, meth a couple of times in the last 2 weks     Sexual activity: Never   Other Topics Concern     Not on file   Social History Narrative     Not on file     Social Determinants of Health     Financial Resource Strain: Not on file   Food Insecurity: Not on file   Transportation Needs: Not on file   Physical Activity: Not on file   Stress: Not on file   Social Connections: Not on file   Intimate Partner Violence: Not on file    Housing Stability: Not on file       Physical Exam     Patient Vitals for the past 24 hrs:   BP Temp Pulse Resp SpO2   05/09/22 2311 -- 97.1  F (36.2  C) -- -- --   05/09/22 2254 (!) 154/91 -- 50 16 94 %       Physical Exam  Vitals: reviewed by me  General: Pt seen on [facility], unresponsive apart from grimacing and moaning to mandibular thrust  Eyes: Tracking poorly, pupils sluggish  ENT: MMM, midline trachea.   Lungs: Slightly tachypnea, slightly shallow breaths noted.  Good air movement in both sides  CV: Rate as above  Abd: Soft, non tender, no guarding, no rebound. Non distended  MSK: no joint effusion.  No evidence of trauma  Skin: No rash, numerous track marks  Neuro: Unresponsive apart from to deep painful stimuli  Psych: Deferred      Emergency Department Course     ECG  ECG obtained at 2255, ECG read at 2256  Sinus bradycardia.  Moderate voltage criteria for LVH, may be normal variant.  Possible Acute pericarditis  Abnormal ECG.   Rate 53 bpm. UT interval 172 ms. QRS duration 92 ms. QT/QTc 466/437 ms. P-R-T axes 42 51 48.     Imaging:  XR Chest Port 1 View   Final Result   IMPRESSION: Cardiomediastinal silhouette within normal limits. No focal consolidation or pleural effusion.      CT Head w/o Contrast   Final Result   IMPRESSION:   1.  No acute intracranial process.          Laboratory:  Labs Ordered and Resulted from Time of ED Arrival to Time of ED Departure   COMPREHENSIVE METABOLIC PANEL - Abnormal       Result Value    Sodium 141      Potassium 3.3 (*)     Chloride 109      Carbon Dioxide (CO2) 22      Anion Gap 10      Urea Nitrogen 17      Creatinine 0.99      Calcium 8.7      Glucose 115 (*)     Alkaline Phosphatase 80      AST 26      ALT 41      Protein Total 7.7      Albumin 4.0      Bilirubin Total 0.5      GFR Estimate >90     BLOOD GAS VENOUS WITH OXYHEMOGLOBIN - Abnormal    pH Venous 7.23 (*)     pCO2 Venous 63 (*)     pO2 Venous 22 (*)     Bicarbonate Venous 26      FIO2 2       Oxyhemoglobin Venous 29 (*)     Base Excess/Deficit (+/-) -2.7     ISTAT GASES LACTATE VENOUS POCT - Abnormal    Lactic Acid POCT 1.4      Bicarbonate Venous POCT 28      O2 Sat, Venous POCT 16 (*)     pCO2V Venous POCT 70 (*)     pH Venous POCT 7.21 (*)     pO2 Venous POCT 17 (*)    ACETAMINOPHEN LEVEL - Abnormal    Acetaminophen <2 (*)    BLOOD GAS VENOUS WITH OXYHEMOGLOBIN - Abnormal    pH Venous 7.23 (*)     pCO2 Venous 58 (*)     pO2 Venous 21 (*)     Bicarbonate Venous 24      FIO2 0      Oxyhemoglobin Venous 26 (*)     Base Excess/Deficit (+/-) -4.4     BLOOD GAS VENOUS WITH OXYHEMOGLOBIN - Abnormal    pH Venous 7.27 (*)     pCO2 Venous 56 (*)     pO2 Venous 18 (*)     Bicarbonate Venous 26      FIO2 30      Oxyhemoglobin Venous 22 (*)     Base Excess/Deficit (+/-) -2.3     LACTIC ACID WHOLE BLOOD - Normal    Lactic Acid 1.6     TROPONIN I - Normal    Troponin I High Sensitivity 11     ETHYL ALCOHOL LEVEL - Normal    Alcohol ethyl <0.01     SALICYLATE LEVEL - Normal    Salicylate <2     CBC WITH PLATELETS AND DIFFERENTIAL    WBC Count 8.3      RBC Count 5.22      Hemoglobin 14.8      Hematocrit 46.1      MCV 88      MCH 28.4      MCHC 32.1      RDW 12.8      Platelet Count 259      % Neutrophils 73      % Lymphocytes 17      % Monocytes 8      % Eosinophils 2      % Basophils 0      % Immature Granulocytes 0      NRBCs per 100 WBC 0      Absolute Neutrophils 6.0      Absolute Lymphocytes 1.4      Absolute Monocytes 0.6      Absolute Eosinophils 0.2      Absolute Basophils 0.0      Absolute Immature Granulocytes 0.0      Absolute NRBCs 0.0        Emergency Department Course:    Reviewed:  I reviewed nursing notes, vitals and past medical history    Assessments:  I spoke to poison center shortly after patient's arrival at length about possible combinations of his overdose that could cause bradycardia, favoring Xanax and Ambien.  Agreed with ED pharmacy recommendations as well as poison center's recommendations  do not give flumazenil since he is on Wellbutrin.    I also spoke with the patient's mother twice by phone.  She tells me that the patient has just gotten out of his third treatment at [facility] this year.    2:42 AM  Patient still obtunded, but now wakes up and grimaces and moves hands to light sternal rub, which is a dramatic improvement from when he first arrived    Interventions:  IV fluids  BiPAP  4 mg Narcan IV    Disposition:  Care of the patient was transferred to my colleague Dr. Appiah pending admission to the hospitalist    Impression & Plan     Medical Decision Making:  This is a 31-year-old male presents to the emergency room with an overdose leading to an obtunded state.  After going through his history, and speaking with the poison center, I do not think that this is an opiate overdose as he has received essentially 9 mg of Narcan without change.  He has access to numerous other sedatives, like Xanax, Latuda, Wellbutrin and Ambien, also has GHB as a medication he is previously abused.  He is improving here in the ER, but had a respiratory acidosis that led me to put him on BiPAP, and he is improving on this.  He had no evidence of trauma, the CT scan of his head shows no reason for his altered state, and based on my discussion with his mother, does not like he was high and using drugs earlier today prior to being found by bystanders next of the grocery store.  His congestion labs here are normal, his alcohol level here is normal, and he is afebrile.  X-ray shows no reason for his hypoxia.  Given the prolonged metabolization of what ever he has taken, I will plan for admission to the hospitalist.  His bradycardia also seems to be new, and this may be related to what ever sedative he took.  He is maintaining a good blood pressure here, no indication for antibiotics, and supportive care is indicated.  He is protecting his airway throughout, and he may be able to come off of the BiPAP in the next few hours  here.  Right now however, he will need to be admitted to the [facility].    I have also placed him on an CAM[place], as this may have been an intentional overdose, and he will need to be seen by our mental health colleagues    Critical Care Time: was 45 minutes for this patient excluding procedures    Diagnosis:    ICD-10-CM    1. Altered mental status, unspecified altered mental status type  R41.82    2. Unresponsive state  R41.89    3. Drug overdose, undetermined intent, initial encounter  T50.904A               Brandon Abdullahi MD  05/10/22 [id number]

## 2022-05-10 NOTE — PROGRESS NOTES
Pt made requests for suboxone meds to be restarted. Dr. Aldana of hospitalist service paged and then psychiatrist paged. Pt requested for blood borne pathogen prophylaxis after recent exposure 'stated Saturday probably' but also said he used heroin last night. Testing already pending. Dr. Aldana stated he'd contact infectious disease and address in the morning. Pt was updated regarding all of these communications.

## 2022-05-10 NOTE — ED NOTES
[facility]  ED Nurse Handoff Report    ED Chief complaint: Altered Mental Status (Grocery store staff saw pt laying in bushes outside store. Approx. 30 minutes. , not responding after 4mg intra nasal narcan and 1.6mg IV Narcan PTA in ED. Initial SpO2 80% upon ems arrival. )      ED Diagnosis:   Final diagnoses:   Altered mental status, unspecified altered mental status type   Unresponsive state   Drug overdose, undetermined intent, initial encounter       Code Status: Full Code    Allergies:   Allergies   Allergen Reactions     Ambien [Zolpidem] Shortness Of Breath     wheezing     Pollen Extract Unknown       Patient Story: Grocery store staff saw pt laying in bushes outside store. Approx. 30 minutes. , not responding after 4mg intra nasal narcan and 1.6mg IV Narcan PTA in ED. Initial SpO2 80% upon ems arrival.   Focused Assessment:  Pt. Responsive to touch, unable to follow commands at this point. Purposefully moves bilat. UE and LE. Color pink with unlabored-regular resps.     Treatments and/or interventions provided: NaCl bolus of 1000 ml IV, Narcan 4 mg IV. Bipap.   Patient's response to treatments and/or interventions: Sleeping in rom    To be done/followed up on inpatient unit:  nothing    Does this patient have any cognitive concerns?: Unable to validate due to current state.     Activity level - Baseline/Home:  Independent  Activity Level - Current:   Unknown    Patient's Preferred language: English   Needed?: No    Isolation: None  Infection: Not Applicable  Patient tested for COVID 19 prior to admission: YES  Bariatric?: No    Vital Signs:   Vitals:    05/10/22 0130 05/10/22 0215 05/10/22 0230 05/10/22 0235   BP: (!) 152/94 (!) 162/103 (!) 171/97 (!) 161/102   Pulse: (!) 44 (!) 43 (!) 41 (!) 43   Resp: 17 18 18 12   Temp:       SpO2: 100% 99% 99% 96%       Cardiac Rhythm:     Was the PSS-3 completed:   No -Pt. Unresponsive upon arrival.  What interventions are  required if any? None              Family Comments: None present.   OBS brochure/video discussed/provided to patient/family: N/A              Name of person given brochure if not patient:                Relationship to patient:      For the majority of the shift this patient's behavior was Green.   Behavioral interventions performed were None.    ED NURSE PHONE NUMBER: [phone]
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
## 2022-05-10 NOTE — PLAN OF CARE
Neuro :lethargic, confused, oriented to self  Tele/cardiac: SB  Respiration: on BiPAP  Activity: not yet OOB  Pain: Denies  Drips: NS @ 100 ml/hr  Drains/tubes: none  Skin: intact  GI/ , bladder scan 621 ml, need to encourage to void/straight cath  Aggression color: green  Isolation: none  Plan: psychiatry consult,

## 2022-05-10 NOTE — ED TRIAGE NOTES
Pt found down unresponsive in bush outside grocery store. Unresponsive to narcan     Triage Assessment     Row Name 05/09/22 [id number]       Triage Assessment (Adult)    Airway WDL WDL       Respiratory WDL    Respiratory WDL X;all    Expansion/Accessory Muscles/Retractions abdominal muscle use;accessory muscle use    Mucous Membranes dry    Cough Frequency no cough       Skin Circulation/Temperature WDL    Skin Circulation/Temperature WDL WDL       Cardiac WDL    Cardiac WDL X;rhythm    Cardiac Rhythm bradycardic       Peripheral/Neurovascular WDL    Peripheral Neurovascular WDL WDL       Cognitive/Neuro/Behavioral WDL    Cognitive/Neuro/Behavioral WDL X;all    Level of Consciousness obtunded    Arousal Level arouses to pain    Orientation --  SOWMYA    Speech SOWMYA (unable to assess)       Pupils (CN II)    Pupil PERRLA no  Not responsive to light    Pupil Size Left 6 mm    Pupil Shape Left round    Pupil Reaction Left fixed    Pupil Size Right 6 mm    Pupil Shape Right round    Pupil Reaction Right fixed       [place] Coma Scale    Best Eye Response 1-->(E1) none    Best Motor Response 1-->(M1) none    Best Verbal Response 1-->(V1) none    [place] Coma Scale Score 3

## 2022-05-10 NOTE — H&P
[facility]    History and Physical  Hospitalist     Date of Admission:  5/9/2022    Assessment & Plan   Romeo Maldonado is a 31 year old male with a past medical history of polysubstance abuse, presents to hospital with altered mental status.    Altered mental status  Patient found obtunded in a parking lot, did not improve with narcan. Likely due to overdose. poinson control thinks bradycardia and mental status change could be due to Xanax or Ambien overdose.  Use of flumazenil was not recommended given that patient is on Wellbutrin.  Patient currently withdraws to painful stimuli. Placed on bipap in the er with improvement of blood gas.   -bipap  -psychiatric consult  -Follow-up U tox  -IV hydration  -Suicidal precautions    Hypokalemia  -Replace per protocol    History of polysubstance abuse  History of injection drug use  Patient has a history of opiate use, benzodiazepine use, methamphetamine use, GHB use.  Patient reportedly recently left [place] for chemical dependency treatment.    History of hepatitis C status posttreatment  Hep C RNA undetectable on February 2, 2022    Code Status   Full Code  DVT ppx: lovenox sc  Expected length of stay greater than 2 days    Primary Care Physician   Jean Pierre Simon    Chief Complaint   Altered mental status    History obtained from the medical chart    History of Present Illness   Romeo Maldonado is a 31 year old male with a past medical history of polysubstance abuse, frequent overdoses presents to hospital with altered mental status after found lying on the ground in front of a grocery store.  The patient is altered and unable to provide any history.  In route the patient was given intranasal Narcan as well as IV Narcan without improvement of symptoms.  Blood sugar was normal.  Patient was found alone.  Patient arrived on an oxygen facemask and breathing independently.  No further history available.  In the emergency room patient was placed on  BiPAP.  He was found to be bradycardic but hemodynamically stable.  Case was discussed with poison control who suspected that the patient may have overdosed on Xanax or Ambien which could cause his sedation and bradycardia.  Due to his Wellbutrin use flumazenil was not recommended.  The patient was admitted for monitoring until he is able to clear what ever substance he ingested.    Past Medical History    I have reviewed this patient's medical history and updated it with pertinent information if needed.   Past Medical History:   Diagnosis Date     Allergic state      Anxiety      Depressive disorder      Hepatitis C      Substance abuse (H)     meth use     Uncomplicated asthma        Past Surgical History   I have reviewed this patient's surgical history and updated it with pertinent information if needed.  Past Surgical History:   Procedure Laterality Date     INCISION AND DRAINAGE SOFT TISSUE UPPER EXTREMITY, COMBINED  8/11/2012    Procedure: COMBINED INCISION AND DRAINAGE SOFT TISSUE UPPER EXTREMITY;  Incision and drainage Right Arm Abscess;  Surgeon: Cheli White MD;  Location:  OR       Prior to Admission Medications   Prior to Admission Medications   Prescriptions Last Dose Informant Patient Reported? Taking?   LATUDA 20 MG TABS tablet   Yes No   albuterol (PROAIR HFA/PROVENTIL HFA/VENTOLIN HFA) 108 (90 Base) MCG/ACT inhaler  Self No No   Sig: Inhale 2 puffs into the lungs every 6 hours as needed   Patient not taking: Reported on 4/25/2022   buPROPion (WELLBUTRIN XL) 300 MG 24 hr tablet   Yes No   buprenorphine HCl-naloxone HCl (SUBOXONE) 4-1 MG per film   No No   Sig: Take 1/2 to 1 film daily as needed for symptoms of opioid withdrawal.   naloxone (NARCAN) 4 MG/0.1ML nasal spray   No No   Sig: Spray 1 spray (4 mg) into one nostril alternating nostrils once as needed for opioid reversal every 2-3 minutes until assistance arrives      Facility-Administered Medications: None     Allergies   Allergies    Allergen Reactions     Ambien [Zolpidem] Shortness Of Breath     wheezing     Pollen Extract Unknown       Social History   I have reviewed this patient's social history and updated it with pertinent information if needed. Romeo LIU Erica  reports that he has quit smoking. He smoked 0.25 packs per day. He has never used smokeless tobacco. He reports current alcohol use. He reports current drug use. Drugs: Methamphetamines, Opiates, and IV.    Family History   I have reviewed this patient's family history and updated it with pertinent information if needed.   Family History   Problem Relation Age of Onset     Hypertension Father        Review of Systems   Unable to assess due to mental status    Physical Exam   Temp: 97.1  F (36.2  C)   BP: (!) 161/102 Pulse: (!) 43   Resp: 12 SpO2: 96 % O2 Device: None (Room air)    Vital Signs with Ranges  Temp:  [97.1  F (36.2  C)] 97.1  F (36.2  C)  Pulse:  [40-56] 43  Resp:  [12-25] 12  BP: (152-171)/() 161/102  SpO2:  [94 %-100 %] 96 %  0 lbs 0 oz  Physical Exam  Vitals reviewed.   Constitutional:       Appearance: Normal appearance.      Comments: Well developed well nourished man seen on BiPAP in bed in emergency room in no apparent distress.   HENT:      Head: Normocephalic and atraumatic.      Mouth/Throat:      Mouth: Mucous membranes are moist.      Pharynx: Oropharynx is clear.   Eyes:      Extraocular Movements: Extraocular movements intact.      Conjunctiva/sclera: Conjunctivae normal.      Pupils: Pupils are equal, round, and reactive to light.   Cardiovascular:      Rate and Rhythm: Regular rhythm. Bradycardia present.      Pulses: Normal pulses.   Pulmonary:      Effort: Pulmonary effort is normal.      Breath sounds: Normal breath sounds. No wheezing, rhonchi or rales.   Abdominal:      General: Abdomen is flat. Bowel sounds are normal.      Palpations: Abdomen is soft.   Musculoskeletal:         General: No swelling or deformity.   Skin:     General: Skin is  warm and dry.   Neurological:      Comments: Patient is obtunded.  Withdraws to painful stimuli.  Moves his arms to sternal rub.  Did not open his eyes during my exam.  Not participate in any meaningful way.

## 2022-05-10 NOTE — UTILIZATION REVIEW
Admission Status; Secondary Review Determination       Under the authority of the Utilization Management Committee, the utilization review process indicated a secondary review on the above patient. The review outcome is based on review of the medical records, discussions with staff, and applying clinical experience noted on the date of the review.     (x) Inpatient Status Appropriate - This patient's medical care is consistent with medical management for inpatient care and reasonable inpatient medical practice.     RATIONALE FOR DETERMINATION   31 year old male with a past medical history of polysubstance abuse, presents to hospital with altered mental status.   Patient found obtunded in a parking lot, did not improve with narcan. Likely due to overdose. poinson control thinks bradycardia and mental status change could be due to Xanax or Ambien overdose. Use of flumazenil was not recommended given that patient is on Wellbutrin. Patient currently withdraws to painful stimuli. Placed on bipap in the er   Patient requires inpatient admission versus short stay observation or outpatient treatment for the following reasons: Patient polysubstance abuse injection drug use requiring one-on-one sitter was unresponsive at the time of admission unable to maintain his breathing requiring noninvasive mechanical ventilation.  The expected length of stay at the time of admission was more than 2 nights because of the severity of illness, intensity of service provided, and risk for adverse outcome. Inpatient admission is appropriate.         This document was produced using voice recognition software       The information on this document is developed by the utilization review team in order for the business office to ensure compliance. This only denotes the appropriateness of proper admission status and does not reflect the quality of care rendered.   The definitions of Inpatient Status and Observation Status used in making the  determination above are those provided in the CMS Coverage Manual, Chapter 1 and Chapter 6, section 70.4.   Sincerely,   MARYJANE CHACON MD   System Medical Director   Utilization Management   [facility].

## 2022-05-10 NOTE — PROGRESS NOTES
CPAP/BiPAP Settings  IPAP: 16  EPAP: 8  Rate: 18  FiO2: .30  Timed Inspiration (sec): .9    CPAP/BiPAP/AVAPS/AVAPS AE Alarms  High Pressure: 25  Low Pressure: 5  Low Pressure Delay: 20  High Rate (breaths/min): 45  Low Rate (breaths/min): 5  Alarm Volume Level: 10    CPAP/BiPAP/AVAPS/AVAPS AE Patient Assessment  Skin Assessment: intact  Barriers Applied: yes  Lung Sounds: Diminished.

## 2022-05-11 LAB
HIV 1+2 AB+HIV1 P24 AG SERPL QL IA: NONREACTIVE
POTASSIUM BLD-SCNC: 3.6 MMOL/L (ref 3.4–5.3)

## 2022-05-11 PROCEDURE — 250N000013 HC RX MED GY IP 250 OP 250 PS 637: Performed by: FAMILY MEDICINE

## 2022-05-11 PROCEDURE — 99233 SBSQ HOSP IP/OBS HIGH 50: CPT | Performed by: INTERNAL MEDICINE

## 2022-05-11 PROCEDURE — 36415 COLL VENOUS BLD VENIPUNCTURE: CPT | Performed by: INTERNAL MEDICINE

## 2022-05-11 PROCEDURE — 250N000013 HC RX MED GY IP 250 OP 250 PS 637: Performed by: INTERNAL MEDICINE

## 2022-05-11 PROCEDURE — 99223 1ST HOSP IP/OBS HIGH 75: CPT | Mod: GT | Performed by: FAMILY MEDICINE

## 2022-05-11 PROCEDURE — 258N000003 HC RX IP 258 OP 636: Performed by: HOSPITALIST

## 2022-05-11 PROCEDURE — 250N000011 HC RX IP 250 OP 636: Performed by: HOSPITALIST

## 2022-05-11 PROCEDURE — 84132 ASSAY OF SERUM POTASSIUM: CPT | Performed by: INTERNAL MEDICINE

## 2022-05-11 PROCEDURE — 86706 HEP B SURFACE ANTIBODY: CPT | Performed by: INTERNAL MEDICINE

## 2022-05-11 PROCEDURE — 250N000013 HC RX MED GY IP 250 OP 250 PS 637: Performed by: STUDENT IN AN ORGANIZED HEALTH CARE EDUCATION/TRAINING PROGRAM

## 2022-05-11 PROCEDURE — 250N000013 HC RX MED GY IP 250 OP 250 PS 637: Performed by: PSYCHIATRY & NEUROLOGY

## 2022-05-11 PROCEDURE — 120N000001 HC R&B MED SURG/OB

## 2022-05-11 PROCEDURE — 250N000011 HC RX IP 250 OP 636: Performed by: STUDENT IN AN ORGANIZED HEALTH CARE EDUCATION/TRAINING PROGRAM

## 2022-05-11 RX ORDER — BUPRENORPHINE 8 MG/1
8 TABLET SUBLINGUAL 2 TIMES DAILY
Status: DISCONTINUED | OUTPATIENT
Start: 2022-05-12 | End: 2022-05-12 | Stop reason: HOSPADM

## 2022-05-11 RX ORDER — EMTRICITABINE AND TENOFOVIR DISOPROXIL FUMARATE 200; 300 MG/1; MG/1
1 TABLET, FILM COATED ORAL DAILY
Status: DISCONTINUED | OUTPATIENT
Start: 2022-05-11 | End: 2022-05-12 | Stop reason: HOSPADM

## 2022-05-11 RX ORDER — BUPRENORPHINE 2 MG/1
4 TABLET SUBLINGUAL 3 TIMES DAILY
Status: COMPLETED | OUTPATIENT
Start: 2022-05-11 | End: 2022-05-11

## 2022-05-11 RX ADMIN — LURASIDONE HYDROCHLORIDE 20 MG: 20 TABLET, FILM COATED ORAL at 21:17

## 2022-05-11 RX ADMIN — ENOXAPARIN SODIUM 40 MG: 40 INJECTION SUBCUTANEOUS at 10:26

## 2022-05-11 RX ADMIN — DOLUTEGRAVIR SODIUM 50 MG: 50 TABLET, FILM COATED ORAL at 14:55

## 2022-05-11 RX ADMIN — ACETAMINOPHEN 975 MG: 325 TABLET ORAL at 10:30

## 2022-05-11 RX ADMIN — BUPRENORPHINE HYDROCHLORIDE 4 MG: 2 TABLET SUBLINGUAL at 15:56

## 2022-05-11 RX ADMIN — SODIUM CHLORIDE: 9 INJECTION, SOLUTION INTRAVENOUS at 02:07

## 2022-05-11 RX ADMIN — EMTRICITABINE AND TENOFOVIR DISOPROXIL FUMARATE 1 TABLET: 200; 300 TABLET, FILM COATED ORAL at 14:55

## 2022-05-11 RX ADMIN — BUPRENORPHINE HYDROCHLORIDE 4 MG: 2 TABLET SUBLINGUAL at 12:11

## 2022-05-11 RX ADMIN — ONDANSETRON 4 MG: 2 INJECTION INTRAMUSCULAR; INTRAVENOUS at 10:31

## 2022-05-11 RX ADMIN — BUPRENORPHINE HYDROCHLORIDE 4 MG: 2 TABLET SUBLINGUAL at 21:17

## 2022-05-11 ASSESSMENT — ACTIVITIES OF DAILY LIVING (ADL)
ADLS_ACUITY_SCORE: 25
ADLS_ACUITY_SCORE: 22
ADLS_ACUITY_SCORE: 22
ADLS_ACUITY_SCORE: 25
ADLS_ACUITY_SCORE: 25
ADLS_ACUITY_SCORE: 22
ADLS_ACUITY_SCORE: 22
ADLS_ACUITY_SCORE: 25
ADLS_ACUITY_SCORE: 22

## 2022-05-11 NOTE — PLAN OF CARE
Goal Outcome Evaluation:    Plan of Care Reviewed With: patient     Overall Patient Progress: improving    DATE & TIME: 5/10/2022 [id number]  Cognitive Concerns/ Orientation : A&O x4. anxious at times. Cooperative. Holdable if he tries to leave. Slept throughout the night.   BEHAVIOR & AGGRESSION TOOL COLOR: Green   COWS SCORE: 3 (chills/sweating, aches, anxiety)  ABNL VS/O2: VSS on RA  MOBILITY: SBA  PAIN MANAGMENT: Pt reports 7/10 generalized aching when awake  DIET: Regular diet, good appetite   BOWEL/BLADDER: continent, up to BR   ABNL LAB/BG: HIV and Hep C pending.   DRAIN/DEVICES: PIV in upper R arm (IVF infusing) and R foot (saline locked, okay for foot IV per MD).   TELEMETRY RHYTHM: NSR  SKIN: face sweaty and flushed; skin clammy.   D/C DATE: Pending improvement. Per psych note, to inpatient psych when medically cleared.   Discharge Barriers: Medical clearance   OTHER IMPORTANT INFO:

## 2022-05-11 NOTE — PROGRESS NOTES
Some concern from the nursing  about patient might not be forthcoming of all the information and might not be telling me the truth about the dirty needle use.     Post exposure prophylaxis medications are ordered and in the chart for patient to use:     Dolutegravir/Truvada  28 day course   HIV recheck in 6 weeks and then again 4 months     Malick Mcgowan MD  Infectious Disease

## 2022-05-11 NOTE — PLAN OF CARE
DATE & TIME: 5/10/2022 transfer-1930  Cognitive Concerns/ Orientation : A&O x4, anxious. Calm and cooperative.   BEHAVIOR & AGGRESSION TOOL COLOR: green   CIWA SCORE: n/a  ABNL VS/O2: VSS on RA  MOBILITY: SBA  PAIN MANAGMENT: c/o generalized pain/aches, managed with PRN Tylenol.    DIET: Regular diet, tolerated, ate well for supper.   BOWEL/BLADDER: continent.   ABNL LAB/BG: On K protocol, recheck earlier today was 3.9, recheck again in am. HIV and Hep C pending.   DRAIN/DEVICES: PIV in upper R arm (IVF infusing) and R foot (saline locked, okay for foot IV per MD).   TELEMETRY RHYTHM: NSR  SKIN: face sweaty and flushed; skin clammy.   TESTS/PROCEDURES: n/a  D/C DATE: pending improvement; per psych note, to inpatient psych when medically cleared.   OTHER IMPORTANT INFO: COWS score 8. Seen by psych today, per their note pt is holdable if he attempts to leave. C/O nausea, managed with IV Zofran.     Goal Outcome Evaluation:

## 2022-05-11 NOTE — CONSULTS
Care Management Initial Consult    General Information  Assessment completed with: VM-chart review,    Type of CM/SW Visit: Initial Assessment    Primary Care Provider verified and updated as needed:     Readmission within the last 30 days:        Reason for Consult: discharge planning  Advance Care Planning: Advance Care Planning Reviewed: no concerns identified          Communication Assessment  Patient's communication style: spoken language (English or Bilingual)    Hearing Difficulty or Deaf: no   Wear Glasses or Blind: no    Cognitive  Cognitive/Neuro/Behavioral: .WDL except  Level of Consciousness: alert  Arousal Level: opens eyes spontaneously  Orientation: oriented x 4  Mood/Behavior: anxious, cooperative  Best Language: 0 - No aphasia  Speech: clear, spontaneous, logical    Living Environment:   People in home: alone     Current living Arrangements: apartment      Able to return to prior arrangements:         Family/Social Support:  Care provided by: self  Provides care for: no one  Marital Status: Single  Parent(s), Other (specify) ([facility], sponsor)          Description of Support System: Supportive, Involved    Support Assessment: Adequate family and caregiver support, Adequate social supports    Current Resources:   Patient receiving home care services:       Community Resources:    Equipment currently used at home: none  Supplies currently used at home:      Employment/Financial:  Employment Status: employed full-time        Financial Concerns:  (concerns on losing current employment position)           Lifestyle & Psychosocial Needs:  Social Determinants of Health     Tobacco Use: Medium Risk     Smoking Tobacco Use: Former Smoker     Smokeless Tobacco Use: Never Used   Alcohol Use: Not on file   Financial Resource Strain: Not on file   Food Insecurity: Not on file   Transportation Needs: Not on file   Physical Activity: Not on file   Stress: Not on file   Social Connections: Not on file    Intimate Partner Violence: Not on file   Depression: At risk     PHQ-2 Score: 3   Housing Stability: Not on file       Functional Status:  Prior to admission patient needed assistance:              Mental Health Status:  Mental Health Status: Current Concern  Mental Health Management: Psychiatrist, Medication    Chemical Dependency Status:  Chemical Dependency Status: Current Concern             Values/Beliefs:  Spiritual, Cultural Beliefs, Druze Practices, Values that affect care:  (Religion)               Additional Information:    Pt was admitted on 5/10/22 for altered mental status.  Pt has a history of polysubstance abuse.  Pt was found obtunded in parking and did not respond to narcan.  Concerns were raised on whether this was intentional overdose or not.      Psychiatry consulted with pt and notes that pt has had several 3 treatment stays at [facility] over the past year, most recently discharged on 4/1/22.  Pt has also been to the ED and hospitalized through [place] several times since 2021, with the most recent [facility] admission in 2/2022.  Recommendation is to discharge to IP Psych once medically stable/clear from [facility].  Psych further mentions that pt is holdable for 72 hours if pt attempts to discharge early and is approaching threshold for commitment petition.      Sw consult ordered for discharge planning.  Sw met with pt to discuss IP psych recommendation following [facility] discharge.  Pt confirmed that he resides in an apt, is employed FT, and has admitted into [facility] 3x over the last year.  Pt expressed significant concern around potential loss of employment if he was to admit into IP Psych from [facility].  Pt stated that he feels he does not have much of a choice, is aware of the commitment process.  Sw and pt processed through reasons for/against IP Psych; Sw emphasized significance of most recent [facility] admission, provided education around the importance in psychiatric stabilization along with obtaining  sobriety so he is able to effectively be employed.  When reflecting on what lead up to pt being [facility], pt initially stated in visit that he did not know he was drinking GHB in his water bottle, that the overdose was an accident.  As conversation continued, pt stated that he knew GHB was in his water bottle and the reason why he drank all of it in one sitting was b/c he was fearful he would be arrested given that his mom was calling the police on him.  Pt states that he absolutely was not attempting to complete suicide.  Pt denies any current SI concerns.  Pt stated that he ultimately will be agreeable to IP Psych but prefers OP AA as he finds this more helpful.  Pt reports that he has been attending OP AA group meetings 5x per week for the past year and that they have been very helpful in his recovery.  Sw provided encouragement and noted the need for 3 IP CD treatment stays + several hospitalizations over the past year despite frequent OP AA group participation.  Pt states that he achieved 4 years of sobriety at one point and feels OP is most conducive to his recovery.  Pt states that attending IP Psych will not further aide him in stabilization as he has not found the right regiment of medications to stabilize him, does not believe he has bipolar disorder, and believes the core of the issue is CD and spirituality.  Sw encouraged pt to express his needs to psych (consulted again) while remaining open to recommendations given by his care team.  Sw to continue to follow if additional needs arise.    Mary Alice Gonsalez, [facility] CenterSW

## 2022-05-11 NOTE — PLAN OF CARE
"DATE & TIME: 5/11/2022 AM shift  Cognitive Concerns/ Orientation : A&O x4. Fearful about loosing his job. Anxious at times especially after talking to his dad over the phone. Cooperative this shift. Holdable if he tries to leave. Napping after Subutex.  BEHAVIOR & AGGRESSION TOOL COLOR: Green   COWS SCORE: 4 this shift  ABNL VS/O2: VSS on RA; LS clear. Denies SOB or chest pain.   MOBILITY: Steady gain. Independent in the room. Walks with a sitter in the [place].  PAIN MANAGMENT: Pt reports generalized aching when awake, Tylenol x 1 given. Reports \"I am going thru withdrawal\", subutex started.   DIET: Regular diet, good appetite. Nausea intermittently, Zofran x 1 given.   BOWEL/BLADDER: continent of B&B, no BM this shift.   ABNL LAB/BG: n/a  DRAIN/DEVICES: PIV in upper R arm and R foot both SL  TELEMETRY RHYTHM: NSR, discontinued.   SKIN: Face flushed; skin clammy at times.   D/C DATE: Pending, psych eval and recommendation. Pt is medically cleared.   Discharge Barriers: awaiting psych consult.   OTHER IMPORTANT INFO: n/a     "

## 2022-05-11 NOTE — PROGRESS NOTES
Earlier in the day, pt asked for HIV post exposure prophylaxis. Dr. Mcgowan from ID and I were with pt and explained to him the benefits and the consequences of taking the prophylaxis. Pt confirmed that he did not use any dirty needles and that there was no way he would have been exposed. After asking pt if he did want to receive the meds in different ways, pt declined receiving prophylaxis each time.   Katia Kay RN

## 2022-05-11 NOTE — CONSULTS
[facility]    Infectious Disease Consultation     Date of Admission:  5/9/2022  Date of Consult (When I saw the patient): 05/11/22    Assessment & Plan   Romeo Maldonado is a 31 year old male who was admitted on 5/9/2022.     Impression:  1. 31 y.o with substance abuse who was admitted with altered mental status.   2. Previously treated hep C  3. Hep B immunized  4. ID consult for patient asking for HIV post exposure prophylaxis on exam today, patient denies using any dirty needles.      Recommendations  1. Counseling regarding clean needles/ safe sex practices discussed   2.  I offered patient post exposure prophylaxis with Dolutegravir/Truvada but patient denies any dirty needles use.   Long discussion with patient if any concern or confusion about dirty needle use still offered post exposure prophylaxis but patient refusing   Discussed with Dr. Aldana.   I will sign off please call if ques        Malick Mcgowan MD    Reason for Consult   Reason for consult: I was asked to evaluate this patient for HIV prophylaxis post exposure to dirty needles used for IV illicit drugs     Primary Care Physician   Jean Pierre Simon    Chief Complaint   Altered mental status     History is obtained from the patient and medical records    History of Present Illness   Romeo Maldonado is a 31 year old male  with a past medical history of polysubstance abuse, presents to hospital with altered mental status    Past Medical History   I have reviewed this patient's medical history and updated it with pertinent information if needed.   Past Medical History:   Diagnosis Date     Allergic state      Anxiety      Depressive disorder      Hepatitis C      Opioid use disorder, severe, dependence (H) 11/17/2013     Substance abuse (H)     meth use     Uncomplicated asthma        Past Surgical History   I have reviewed this patient's surgical history and updated it with pertinent information if needed.  Past Surgical History:    Procedure Laterality Date     INCISION AND DRAINAGE SOFT TISSUE UPPER EXTREMITY, COMBINED  8/11/2012    Procedure: COMBINED INCISION AND DRAINAGE SOFT TISSUE UPPER EXTREMITY;  Incision and drainage Right Arm Abscess;  Surgeon: Cheli White MD;  Location:  OR       Prior to Admission Medications   Prior to Admission Medications   Prescriptions Last Dose Informant Patient Reported? Taking?   LATUDA 20 MG TABS tablet 5/9/2022 at am  Yes Yes   Sig: Take 20 mg by mouth daily   albuterol (PROAIR HFA/PROVENTIL HFA/VENTOLIN HFA) 108 (90 Base) MCG/ACT inhaler  at prn  No Yes   Sig: Inhale 2 puffs into the lungs every 6 hours as needed   buPROPion (WELLBUTRIN XL) 300 MG 24 hr tablet 5/9/2022 at am  Yes Yes   Sig: Take 300 mg by mouth every morning   buprenorphine ER (SUBLOCADE) 300 MG/1.5ML prefilled syringe 3/29/2022  Yes Yes   Sig: Inject 300 mg Subcutaneous every 30 days   buprenorphine HCl-naloxone HCl (SUBOXONE) 4-1 MG per film Not Taking at Unknown time  No No   Sig: Take 1/2 to 1 film daily as needed for symptoms of opioid withdrawal.   Patient not taking: Reported on 5/10/2022   multivitamin, therapeutic (THERA-VIT) TABS tablet 5/9/2022 at am  Yes Yes   Sig: Take 1 tablet by mouth daily   naloxone (NARCAN) 4 MG/0.1ML nasal spray  at prn  No Yes   Sig: Spray 1 spray (4 mg) into one nostril alternating nostrils once as needed for opioid reversal every 2-3 minutes until assistance arrives      Facility-Administered Medications: None     Allergies   Allergies   Allergen Reactions     Pollen Extract Unknown and Other (See Comments)     Zolpidem Shortness Of Breath     wheezing       Immunization History   Immunization History   Administered Date(s) Administered     COVID-19,PF,Moderna 11/30/2021     COVID-19,PF,Pfizer (12+ Yrs) 01/26/2021, 02/16/2021     TDAP Vaccine (Adacel) 08/11/2012       Social History   I have reviewed this patient's social history and updated it with pertinent information if needed. Romeo LIU  Saeb  reports that he has quit smoking. He smoked 0.25 packs per day. He has never used smokeless tobacco. He reports current alcohol use. He reports current drug use. Drugs: Methamphetamines, Opiates, and IV.    Family History   I have reviewed this patient's family history and updated it with pertinent information if needed.   Family History   Problem Relation Age of Onset     Hypertension Father        Review of Systems   The 10 point Review of Systems is negative other than noted in the HPI or here.     Physical Exam   Temp: 97.8  F (36.6  C) Temp src: Oral BP: 134/78 Pulse: 65   Resp: 18 SpO2: 99 % O2 Device: None (Room air)    Vital Signs with Ranges  Temp:  [97.7  F (36.5  C)-98.7  F (37.1  C)] 97.8  F (36.6  C)  Pulse:  [50-93] 65  Resp:  [18] 18  BP: (114-148)/(50-85) 134/78  SpO2:  [95 %-99 %] 99 %  0 lbs 0 oz  There is no height or weight on file to calculate BMI.    GENERAL APPEARANCE:  awake  EYES: Eyes grossly normal to inspection  NECK: no adenopathy  RESP: lungs clear   CV: regular rates and rhythm  LYMPHATICS: normal ant/post cervical and supraclavicular nodes  ABDOMEN: soft, nontender  MS: extremities normal  SKIN: no suspicious lesions or rashes        Data   Lab Results   Component Value Date    WBC 6.4 05/10/2022    HGB 14.6 05/10/2022    HCT 44.2 05/10/2022     05/10/2022     05/10/2022    POTASSIUM 3.6 05/11/2022    CHLORIDE 111 (H) 05/10/2022    CO2 22 05/10/2022    BUN 14 05/10/2022    CR 0.70 05/10/2022    GLC 86 05/10/2022    SED 13 02/15/2022    NTBNPI 112 06/26/2021    TROPI <0.015 06/26/2021    AST 26 05/09/2022    ALT 41 05/09/2022    GGT 9 02/02/2022    ALKPHOS 80 05/09/2022    BILITOTAL 0.5 05/09/2022    DEJAN <9 (L) 11/01/2010    INR 1.01 03/24/2021     No results for input(s): CULT in the last 168 hours.  Recent Labs   Lab Test 07/04/21  1710 05/29/15  1138 05/29/15  1135 03/03/14  2225 03/03/14  2159   CULT No growth  No growth No Beta Streptococcus isolated No  growth No growth No growth

## 2022-05-11 NOTE — PROGRESS NOTES
[facility]    Medicine Progress Note - Hospitalist Service    Date of Admission:  5/9/2022    Assessment & Plan          Romeo Maldonado is a 31 year old male with a past medical history of polysubstance abuse, presents to hospital with altered mental status.    Altered mental status  Patient found obtunded in a parking lot, did not improve with narcan. Likely due to overdose. poinson control thinks bradycardia and mental status change could be due to Xanax or Ambien overdose.  Use of flumazenil was not recommended given that patient is on Wellbutrin.  Patient currently withdraws to painful stimuli. Placed on bipap in the er with improvement of blood gas.   -5/10: AMS resolved.    Continue to monitor closely.  Appreciate psychiatry consult   On one-to-one sitter.  Await transfer to psychiatry.    05/11: Medically stable to discharge to inpatient psychiatry.  Appreciate addiction medicine consult and patient was started on Suboxone  -As per patient's request for HIV prophylaxis postexposure, he was started on HIV PEP kit    Hypokalemia  -Replace per protocol    History of polysubstance abuse  History of injection drug use  Patient has a history of opiate use, benzodiazepine use, methamphetamine use, GHB use.  Patient reportedly recently left [place] for chemical dependency treatment.    Hold PTA Wellbutrin    History of hepatitis C status posttreatment  Hep C RNA undetectable on February 2, 2022  Recheck HIV and hepatitis C ---p  -As per patient's request for HIV prophylaxis postexposure, he was started on HIV PEP kit       Diet: Regular Diet Adult    DVT Prophylaxis: Pneumatic Compression Devices  Marie Catheter: Not present  Central Lines: None  Cardiac Monitoring: ACTIVE order. Indication: Drug overdose (24 hours)  Code Status: Full Code      Disposition Plan   Expected Discharge: 05/12/2022     Anticipated discharge location:  Awaiting care coordination huddle  Delays:         The  "patient's care was discussed with the Bedside Nurse and Patient.    Ramone Aldana MD, MD  Hospitalist Service  [facility]  Securely message with the Vocera Web Console (learn more here)  Text page via [employer] Paging/Directory     Clinically Significant Risk Factors Present on Admission              # Overweight: Estimated body mass index is 25.83 kg/m  as calculated from the following:    Height as of 5/8/22: 1.778 m (5' 10\").    Weight as of 5/8/22: 81.6 kg (180 lb).      ______________________________________________________________________    Interval History   Last 24-hour events noted.  Patient states he is going through withdrawals and feeling significant nausea  Denies any diarrhea or abdominal pain.  Wants to be started on Suboxone  Denies any chest pain/palpitations/fever/shortness of breath  Denies any suicidal/homicidal ideation.  Denies any hallucinations   4 point ROS done and negative unless mentioned     Data reviewed today: I reviewed all medications, new labs and imaging results over the last 24 hours. I personally reviewed no images or EKG's today.    Physical Exam   /85 (BP Location: Left arm)   Pulse 50   Temp 97.7  F (36.5  C) (Oral)   Resp 18   SpO2 98%   Gen- pleasant sitting on chair  HEENT- NAD, EBOYN  Neck- supple, no JVD elevation, no thyromegaly  CVS- I+II+ no m/r/g  RS- CTAB  Abdo- soft, no tenderness . No g/r/r  Ext- no edema   Mild tremors    Data    BMP  Recent Labs   Lab 05/11/22  0747 05/10/22  1057 05/10/22  0730 05/10/22  0530 05/10/22  0508 05/09/22  2317 05/09/22  2303   NA  --   --   --  141  --   --  141   POTASSIUM 3.6 3.9  --  4.1  4.1  --   --  3.3*   CHLORIDE  --   --   --  111*  --   --  109   NILA  --   --   --  8.7  --   --  8.7   CO2  --   --   --  22  --   --  22   BUN  --   --   --  14  --   --  17   CR  --   --   --  0.70  --  1.23 0.99   GLC  --   --  86 113* 104*  --  115*     CBC  Recent Labs   Lab 05/10/22  0530 05/09/22  2301 "   WBC 6.4 8.3   RBC 5.13 5.22   HGB 14.6 14.8   HCT 44.2 46.1   MCV 86 88   MCH 28.5 28.4   MCHC 33.0 32.1   RDW 12.9 12.8    259     INRNo lab results found in last 7 days.  LFTs  Recent Labs   Lab 05/09/22  2303   ALKPHOS 80   AST 26   ALT 41   BILITOTAL 0.5   PROTTOTAL 7.7   ALBUMIN 4.0      PANCNo lab results found in last 7 days.    No results found for this or any previous visit (from the past 24 hour(s)).

## 2022-05-11 NOTE — CONSULTS
Addiction Medicine Consultation    Romeo LIU Erica, 1990, [id number]    Primary:  Jean Pierre Simon, [phone]       Tele-Visit Details    Type of service:  Video Visit  Video Start Time (time video started): 1034  Video End Time (time video stopped): 1049  Originating Location (pt. Location): Patient's room  Distant Location (provider location): On site at [facility]  Reason for Televisit: COVID-19  Mode of Communication:  Video Conference via polycom  Physician has received verbal consent for a video visit from the patient? Yes    Assessment and Plan:     Active Problems:    Overdose drug    Opioid use disorder, severe, dependence (H)    Opioid withdrawal (H)    Unresponsive state    Altered mental status, unspecified altered mental status type    31-year-old male with severe opioid use disorder, dependence who presented with altered mental status and suspected drug overdose, initially requiring BiPAP and having a poor response to naloxone.  Mental status has improved and now patient is in moderate opioid withdrawal.  Urine drug screen obtained over 24 hours ago was positive for opiates and amphetamines which is consistent with patient's report.  He is scheduled for Sublocade injection on 05/17/2022 at 1230 and is requiring a bridge with sublingual buprenorphine.  Although precipitated withdrawal at this point is unlikely, we will nonetheless start at a slightly lower dose at 4 mg 3 times today.  If patient has any signs of precipitated withdrawal, he was specifically instructed to have the nurse page me.  Total dose today will be 12 mg, followed by 8 mg twice daily tomorrow.  Patient was evaluated by psychiatry and yesterday they were recommending inpatient psych however, patient denies suicidal ideation today and notes that he has a safe support network and that once he is on buprenorphine, he will most likely be doing better.  I will defer to psychiatry however, once he is out  of withdrawal, it may be worth a follow-up assessment to determine if inpatient psychiatry is still needed.  Addiction medicine will continue to follow patient peripherally and provide a prescription for buprenorphine at discharge to bridge him until the Sublocade appointment.  Additionally, I will contact the recovery clinic to set up an appointment for him to be seen by provider the morning prior to his injection.    DO NOT discharge patient without a prescription for buprenorphine as this will likely result in relapse and put him at great risk for overdose and death.      For questions and concerns, please page through [facility]. Addiction Medicine team is currently available Monday through Friday.    Chief Complaint:  Altered mental status     HPI:    Romeo Maldonado is a 31 year old old male with a past medical history significant for substance use disorders including opiates, benzodiazepines, GHB and amphetamines and hepatitis C who presented to [facility] for altered mental status.  Patient was found obtunded in a parking lot and did not respond to Narcan.  He was bradycardic and it was thought that patient may have overdosed on Xanax or Ambien.  Flumazenil was not recommended since patient is prescribed Wellbutrin.  Initially placed on BiPAP    Consultation requested for buprenorphine management after he was evaluated by Les Wright of psychiatry.  Per his assessment:  The patient describes that he struggles with ongoing substance use and relapses.  Most recently, he relapsed about 1 week ago.  He describes that he was recently discharged from [facility] in early April.  He describes that he has been fearful about losing his job and did not feel like he could miss any more work, so he did not do any aftercare after the most recent [facility] admission.  He states he has been in treatment 3 times in the past year, all through [facility], and also has done outpatient programming.  He has been on Suboxone as  "recently as 02/2022 when he was here at [facility] and it was noted he would be continuing Suboxone to continue detox and go to [facility]; however, the patient states that he did not like Suboxone and was having concerns that if he stopped taking it, he would get withdrawal symptoms.      Patient is scheduled for Sublocade injection on 05/17/2022.  He reports that he had been given a prescription for buprenorphine 4 mg films and his mother took them thinking that they were actually illicit substances.  He then went into withdrawal and relapsed approximately 1 week ago on heroin (mostly smoking, some IV), methamphetamines once and GHB.    Patient describes a good support network going to 5 meetings a week at the [employer] clubs in [place] in [place].  He lives with his dad in [place] who is a supportive person and he has a sponsor he meets with twice weekly.    Sublocade tends to work best for this patient and he prefers it over Suboxone.      Current symptoms:  Body aches, chills, nausea, malaise, \"general sense of sadness\", excessive lacrimation, rhinorrhea and abdominal cramping.  He denies any vomiting or diarrhea.  COWS score was 4 yesterday evening but it's much higher based on my assessment today, indicating worsening withdrawal.    Medical History  Past Medical History:   Diagnosis Date     Allergic state      Anxiety      Depressive disorder      Hepatitis C      Opioid use disorder, severe, dependence (H) 11/17/2013     Substance abuse (H)     meth use     Uncomplicated asthma        Surgical History  Past Surgical History:   Procedure Laterality Date     INCISION AND DRAINAGE SOFT TISSUE UPPER EXTREMITY, COMBINED  8/11/2012    Procedure: COMBINED INCISION AND DRAINAGE SOFT TISSUE UPPER EXTREMITY;  Incision and drainage Right Arm Abscess;  Surgeon: Cheli White MD;  Location:  OR       Social History  Based on psychiatry assessment:  He grew up in [place] and then at age 11 moved to " [place].  Describes some emotional trauma experiences from childhood with parents having tendency to argue a lot.  He has some abandonment sensitivity about his father and his relationship with him, traveling a lot and so forth.  The patient has a 4-year-old daughter with his long-term significant other.  They are engaged to get , but she left about a year ago, which at that time coincided with patient having worsening depression and substance use.    Social History     Tobacco Use     Smoking status: Former Smoker     Packs/day: 0.25     Smokeless tobacco: Never Used   Substance Use Topics     Alcohol use: Yes     Comment: occasional       Family History  Reviewed    family history includes Hypertension in his father.    Prior to Admission Medications   Medications Prior to Admission   Medication Sig Dispense Refill Last Dose     albuterol (PROAIR HFA/PROVENTIL HFA/VENTOLIN HFA) 108 (90 Base) MCG/ACT inhaler Inhale 2 puffs into the lungs every 6 hours as needed 8.5 g 0  at prn     buprenorphine ER (SUBLOCADE) 300 MG/1.5ML prefilled syringe Inject 300 mg Subcutaneous every 30 days   3/29/2022     buPROPion (WELLBUTRIN XL) 300 MG 24 hr tablet Take 300 mg by mouth every morning   5/9/2022 at am     LATUDA 20 MG TABS tablet Take 20 mg by mouth daily   5/9/2022 at am     multivitamin, therapeutic (THERA-VIT) TABS tablet Take 1 tablet by mouth daily   5/9/2022 at am     naloxone (NARCAN) 4 MG/0.1ML nasal spray Spray 1 spray (4 mg) into one nostril alternating nostrils once as needed for opioid reversal every 2-3 minutes until assistance arrives 0.2 mL 11  at prn     buprenorphine HCl-naloxone HCl (SUBOXONE) 4-1 MG per film Take 1/2 to 1 film daily as needed for symptoms of opioid withdrawal. (Patient not taking: Reported on 5/10/2022) 5 Film 0 Not Taking at Unknown time       Allergies  Allergies   Allergen Reactions     Pollen Extract Unknown and Other (See Comments)     Zolpidem Shortness Of Breath      wheezing        Review of Systems:    A 10 point review of systems was completed and negative apart from that which is mentioned in the HPI.      Physical Exam:    /85 (BP Location: Left arm)   Pulse 50   Temp 97.7  F (36.5  C) (Oral)   Resp 18   SpO2 98%     Standard physical exam not performed today since this encounter is via telehealth during the COVID-19 pandemic.  The exams performed by previous providers are personally reviewed in the chart.    General appearance   Gen: awake, alert, and oriented   Dermatologic   No rash. No piloerection but has moderate diaphoresis. No jaundice  HEENT   EOMI.    No yawning  Excessive lacrimation present  Rhinorrhea plaque present  Pulmonary   No respiratory distress. No cough noted.  Neurologic   Oriented to person, place, time and situation   Speech is normal   No Tremor  Patient denies suicidal ideation    Results:  Lab Results personally reviewed.  UDS positive for amphetamines and opiates on 05/10/2022 at 0816.  Hepatitis C and HIV labs are pending  CMP is unremarkable apart from hypokalemia which has been corrected.  VBG normalized  CBC within normal limits  Ethanol, acetaminophen, salicylate all negative    CT head and chest x-ray reviewed   personally reviewed and shows:  Prescriptions  Total Prescriptions: 15    Total Private Pay: 0    Fill Date ID   Written Drug Qty Days Prescriber Rx # Pharmacy Refill   Daily Dose* Pymt Type      04/25/2022  7   04/25/2022  Buprenorphine-Nalox 4-1mg Film    5.00  5 Ka Par   [id number]   [place] (5978[place]   0/[place]  [place]   03/21/2022  4   03/10/2022  Sublocade 300 Mg/1.5 Ml Syring    1.00  30 Ny Pid   [id number]   Acc (1167)   0/[place]  10.[place]   03/10/2022  3   03/10/2022  Buprenorphine-Nalox 8-2mg Film    60.00  30 Ny Pid   [id number]   [place] (3829[place]   0/[place]  16.[place]   02/07/2022  6   02/07/2022  Buprenorphine-Nalox 8-2mg Film    60.00  30 Sr Eder   [id number]   [place] (5978)   0/[place]  16.[place]   01/04/2022  5   10/18/2021  Sublocade 100 Mg/0.5 Ml Syring    1.00  30 Em Bru   [id number]   Acc (1167)   1/5  3.33 [place]   12/08/2021  5   10/18/2021  Sublocade 100 Mg/0.5 Ml Syring    1.00  30 Em Bru   [id number]   Acc (1167)   0/5  3.33 [place]   10/25/2021  5   09/21/2021  Sublocade 100 Mg/0.5 Ml Syring    1.00  30 An Pyl   [id number]   Acc (1167)   1/5  3.33 [place]   09/27/2021  5   09/21/2021  Sublocade 100 Mg/0.5 Ml Syring    1.00  30 An Pyl   [id number]   Acc (1167)   0/5  3.33 [place]   08/30/2021  5   08/18/2021  Sublocade 300 Mg/1.5 Ml Syring    1.00  30 An Pyl   [id number]   Acc (1167)   0/[place]  10.[place]   08/02/2021  4   07/22/2021  Sublocade 300 Mg/1.5 Ml Syring    1.00  30 Ny Pid   [phone]   Acc (1167)   0/1  10.[place]   07/22/2021  3   07/22/2021  Buprenorphine-Nalox 8-2mg Film    60.00  30 Ny Pid   [id number]   [place]3829[place]   0/[place]  16.[place]   07/15/2021  1   06/16/2021  Gabapentin 800 MG Tablet    90.00  30 Mi Paul   [id number]   [place] (3386)   0/2   [place]   07/15/2021  2   07/14/2021  Buprenorphine-Nalox 8-2mg Film    5.00  5 Josep Omaira   [id number]   [place] (9029)   0/[place]  8.00 mg  [place]   06/16/2021  1   06/16/2021  Gabapentin 800 MG Tablet    90.00  30 Mi Paul   [id number]   [place] (6278)   0/3   [place]   05/28/2021  1   01/29/2021  Gabapentin 600 MG Tablet    30.00  30 [facility]   [id number]   [place] (4423)   0/0  0.67 LME  [place]         Tiffany Bullock MD  Addiction Medicine

## 2022-05-12 VITALS
TEMPERATURE: 98 F | SYSTOLIC BLOOD PRESSURE: 133 MMHG | DIASTOLIC BLOOD PRESSURE: 76 MMHG | HEART RATE: 55 BPM | OXYGEN SATURATION: 97 % | RESPIRATION RATE: 18 BRPM

## 2022-05-12 LAB
HBV SURFACE AB SERPL IA-ACNC: 3.58 M[IU]/ML
HCV RNA SERPL NAA+PROBE-ACNC: NOT DETECTED IU/ML

## 2022-05-12 PROCEDURE — 250N000013 HC RX MED GY IP 250 OP 250 PS 637: Performed by: INTERNAL MEDICINE

## 2022-05-12 PROCEDURE — 99231 SBSQ HOSP IP/OBS SF/LOW 25: CPT | Mod: GT | Performed by: INTERNAL MEDICINE

## 2022-05-12 PROCEDURE — 250N000013 HC RX MED GY IP 250 OP 250 PS 637: Performed by: FAMILY MEDICINE

## 2022-05-12 PROCEDURE — 250N000011 HC RX IP 250 OP 636: Performed by: HOSPITALIST

## 2022-05-12 PROCEDURE — 99239 HOSP IP/OBS DSCHRG MGMT >30: CPT | Performed by: HOSPITALIST

## 2022-05-12 RX ORDER — BUPRENORPHINE AND NALOXONE 8; 2 MG/1; MG/1
1 FILM, SOLUBLE BUCCAL; SUBLINGUAL 2 TIMES DAILY
Qty: 10 FILM | Refills: 0 | Status: SHIPPED | OUTPATIENT
Start: 2022-05-12 | End: 2022-05-17

## 2022-05-12 RX ORDER — EMTRICITABINE AND TENOFOVIR DISOPROXIL FUMARATE 200; 300 MG/1; MG/1
1 TABLET, FILM COATED ORAL DAILY
Qty: 26 TABLET | Refills: 0 | Status: ON HOLD | OUTPATIENT
Start: 2022-05-13 | End: 2022-10-25

## 2022-05-12 RX ORDER — LURASIDONE HYDROCHLORIDE 20 MG/1
20 TABLET, FILM COATED ORAL EVERY EVENING
Qty: 30 TABLET | Refills: 0 | Status: ON HOLD | OUTPATIENT
Start: 2022-05-12 | End: 2022-10-25

## 2022-05-12 RX ORDER — POLYETHYLENE GLYCOL 3350 17 G/17G
17 POWDER, FOR SOLUTION ORAL DAILY PRN
Qty: 510 G | Refills: 0 | Status: ON HOLD | OUTPATIENT
Start: 2022-05-12 | End: 2022-10-25

## 2022-05-12 RX ADMIN — BUPRENORPHINE HYDROCHLORIDE 8 MG: 8 TABLET SUBLINGUAL at 17:39

## 2022-05-12 RX ADMIN — EMTRICITABINE AND TENOFOVIR DISOPROXIL FUMARATE 1 TABLET: 200; 300 TABLET, FILM COATED ORAL at 09:45

## 2022-05-12 RX ADMIN — BUPRENORPHINE HYDROCHLORIDE 8 MG: 8 TABLET SUBLINGUAL at 09:45

## 2022-05-12 RX ADMIN — DOLUTEGRAVIR SODIUM 50 MG: 50 TABLET, FILM COATED ORAL at 09:45

## 2022-05-12 ASSESSMENT — ACTIVITIES OF DAILY LIVING (ADL)
ADLS_ACUITY_SCORE: 22

## 2022-05-12 NOTE — PLAN OF CARE
"DATE & TIME: 5/12/2022 AM shift  Cognitive Concerns/ Orientation : A&O x4. Cooperative this shift. Calm/resting comfortably. Says \"not much to do\". Encouraged to walk in the hern[place]dez. Holdable if he tries to leave.   BEHAVIOR & AGGRESSION TOOL COLOR: Green   COWS SCORE: 2/0   ABNL VS/O2: VSS on RA; LS clear. Denies SOB or chest pain.   MOBILITY: Steady gain. Independent in the room. Walks with a sitter in the hernández.  PAIN MANAGMENT: denies pain  DIET: Regular diet, good appetite.   BOWEL/BLADDER: continent of B&B, no BM this shift.   ABNL LAB/BG: n/a  DRAIN/DEVICES: PIV in upper R arm and R foot both SL  TELEMETRY RHYTHM: n/a  SKIN: Face flushed; scabs to legs and arms  D/C DATE: Pending, psych eval and recommendation. Pt is medically cleared.   Discharge Barriers: awaiting psych consult.   OTHER IMPORTANT INFO: n/a  "

## 2022-05-12 NOTE — PROGRESS NOTES
Addiction Medicine Follow Up    Tele-Visit Details    Type of service:  Video Visit  Time Service Began (time 1st connected with pt): 1552  Time Service Ended (time completely finished with pt): 1603  Originating Location (pt. Location): Patient ADAM [facility]  Distant Location (provider location): [facility]  Reason for Televisit: COVID 19  Mode of Communication:  Video Conference via Polycom  Physician has received verbal consent for a video visit from the patient? Yes      Active Problems:    Overdose drug    Opioid use disorder, severe, dependence (H)    Opioid withdrawal (H)    Unresponsive state    Altered mental status, unspecified altered mental status type      Subjective  Chief complaint: Opioid overdose    HPI: Romeo is a 32 yo male with known opioid use disorder, who was admitted on 5/9 after being found in car lethargic.   Did not arouse with narcan and required BiPAP for oxygenation.   Patient was given Sublocade on 3/21/22, but after leaving [facility], he did not arrange the next shot.   Was supposed to go on suboxone, but mother confiscated them, thinking they were illicit, so didn't have any buprenorphine.       Now back on Suboxone 8 mg bid and tolerating it without any precipitated withdrawal or oversedation.     ROS:   GI:  Mild constipation,    ROS otherwise negative.        Objective    /76 (BP Location: Right arm)   Pulse 55   Temp 98  F (36.7  C) (Oral)   Resp 18   SpO2 97%     Physical Exam per hospitalist:     Skin:  No fever  Neuro:  Alert, oriented x 4   Psych:     Cooperative     Mood:  Euthymic               Affect:  Congruent               Thought content:  Denes SI, HI or hallucinations               Thought processes:  Linear                Speech:  Normal                Motor:  Normal                Insight/judgement:  good/ fair    Results      Assessment and Plan:  1.  Opioid Use Disorder, severe -  Already has appt to get started  back on Sublocade on 5/17.   I have arranged for him to see Dr. Liu in the Recovery [facility] at [facility] at 11:30 am that day and give final approval for the injection at 12:30 pm.      Will discharge with enough Suboxone to get to that appt.so he can take them up to the time he gets the sublocade.        Informed hospitalist, Dr. Chandra of this plan and she will arrange diacharge.         Shahrzad Villalobos MD  Addiction Medicine Service  [facility]   Page me (click here for Nayely Villalobos)

## 2022-05-12 NOTE — PROGRESS NOTES
Date/Time: 05/11/2022 [id number]   Summary: overdose  Precautions: flight risk. Holdable if tries to leave. 1:1 sitter. Door alarm.   Cognitive Concerns/Orientation: A&Ox4. Can get anxious about the next steps concerning treatment and where he is going after leaving the hospital  Behavior and Aggression Color: green  ABNL VS/O2: VSS on RA  COWS: 3 this shift  Mobility: independent in room  Pain Management: all over body aches. Tylenol available if needed. Scheduled subutex  Diet: regular  Bowel/Bladder: continent. Ambulating to BR  ABNL Labs/BG: NA  Drain/Devices: Right upper arm PIV SL. Left PIV in foot SL  Telemetry Rhythm: NA  Skin: clammy skin with scattered bruising. Scabs to bilat shins  Tests/Procedures: awaiting psych consult.   Discharge Date: pt is medically cleared. Pending psych consult.   Other Info:

## 2022-05-12 NOTE — DISCHARGE SUMMARY
[facility]    Discharge Summary  Hospitalist    Date of Admission:  5/9/2022  Date of Discharge:  5/12/2022  Discharging Provider: Marina Chandra DO  Date of Service (when I saw the patient): 05/12/22    Discharge Diagnoses   Altered mental status secondary to overdose  Depressive disorder, rule out bipolar disorder  Hypokalemia  History of polysubstance abuse  History of injection drug use  History of hepatitis C status post-treatment    History of Present Illness   Romeo Maldonado is an 31 year old male who presented with altered mental status, secondary to drug overdose    Hospital Course   Romeo Maldonado was admitted on 5/9/2022.  The following problems were addressed during his hospitalization:    Altered mental status secondary to overdose  History of polysubstance abuse  History of injection drug use  Patient found obtunded in a parking lot, did not improve with narcan. Likely due to overdose. Poison control thought bradycardia and mental status change could be due to Xanax or Ambien overdose. Placed on bipap in the er with improvement of blood gas. AMS resolved on 5/10.  Patient has a history of opiate use, benzodiazepine use, methamphetamine use, GHB use. Patient reportedly recently left [facility] for chemical dependency treatment.  - appreciate addiction medicine consultants  - started on suboxone on discharge, enough to get him to planned outpatient injection of sublocade on 5/17 with Dr Liu   --miralax for constipation related to medication  - appreciate psychiatry, initially planned for inpatient psych, but then reconsult on 5/12 showed he was stable for discharge to home  - follow up with primary psychiatrist  - follow up with PCP in one week (repeat HIV testing in 6 weeks and again in 4mo)  - [facility] paperwork completed for patient, okay to return to work on 5/16, will need time off work to attend appointments.    Hypokalemia  -Replaced per protocol     Depressive disorder,  rule out bipolar disorder  Hold PTA Wellbutrin given concern for possible bipolar disorder  - continue latuda 20mg at bedtime only     History of hepatitis C status posttreatment  Hep C RNA undetectable on February 2, 2022. HIV nonreactive on 5/10.   -started on post-exposure prophylaxis with tivicay and truvada, continue to take for total 28 days  - recheck HIV in 6 weeks and again in 4mo  - quantitative hep C pending at time of discharge    Marina Chandra, DO    Significant Results and Procedures   See above    Pending Results   These results will be followed up by PCP  Unresulted Labs Ordered in the Past 30 Days of this Admission     Date and Time Order Name Status Description    5/10/2022 11:19 AM Hepatitis C RNA, Quantitative by PCR In process           Code Status   Full Code       Primary Care Physician   Jean Pierre Simon    Physical Exam   Temp: 98  F (36.7  C) Temp src: Oral BP: 133/76 Pulse: 55   Resp: 18 SpO2: 97 % O2 Device: None (Room air)    There were no vitals filed for this visit.  Vital Signs with Ranges  Temp:  [97.7  F (36.5  C)-98  F (36.7  C)] 98  F (36.7  C)  Pulse:  [50-55] 55  Resp:  [16-20] 18  BP: (112-133)/(57-76) 133/76  SpO2:  [95 %-97 %] 97 %  No intake/output data recorded.    Patient seen and examined. No chest pain, shortness of breath, nausea, vomiting. He feels good. Spoke with psychiatry earlier and was told he could return home. Discussed his [facility] paperwork and his job. Tolerating diet. Sleeping well. Doing well on subutex started on 5/11. Discussed with addiction med physician, stable for discharge from their standpoint as well. Stable for discharge to home.    Constitutional: Awake, alert, cooperative, no apparent distress.  Eyes: Conjunctiva and pupils examined and normal.  HEENT: Moist mucous membranes, normal dentition.  Respiratory: Clear to auscultation bilaterally, no crackles or wheezing.  Cardiovascular: Regular rate and rhythm, normal S1 and S2, and no murmur  noted.  GI: Soft, non-distended, non-tender, normal bowel sounds.  Lymph/Hematologic: No anterior cervical or supraclavicular adenopathy.  Skin: No rashes, no cyanosis, no edema.  Musculoskeletal: No joint swelling, erythema or tenderness.  Neurologic: Cranial nerves 2-12 intact, normal strength and sensation.  Psychiatric: Alert, oriented to person, place and time, no obvious anxiety or depression.    Discharge Disposition   Discharged to home  Condition at discharge: Stable    Consultations This Hospital Stay   CARE MANAGEMENT / SOCIAL WORK IP CONSULT  PSYCHIATRY IP CONSULT  CARE MANAGEMENT / SOCIAL WORK IP CONSULT  PSYCHIATRY IP CONSULT  INFECTIOUS DISEASES IP CONSULT  PSYCHIATRY IP CONSULT    Time Spent on this Encounter   IMarina DO, personally saw the patient today and spent greater than 30 minutes discharging this patient.    Discharge Orders      HIV Antigen Antibody Combo     Reason for your hospital stay    Altered mental status after drug overdose     Follow-up and recommended labs and tests     Follow up with primary care provider, Jean Pierre Simon, within 7 days for hospital follow- up.  The following labs/tests are recommended: HIV retest in 6 weeks and then again in 4 months    Follow up with Dr. Liu in the [facility] at [facility] at 11:30 am 5/17 and then injection at 12:30 pm.     Activity    Your activity upon discharge: activity as tolerated     Discharge Instructions    1. Do not restart your wellbutrin given concern for possible bipolar disorder. Take only Latuda 20mg at bedtime. Follow up with your primary psychiatrist to discuss medications further.    2. Take Tivicay and Truvada for 26 more days for post-exposure prophylaxis for HIV.    3. Take Suboxone as prescribed by Dr Villalobos until follow up with Dr Liu    4. Take miralax as needed for constipation related to suboxone     Diet    Follow this diet upon discharge:  Regular Diet Adult     Discharge Medications    Current Discharge Medication List      START taking these medications    Details   buprenorphine HCl-naloxone HCl (SUBOXONE) 8-2 MG per film Place 1 Film under the tongue 2 times daily  Qty: 10 Film, Refills: 0    Comments: BRIAN: [id number]  Associated Diagnoses: Opioid use disorder      dolutegravir (TIVICAY) 50 MG tablet Take 1 tablet (50 mg) by mouth daily  Qty: 26 tablet, Refills: 0    Comments: Future refills by PCP Dr. Jean Pierre Simon with phone number [phone].  Associated Diagnoses: HIV exposure      emtricitabine-tenofovir (TRUVADA) 200-300 MG per tablet Take 1 tablet by mouth daily  Qty: 26 tablet, Refills: 0    Comments: Future refills by PCP Dr. Jean Pierre Simon with phone number [phone].  Associated Diagnoses: HIV exposure      polyethylene glycol (MIRALAX) 17 GM/Dose powder Take 17 g by mouth daily as needed for constipation  Qty: 510 g, Refills: 0    Associated Diagnoses: Opioid use disorder         CONTINUE these medications which have CHANGED    Details   lurasidone (LATUDA) 20 MG TABS tablet Take 1 tablet (20 mg) by mouth every evening  Qty: 30 tablet, Refills: 0    Comments: Future refills by PCP Dr. Jean Pierre Simon with phone number [phone].  Associated Diagnoses: Severe episode of recurrent major depressive disorder, without psychotic features (H)         CONTINUE these medications which have NOT CHANGED    Details   albuterol (PROAIR HFA/PROVENTIL HFA/VENTOLIN HFA) 108 (90 Base) MCG/ACT inhaler Inhale 2 puffs into the lungs every 6 hours as needed  Qty: 8.5 g, Refills: 0    Comments: Pharmacy may dispense brand covered by insurance (Proair, or proventil or ventolin or generic albuterol inhaler)  Associated Diagnoses: Hypoxia      multivitamin, therapeutic (THERA-VIT) TABS tablet Take 1 tablet by mouth daily      naloxone (NARCAN) 4 MG/0.1ML nasal spray Spray 1 spray (4 mg) into one nostril alternating nostrils once as needed for opioid reversal every 2-3 minutes until  assistance arrives  Qty: 0.2 mL, Refills: 11    Associated Diagnoses: Opioid use disorder         STOP taking these medications       buprenorphine ER (SUBLOCADE) 300 MG/1.5ML prefilled syringe Comments:   Reason for Stopping:         buprenorphine HCl-naloxone HCl (SUBOXONE) 4-1 MG per film Comments:   Reason for Stopping:         buPROPion (WELLBUTRIN XL) 300 MG 24 hr tablet Comments:   Reason for Stopping:             Allergies   Allergies   Allergen Reactions     Pollen Extract Unknown and Other (See Comments)     Zolpidem Shortness Of Breath     wheezing     Data   Most Recent 3 CBC's:  Recent Labs   Lab Test 05/10/22  0530 05/09/22  2303 02/16/22  1942   WBC 6.4 8.3 6.5   HGB 14.6 14.8 12.7*   MCV 86 88 84    259 243      Most Recent 3 BMP's:  Recent Labs   Lab Test 05/11/22  0747 05/10/22  1057 05/10/22  0730 05/10/22  0530 05/10/22  0508 05/09/22  2317 05/09/22 2303 02/16/22  0701   NA  --   --   --  141  --   --  141 133   POTASSIUM 3.6 3.9  --  4.1  4.1  --   --  3.3* 3.4   CHLORIDE  --   --   --  111*  --   --  109 105   CO2  --   --   --  22  --   --  22 25   BUN  --   --   --  14  --   --  17 7   CR  --   --   --  0.70  --  1.23 0.99 0.66   ANIONGAP  --   --   --  8  --   --  10 3   NILA  --   --   --  8.7  --   --  8.7 7.8*   GLC  --   --  86 113* 104*  --  115* 102*     Most Recent 2 LFT's:  Recent Labs   Lab Test 05/09/22  2303 02/15/22  0145   AST 26 15   ALT 41 38   ALKPHOS 80 59   BILITOTAL 0.5 0.2     Most Recent INR's and Anticoagulation Dosing History:  Anticoagulation Dose History     Recent Dosing and Labs Latest Ref Rng & Units 3/24/2021    INR 0.86 - 1.14 1.01        Most Recent 3 Troponin's:  Recent Labs   Lab Test 06/26/21  1349 06/26/21  1139   TROPI <0.015 0.018     Most Recent Cholesterol Panel:  Recent Labs   Lab Test 07/07/21  0735   CHOL 174   *   HDL 45   TRIG 137     Most Recent 6 Bacteria Isolates From Any Culture (See EPIC Reports for Culture Details):  Recent  Labs   Lab Test 07/04/21  1710 05/29/15  1138 05/29/15  1135 03/03/14  2225 03/03/14  2159   CULT No growth  No growth No Beta Streptococcus isolated No growth No growth No growth     Most Recent TSH, T4 and A1c Labs:  Recent Labs   Lab Test 07/07/21  0735   TSH 0.45     Results for orders placed or performed during the hospital encounter of 05/09/22   XR Chest Port 1 View    Narrative    EXAM: XR CHEST PORT 1 VIEW  LOCATION: [facility]  DATE/TIME: 5/9/2022 11:17 PM    INDICATION: ams  COMPARISON: 02/03/2022      Impression    IMPRESSION: Cardiomediastinal silhouette within normal limits. No focal consolidation or pleural effusion.   CT Head w/o Contrast    Narrative    EXAM: CT HEAD W/O CONTRAST  LOCATION: [facility]  DATE/TIME: 5/9/2022 11:11 PM    INDICATION: Mental status change, unknown cause. Confusion.  COMPARISON: None.  TECHNIQUE: Routine CT Head without IV contrast. Multiplanar reformats. Dose reduction techniques were used.    FINDINGS:  INTRACRANIAL CONTENTS: No intracranial hemorrhage, extraaxial collection, or mass effect.  No CT evidence of acute infarct. Normal parenchymal attenuation. Normal ventricles and sulci.     VISUALIZED ORBITS/SINUSES/MASTOIDS: No intraorbital abnormality. Minimal left ethmoid air cell mucosal thickening. No middle ear or mastoid effusion.    BONES/SOFT TISSUES: No acute abnormality.      Impression    IMPRESSION:  1.  No acute intracranial process.

## 2022-05-12 NOTE — PLAN OF CARE
Goal Outcome Evaluation:    Plan of Care Reviewed With: patient     Overall Patient Progress: improving    DATE & TIME: 5/11/2022 2199-1114  Cognitive Concerns/ Orientation : A&O x4. Cooperative. Rest promoted, sleeping throughout the night. Holdable if he tries to leave.  BEHAVIOR & AGGRESSION TOOL COLOR: Green   COWS SCORE: 2 (aches and anxiety)  ABNL VS/O2: VSS on RA, can be sea at times (high 40s)  MOBILITY: Independent in the room  PAIN MANAGMENT: Denies ex gen aching  DIET: Regular diet   BOWEL/BLADDER: Continent, up to BR   ABNL LAB/BG: n/a  DRAIN/DEVICES: PIV in upper R arm and R foot both SL  SKIN: Face flushed; skin clammy at times.   D/C DATE: Pending, psych eval and recommendation. Pt is medically cleared.   OTHER IMPORTANT INFO: Started on Subutex and HIV post-exposure prophylaxis meds on 5/11. Door alarm in use. Requesting [facility] paperwork to be filled out today.

## 2022-05-12 NOTE — CONSULTS
Triage and Transition- Consult and Liaison     Romeo Maldonado  May 12, 2022      Psychiatry consult completed. Dictation to follow. Patient is able to discharge to community once considered medically cleared. Patient has supports in community and is well connected to recovery network. He is not holdable and declines admission to IP psych/treatment.     Etelvina Munoz, [facility]  Triage and Transition - Consult and Liaison   [phone]

## 2022-05-13 ENCOUNTER — TELEPHONE (OUTPATIENT)
Dept: BEHAVIORAL HEALTH | Facility: CLINIC | Age: 32
End: 2022-05-13
Payer: COMMERCIAL

## 2022-05-13 ENCOUNTER — PATIENT OUTREACH (OUTPATIENT)
Dept: CARE COORDINATION | Facility: CLINIC | Age: 32
End: 2022-05-13
Payer: COMMERCIAL

## 2022-05-13 DIAGNOSIS — Z71.89 OTHER SPECIFIED COUNSELING: ICD-10-CM

## 2022-05-13 DIAGNOSIS — F11.20 OPIOID USE DISORDER, SEVERE, DEPENDENCE (H): Primary | ICD-10-CM

## 2022-05-13 PROCEDURE — 99207 PR SELF-HELP/PEER SVC PER 15 MIN: CPT

## 2022-05-13 NOTE — CONSULTS
"Triage and Transition - Consult and Liaison     Romeo Maldonado  May 12, 2022    Session start: 9:30 am  Session end: 9:50 am  Session duration in minutes: 20  CPT utilized: 21161 - Psychotherapy (with patient) - 30 (16-37*) min  Patient was seen virtually ([employer] cart or other teleconferencing device).  Anticipated number of sessions or this episode of care: 1-4    Reason for consult: Romeo is 31 year old White  male . Psychiatry consult was requested to assess need for inpatient psych. Patient was seen by [facility] Consult & Liaison team.     Presenting problem : The patient presented to this most recent admission after being found down in a parking lot unconscious.  He reportedly did not respond to Narcan. In individual therapeutic contact with patient today, patient presents with broad affect, sad mood.. Safety concerns are not present today. Patient is adament he has not been suicidal recently and this was not an intentional overdose. Current risk factors for suicide include loss of relationship, separation/divorce, etc. and history of or current substance use. Protective factors against suicide include identifies reason for living, strong bond to family/friends, community support, employment, responsibilities to others (spouse, pets, children, etc.), positive working relationship with existing medical/mental health providers and future oriented towards goals, hopes, dreams.   Romeo reports doing well today. Patient reports a little sad he is in this postion, worried about losing job. Patient reports when not there, his work falls on other people, they may be frustrated with him.He reports he likes his job and is an important part of his self-worth. He states lately he has been trying to look at his addiction as being a separte part of him. He states he is mad at his addiction. He reports he has a strong desire to remain sober. He states he doesn't want to let this addiction \"take him\" and \"wont let it win\". He " reports he feels he needs to do better for his daughter, wants hre to see him as a sober and healthy dad.   He shares information about his current support system, reports he has two sponsors, spends a lot of time with them, talks with them daily. He reports they are coming to visit him later today. Patient indicates he tries to go to 5 AA meetings a week. He surrounds himself with people that are sober. Patient reports he has an appointment on 17th for Sublocade shot and meeting with psychiatrist. He reports he will also have a   through this program.       Mental Status Exam   Affect: Appropriate  Appearance: Appropriate   Attention Span/Concentration: Attentive    Eye Contact: Engaged  Fund of Knowledge: Appropriate   Language /Speech Content: Fluent  Language /Speech Volume: Normal   Language /Speech Rate/Productions: Normal   Recent Memory: Intact  Remote Memory: Intact  Mood: Sad   Orientation:   Person: Yes   Place: Yes  Time of Day: Yes   Date: Yes   Situation (Do they understand why they are here?): Yes   Psychomotor Behavior: Normal   Thought Content: Clear  Thought Form: Intact    Current medications:   Current Facility-Administered Medications   Medication     0.9% sodium chloride BOLUS     acetaminophen (TYLENOL) tablet 975 mg     buprenorphine (SUBUTEX) sublingual tablet 8 mg     carboxymethylcellulose PF (REFRESH PLUS) 0.5 % ophthalmic solution 1 drop     dolutegravir (TIVICAY) tablet 50 mg     emtricitabine-tenofovir (TRUVADA) 200-300 MG per tablet 1 tablet     HOLD: All Oral Medications     lidocaine (LMX4) cream     lidocaine 1 % 0.1-1 mL     lurasidone (LATUDA) tablet 20 mg     melatonin tablet 1 mg     nitroGLYcerin (NITROSTAT) sublingual tablet 0.4 mg     No lozenges or gum should be given while patient on BIPAP/AVAPS/AVAPS AE     ondansetron (ZOFRAN) injection 4 mg     sodium chloride (PF) 0.9% PF flush 3 mL     sodium chloride (PF) 0.9% PF flush 3 mL     sodium chloride (PF) 0.9%  PF flush 3 mL     Current Outpatient Medications   Medication     albuterol (PROAIR HFA/PROVENTIL HFA/VENTOLIN HFA) 108 (90 Base) MCG/ACT inhaler     buprenorphine HCl-naloxone HCl (SUBOXONE) 8-2 MG per film     [START ON 5/13/2022] dolutegravir (TIVICAY) 50 MG tablet     [START ON 5/13/2022] emtricitabine-tenofovir (TRUVADA) 200-300 MG per tablet     lurasidone (LATUDA) 20 MG TABS tablet     multivitamin, therapeutic (THERA-VIT) TABS tablet     naloxone (NARCAN) 4 MG/0.1ML nasal spray     polyethylene glycol (MIRALAX) 17 GM/Dose powder     Therapeutic intervention and progress:  Therapeutic intervention consisted of building therapeutic rapport, active listening, validation and normalizing. Patient is making progress towards treatment goals as evidenced by willingness to engage, safety plan and discuss discharge planning.     Collateral information:   Reviewed chart.    Diagnosis:     Substance-Related & Addictive Disorders Opioid Use Disorder, Specify if:  On a maintenance therapy with a severity of:  304.00 (F11.20) Severe, by history;    296.32 (F33.1) Major Depressive Disorder, Recurrent Episode, Moderate _, by history;       Plan:   1. Patient is able to discharge to community once considered medically cleared. Patient has supports in community and is well connected to recovery network.He denies all safety concerns.  He is not holdable and declines admission to IP psych/treatment.   2. Recommend following up with addiction med to ensure prescripton Is done to bridge gap between discharge and appointment on Tuesday.  3. Patient will not continue to be followed by this service.     Etelvina Munoz, [facility]  Triage and Transition - Consult and Liaison   [phone]

## 2022-05-13 NOTE — PROGRESS NOTES
Clinic Care Coordination Contact  [facility]/Voicemail       Clinical Data: Care Coordinator Outreach  Outreach attempted x 1.  Left message on patient's voicemail with call back information and requested return call.  Plan: CC will attempt to reach patient again in 1-2 business days.    ERMELINDA Kang   Social Work Clinic Care Coordinator   [facility]  PH: [phone]  [email or web address]

## 2022-05-13 NOTE — PLAN OF CARE
Discharge    Patient discharged to home  Care plan note:  DATE & TIME: 5/12/2022 1500-discharge  Cognitive Concerns/ Orientation : A&O x4. Calm and cooperative.    BEHAVIOR & AGGRESSION TOOL COLOR: Green   COWS SCORE: 0  ABNL VS/O2: VSS on RA  MOBILITY: Independent   PAIN MANAGMENT: Denies pain  DIET: Regular diet, good appetite.   BOWEL/BLADDER: continent of B&B, no BM this shift.   ABNL LAB/BG: n/a  DRAIN/DEVICES: n/a  TELEMETRY RHYTHM: n/a  SKIN: Face flushed; scattered bruises and scabs.   D/C DATE: Cleared by psych to discharge home; discharged home this evening.    Listed belongings gathered and given to patient (including from security/pharmacy). Yes  Care Plan and Patient education resolved: Yes  Prescriptions if needed, hard copies sent with patient  Yes  Medication Bin checked and emptied on discharge Yes  SW/care coordinator/charge RN aware of discharge: Yes

## 2022-05-13 NOTE — TELEPHONE ENCOUNTER
Pt left a voicemail message on [facility] office phone stating he had a re-use of substances resulting in a hospitalization at [facility], and was reaching out to [facility] for recovery support and resources.   [facility] returned call to pt at 3:00 pm.  Pt answered but reported he was on a conference call with his physician.  [facility] advised he was available via office phone until 4:40 pm today.    Michael Austin on 5/13/2022 at 3:50 PM

## 2022-05-15 ENCOUNTER — PATIENT OUTREACH (OUTPATIENT)
Dept: CARE COORDINATION | Facility: CLINIC | Age: 32
End: 2022-05-15
Payer: COMMERCIAL

## 2022-05-15 NOTE — PROGRESS NOTES
Clinic Care Coordination Contact    Background: Care Coordination referral placed from [facility] discharge report for reason of patient meeting criteria for a TCM outreach call by [facility] [place] team.    Assessment: Upon chart review, [facility] Team member will cancel/close the referral for TCM outreach due to reason below:    Patient isn't interested in speaking with care team today and disconnected call    Plan: Care Coordination referral for TCM outreach canceled.    Erin Lockett MA  [facility]cted [facility] [place], [facility]

## 2022-05-16 NOTE — TELEPHONE ENCOUNTER
If pt has continued to take at least 8mg buprenorphine/day since hospital discharge, it would be ideal if he could receive Sublocade on 5/17 when he returns for visit with KARINA Paul.     he is currently scheduled for Sublocade 5/20.

## 2022-05-17 ENCOUNTER — OFFICE VISIT (OUTPATIENT)
Dept: BEHAVIORAL HEALTH | Facility: CLINIC | Age: 32
End: 2022-05-17
Payer: COMMERCIAL

## 2022-05-17 ENCOUNTER — OFFICE VISIT (OUTPATIENT)
Dept: BEHAVIORAL HEALTH | Facility: CLINIC | Age: 32
End: 2022-05-17

## 2022-05-17 VITALS — SYSTOLIC BLOOD PRESSURE: 115 MMHG | HEART RATE: 71 BPM | DIASTOLIC BLOOD PRESSURE: 75 MMHG

## 2022-05-17 DIAGNOSIS — F15.90 STIMULANT USE DISORDER: ICD-10-CM

## 2022-05-17 DIAGNOSIS — F17.200 NICOTINE USE DISORDER: ICD-10-CM

## 2022-05-17 DIAGNOSIS — F11.20 OPIOID USE DISORDER, SEVERE, DEPENDENCE (H): Primary | ICD-10-CM

## 2022-05-17 DIAGNOSIS — F11.93 OPIOID WITHDRAWAL (H): ICD-10-CM

## 2022-05-17 DIAGNOSIS — F19.90 IVDU (INTRAVENOUS DRUG USER): ICD-10-CM

## 2022-05-17 LAB
ATRIAL RATE - MUSE: 101 BPM
DIASTOLIC BLOOD PRESSURE - MUSE: NORMAL MMHG
INTERPRETATION ECG - MUSE: NORMAL
P AXIS - MUSE: 77 DEGREES
PR INTERVAL - MUSE: 146 MS
QRS DURATION - MUSE: 80 MS
QT - MUSE: 354 MS
QTC - MUSE: 459 MS
R AXIS - MUSE: 71 DEGREES
SYSTOLIC BLOOD PRESSURE - MUSE: NORMAL MMHG
T AXIS - MUSE: 71 DEGREES
VENTRICULAR RATE- MUSE: 101 BPM

## 2022-05-17 PROCEDURE — 99207 PR SELF-HELP/PEER SVC PER 15 MIN: CPT

## 2022-05-17 PROCEDURE — 99215 OFFICE O/P EST HI 40 MIN: CPT | Performed by: FAMILY MEDICINE

## 2022-05-17 RX ORDER — ONDANSETRON 4 MG/1
4 TABLET, ORALLY DISINTEGRATING ORAL EVERY 8 HOURS PRN
Qty: 15 TABLET | Refills: 0 | Status: ON HOLD | OUTPATIENT
Start: 2022-05-17 | End: 2022-10-25

## 2022-05-17 RX ORDER — GABAPENTIN 300 MG/1
300-600 CAPSULE ORAL EVERY 8 HOURS PRN
Qty: 30 CAPSULE | Refills: 0 | Status: ON HOLD | OUTPATIENT
Start: 2022-05-17 | End: 2022-10-25

## 2022-05-17 RX ORDER — CLONIDINE HYDROCHLORIDE 0.1 MG/1
.1-.2 TABLET ORAL EVERY 8 HOURS PRN
Qty: 30 TABLET | Refills: 0 | Status: ON HOLD | OUTPATIENT
Start: 2022-05-17 | End: 2022-10-25

## 2022-05-17 RX ORDER — BUPRENORPHINE 8 MG/1
8 TABLET SUBLINGUAL 2 TIMES DAILY
Qty: 20 TABLET | Refills: 0 | Status: ON HOLD | OUTPATIENT
Start: 2022-05-17 | End: 2022-10-25

## 2022-05-17 RX ORDER — BACLOFEN 10 MG/1
10 TABLET ORAL EVERY 8 HOURS PRN
Qty: 15 TABLET | Refills: 0 | Status: ON HOLD | OUTPATIENT
Start: 2022-05-17 | End: 2022-11-02

## 2022-05-17 NOTE — PROGRESS NOTES
[facility]    Peer  met with Romeo Maldonado in the Recovery Clinic to introduce himself, detail services provided and discuss current status of recovery. Pt appeared alert, oriented and open to feedback during our discussion.     Pt reports recovery has been a struggle with a re-use leading to recent inpatient hospitalization.  Pt reprots the medications provided to him upon discharge home from hospital make him ill so he has been using opioids until today's appointment.  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 PRC and pt discussed treatment options which appear off the table for now.    Pt discussed previous life of being in a stable relationship, having a home and a  job which ended badly and is a factor in the current states of using.    Pt reports being involved in [employer] at that time and has lost touch with it during this period of life. Pt appears to want to find that piece again and PRC encouraged him to do so.       psychotherapist Steven Morgan introduced himself during our encounter and discussion regarding scheduled appointment with him was encouraged by PRC.     PRC welcomes contact for recovery based support and resources. PRC and pt agree to speak again during an upcoming  visit.           Service Type:     Individual     Session Start Time:     11:00 AM                    Session End Time:    11:30 AM    Session Length:       30 minutes      Patient Goal: To utilize suboxone assisted treatment for sobriety and long term recovery.     Goal Progress:   Ongoing.    Key Risk Factors to Recovery:   PRC encourages being aware of risk factors that can lead to re-use which include avoiding isolation, avoiding triggers and managing cravings in a healthy manner. being open and willing to acceptance and change on a daily basis.  PRC encourages pt to establish a sober network calling tree to reach out to when needed.   Continue to practice honesty with ourselves and trusted support person(s).   PRC encourages regular attendance at recovery based meetings as well as finding a sponsor for mentoring and accountability.   PRC encourages consideration of other services such as counseling for mental health issues which can correlate with our substance use.      Support Needs:   Ongoing care, support and resources for opioid substance use disorder.     Follow up:   JODY has provided pt with his contact information for support and resource needs.    PRC and pt agree to meet during an upcoming  visit.       [facility]  [address], [address]   [place], [place], [address]  Clinic Phone: [phone]  Clinic Fax: [phone]  Peer  phone: [phone]    Open Monday - Friday  9:00am-4:00pm  Walk in hours: 9am-3pm      Michael Austin  May 17, 2022  1:57 PM    Peer  is supervised by the program medical director.

## 2022-05-17 NOTE — PROGRESS NOTES
[facility] Return Visit    ASSESSMENT/PLAN                                                    1. Opioid use disorder, severe, dependence (H)  Pt reporting last use 5/16/22 ~2330.  Endorsing withdrawal symptoms currently.   Reviewed options for re-starting buprenorphine, pt reports previous success with waiting 24hrs and starting with 8mg dose.  Discussed dosing buprenorphine up to 32mg on day 1 to address withdrawal symptoms as needed.  Starting day 2 recommend buprenorphine 16mg/day.   Prescription for single agent buprenorphine for restarting SL therapy before resuming Sublocade only, pt expressed understanding of this.    Sublocade 300mg scheduled for 5/20/22.    Pt confirms he has naloxone.  Reviewed importance of never using alone if he returns to use of full opioid agonists.   - buprenorphine (SUBUTEX) 8 MG SUBL sublingual tablet; Place 1 tablet (8 mg) under the tongue 2 times daily  Dispense: 20 tablet; Refill: 0    2. Opioid withdrawal (H)  Reviewed use of medications below to address withdrawal symptoms until stabilized on buprenorphine  - cloNIDine (CATAPRES) 0.1 MG tablet; Take 1-2 tablets (0.1-0.2 mg) by mouth every 8 hours as needed (withdrawal symptoms including hot/cold sweats, anxiety, restlessness, sleeplessness)  Dispense: 30 tablet; Refill: 0  - baclofen (LIORESAL) 10 MG tablet; Take 1 tablet (10 mg) by mouth every 8 hours as needed (withdrawal symptoms including muscle aches, anxiety)  Dispense: 15 tablet; Refill: 0  - gabapentin (NEURONTIN) 300 MG capsule; Take 1-2 capsules (300-600 mg) by mouth every 8 hours as needed (withdrawal symptoms including anxiety, restlessness, sleeplessness)  Dispense: 30 capsule; Refill: 0  - ondansetron (ZOFRAN ODT) 4 MG ODT tab; Take 1 tablet (4 mg) by mouth every 8 hours as needed for nausea  Dispense: 15 tablet; Refill: 0    3. Stimulant use disorder  Pt reporting most recent use cocaine, denies GHB use since 5/8/22      4. IVDU  (intravenous drug user)  Recommend he resume Tivicay and Truvada and complete 28d course.  Also recommend he begin PrEP upon completion of this post exposure prophylaxis.  Recommend rescreening for HIV due to high risk exposures 3mo with ongoing risk, due in 8/2022.   Referred to [facility]    5. Nicotine use disorder  Not ready to quit at this time      Return in about 1 week (around 5/24/2022) for Follow up, in person.  And notify clinic sooner if any problems, changes, or questions.     SUBJECTIVE                                                      CC/HPI:  Romeo Madlonado is a 31 year old male with [facility] hepatitis C s/p treatment 2016, high risk HIV exposure, anxiety, depression, nicotine use disorder, stimulant use disorder (methamphtamine and GHB,) benzodiazepine use disorder, and opioid use disorder who presents to the [facility] for return visit.     Brief History:   Romeo initially presented to Recovery Clinic 4/25/22.  He had been referred by staff at [facility] at time he was discharged at staff request 3/28/22.  Pt did receive Sublocade 300mg prior to his discharge from [facility], and sought treatment in  to continue Sublocade.  Pt returned to use of heroin/fentanyl, meth, GHB, benzodiazepines after his initial Recovery [facility] visit and was hospitalized at [facility] 5/9-5/12/22.  He was started on SL buprenorphine during this hospitalization.  Pt stopped buprenorphine after discharge on 5/13 and returned to use of heroin/fentanyl.  He declines any form of SL buprenorphine other than single agent Subutex until he is able to resume Sublocade.  Sublocade is currently scheduled for 5/20/22.      Pt has high risk IVDU history including not using clean, new needles and injecting in areas other than UE's.  He was prescribed post-exposure prophylaxis during 2 of his recent hospitalizations; at 5/17/22 visit pt reported he was not taking the Tivicay and Truvada prescribed but  agreed to resume and complete course.  He would benefit from PrEP after completing postexposure therapy and q3mo screening.      Substance Use History :  Opioids:   Age at first use: 16 - Started using Percocet and Vicodin (from non-prescription sources).  Progressed to IV heroin at age 18.    Current use: timing of last use: 5/16/22 [id number]; substance: heroin/fentnayl; quantity 0.5-1 gram daily; route: IV      IV drug use: Yes: has used needles previously used by HIV+acquaintence 2/2022  History of overdose: Yes: 3-4 times  Previous treatments : Yes: [facility] (most recently 3/2022), [facility], [facility]; buprenorphine including Sublocade 2021 per pt; received Sublocade 300mg 3/21/22  Longest period of sobriety: 4 years  Medical complications related to substance use: hep C, L neck abscess 2/2022, overdoses  Hepatitis C: s/p treatment for Hep C in 2016, HCV RNA not detected 5/10/22, 2/2/22   HIV: HIV ag/ab nonreactive 5/10/22, 2/15/22; due for rescreening 8/2022 and q3mo if ongoing exposures     DSM-5 OUD criteria met:  Taken in larger amounts/greater time spent in behavior over longer period of time than intended,Yes:   Persistent desire or unsuccessful efforts to cut down or control use/behavior, Yes:   A great deal of time is spent in activities necessary to obtain the substance/participate in the behavior or recover from its effects, Yes:   Cravings, Yes:   Recurrent use/behavior resulting in failure to fulfill major role obligations at work, school, or home, Yes:   Continued use/behavior despite having persistent or recurrent social or interpersonal problems caused or exacerbated by effects of use/behavior, Yes:   Important social, occupational, or recreational activities are given up or reduced because of use/behavior, Yes:   Recurrent use/behavior in situations in which it is physically hazardous, Yes:   Continued use/behavior despite knowledge of having a persistent or recurrent physical or  psychological problem that is likely to have been caused or exacerbated by use/behavior, Yes   Tolerance, Yes:     Withdrawal, Yes:      Other Addiction History:  Stimulants:   Past use: Meth- started at 24-25, daily use; Cocaine- started at age 15, occasional use  Use in last 6 months: Methamphetamine, cocaine, GHB  Sedatives/hypnotics/anxiolytics:   Past use: xanax- would only use when in withdrawal   Use in last 6 months: Xanax- 2/10/22  Alcohol:   Past use: has not had drink since 2015  Use in last 6 months: denies  Nicotine:   Cigarettes: denies  Vaping: currently   Chewing tobacco: denies  Cannabis:   Past use: 15-25 years of age  Use in last 6 months: denies  Hallucinogens:   Past use: mushrooms one time  Use in last 6 months: denies  Behavioral addictions:   denies;     Pt was last seen in  on 4/25/22 by Dr. Mejia, his initial visit.   A/P at that time was:  1. Opioid use disorder  Reviewed history and diagnostic criteria.  He would like to continue Sublocade injections.  Orders placed and request for insurance approval sent to finance specialists.  He is not experiencing any significant symptoms of withdrawal or cravings but is anxious about this.  Possible his difficulty sleeping the past few nights is related to being due for injection.  I have sent a very small supply of Suboxone 4-1mg films for him to have on hand should he develop symptoms to minimize risk of return to use while awaiting subsequent infusion.    - Fentanyl Urine, Qualitative; Standing  - buprenorphine HCl-naloxone HCl (SUBOXONE) 4-1 MG per film; Take 1/2 to 1 film daily as needed for symptoms of opioid withdrawal.  Dispense: 5 Film; Refill: 0  - naloxone (NARCAN) 4 MG/0.1ML nasal spray; Spray 1 spray (4 mg) into one nostril alternating nostrils once as needed for opioid reversal every 2-3 minutes until assistance arrives  Dispense: 0.2 mL; Refill: 11     2. Polysubstance abuse (H)  No substance use since Feb 2022.  Encouraged  "continued involvement with AA.       3. Current episode of major depressive disorder without prior episode, unspecified depression episode severity  Managed by psychiatry, planning to start Latuda.       At follow-up visit he will need to have labs completed - HIV and Hep C to follow-up from most recent IVDU.  Repeat HIV in June as well (given recent use of post-exposure prophylaxis).  Will need LFTs done as well for Sublocade.                 Return in about 2 weeks (around 5/9/2022) for Follow up, with me, in person.      Pt was found obtunded in a parking garage 5/8/22, naloxone was administered and pt did not respond.  He was taken to [facility] ED and eventually hospitalized 5/9-5/12/22.  He reported he had returned to use of heroin/fentanyl, methamphetamine, and GHB.  He was started on buprenorphine during hospitalization and discharged with buprenorphine/naloxone 16mg/day.      5/17/22:  Pt states he discontinued buprenorphine 5/13/22 and returned to use of heroin/fentanyl and cocaine.  He last used fentanyl 5/16/22 between 11pm and midnight.  He states he developed withdrawal symptoms on 5/13 and attributes this to buprenorphine/naloxone combination product.  He wants to resume buprenorphine SL and then resume XR buprenorphine, and states he refuses to take any other form of SL buprenorphine than \"what they gave me in the hospital\" which was single agent buprenorphine product.  Pt is scheduled for Sublocade 300mg on 5/20/22.      In review of his medications, he states he stopped taking Truvada and Tivicay based on what he understood to be recommendations of ID provider in hospital.  Pt endorses repeated use of needles previously used by an acquaintance of his who is HIV+.      [place] Prescription Drug Monitoring Program Reviewed:  Yes; as expected        Past Medical History:   Diagnosis Date     Allergic state      Anxiety      Depressive disorder      Hepatitis C      Opioid use disorder, severe, " dependence (H) 11/17/2013     Stimulant use disorder     methamphetamine, GHB     Uncomplicated asthma          PAST PSYCHIATRIC HISTORY:  Diagnoses- anxiety and depression   Suicide Attempts: No   Hospitalizations: No , nothing not related to drug use    PHQ-2 Score:     PHQ-2 ( 1999 [employer]) 5/17/2022 4/25/2022   Q1: Little interest or pleasure in doing things 3 1   Q2: Feeling down, depressed or hopeless 3 2   PHQ-2 Score 6 3        If PHQ-2 score of 3 or higher, has [facility] therapist or provider been notified? Yes    Any current suicidal ideation? No  If yes, has [facility] therapist or provider been notified? N/A    Mental health provider: [facility] (follow up on  referral if needed), starting with new therapist at [facility] soon    Past Surgical History:   Procedure Laterality Date     INCISION AND DRAINAGE SOFT TISSUE UPPER EXTREMITY, COMBINED  8/11/2012    Procedure: COMBINED INCISION AND DRAINAGE SOFT TISSUE UPPER EXTREMITY;  Incision and drainage Right Arm Abscess;  Surgeon: Cheli White MD;  Location:  OR       Medications:  buprenorphine HCl-naloxone HCl (SUBOXONE) 8-2 MG per film, Place 1 Film under the tongue 2 times daily  albuterol (PROAIR HFA/PROVENTIL HFA/VENTOLIN HFA) 108 (90 Base) MCG/ACT inhaler, Inhale 2 puffs into the lungs every 6 hours as needed  dolutegravir (TIVICAY) 50 MG tablet, Take 1 tablet (50 mg) by mouth daily  emtricitabine-tenofovir (TRUVADA) 200-300 MG per tablet, Take 1 tablet by mouth daily  lurasidone (LATUDA) 20 MG TABS tablet, Take 1 tablet (20 mg) by mouth every evening  multivitamin, therapeutic (THERA-VIT) TABS tablet, Take 1 tablet by mouth daily  naloxone (NARCAN) 4 MG/0.1ML nasal spray, Spray 1 spray (4 mg) into one nostril alternating nostrils once as needed for opioid reversal every 2-3 minutes until assistance arrives  polyethylene glycol (MIRALAX) 17 GM/Dose powder, Take 17 g by mouth daily as needed for  constipation  [DISCONTINUED] buPROPion (WELLBUTRIN XL) 300 MG 24 hr tablet, Take 300 mg by mouth every morning    No current facility-administered medications on file prior to visit.      Allergies   Allergen Reactions     Pollen Extract Unknown and Other (See Comments)     Zolpidem Shortness Of Breath     wheezing       Family History   Problem Relation Age of Onset     Hypertension Father          Social History  Housing status: alone in an apartment  Employment status: Employed full time - finance  Relationship status: Single  Children: 1 child (daughter born 2018)  Legal: DUI in 3/2021, on probation for 2 years     REVIEW OF SYSTEMS:  States he is taking amoxicillin from a rx his father had to address concerns of recurrent swelling L side of neck where he experienced abscess at injection site in 2/2022.  Pt did inject in this area at time of his overdose 5/9/22.     OBJECTIVE                                                        /75   Pulse 71     Physical Exam  Vitals reviewed.   Constitutional:       Appearance: He is diaphoretic.      Comments: Pt appears intermittently drowsy, closing his eyes for up to several seconds when not actively engaged in conversation   HENT:      Head: Normocephalic and atraumatic.   Eyes:      General: No scleral icterus.     Conjunctiva/sclera: Conjunctivae normal.   Cardiovascular:      Rate and Rhythm: Normal rate.   Pulmonary:      Effort: Pulmonary effort is normal. No respiratory distress.   Skin:     General: Skin is warm.      Coloration: Skin is not jaundiced or pale.   Neurological:      Mental Status: He is oriented to person, place, and time.      Coordination: Coordination is intact.      Gait: Gait is intact.   Psychiatric:         Attention and Perception: Attention normal.         Mood and Affect: Mood is depressed. Affect is flat.         Speech: Speech normal.         Behavior: Behavior normal. Behavior is cooperative.         Thought Content: Thought  content does not include suicidal ideation.      Comments: Insight and judgement are poor to fair         Labs:    UDS 5/17/22: buprenorphine and cocaine  *POC urine drug screen does not screen for Fentanyl    Recent Results (from the past 720 hour(s))   Asymptomatic COVID-19 Virus (Coronavirus) by PCR Nasopharyngeal    Collection Time: 05/08/22  2:44 AM    Specimen: Nasopharyngeal; Swab   Result Value Ref Range    SARS CoV2 PCR Negative Negative   EKG 12-lead, tracing only    Collection Time: 05/09/22 10:55 PM   Result Value Ref Range    Systolic Blood Pressure  mmHg    Diastolic Blood Pressure  mmHg    Ventricular Rate 53 BPM    Atrial Rate 53 BPM    TX Interval 172 ms    QRS Duration 92 ms     ms    QTc 437 ms    P Axis 42 degrees    R AXIS 51 degrees    T Axis 48 degrees    Interpretation ECG       Sinus bradycardia  Moderate voltage criteria for LVH, may be normal variant  Possible Acute pericarditis  Abnormal ECG  When compared with ECG of 03-FEB-2022 11:38,  No significant change was found  Confirmed by GENERATED REPORT,  ([id number]),  Aasen, Bradley ([id number]) on 5/9/2022 11:51:35 PM  Also confirmed by GENERATED REPORT, COMPUTER ([id number]),  Nataliya Calix ([id number])  on 5/10/2022 8:40:58 AM     Comprehensive metabolic panel    Collection Time: 05/09/22 11:03 PM   Result Value Ref Range    Sodium 141 133 - 144 mmol/L    Potassium 3.3 (L) 3.4 - 5.3 mmol/L    Chloride 109 94 - 109 mmol/L    Carbon Dioxide (CO2) 22 20 - 32 mmol/L    Anion Gap 10 3 - 14 mmol/L    Urea Nitrogen 17 7 - 30 mg/dL    Creatinine 0.99 0.66 - 1.25 mg/dL    Calcium 8.7 8.5 - 10.1 mg/dL    Glucose 115 (H) 70 - 99 mg/dL    Alkaline Phosphatase 80 40 - 150 U/L    AST 26 0 - 45 U/L    ALT 41 0 - 70 U/L    Protein Total 7.7 6.8 - 8.8 g/dL    Albumin 4.0 3.4 - 5.0 g/dL    Bilirubin Total 0.5 0.2 - 1.3 mg/dL    GFR Estimate >90 >60 mL/min/1.73m2   Troponin I    Collection Time: 05/09/22 11:03 PM   Result Value Ref Range    Troponin I  High Sensitivity 11 <79 ng/L   Ethyl Alcohol Level    Collection Time: 05/09/22 11:03 PM   Result Value Ref Range    Alcohol ethyl <0.01 <=0.01 g/dL   CBC with platelets and differential    Collection Time: 05/09/22 11:03 PM   Result Value Ref Range    WBC Count 8.3 4.0 - 11.0 10e3/uL    RBC Count 5.22 4.40 - 5.90 10e6/uL    Hemoglobin 14.8 13.3 - 17.7 g/dL    Hematocrit 46.1 40.0 - 53.0 %    MCV 88 78 - 100 fL    MCH 28.4 26.5 - 33.0 pg    MCHC 32.1 31.5 - 36.5 g/dL    RDW 12.8 10.0 - 15.0 %    Platelet Count 259 150 - 450 10e3/uL    % Neutrophils 73 %    % Lymphocytes 17 %    % Monocytes 8 %    % Eosinophils 2 %    % Basophils 0 %    % Immature Granulocytes 0 %    NRBCs per 100 WBC 0 <1 /100    Absolute Neutrophils 6.0 1.6 - 8.3 10e3/uL    Absolute Lymphocytes 1.4 0.8 - 5.3 10e3/uL    Absolute Monocytes 0.6 0.0 - 1.3 10e3/uL    Absolute Eosinophils 0.2 0.0 - 0.7 10e3/uL    Absolute Basophils 0.0 0.0 - 0.2 10e3/uL    Absolute Immature Granulocytes 0.0 <=0.4 10e3/uL    Absolute NRBCs 0.0 10e3/uL   Extra Blue Top Tube    Collection Time: 05/09/22 11:04 PM   Result Value Ref Range    Hold Specimen [facility]    Extra Red Top Tube    Collection Time: 05/09/22 11:04 PM   Result Value Ref Range    Hold Specimen [facility]    Extra Blood Bank Purple Top Tube    Collection Time: 05/09/22 11:04 PM   Result Value Ref Range    Hold Specimen [facility]    Acetaminophen level    Collection Time: 05/09/22 11:04 PM   Result Value Ref Range    Acetaminophen <2 (L) 10 - 30 mg/L   Salicylate level    Collection Time: 05/09/22 11:04 PM   Result Value Ref Range    Salicylate <2 <20 mg/dL   iStat Gases (lactate) venous, POCT    Collection Time: 05/09/22 11:05 PM   Result Value Ref Range    Lactic Acid POCT 1.4 <=2.0 mmol/L    Bicarbonate Venous POCT 28 21 - 28 mmol/L    O2 Sat, Venous POCT 16 (L) 94 - 100 %    pCO2V Venous POCT 70 (H) 40 - 50 mm Hg    pH Venous POCT 7.21 (L) 7.32 - 7.43    pO2 Venous POCT 17 (L) 25 - 47 mm Hg   Lactic acid whole blood     Collection Time: 05/09/22 11:17 PM   Result Value Ref Range    Lactic Acid 1.6 0.7 - 2.0 mmol/L   Blood gas venous and oxyhgb    Collection Time: 05/09/22 11:17 PM   Result Value Ref Range    pH Venous 7.23 (L) 7.32 - 7.43    pCO2 Venous 63 (H) 40 - 50 mm Hg    pO2 Venous 22 (L) 25 - 47 mm Hg    Bicarbonate Venous 26 21 - 28 mmol/L    FIO2 2     Oxyhemoglobin Venous 29 (L) 70 - 75 %    Base Excess/Deficit (+/-) -2.7 -7.7 - 1.9 mmol/L   Creatinine    Collection Time: 05/09/22 11:17 PM   Result Value Ref Range    Creatinine 1.23 0.66 - 1.25 mg/dL    GFR Estimate 79 >60 mL/min/1.73m2   Blood gas venous and oxyhgb    Collection Time: 05/09/22 11:51 PM   Result Value Ref Range    pH Venous 7.23 (L) 7.32 - 7.43    pCO2 Venous 58 (H) 40 - 50 mm Hg    pO2 Venous 21 (L) 25 - 47 mm Hg    Bicarbonate Venous 24 21 - 28 mmol/L    FIO2 0     Oxyhemoglobin Venous 26 (L) 70 - 75 %    Base Excess/Deficit (+/-) -4.4 -7.7 - 1.9 mmol/L   Blood gas venous and oxyhgb    Collection Time: 05/10/22  1:39 AM   Result Value Ref Range    pH Venous 7.27 (L) 7.32 - 7.43    pCO2 Venous 56 (H) 40 - 50 mm Hg    pO2 Venous 18 (L) 25 - 47 mm Hg    Bicarbonate Venous 26 21 - 28 mmol/L    FIO2 30     Oxyhemoglobin Venous 22 (L) 70 - 75 %    Base Excess/Deficit (+/-) -2.3 -7.7 - 1.9 mmol/L   Asymptomatic COVID-19 Virus (Coronavirus) by PCR Nasopharyngeal    Collection Time: 05/10/22  3:06 AM    Specimen: Nasopharyngeal; Swab   Result Value Ref Range    SARS CoV2 PCR Negative Negative   Glucose by meter    Collection Time: 05/10/22  5:08 AM   Result Value Ref Range    GLUCOSE BY METER POCT 104 (H) 70 - 99 mg/dL   Basic metabolic panel    Collection Time: 05/10/22  5:30 AM   Result Value Ref Range    Sodium 141 133 - 144 mmol/L    Potassium 4.1 3.4 - 5.3 mmol/L    Chloride 111 (H) 94 - 109 mmol/L    Carbon Dioxide (CO2) 22 20 - 32 mmol/L    Anion Gap 8 3 - 14 mmol/L    Urea Nitrogen 14 7 - 30 mg/dL    Creatinine 0.70 0.66 - 1.25 mg/dL    Calcium 8.7 8.5 -  10.1 mg/dL    Glucose 113 (H) 70 - 99 mg/dL    GFR Estimate >90 >60 mL/min/1.73m2   Potassium    Collection Time: 05/10/22  5:30 AM   Result Value Ref Range    Potassium 4.1 3.4 - 5.3 mmol/L   CBC with platelets    Collection Time: 05/10/22  5:30 AM   Result Value Ref Range    WBC Count 6.4 4.0 - 11.0 10e3/uL    RBC Count 5.13 4.40 - 5.90 10e6/uL    Hemoglobin 14.6 13.3 - 17.7 g/dL    Hematocrit 44.2 40.0 - 53.0 %    MCV 86 78 - 100 fL    MCH 28.5 26.5 - 33.0 pg    MCHC 33.0 31.5 - 36.5 g/dL    RDW 12.9 10.0 - 15.0 %    Platelet Count 261 150 - 450 10e3/uL   Glucose by meter    Collection Time: 05/10/22  7:30 AM   Result Value Ref Range    GLUCOSE BY METER POCT 86 70 - 99 mg/dL   Drug abuse screen 77 urine (FL, RH, SH)    Collection Time: 05/10/22  8:16 AM   Result Value Ref Range    Amphetamines Urine Screen Positive (A) Screen Negative    Barbiturates Urine Screen Negative Screen Negative    Benzodiazepines Urine Screen Negative Screen Negative    Cannabinoids Urine Screen Negative Screen Negative    Cocaine Urine Screen Negative Screen Negative    Opiates Urine Screen Positive (A) Screen Negative    PCP Urine Screen Negative Screen Negative   Potassium    Collection Time: 05/10/22 10:57 AM   Result Value Ref Range    Potassium 3.9 3.4 - 5.3 mmol/L   Blood gas venous    Collection Time: 05/10/22 10:57 AM   Result Value Ref Range    pH Venous 7.38 7.32 - 7.43    pCO2 Venous 44 40 - 50 mm Hg    pO2 Venous 37 25 - 47 mm Hg    Bicarbonate Venous 25 21 - 28 mmol/L    Base Excess/Deficit (+/-) -0.1 -7.7 - 1.9 mmol/L    FIO2 0    HIV Antigen Antibody Combo    Collection Time: 05/10/22 11:44 AM   Result Value Ref Range    HIV Antigen Antibody Combo Nonreactive Nonreactive   Hepatitis C RNA, Quantitative by PCR    Collection Time: 05/10/22 11:44 AM   Result Value Ref Range    Hepatitis C RNA IU/mL Not Detected Not Detected IU/mL   EKG 12-lead, tracing only    Collection Time: 05/10/22  1:35 PM   Result Value Ref Range     Systolic Blood Pressure  mmHg    Diastolic Blood Pressure  mmHg    Ventricular Rate 101 BPM    Atrial Rate 101 BPM    MA Interval 146 ms    QRS Duration 80 ms     ms    QTc 459 ms    P Axis 77 degrees    R AXIS 71 degrees    T Axis 71 degrees    Interpretation ECG       Sinus tachycardia  Otherwise normal ECG  When compared with ECG of 09-MAY-2022 22:55,  Vent. rate has increased BY  48 BPM  Confirmed by MD DAVIDSON, CARLOS ([id number]),  Ara Montero ([id number]) on 5/17/2022 10:54:43 AM     Potassium    Collection Time: 05/11/22  7:47 AM   Result Value Ref Range    Potassium 3.6 3.4 - 5.3 mmol/L   Hepatitis B Surface Antibody    Collection Time: 05/11/22  7:47 AM   Result Value Ref Range    Hepatitis B Surface Antibody 3.58 <8.00 m[IU]/mL         Patient counseling completed today:  Discussed mechanism of action, potential risks/benefits/side effects of medications and other recommendations above.  Discussed risk of precipitated withdrawal with initiation of buprenorphine in the presence of full opioid agonists.  Reviewed directions for initiation of buprenorphine to reduce risk of precipitated withdrawal and maximize efficacy.  Recommend pt keep naloxone in their possession and reviewed other aspects of harm reduction to reduce risk of overdose with return to use.   Recommended avoiding concurrent use of alcohol, benzodiazepines or other sedatives with buprenorphine or other opioids.  Discussed importance of avoiding isolation, building a network of supportive relationships, avoiding people/places/things associated with past use to reduce risk of relapse; including motivational interviewing regarding psychosocial treatment for addiction.     At least 40min spent in review of medical record,  review, obtaining histories, discussing recommendations, harm reduction counseling    Eloina Liu MD  [place] [id number] [address] [address] [phone]

## 2022-05-17 NOTE — PATIENT INSTRUCTIONS
Start clonidine, gabapentin, baclofen, and zofran for withdrawal symptoms today.   Continue taking these every 8 hours until symptoms are controlled with buprenorphine.     Start buprenorphine when at least 24 hours from last use of heroin/fentanyl.    Take 8mg for first dose of buprenorphine.  If symptoms worsen, take 16mg buprenorphine in 1-2 hours.  You can take an additional 8mg dose in 1-2 hours again if needed.     Starting the next day, take 16mg buprenorphine daily until you get your next Sublocade injection.

## 2022-05-17 NOTE — NURSING NOTE
[facility]    States he is having withdrawal from taking Suboxone, was only doing good when he was taking Subutex.    Rooming information:  Approximate last use of illicit opioids: 5/17/22  Taking buprenorphine? Yes As prescribed? No  Number of buprenorphine films/tablets remaining currently: 3  Side effects related to buprenorphine (constipation, dry mouth, sedation?) Yes: dry mouth and constipation    Narcan currently available: Yes  Other recent substance use:    Denies and Cocaine   NICOTINE-Yes: vape  If using nicotine, ready to quit? No    Point of care urine drug screen positive for: buprenorphine and cocaine  *POC urine drug screen does not screen for Fentanyl      PHQ-2 Score:     PHQ-2 ( 1999 Pfizer) 5/17/2022 4/25/2022   Q1: Little interest or pleasure in doing things 3 1   Q2: Feeling down, depressed or hopeless 3 2   PHQ-2 Score 6 3        If PHQ-2 score of 3 or higher, has Recovery Clinic therapist or provider been notified? Yes    Any current suicidal ideation? No  If yes, has Recovery Clinic therapist or provider been notified? N/A    Primary care provider: Jean Pierre Simon     Mental health provider: HUGO Mancia (follow up on MH referral if needed)    Insurance needs: active    Housing needs: stable    Contact information up to date? yes    3rd Party Involvement none today (please obtain CHERRIE if pt would like to include)    Mona Fritz CMA  May 17, 2022  11:20 AM

## 2022-05-25 ENCOUNTER — TELEPHONE (OUTPATIENT)
Dept: BEHAVIORAL HEALTH | Facility: CLINIC | Age: 32
End: 2022-05-25
Payer: COMMERCIAL

## 2022-05-25 DIAGNOSIS — F11.20 OPIOID USE DISORDER, SEVERE, DEPENDENCE (H): Primary | ICD-10-CM

## 2022-05-25 PROCEDURE — 99207 PR SELF-HELP/PEER SVC PER 15 MIN: CPT

## 2022-05-25 NOTE — TELEPHONE ENCOUNTER
[facility] contacted pt for Telepone Recovery Support check in after he cancelled appointments scheduled for sublocade injection in Infusion Center and a provider visit in the Recovery Clinic.     Pt states his current employment is precarious given the frequent absenteeism due to substance use related issues.  Pt states he cancelled appointments given an important project was due at work.    Pt reports he was able to reschedule the Infusion Center appointment for next week and will use suboxone supply he currently has until he can see RC provider next week.     Pt reports last substance use was Friday May 20.    Pt reports attending a recovery meeting that night concerned members have been checking in to offer support and resources.  [facility] will meet with pt during next  visit and encourages pt to schedule appointment with  psychotherapist Steven Morgan for support and resources..   Pt reports he will schedule appointment with  provider for same date of Infusion Therapy.     Michael Austin on 5/25/2022 at 4:28 PM  Peer   [facility]  [phone]

## 2022-05-29 ENCOUNTER — HOSPITAL ENCOUNTER (EMERGENCY)
Facility: CLINIC | Age: 32
Discharge: HOME | End: 2022-05-29
Attending: EMERGENCY MEDICINE | Admitting: EMERGENCY MEDICINE
Payer: COMMERCIAL

## 2022-05-29 VITALS
WEIGHT: 195 LBS | TEMPERATURE: 98.7 F | BODY MASS INDEX: 27.92 KG/M2 | DIASTOLIC BLOOD PRESSURE: 104 MMHG | HEART RATE: 86 BPM | OXYGEN SATURATION: 93 % | HEIGHT: 70 IN | RESPIRATION RATE: 16 BRPM | SYSTOLIC BLOOD PRESSURE: 148 MMHG

## 2022-05-29 DIAGNOSIS — R41.82 ALTERED MENTAL STATUS, UNSPECIFIED ALTERED MENTAL STATUS TYPE: ICD-10-CM

## 2022-05-29 DIAGNOSIS — F19.10 POLYSUBSTANCE ABUSE (H): ICD-10-CM

## 2022-05-29 PROCEDURE — 99282 EMERGENCY DEPT VISIT SF MDM: CPT

## 2022-05-29 NOTE — ED TRIAGE NOTES
Pt BIBA from [facility] in [place].  Pt was attending an addiction and substance abuse conference and was found in the lobby in a somnolent state.  Pt admits to taking 0.50mg of alprazolam to PD and EMS.  Pt drowsy, but arousable and able to answer questions.  EMS has concerns about Pt's mentation

## 2022-05-29 NOTE — ED NOTES
Bed: ED16  Expected date:   Expected time:   Means of arrival:   Comments:  Megan [phone] M benzo use and prescription drug abuse ETA 0936

## 2022-05-29 NOTE — ED PROVIDER NOTES
History     Chief Complaint:  AMS     HPI:  The history is provided by the patient.   History is supplemented by electronic medical records.    Romeo Maldonado is a 31 year old male with a history of polysubstance abuse, hepatitis C, asthma who presents after being found sleeping in the lobby of the [employer] hotel in [place] where he is attending an AA/recovery convention. He reports that for the past 3-4 days, he has not been sleeping well. This, on top of stress he is experiencing from work, has caused him to feel rather fatigued. He states that he traveled to [place] to attend an AA convention with a recovery group that he is affiliated with. He reports that he has been dealing with an addiction to heroin. This morning, he was attending the AA conference when several of the attendees became concerned that he was quite sleepy. EMS and PD were called and found him to be somnolent in the lobby of the hotel. He was brought here to the ED for further evaluation. He does endorse taking 0.5 mg of Xanax today but does not feel intoxicated as he states that he only used this to sleep. The Xanax was reportedly from an old prescription of his that he found in his home. Romeo also endorses using heroin most recently 48 hours ago. He states that he is continuing to struggle with this addiction, but has been smoking the heroin more frequently rather than injecting it as he used to do this. He has not used any stimulants recently. He reports that he is in contact with a recovery team at [facility] and has a plan for his treatment which he will be following up on with them in 2 days. He denies any suicidal or homicidal ideation. Romeo does mention some concerns regarding the prophylactic medications he is taking for HIV exposure. He is worried that they may not work and he will test positive for HIV, but he has been taking these diligently.  He does not feel drowsy.  No intent to harm self or others.  He  "understands the concern by the other attendees of the drug recovery convention but does not feel that there is something new and dangerous going on.    Review of Systems   All other systems reviewed and are negative.    Allergies:  Zolpidem    Medications:  Albuterol inhaler  Baclofen  Buprenorphine  Clonidine  Tivicay  Truvada  Gabapentin  Latuda  Narcan  Zofran  Suboxone    Past Medical History:     Anxiety  Depression  Hepatitis C  Asthma  Polysubstance abuse (methamphetamine, heroin, benzodiazepines)  Alcohol dependence   Acute renal failure  Hypoxia  Opiate overdose  Suicidal ideation   Alcoholic hepatitis without ascites   Benzodiazepine withdrawal  Opioid withdrawal   Psychosis  Cellulitis and abscess   Insomnia    Past Surgical History:    Incision and drainage, upper extremity    Family History:    Father: hypertension, mitral valve stenosis, psychiatric disorder, colon polyps  Mother: depression, sleep apnea    Social History:  The patient presents to the ED alone.  The patient presents to the ED via EMS.   The patient uses opiates.  The patient is involved in a recovery program.  The patient reports that he has a daughter and whose mother is also in the recovery program.    Physical Exam     Patient Vitals for the past 24 hrs:   BP Temp Temp src Pulse Resp SpO2 Height Weight   05/29/22 0946 -- -- -- 86 -- 93 % -- --   05/29/22 0940 -- -- -- -- -- 92 % -- --   05/29/22 0910 (!) 148/104 98.7  F (37.1  C) Oral 109 16 95 % 1.778 m (5' 10\") 88.5 kg (195 lb)     Physical Exam  General: Nontoxic-appearing male sitting upright in room 16  HENT: mucous membranes moist, OP clear  Eyes: PERRL without nystagmus  CV: extremities well perfused, regular rhythm  Resp: normal effort, no stridor, no cough observed  GI: abdomen soft and nontender, no guarding  MSK: no bony tenderness   Skin: appropriately warm and dry  Neuro: alert, clear speech, oriented, normal tone in extremities, ambulation steady  Psych:  calm, " cooperative, denies feeling suicidal, no evidence of hallucinations    Emergency Department Course        Reviewed:  I reviewed nursing notes, vitals, past medical history and Care Everywhere    Assessments:  0915 I obtained history and examined the patient as noted above.     Disposition:  The patient was discharged to home.     Impression & Plan     Medical Decision Making:  He was sent here out of concern for drowsiness in the hotel where he is attending a drug convention.  He admits to being very tired and poor sleep lately, as well as using some Xanax today, though he has absolutely no evidence of intoxication or withdrawal state at this time.  He very openly, directly, and articulately describes his longstanding struggles with substance abuse.  No signs of focal neurologic deficit.  I do not think that emergent work-up is indicated, noting that blood sugar was checked by paramedics and was 120.  He is ambulatory.  No evidence of decompensated psychiatric illness.  He expresses strong motivation to continue the drug treatment program that he is on and pursue support from his peers in the treatment groups that he participates in.  I do not think he can be held against as well.  He does not have any issues that he wishes to be addressed in the emergency department today.  He intends to return to the conference for ongoing participation.  Is welcome back for crisis at any hour.    Diagnosis:    ICD-10-CM    1. Polysubstance abuse (H)  F19.10    2. Altered mental status, unspecified altered mental status type  R41.82     resolved     Scribe Disclosure:  I, Kay Shawn, am serving as a scribe at 9:25 AM on 5/29/2022 to document services personally performed by Michael Walker MD based on my observations and the provider's statements to me.     This note was completed in part using Dragon voice recognition software. Although reviewed after completion, some word and grammatical errors may occur.      Aaron  Michael Sue MD  05/29/22 6984

## 2022-06-03 ENCOUNTER — TELEPHONE (OUTPATIENT)
Dept: BEHAVIORAL HEALTH | Facility: CLINIC | Age: 32
End: 2022-06-03
Payer: COMMERCIAL

## 2022-06-06 ENCOUNTER — MYC MEDICAL ADVICE (OUTPATIENT)
Dept: BEHAVIORAL HEALTH | Facility: CLINIC | Age: 32
End: 2022-06-06
Payer: COMMERCIAL

## 2022-10-24 ENCOUNTER — TELEPHONE (OUTPATIENT)
Dept: BEHAVIORAL HEALTH | Facility: CLINIC | Age: 32
End: 2022-10-24

## 2022-10-24 ENCOUNTER — HOSPITAL ENCOUNTER (INPATIENT)
Facility: CLINIC | Age: 32
LOS: 9 days | Discharge: OTHER INSTITUTION | DRG: 897 | End: 2022-11-03
Attending: EMERGENCY MEDICINE | Admitting: PSYCHIATRY & NEUROLOGY
Payer: COMMERCIAL

## 2022-10-24 DIAGNOSIS — F19.10 POLYSUBSTANCE ABUSE (H): ICD-10-CM

## 2022-10-24 DIAGNOSIS — R09.02 HYPOXIA: ICD-10-CM

## 2022-10-24 DIAGNOSIS — F11.90 OPIOID USE DISORDER: ICD-10-CM

## 2022-10-24 DIAGNOSIS — Z20.6 HIV EXPOSURE: ICD-10-CM

## 2022-10-24 DIAGNOSIS — Z20.822 LAB TEST NEGATIVE FOR COVID-19 VIRUS: ICD-10-CM

## 2022-10-24 DIAGNOSIS — K70.10 ALCOHOLIC HEPATITIS WITHOUT ASCITES (H): Primary | ICD-10-CM

## 2022-10-24 DIAGNOSIS — F13.10 BENZODIAZEPINE ABUSE (H): ICD-10-CM

## 2022-10-24 LAB
ALBUMIN SERPL-MCNC: 3.2 G/DL (ref 3.4–5)
ALP SERPL-CCNC: 68 U/L (ref 40–150)
ALT SERPL W P-5'-P-CCNC: 25 U/L (ref 0–70)
AMPHETAMINES UR QL SCN: ABNORMAL
ANION GAP SERPL CALCULATED.3IONS-SCNC: 10 MMOL/L (ref 3–14)
AST SERPL W P-5'-P-CCNC: 11 U/L (ref 0–45)
BARBITURATES UR QL: ABNORMAL
BASOPHILS # BLD AUTO: 0 10E3/UL (ref 0–0.2)
BASOPHILS NFR BLD AUTO: 0 %
BENZODIAZ UR QL: ABNORMAL
BILIRUB SERPL-MCNC: 0.3 MG/DL (ref 0.2–1.3)
BUN SERPL-MCNC: 6 MG/DL (ref 7–30)
CALCIUM SERPL-MCNC: 8.5 MG/DL (ref 8.5–10.1)
CANNABINOIDS UR QL SCN: ABNORMAL
CHLORIDE BLD-SCNC: 106 MMOL/L (ref 94–109)
CO2 SERPL-SCNC: 25 MMOL/L (ref 20–32)
COCAINE UR QL: ABNORMAL
CREAT SERPL-MCNC: 0.57 MG/DL (ref 0.66–1.25)
EOSINOPHIL # BLD AUTO: 0.3 10E3/UL (ref 0–0.7)
EOSINOPHIL NFR BLD AUTO: 6 %
ERYTHROCYTE [DISTWIDTH] IN BLOOD BY AUTOMATED COUNT: 12.9 % (ref 10–15)
ETHANOL SERPL-MCNC: <0.01 G/DL
GFR SERPL CREATININE-BSD FRML MDRD: >90 ML/MIN/1.73M2
GLUCOSE BLD-MCNC: 104 MG/DL (ref 70–99)
HCT VFR BLD AUTO: 40.1 % (ref 40–53)
HGB BLD-MCNC: 13.8 G/DL (ref 13.3–17.7)
IMM GRANULOCYTES # BLD: 0 10E3/UL
IMM GRANULOCYTES NFR BLD: 0 %
LYMPHOCYTES # BLD AUTO: 2.3 10E3/UL (ref 0.8–5.3)
LYMPHOCYTES NFR BLD AUTO: 42 %
MCH RBC QN AUTO: 28.3 PG (ref 26.5–33)
MCHC RBC AUTO-ENTMCNC: 34.4 G/DL (ref 31.5–36.5)
MCV RBC AUTO: 82 FL (ref 78–100)
MONOCYTES # BLD AUTO: 0.4 10E3/UL (ref 0–1.3)
MONOCYTES NFR BLD AUTO: 7 %
NEUTROPHILS # BLD AUTO: 2.5 10E3/UL (ref 1.6–8.3)
NEUTROPHILS NFR BLD AUTO: 45 %
NRBC # BLD AUTO: 0 10E3/UL
NRBC BLD AUTO-RTO: 0 /100
OPIATES UR QL SCN: ABNORMAL
PLATELET # BLD AUTO: 273 10E3/UL (ref 150–450)
POTASSIUM BLD-SCNC: 3.5 MMOL/L (ref 3.4–5.3)
PROT SERPL-MCNC: 6.8 G/DL (ref 6.8–8.8)
RBC # BLD AUTO: 4.88 10E6/UL (ref 4.4–5.9)
SARS-COV-2 RNA RESP QL NAA+PROBE: NEGATIVE
SODIUM SERPL-SCNC: 141 MMOL/L (ref 133–144)
WBC # BLD AUTO: 5.6 10E3/UL (ref 4–11)

## 2022-10-24 PROCEDURE — 99285 EMERGENCY DEPT VISIT HI MDM: CPT | Mod: 25 | Performed by: EMERGENCY MEDICINE

## 2022-10-24 PROCEDURE — 85025 COMPLETE CBC W/AUTO DIFF WBC: CPT | Performed by: EMERGENCY MEDICINE

## 2022-10-24 PROCEDURE — HZ2ZZZZ DETOXIFICATION SERVICES FOR SUBSTANCE ABUSE TREATMENT: ICD-10-PCS | Performed by: EMERGENCY MEDICINE

## 2022-10-24 PROCEDURE — 80053 COMPREHEN METABOLIC PANEL: CPT | Performed by: EMERGENCY MEDICINE

## 2022-10-24 PROCEDURE — U0005 INFEC AGEN DETEC AMPLI PROBE: HCPCS | Performed by: EMERGENCY MEDICINE

## 2022-10-24 PROCEDURE — 36415 COLL VENOUS BLD VENIPUNCTURE: CPT | Performed by: EMERGENCY MEDICINE

## 2022-10-24 PROCEDURE — C9803 HOPD COVID-19 SPEC COLLECT: HCPCS | Performed by: EMERGENCY MEDICINE

## 2022-10-24 PROCEDURE — 99285 EMERGENCY DEPT VISIT HI MDM: CPT | Performed by: EMERGENCY MEDICINE

## 2022-10-24 PROCEDURE — 82077 ASSAY SPEC XCP UR&BREATH IA: CPT | Performed by: EMERGENCY MEDICINE

## 2022-10-24 PROCEDURE — 87340 HEPATITIS B SURFACE AG IA: CPT | Performed by: PHYSICIAN ASSISTANT

## 2022-10-24 PROCEDURE — 80307 DRUG TEST PRSMV CHEM ANLYZR: CPT | Performed by: EMERGENCY MEDICINE

## 2022-10-24 PROCEDURE — 86704 HEP B CORE ANTIBODY TOTAL: CPT | Performed by: PHYSICIAN ASSISTANT

## 2022-10-24 RX ORDER — PHENOBARBITAL SODIUM 65 MG/ML
130 INJECTION, SOLUTION INTRAMUSCULAR; INTRAVENOUS ONCE
Status: COMPLETED | OUTPATIENT
Start: 2022-10-24 | End: 2022-10-25

## 2022-10-24 ASSESSMENT — ACTIVITIES OF DAILY LIVING (ADL)
ADLS_ACUITY_SCORE: 37

## 2022-10-24 NOTE — ED TRIAGE NOTES
Pt was brought in by his friends. Pt stated that he uses alcohol 40oz,, math and heroin. Pt stated that he is not safe at home because he is threatened by drug dealers and friends. Wants to detox.     Triage Assessment     Row Name 10/24/22 4862       Triage Assessment (Adult)    Airway WDL WDL       Respiratory WDL    Respiratory WDL WDL       Skin Circulation/Temperature WDL    Skin Circulation/Temperature WDL WDL       Cardiac WDL    Cardiac WDL WDL       Peripheral/Neurovascular WDL    Peripheral Neurovascular WDL WDL       Cognitive/Neuro/Behavioral WDL    Cognitive/Neuro/Behavioral WDL WDL       [place] Coma Scale    Best Eye Response 4-->(E4) spontaneous    Best Motor Response 6-->(M6) obeys commands    Best Verbal Response 5-->(V5) oriented    [place] Coma Scale Score 15

## 2022-10-25 LAB
HBV CORE AB SERPL QL IA: NONREACTIVE
HBV SURFACE AB SERPL IA-ACNC: 5.68 M[IU]/ML
HBV SURFACE AB SERPL IA-ACNC: NONREACTIVE M[IU]/ML
HBV SURFACE AG SERPL QL IA: NONREACTIVE
HIV 1+2 AB+HIV1 P24 AG SERPL QL IA: NONREACTIVE

## 2022-10-25 PROCEDURE — 96374 THER/PROPH/DIAG INJ IV PUSH: CPT | Performed by: EMERGENCY MEDICINE

## 2022-10-25 PROCEDURE — 250N000013 HC RX MED GY IP 250 OP 250 PS 637: Performed by: PSYCHOLOGIST

## 2022-10-25 PROCEDURE — 87389 HIV-1 AG W/HIV-1&-2 AB AG IA: CPT | Performed by: PHYSICIAN ASSISTANT

## 2022-10-25 PROCEDURE — 250N000011 HC RX IP 250 OP 636: Performed by: EMERGENCY MEDICINE

## 2022-10-25 PROCEDURE — 36415 COLL VENOUS BLD VENIPUNCTURE: CPT | Performed by: PHYSICIAN ASSISTANT

## 2022-10-25 PROCEDURE — 250N000013 HC RX MED GY IP 250 OP 250 PS 637: Performed by: PHYSICIAN ASSISTANT

## 2022-10-25 PROCEDURE — 99223 1ST HOSP IP/OBS HIGH 75: CPT | Mod: AI | Performed by: PSYCHIATRY & NEUROLOGY

## 2022-10-25 PROCEDURE — 128N000004 HC R&B CD ADULT

## 2022-10-25 PROCEDURE — 87522 HEPATITIS C REVRS TRNSCRPJ: CPT | Performed by: PHYSICIAN ASSISTANT

## 2022-10-25 PROCEDURE — 87536 HIV-1 QUANT&REVRSE TRNSCRPJ: CPT | Performed by: PHYSICIAN ASSISTANT

## 2022-10-25 PROCEDURE — 86706 HEP B SURFACE ANTIBODY: CPT | Performed by: PHYSICIAN ASSISTANT

## 2022-10-25 PROCEDURE — 250N000013 HC RX MED GY IP 250 OP 250 PS 637: Performed by: PSYCHIATRY & NEUROLOGY

## 2022-10-25 PROCEDURE — 99232 SBSQ HOSP IP/OBS MODERATE 35: CPT | Performed by: PHYSICIAN ASSISTANT

## 2022-10-25 RX ORDER — NALOXONE HYDROCHLORIDE 0.4 MG/ML
0.2 INJECTION, SOLUTION INTRAMUSCULAR; INTRAVENOUS; SUBCUTANEOUS
Status: DISCONTINUED | OUTPATIENT
Start: 2022-10-25 | End: 2022-11-03 | Stop reason: HOSPADM

## 2022-10-25 RX ORDER — MULTIVITAMIN,THERAPEUTIC
1 TABLET ORAL DAILY
Status: DISCONTINUED | OUTPATIENT
Start: 2022-10-25 | End: 2022-11-03 | Stop reason: HOSPADM

## 2022-10-25 RX ORDER — BUPRENORPHINE 2 MG/1
2 TABLET SUBLINGUAL 4 TIMES DAILY
Status: DISCONTINUED | OUTPATIENT
Start: 2022-10-26 | End: 2022-10-26

## 2022-10-25 RX ORDER — TRAZODONE HYDROCHLORIDE 50 MG/1
50 TABLET, FILM COATED ORAL
Status: DISCONTINUED | OUTPATIENT
Start: 2022-10-25 | End: 2022-10-30

## 2022-10-25 RX ORDER — METHOCARBAMOL 500 MG/1
500 TABLET, FILM COATED ORAL 3 TIMES DAILY PRN
Status: DISCONTINUED | OUTPATIENT
Start: 2022-10-25 | End: 2022-10-30

## 2022-10-25 RX ORDER — EMTRICITABINE AND TENOFOVIR DISOPROXIL FUMARATE 200; 300 MG/1; MG/1
1 TABLET, FILM COATED ORAL DAILY
Status: DISCONTINUED | OUTPATIENT
Start: 2022-10-25 | End: 2022-11-03 | Stop reason: HOSPADM

## 2022-10-25 RX ORDER — GABAPENTIN 100 MG/1
100 CAPSULE ORAL EVERY 6 HOURS PRN
Status: DISCONTINUED | OUTPATIENT
Start: 2022-10-25 | End: 2022-10-25

## 2022-10-25 RX ORDER — CLONIDINE HYDROCHLORIDE 0.1 MG/1
0.1 TABLET ORAL EVERY 6 HOURS PRN
Status: DISCONTINUED | OUTPATIENT
Start: 2022-10-25 | End: 2022-10-30

## 2022-10-25 RX ORDER — ONDANSETRON 4 MG/1
4 TABLET, ORALLY DISINTEGRATING ORAL EVERY 6 HOURS PRN
Status: DISCONTINUED | OUTPATIENT
Start: 2022-10-25 | End: 2022-11-03 | Stop reason: HOSPADM

## 2022-10-25 RX ORDER — DICYCLOMINE HYDROCHLORIDE 10 MG/1
20 CAPSULE ORAL 3 TIMES DAILY PRN
Status: DISCONTINUED | OUTPATIENT
Start: 2022-10-25 | End: 2022-10-30

## 2022-10-25 RX ORDER — IBUPROFEN 600 MG/1
600 TABLET, FILM COATED ORAL EVERY 6 HOURS PRN
Status: DISCONTINUED | OUTPATIENT
Start: 2022-10-25 | End: 2022-11-03 | Stop reason: HOSPADM

## 2022-10-25 RX ORDER — ACETAMINOPHEN 325 MG/1
650 TABLET ORAL EVERY 4 HOURS PRN
Status: DISCONTINUED | OUTPATIENT
Start: 2022-10-25 | End: 2022-11-03 | Stop reason: HOSPADM

## 2022-10-25 RX ORDER — LOPERAMIDE HCL 2 MG
2 CAPSULE ORAL EVERY 4 HOURS PRN
Status: DISCONTINUED | OUTPATIENT
Start: 2022-10-25 | End: 2022-11-03 | Stop reason: HOSPADM

## 2022-10-25 RX ORDER — OLANZAPINE 10 MG/1
10 TABLET ORAL 3 TIMES DAILY PRN
Status: DISCONTINUED | OUTPATIENT
Start: 2022-10-25 | End: 2022-10-30

## 2022-10-25 RX ORDER — ALBUTEROL SULFATE 90 UG/1
2 AEROSOL, METERED RESPIRATORY (INHALATION) EVERY 6 HOURS PRN
Status: DISCONTINUED | OUTPATIENT
Start: 2022-10-25 | End: 2022-10-25

## 2022-10-25 RX ORDER — OLANZAPINE 10 MG/2ML
10 INJECTION, POWDER, FOR SOLUTION INTRAMUSCULAR 3 TIMES DAILY PRN
Status: DISCONTINUED | OUTPATIENT
Start: 2022-10-25 | End: 2022-10-30

## 2022-10-25 RX ORDER — PHENOBARBITAL 32.4 MG/1
97.2 TABLET ORAL 4 TIMES DAILY
Status: DISCONTINUED | OUTPATIENT
Start: 2022-10-25 | End: 2022-10-25

## 2022-10-25 RX ORDER — ALBUTEROL SULFATE 90 UG/1
2 AEROSOL, METERED RESPIRATORY (INHALATION) EVERY 6 HOURS PRN
Status: DISCONTINUED | OUTPATIENT
Start: 2022-10-25 | End: 2022-11-03 | Stop reason: HOSPADM

## 2022-10-25 RX ORDER — POLYETHYLENE GLYCOL 3350 17 G/17G
17 POWDER, FOR SOLUTION ORAL DAILY PRN
Status: DISCONTINUED | OUTPATIENT
Start: 2022-10-25 | End: 2022-11-03 | Stop reason: HOSPADM

## 2022-10-25 RX ORDER — BUPRENORPHINE 2 MG/1
2 TABLET SUBLINGUAL
Status: DISCONTINUED | OUTPATIENT
Start: 2022-10-25 | End: 2022-10-26

## 2022-10-25 RX ORDER — PHENOBARBITAL 32.4 MG/1
64.8 TABLET ORAL 3 TIMES DAILY
Status: COMPLETED | OUTPATIENT
Start: 2022-10-25 | End: 2022-10-28

## 2022-10-25 RX ORDER — NALOXONE HYDROCHLORIDE 0.4 MG/ML
0.4 INJECTION, SOLUTION INTRAMUSCULAR; INTRAVENOUS; SUBCUTANEOUS
Status: DISCONTINUED | OUTPATIENT
Start: 2022-10-25 | End: 2022-11-03 | Stop reason: HOSPADM

## 2022-10-25 RX ORDER — AMOXICILLIN 250 MG
1 CAPSULE ORAL 2 TIMES DAILY PRN
Status: DISCONTINUED | OUTPATIENT
Start: 2022-10-25 | End: 2022-11-03 | Stop reason: HOSPADM

## 2022-10-25 RX ORDER — MAGNESIUM HYDROXIDE/ALUMINUM HYDROXICE/SIMETHICONE 120; 1200; 1200 MG/30ML; MG/30ML; MG/30ML
30 SUSPENSION ORAL EVERY 4 HOURS PRN
Status: DISCONTINUED | OUTPATIENT
Start: 2022-10-25 | End: 2022-11-03 | Stop reason: HOSPADM

## 2022-10-25 RX ORDER — GABAPENTIN 300 MG/1
300-600 CAPSULE ORAL EVERY 8 HOURS PRN
Status: DISCONTINUED | OUTPATIENT
Start: 2022-10-25 | End: 2022-10-30

## 2022-10-25 RX ADMIN — DICYCLOMINE HYDROCHLORIDE 20 MG: 10 CAPSULE ORAL at 16:38

## 2022-10-25 RX ADMIN — METHOCARBAMOL 500 MG: 500 TABLET ORAL at 11:02

## 2022-10-25 RX ADMIN — ACETAMINOPHEN 650 MG: 325 TABLET, FILM COATED ORAL at 09:34

## 2022-10-25 RX ADMIN — GABAPENTIN 300 MG: 300 CAPSULE ORAL at 16:38

## 2022-10-25 RX ADMIN — PHENOBARBITAL 64.8 MG: 32.4 TABLET ORAL at 20:22

## 2022-10-25 RX ADMIN — GABAPENTIN 100 MG: 100 CAPSULE ORAL at 11:02

## 2022-10-25 RX ADMIN — THERA TABS 1 TABLET: TAB at 17:56

## 2022-10-25 RX ADMIN — DOLUTEGRAVIR SODIUM 50 MG: 50 TABLET, FILM COATED ORAL at 17:56

## 2022-10-25 RX ADMIN — DICYCLOMINE HYDROCHLORIDE 20 MG: 10 CAPSULE ORAL at 09:34

## 2022-10-25 RX ADMIN — PHENOBARBITAL SODIUM 130 MG: 65 INJECTION INTRAMUSCULAR; INTRAVENOUS at 00:36

## 2022-10-25 RX ADMIN — EMTRICITABINE AND TENOFOVIR DISOPROXIL FUMARATE 1 TABLET: 200; 300 TABLET, FILM COATED ORAL at 17:56

## 2022-10-25 ASSESSMENT — ACTIVITIES OF DAILY LIVING (ADL)
WALKING_OR_CLIMBING_STAIRS_DIFFICULTY: NO
DIFFICULTY_EATING/SWALLOWING: NO
ADLS_ACUITY_SCORE: 41
NUMBER_OF_TIMES_PATIENT_HAS_FALLEN_WITHIN_LAST_SIX_MONTHS: 2
HYGIENE/GROOMING: PROMPTS
TOILETING_ISSUES: NO
ADLS_ACUITY_SCORE: 41
ADLS_ACUITY_SCORE: 53
ADLS_ACUITY_SCORE: 51
WEAR_GLASSES_OR_BLIND: NO
DRESS: SCRUBS (BEHAVIORAL HEALTH)
ADLS_ACUITY_SCORE: 37
ADLS_ACUITY_SCORE: 37
DRESSING/BATHING_DIFFICULTY: NO
HYGIENE/GROOMING: INDEPENDENT
ADLS_ACUITY_SCORE: 51
ADLS_ACUITY_SCORE: 41
ORAL_HYGIENE: PROMPTS
CONCENTRATING,_REMEMBERING_OR_MAKING_DECISIONS_DIFFICULTY: YES
ADLS_ACUITY_SCORE: 51
DIFFICULTY_COMMUNICATING: NO
ADLS_ACUITY_SCORE: 41
ADLS_ACUITY_SCORE: 41
DOING_ERRANDS_INDEPENDENTLY_DIFFICULTY: NO
LAUNDRY: UNABLE TO COMPLETE
CHANGE_IN_FUNCTIONAL_STATUS_SINCE_ONSET_OF_CURRENT_ILLNESS/INJURY: NO
ADLS_ACUITY_SCORE: 51
FALL_HISTORY_WITHIN_LAST_SIX_MONTHS: YES

## 2022-10-25 NOTE — ED PROVIDER NOTES
"Due to  History     Chief Complaint   Patient presents with     Addiction Problem     HPI  Romeo Maldonado is a 32 year old male with a past medical history of polysubstance use disorder (benzodiazepines, opiates, meth), hepatitis C status post treatment  who presents to the emergency department seeking detox placement.  The patient was brought in by his friends who he states became concerned about him due to his escalating drug usage over the past 2-3 weeks.  However, he states he voluntarily wants to get help here.  He states he uses up to 40 ounces of alcohol per day as well as methamphetamine and heroin.  He also uses benzodiazepines daily.  He states he does not drink every day.  He is unable to quantify the amount of methamphetamine and heroin he has been using, however, he states he uses benzodiazepines including Xanax and Klonopin that he buys off of the street.  He states he uses possibly 2 mg up to 3 times a day.  The patient has not used this since yesterday, does feel that he is starting to have some withdrawal symptoms, including anxiety.  The patient feels that he is not safe at home because he is threatened by \"drug dealers and his friends.\"  He states he did drink alcohol today.    I have reviewed the Medications, Allergies, Past Medical and Surgical History, and Social History in the [facility] [employer] system.    Past Medical History:   Diagnosis Date     Allergic state      Anxiety      Depressive disorder      Hepatitis C      Opioid use disorder, severe, dependence (H) 11/17/2013     Stimulant use disorder     methamphetamine, GHB     Uncomplicated asthma      Past Surgical History:   Procedure Laterality Date     INCISION AND DRAINAGE SOFT TISSUE UPPER EXTREMITY, COMBINED  8/11/2012    Procedure: COMBINED INCISION AND DRAINAGE SOFT TISSUE UPPER EXTREMITY;  Incision and drainage Right Arm Abscess;  Surgeon: Cheli White MD;  Location:  OR     No current facility-administered medications for this encounter. "     Current Outpatient Medications   Medication     albuterol (PROAIR HFA/PROVENTIL HFA/VENTOLIN HFA) 108 (90 Base) MCG/ACT inhaler     baclofen (LIORESAL) 10 MG tablet     buprenorphine (SUBUTEX) 8 MG SUBL sublingual tablet     cloNIDine (CATAPRES) 0.1 MG tablet     dolutegravir (TIVICAY) 50 MG tablet     emtricitabine-tenofovir (TRUVADA) 200-300 MG per tablet     gabapentin (NEURONTIN) 300 MG capsule     lurasidone (LATUDA) 20 MG TABS tablet     multivitamin, therapeutic (THERA-VIT) TABS tablet     naloxone (NARCAN) 4 MG/0.1ML nasal spray     ondansetron (ZOFRAN ODT) 4 MG ODT tab     polyethylene glycol (MIRALAX) 17 GM/Dose powder     Allergies   Allergen Reactions     Pollen Extract Unknown and Other (See Comments)     Zolpidem Shortness Of Breath     wheezing     Past medical history, past surgical history, medications, and allergies were reviewed with the patient. Additional pertinent items: None    Social History     Socioeconomic History     Marital status: Single     Spouse name: Not on file     Number of children: Not on file     Years of education: Not on file     Highest education level: Not on file   Occupational History     Not on file   Tobacco Use     Smoking status: Former     Packs/day: 0.25     Types: Cigarettes     Smokeless tobacco: Never   Vaping Use     Vaping Use: Every day   Substance and Sexual Activity     Alcohol use: Yes     Comment: occasional     Drug use: Yes     Types: Methamphetamines, Opiates, IV     Comment: heroin daily, meth a couple of times in the last 2 weks     Sexual activity: Never   Other Topics Concern     Not on file   Social History Narrative     Not on file     Social Determinants of Health     Financial Resource Strain: Not on file   Food Insecurity: Not on file   Transportation Needs: Not on file   Physical Activity: Not on file   Stress: Not on file   Social Connections: Not on file   Intimate Partner Violence: Not on file   Housing Stability: Not on file     Social  history was reviewed with the patient. Additional pertinent items: None    Review of Systems  General: No fevers or chills  Skin: No rash or diaphoresis  Eyes: No eye redness or discharge  Ears/Nose/Throat: No rhinorrhea or nasal congestion  Respiratory: No cough or SOB  Cardiovascular: No chest pain or palpitations  Gastrointestinal: No nausea, vomiting, or diarrhea  Genitourinary: No urinary frequency, hematuria, or dysuria  Musculoskeletal: No arthralgias or myalgias  Neurologic: No numbness or weakness  Psychiatric: No HI or SI reported  Hematologic/Lymphatic/Immunologic: No leg swelling, no easy bruising/bleeding  Endocrine: No polyuria/polydypsia    A complete review of systems was performed with pertinent positives and negatives noted in the HPI, and all other systems negative.    Physical Exam   BP: 104/73  Pulse: 58  Temp: 98.3  F (36.8  C)  Resp: 18  SpO2: 100 %      General: Well nourished, well developed, NAD  HEENT: EOMI, anicteric. NCAT, MMM  Neck: no jugular venous distension, supple, nl ROM  Cardiac: Regular rate and rhythm.  Intact peripheral pulses  Pulm: Airway patent, nonlabored breathing  Skin: Warm and dry to the touch.  No rash  Extremities: No LE edema, no cyanosis, w/w/p  Neuro: Alert, moving all extremities  Psych: Calm and cooperative    ED Course        Procedures                          Labs Ordered and Resulted from Time of ED Arrival to Time of ED Departure   COMPREHENSIVE METABOLIC PANEL - Abnormal       Result Value    Sodium 141      Potassium 3.5      Chloride 106      Carbon Dioxide (CO2) 25      Anion Gap 10      Urea Nitrogen 6 (*)     Creatinine 0.57 (*)     Calcium 8.5      Glucose 104 (*)     Alkaline Phosphatase 68      AST 11      ALT 25      Protein Total 6.8      Albumin 3.2 (*)     Bilirubin Total 0.3      GFR Estimate >90     ETHYL ALCOHOL LEVEL - Normal    Alcohol ethyl <0.01     COVID-19 VIRUS (CORONAVIRUS) BY PCR - Normal    SARS CoV2 PCR Negative     CBC WITH  PLATELETS AND DIFFERENTIAL    WBC Count 5.6      RBC Count 4.88      Hemoglobin 13.8      Hematocrit 40.1      MCV 82      MCH 28.3      MCHC 34.4      RDW 12.9      Platelet Count 273      % Neutrophils 45      % Lymphocytes 42      % Monocytes 7      % Eosinophils 6      % Basophils 0      % Immature Granulocytes 0      NRBCs per 100 WBC 0      Absolute Neutrophils 2.5      Absolute Lymphocytes 2.3      Absolute Monocytes 0.4      Absolute Eosinophils 0.3      Absolute Basophils 0.0      Absolute Immature Granulocytes 0.0      Absolute NRBCs 0.0     DRUG ABUSE SCREEN 1 URINE (ED)            Results for orders placed or performed during the hospital encounter of 10/24/22 (from the past 24 hour(s))   CBC with platelets differential    Narrative    The following orders were created for panel order CBC with platelets differential.  Procedure                               Abnormality         Status                     ---------                               -----------         ------                     CBC with platelets and d...[[id number]]                      Final result                 Please view results for these tests on the individual orders.   Comprehensive metabolic panel   Result Value Ref Range    Sodium 141 133 - 144 mmol/L    Potassium 3.5 3.4 - 5.3 mmol/L    Chloride 106 94 - 109 mmol/L    Carbon Dioxide (CO2) 25 20 - 32 mmol/L    Anion Gap 10 3 - 14 mmol/L    Urea Nitrogen 6 (L) 7 - 30 mg/dL    Creatinine 0.57 (L) 0.66 - 1.25 mg/dL    Calcium 8.5 8.5 - 10.1 mg/dL    Glucose 104 (H) 70 - 99 mg/dL    Alkaline Phosphatase 68 40 - 150 U/L    AST 11 0 - 45 U/L    ALT 25 0 - 70 U/L    Protein Total 6.8 6.8 - 8.8 g/dL    Albumin 3.2 (L) 3.4 - 5.0 g/dL    Bilirubin Total 0.3 0.2 - 1.3 mg/dL    GFR Estimate >90 >60 mL/min/1.73m2   Ethyl Alcohol Level   Result Value Ref Range    Alcohol ethyl <0.01 <=0.01 g/dL   Asymptomatic COVID-19 Virus (Coronavirus) by PCR Nasopharyngeal    Specimen: Nasopharyngeal; Swab    Result Value Ref Range    SARS CoV2 PCR Negative Negative    Narrative    Testing was performed using the Xpert Xpress SARS-CoV-2 Assay on the   Cepheid Gene-Xpert Instrument Systems. Additional information about   this Emergency Use Authorization (EUA) assay can be found via the Lab   Guide. This test should be ordered for the detection of SARS-CoV-2 in   individuals who meet SARS-CoV-2 clinical and/or epidemiological   criteria. Test performance is unknown in asymptomatic patients. This   test is for in vitro diagnostic use under the FDA EUA for   laboratories certified under CLIA to perform high complexity testing.   This test has not been FDA cleared or approved. A negative result   does not rule out the presence of PCR inhibitors in the specimen or   target RNA in concentration below the limit of detection for the   assay. The possibility of a false negative should be considered if   the patient's recent exposure or clinical presentation suggests   COVID-19. This test was validated by the [facility]. This laboratory is certified under the Clinical Laboratory Improvement Amendments of 1988 (CLIA-88) as qualified to perform high complexity laboratory testing.     CBC with platelets and differential   Result Value Ref Range    WBC Count 5.6 4.0 - 11.0 10e3/uL    RBC Count 4.88 4.40 - 5.90 10e6/uL    Hemoglobin 13.8 13.3 - 17.7 g/dL    Hematocrit 40.1 40.0 - 53.0 %    MCV 82 78 - 100 fL    MCH 28.3 26.5 - 33.0 pg    MCHC 34.4 31.5 - 36.5 g/dL    RDW 12.9 10.0 - 15.0 %    Platelet Count 273 150 - 450 10e3/uL    % Neutrophils 45 %    % Lymphocytes 42 %    % Monocytes 7 %    % Eosinophils 6 %    % Basophils 0 %    % Immature Granulocytes 0 %    NRBCs per 100 WBC 0 <1 /100    Absolute Neutrophils 2.5 1.6 - 8.3 10e3/uL    Absolute Lymphocytes 2.3 0.8 - 5.3 10e3/uL    Absolute Monocytes 0.4 0.0 - 1.3 10e3/uL    Absolute Eosinophils 0.3 0.0 - 0.7 10e3/uL    Absolute Basophils 0.0 0.0  - 0.2 10e3/uL    Absolute Immature Granulocytes 0.0 <=0.4 10e3/uL    Absolute NRBCs 0.0 10e3/uL   Urine Drugs of Abuse Screen    Narrative    The following orders were created for panel order Urine Drugs of Abuse Screen.  Procedure                               Abnormality         Status                     ---------                               -----------         ------                     Drug abuse screen 1 urin...[[id number]]                      In process                   Please view results for these tests on the individual orders.       Labs, vital signs, and imaging studies were reviewed by me.    Medications - No data to display    Assessments & Plan (with Medical Decision Making)   Romeo Maldonado is a 32 year old male who presents to the emergency department seeking detox placement.    Patient discussed with detox intake, they do have a bed available for the patient, though it may be sometime before the patient is able to be admitted due to staffing constraints.  Medical clearance labs were ordered.    I have reviewed the nursing notes.    I have reviewed the findings, diagnosis, plan and need for follow up with the patient.    Patient to be admitted to detox unit for further management. Plan was discussed with patient who understands and agrees with plan.    New Prescriptions    No medications on file       Final diagnoses:   Polysubstance abuse (H)   Benzodiazepine abuse (H)     Megan Lees MD  10/24/2022   [facility] EMERGENCY DEPARTMENT     Megan Lees MD  10/24/22 [id number]

## 2022-10-25 NOTE — PLAN OF CARE
10/25/22 0332   Patient Belongings   Did you bring any home meds/supplements to the hospital?  Yes   Disposition of meds  Sent to security/pharmacy per site process   Patient Belongings locker   Patient Belongings Put in Hospital Secure Location (Security or Locker, etc.) cell phone/electronics;clothing;medication(s);shoes   Belongings Search Yes   Clothing Search Yes   Second Staff Greg COBURN       Med room bin: Cell phone, 1 phone     Storage room bin: 2 sweaters, 1 shorts, 1 polo t shirt, 1 boxer, 1 black T-shirt, 1 apple laptop , 1 black pack with documents, 1 reading book.     A               Admission:  I am responsible for any personal items that are not sent to the safe or pharmacy.  [facility] is not responsible for loss, theft or damage of any property in my possession.    Signature:  _________________________________ Date: _______  Time: _____                                              Staff Signature:  ____________________________ Date: ________  Time: _____      2nd Staff person, if patient is unable/unwilling to sign:    Signature: ________________________________ Date: ________  Time: _____     Discharge:  [facility] has returned all of my personal belongings:    Signature: _________________________________ Date: ________  Time: _____                                          Staff Signature:  ____________________________ Date: ________  Time: _____

## 2022-10-25 NOTE — PLAN OF CARE
Behavioral Team Discussion: (10/25/2022)    Continued Stay Criteria/Rationale: Patient admitted for alcohol and benzodiazepine withdrawal and Use Disorder.  Plan: The following services will be provided to the patient; psychiatric assessment, medication management, therapeutic milieu, individual and group support, and skills groups.   Participants: 3A Provider: Dr. Madhav Dick MD; 3A RN: Flavia Jiang RN; 3A CM's: Venita Chavez .  Summary/Recommendation: Providers will assess today for treatment recommendations, discharge planning, and aftercare plans. CM will meet with pt for discharge planning.   Medical/Physical: hepatitis C status post treatment  Precautions:   Behavioral Orders   Procedures     Code 1 - Restrict to Unit     Routine Programming     As clinically indicated     Status 15     Every 15 minutes.     Rationale for change in precautions or plan: N/A  Progress: Initial.    ASAM Dimension Scale Ratings:  Dimension 1: 3 Client tolerates and rayray with withdrawal discomfort poorly. Client has severe intoxication, such that the client endangers self or others, or intoxication has not abated with less intensive levels of services. Client displays severe signs and symptoms; or risk of severe, but manageable withdrawal; or withdrawal worsening despite detox at less intensive level.  Dimension 2: 1 Client tolerates and rayray with physical discomfort and is able to get the services that the client needs.  Dimension 3: 2 Client has difficulty with impulse control and lacks coping skills. Client has thoughts of suicide or harm to others without means; however, the thoughts may interfere with participation in some treatment activities. Client has difficulty functioning in significant life areas. Client has moderate symptoms of emotional, behavioral, or cognitive problems. Client is able to participate in most treatment activities.  Dimension 4: 3 Client displays inconsistent compliance, minimal awareness of  either the client's addiction or mental disorder, and is minimally cooperative.  Dimension 5: 4 No awareness of the negative impact of mental health problems or substance abuse. No coping skills to arrest mental health or addiction illnesses, or prevent relapse.  Dimension 6: 4 Client has (A) Chronically antagonistic significant other, living environment, family, peer group or long-term criminal justice involvement that is harmful to recovery or treatment progress, or (B) Client has an actively antagonistic significant other, family, work, or living environment with immediate threat to the client's safety and well-being.

## 2022-10-25 NOTE — ED NOTES
To 3 A via w/c with RN.  Pt. calm and cooperative at time of transfer.  Pt. verbalizes understanding that it is locked unit and he will not be allowed to have his cell phone.

## 2022-10-25 NOTE — H&P
"Identifying information Romeo Maldonado is a 32 year old male    Chief compliant:  Benzo, meth,heroin    [place]    As per ed note     Patient presents with     Addiction Problem      HPI  Romeo Maldonado is a 32 year old male with a past medical history of polysubstance use disorder (benzodiazepines, opiates, meth), hepatitis C status post treatment  who presents to the emergency department seeking detox placement.  The patient was brought in by his friends who he states became concerned about him due to his escalating drug usage over the past 2-3 weeks.  However, he states he voluntarily wants to get help here.  He states he uses up to 40 ounces of alcohol per day as well as methamphetamine and heroin.  He also uses benzodiazepines daily.  He states he does not drink every day.  He is unable to quantify the amount of methamphetamine and heroin he has been using, however, he states he uses benzodiazepines including Xanax and Klonopin that he buys off of the street.  He states he uses possibly 2 mg up to 3 times a day.  The patient has not used this since yesterday, does feel that he is starting to have some withdrawal symptoms, including anxiety.  The patient feels that he is not safe at home because he is threatened by \"drug dealers and his friends.\"  He states he did drink alcohol today.     I have reviewed the Medications, Allergies, Past Medical and Surgical History, and Social History in the Epic system.       During my interview the patient patient reports that he went to treatment and somebody was sober for 10 days and then he relapsed.  He had relapsed on using benzos heroin and meth.  He reports he has been using heroin 1 g smokes it and used it IV.  Benzodiazepine he has been taking clonazepam and Xanax he thinks he has been taking 2 mg of of Xanax and 1 to 6 mg of clonazepam.  He has been using meth half a gram  He reports approximately 3 weeks ago he got a DWI  He reports he relates that he needs to get help " because people are taking advantage of him.     Patient has tolerance, withdrawal, progressive use, loss of control, spending more time and more amount than intended to all the above drugs. Patient has made attempts to quit, is experiencing cravings, and reports negative consequences to all the above drugs.          With pain to the digits 17 x 19 he switched to heroin he was previously on Suboxone maintenance    He has been using benzos for a long time he relapses back and forth on it  Patient has been using meth for about 20 started history of overdose in 2012 from methamphetamine                           He denies any active suicide lesion plan or intent     Denies auditory or visual hallucinations.      Patient does not smokes     Patient denied any gambling       PSYCHIATRIC REVIEW OF SYSTEMS:          Psychiatric Review of Systems:   Depression:   Feels like a failure dont ift in cant function, no suicidal   [place]:         Denies: sleeplessness, increased goal-directed activities, abrupt increase in energy, pressured speech   impulsiveness, racing thoughts, increased goal-directed activities, pressured speech, increase in energy  [place] Feeling euphoric,Distractible,Impulsive,Grandiose,Talking excessively,Have energy without sleeping,Mood swings,Irritability  Psychosis:     Denies: visual hallucinations, auditory hallucinations, paranoia  Anxiety:    something bad is going happen , tightness in his chest   Patient reports he gets anxiety feels panicky fearful scared  PTSD:  Denies: re-experiencing past trauma, nightmares, increased arousal, avoidance of traumatic stimuli, impaired function.  OCD:     Denies: obsessions, checking, symmetry, cleaning, skin picking.  ED:      Denies: restriction, binging, purging.                           PSYCHIATRIC HISTORY      Previous diagnoses:              Past court commitments: none  SIB /SUICIDE ATTEMPTS NONE  Psych Hosp : Numerous psychiatric hospitalizations last one  was in July  Patient never had any electroconvulsive therapy  Inpatient cd trt 10-20  Out pt cd trt:4-5  Detoxes  PAST PSYCH MED TRIALS         SOCIAL HISTORY                                                                            Born in [place].  Only child.  Father was [place].  Parents .  He was taken care of by his mother.  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 rate 16, weight 83.3 kg (183 lb 9.6 oz), SpO2 (P) 94 %.     ROS: 10 point ROS neg other than the symptoms noted above in the HPI.            Past Medical History:   PAST MEDICAL HISTORY:   Past Medical History        Past Medical History:   Diagnosis Date     Allergic state       Anxiety       Depressive disorder       Hepatitis C       Substance abuse (H)       meth use     Uncomplicated asthma              PAST SURGICAL HISTORY:   Past Surgical History         Past Surgical History:   Procedure Laterality Date     INCISION AND DRAINAGE SOFT TISSUE UPPER EXTREMITY, COMBINED   8/11/2012     Procedure: COMBINED INCISION AND DRAINAGE SOFT TISSUE UPPER EXTREMITY;  Incision and drainage Right Arm Abscess;  Surgeon: Cheli White MD;  Location:  OR            -     -                         MENTAL STATUS EXAM:        Constitutional: General appearance of patient:  Appearance:  awake, alert, appeared as age stated, adequate groomed and slightly unkempt  Attitude:  cooperative  Eye Contact:  good  Mood:   Depressed  Affect:  congruent   Speech:  clear, coherent normal rate   Psychomotor Behavior:  no evidence of  tardive dyskinesia, dystonia, or tics  Thought Process:  logical, linear and goal oriented  Associations:  no loose associations  Thought Content:  no evidence of psychotic thought and active suicidal ideation present  Denied any active suicidal /homicidation ideation plan intent   Insight:  fair  Judgment:  fair  Oriented to:  time, person, and place  Attention Span and Concentration:  intact  Recent and Remote Memory:  intact  Language:  english with appropriate syntax and vocabulary  Fund of Knowledge: appropriate  Muscle Strength and Tone: normal  Gait and Station: Normal      There are no abnormal or psychotic thoughts, no preoccupations, no overvalued ideas, no rumination, no obsessions, no compulsions, no somatic concerns, no hypochrondriasis, no ideas of reference, and no delusions.  Patient denies homicidal thoughts.   Patient denies suicidal thoughts.  Patient appears to have good judgment and good insight.      Musculoskeletal: Patient shows no abnormalities of motor activity: there is no tremor, no tic, and no dystonia.  There is no apparent muscle atrophy, strength and tone appear normal, and there are no abnormal movements.  Patient has normal gait and stance.     DISCUSSION:           Assessment:         Patient has a biological predisposition with family history positive for alcoholism mental illness  Psychologically patient is experiencing polysubstance abuse abusing  methamphetamine  heroin benzodiazepines  He has neurovegetative symptoms of depression     Patient has these particular stressors noncompliance with medication job legal     Patient has chronic illness exacerbation leading to hospitalization progression as described.     Patient has been unable to stop using drugs in the community due to both physical and psychological symptoms.  Continued use will put the patient at risk for medical and/or psychiatric complications.        Inpatient psychiatric hospitalization is warranted at this  time for safety, stabilization, and possible adjustment in medications.       Diagnoses:   Benzodiazepine use disorder severe  Benzodiazepine withdrawal severe  Methamphetamine use disorder severe  Methamphetamine withdrawal severe  Opiate use disorder severe  Opiate withdrawal severe  Major depressive disorder moderate recurrent without psychosis  Anxiety NOS  Noncompliance with medication      Plan  Patient's urine tox was positive for amphetamines it is negative for opiates is negative for benzodiazepines  It is positive for barbiturates however he did receive barbiturates IV given in the emergency room  .  Benzodiazepine use disorder severe benzodiazepine withdrawal severe  Patient's urine drug screen is negative for benzos but patient states he has been taking Xanax and clonazepam clonazepam will be negative in the urine drug screen we will detox patient using phenobarbital 60 mg 3 times daily    Patient has eating disturbance tremor agitation sweats  Patient received 130 mg of IV phenobarbital yesterday    Opiate use disorder severe opiate withdrawal severe    Patient will be detoxed off opiates using Subutex we will put him on maintenance    methAmphetamine use disorder severe methamphetamine withdrawal  Patient will have symptomatic withdrawal from it      Patient reports his use of alcohol was only once he has not been taking it regularly he does not need to be detox from alcohol    Patient has been unable to stop using drugs in the community due to both physical and psychological symptoms.  Continued use will put the patient at risk for medical and/or psychiatric complications.     I HAVE REVIEWED LABS WITH PT AND TALKED ABOUT RESULTS WITH PT  I HAVE REVIEWED AND SUMMARIZED OLD RECORDS including his medication reconcilation of his home medications  and PDMP   I HAVE SPOKEN WITH RN ABOUT MEDICATIONS AND DETOX SCORES  I HAVE SPOKEN WITH CM ABOUT PTS TREATMENT OPTIONS     Medical  treatment/interventions:  Medical concerns: As above     - Consults: IM consult placed. Appreciate assistance.     Legal Status: Voluntary     Safety Assessment:   Checks: Status 15  Pt has not required locked seclusion or restraints in the past 24 hours to maintain safety, please refer to RN documentation for further details.    The risks, benefits, alternatives and side effects have been discussed and are understood by the patient.          Patient will be treated in therapeutic milieu with appropriate individual and group therapies as described.  Disposition: Pending clinical stabilization. Pt does  appear interested in COMPLETE DETOX AND DO TRT  Length of stay 3-5 days

## 2022-10-25 NOTE — CONSULTS
[facility]  Consult Note - Hospitalist Service  Date of Admission:  10/24/2022  Consult Requested by: Dr. Dick    Reason for Consult:  Medical co-management.      Assessment & Plan   Romeo Maldonado is a 32 year old male with history of polysubstance abuse, opioid use disorder, benzo dependence, depression, anxiety, HCV (treated), and asthma who was admitted to unit 3A on 10/24/2022 for detox.    Polysubstance abuse with benzo and opioid dependence:  Regular use of benzos (xanax, klonopin), methamphetamine, and heroine. Ongoing IVDU. No concern for SSTI at injection sites. Does not share needles but notes recent needle stick after reaching hand into garbage and being stuck by someone else's uncapped syringe. This occurred 4-5 days ago. Previously treated with PEP following use of dirty needle in 5/2022. Tolerated ART meds. Case discussed with ID, who felt that benefit of PEP treatment outweighed any potential risks. Also noted that patient may benefit from PrEP following treatment. Currently having moderate withdrawal symptoms, primarily from opioids.   - Continue subodone and phenobarbital per primary team  - Check HBsAg, HBsAb, HBcAb, HCV RNA quant, HIV antibody, and HIV RNA quant  - Start PEP with Dolutegravir + Tenofovir  x28 days  - ID recommends starting PrEP (Tenofovir daily) following treatment  - Recommend outpatient follow up with PCP for management of PrEP    Hx HCV infection:  Completed treatment in 2016 with SVR noted. HCV RNA quant undetectable since 2022 in our chart. Will always have positive antibody.  - Check HCV RNA Quant given recent needle stick    Hypoalbuminemia:  ? Malnutrition vs acute phase reactant.   - Monitor        The patient's care was discussed with the Bedside Nurse, Patient and Primary team.    [facility]  Securely message with the Vocera Web Console (learn more  here)  Text page via [facility] Paging/Directory       Hospitalist Service    Clinically Significant Risk Factors Present on Admission              # Hypoalbuminemia: Lowest albumin = 3.2 g/dL (Ref range: 3.5-5.2) at 10/24/2022  9:50 PM, will monitor as appropriate     # Hypertension: home medication list includes antihypertensive(s)             ______________________________________________________________________    Chief Complaint   Polysubstance abuse detox    History is obtained from the patient    History of Present Illness   Romeo Maldonado is a 32 year old male with history of polysubstance abuse who was admitted to unit 3A for detox from multiple substances. Patient reports daily use of benzos (xanax or klonopin from street), meth, and heroin. He injects both meth and heroine. He denies needle sharing or using dirty needles, however notes that he was stuck by someone else's uncovered needle that was thrown in the garbage. This was 4-5 days ago. He denies any pain, swelling, erythema from injection sites. He does have a history of treated HCV. He also completed post-exposure prophylaxis for HIV in 5/2022 after using dirty needle. He reports follow up HIV testing was negative, but unable to confirm (done at [facility]). He would like to go to treatment after detox.     He currently feels horrible. He is restless and reports sweats, body aches, and runny nose.     Review of Systems   The 10 point Review of Systems is negative other than noted in the HPI or here.     Past Medical History    I have reviewed this patient's medical history and updated it with pertinent information if needed.   Past Medical History:   Diagnosis Date     Allergic state      Anxiety      Depressive disorder      Hepatitis C      Opioid use disorder, severe, dependence (H) 11/17/2013     Stimulant use disorder     methamphetamine, GHB     Uncomplicated asthma        Past Surgical History   I have reviewed this patient's surgical history and  updated it with pertinent information if needed.  Past Surgical History:   Procedure Laterality Date     INCISION AND DRAINAGE SOFT TISSUE UPPER EXTREMITY, COMBINED  8/11/2012    Procedure: COMBINED INCISION AND DRAINAGE SOFT TISSUE UPPER EXTREMITY;  Incision and drainage Right Arm Abscess;  Surgeon: Cheli White MD;  Location:  OR       Social History   I have reviewed this patient's social history and updated it with pertinent information if needed.  Social History     Tobacco Use     Smoking status: Former     Packs/day: 0.25     Types: Cigarettes     Smokeless tobacco: Never   Vaping Use     Vaping Use: Every day   Substance Use Topics     Alcohol use: Yes     Comment: occasional     Drug use: Yes     Types: Methamphetamines, Opiates, IV     Comment: heroin daily, meth a couple of times in the last 2 weks       Family History   I have reviewed this patient's family history and updated it with pertinent information if needed.  Family History   Problem Relation Age of Onset     Hypertension Father        Medications   I have reviewed this patient's current medications  Medications Prior to Admission   Medication Sig Dispense Refill Last Dose     albuterol (PROAIR HFA/PROVENTIL HFA/VENTOLIN HFA) 108 (90 Base) MCG/ACT inhaler Inhale 2 puffs into the lungs every 6 hours as needed 8.5 g 0 Past Week     baclofen (LIORESAL) 10 MG tablet Take 1 tablet (10 mg) by mouth every 8 hours as needed (withdrawal symptoms including muscle aches, anxiety) 15 tablet 0 Past Week     buprenorphine (SUBUTEX) 8 MG SUBL sublingual tablet Place 1 tablet (8 mg) under the tongue 2 times daily 20 tablet 0 Unknown     cloNIDine (CATAPRES) 0.1 MG tablet Take 1-2 tablets (0.1-0.2 mg) by mouth every 8 hours as needed (withdrawal symptoms including hot/cold sweats, anxiety, restlessness, sleeplessness) 30 tablet 0 Past Week     dolutegravir (TIVICAY) 50 MG tablet Take 1 tablet (50 mg) by mouth daily 26 tablet 0 Unknown      emtricitabine-tenofovir (TRUVADA) 200-300 MG per tablet Take 1 tablet by mouth daily 26 tablet 0 Unknown     gabapentin (NEURONTIN) 300 MG capsule Take 1-2 capsules (300-600 mg) by mouth every 8 hours as needed (withdrawal symptoms including anxiety, restlessness, sleeplessness) 30 capsule 0 Unknown     lurasidone (LATUDA) 20 MG TABS tablet Take 1 tablet (20 mg) by mouth every evening 30 tablet 0 Unknown     multivitamin, therapeutic (THERA-VIT) TABS tablet Take 1 tablet by mouth daily   Unknown     naloxone (NARCAN) 4 MG/0.1ML nasal spray Spray 1 spray (4 mg) into one nostril alternating nostrils once as needed for opioid reversal every 2-3 minutes until assistance arrives 0.2 mL 11 Unknown     ondansetron (ZOFRAN ODT) 4 MG ODT tab Take 1 tablet (4 mg) by mouth every 8 hours as needed for nausea 15 tablet 0 Unknown     polyethylene glycol (MIRALAX) 17 GM/Dose powder Take 17 g by mouth daily as needed for constipation 510 g 0 Unknown       Allergies   Allergies   Allergen Reactions     Pollen Extract Unknown and Other (See Comments)     Zolpidem Shortness Of Breath     wheezing       Physical Exam   Vital Signs: Temp: (P) 97.4  F (36.3  C) Temp src: (P) Temporal BP: (P) 122/79 Pulse: (P) 65   Resp: (P) 16 SpO2: (P) 94 % O2 Device: (P) None (Room air)    Weight: 183 lbs 9.6 oz    General Appearance:  well developed. Appears stated age. Awake. Alert. Oriented x3. Looks uncomfortable but non-toxic.    HEENT:  No scleral icterus. Moist mucous membranes.   Respiratory:  Normal effort. CTAB.   Cardiovascular:  RRR. S1,S2. No murmurs or gallops.   GI:  Soft, ND, NT, +BS.   Extremity:  No pitting edema. No calf tenderness.   Skin:  No visible rash. No jaundice. Multiple tract marks on upper extremities w/o surrounding erythema, swelling, warmth, or tenderness.  Neuro:  Grossly non-focal.  Psych:  Appropriate mood and affect.      Data   Recent Labs   Lab 10/24/22  2150      POTASSIUM 3.5   CHLORIDE 106   CO2 25    ANIONGAP 10   BUN 6*   CR 0.57*   NILA 8.5   PROTTOTAL 6.8   ALBUMIN 3.2*   BILITOTAL 0.3   ALKPHOS 68   AST 11   ALT 25     Recent Labs   Lab 10/24/22  2150   WBC 5.6   RBC 4.88   HGB 13.8   HCT 40.1   MCV 82   MCH 28.3   MCHC 34.4   RDW 12.9        No lab results found in last 7 days.  No lab results found in last 7 days.   No lab results found in last 7 days.  No lab results found in last 7 days.  Recent Labs   Lab 10/24/22  2150   *

## 2022-10-25 NOTE — TELEPHONE ENCOUNTER
S: 10:36pm- ED MD called to request detox inpt for 32 yrs old male in the [facility] ED.     B: Pt presents to the ED seeking detox from alcohol and benzos.  He has a hx of polysubstance abuse.  He also uses opiates and methamphetamine.   Pt was unable to give quantitative amount of everything. He reports that everything spiraled out of control 3 weeks ago.  He has been drinking up to 40 ounces of alcohol daily, along with unknown amount of heroin and meth..  He also uses 2mg of Xanax up to three times daily and some klonopin; he gets them off the streets.  He has a little bit of social situation at home where he is being threatened by drug dealers and friends at home.  Pt is not a great historian.  He appears to be intoxicated but his alcohol is 0.  It is hard to tell what it is that he's on.     Denies HI/SIB. No w/d seizures or DTs.  There are no current physical health concerns.  Pt is starting to have some anxiety.  Pt's scored for opiate is a 9.  No MSSA since he is using more benzos.  No hallucinations.      Alcohol is 0.  Labs look good.      No chronic medical illness.  ambulates independently.  Pt is medically.     COVID: negative  UTOX: positive for amphetamines & barbiturates,   BAL: 0  CMP: WNL  CBC: WNL    A: Vol.  Calm and cooperative.      R: Patient cleared and ready for behavioral bed placement: Yes

## 2022-10-25 NOTE — PLAN OF CARE
"  Problem: Substance Withdrawal  Goal: Substance Withdrawal, no seizure or infection  Description: Signs and symptoms of listed problems will be absent or manageable.  Outcome: Progressing   Goal Outcome Evaluation:  Safe, no fall/seizure activity.    Plan of Care Reviewed With: patient    Precautions:   Withdrawal, Seizure initiated  Legal Status:   Voluntary     Mental Health:  Patient rates anxiety a 8/10, 10 being worst, and denies any suicidal ideation or hallucinations.    Medical:  Patient seeks detox for polysubstance (Alcohol, Meth, Benzos, Heroin & IV user)  Cows score of 8 and MSSA of 11 (9am) and 6 and 9 at (1300). Patient did not get out of bed, reports,\"exhausted, have not slept in days and in a lot of pain.\" He states \"stomach cramping,\" \"I fell on my right shoulder while walking and think I did something to it,\"  Writer inspects track marks and left antecubital area is very tender to touch and hardened. Patient given fluids/snack at bedside and advised to get out of bed slowly due to seizure pads added and effects of medication.    Patient eats fruit granola bar & juice in morning and pudding, dinner roll and orange juice and graciela water.  Writer assists patient to complete menu and change into scrub attire that is more fitting/sweatshirt given for reports of chills; gait is slow and steady, very poor eye contact, cooperative and no aggression. Patient moved closer to desk for safety as patient sleeping all day.    Patient scheduled to start lower dose of Phenobarbital 64.8 mg starting at 8 pm this evening. Prophylactic antivirals orders due to patient reports of several needle stick exposures by unknown IV drug users.      Vitals: (8AM)  BP (P) 122/79 (BP Location: Right arm)   Pulse (P) 65   Temp (P) 97.4  F (36.3  C) (Temporal)   Resp (P) 16   Wt 83.3 kg (183 lb 9.6 oz)   SpO2 (P) 94%   BMI 26.34 kg/m        (1300)  /75   Pulse 68   Temp 97.5  F (36.4  C) (Temporal)   Resp 16   Wt " 83.3 kg (183 lb 9.6 oz)   SpO2 (P) 94%   BMI 26.34 kg/m           PRNs Given:  Despite patient reports of severe anxiety and severe pain, patient has been in room sleeping all day.  Tylenol and Bentyl (9AM) for pain and stomach cramping, patient declines any relief from body aches; prn gabapentin and Robaxin given 11 am, patient able to sleep.

## 2022-10-25 NOTE — PLAN OF CARE
"  Problem: Substance Withdrawal  Goal: Substance Withdrawal  Description: Signs and symptoms of listed problems will be absent or manageable.  Outcome: Progressing   Goal Outcome Evaluation:    Plan of Care Reviewed With: patient          Pt is on COWs and MSSA to detox from polysubs.     1600: COWs 7, MSSA 9  Prn gabapentin 300mg , bentyl given.    Pt has been in bed. Tells me he's just lying there not sleeping, just does not feel good, feels horrible.   Refused dinner, not hungry, feels sick to stomach. Pt accepted and ate snacks( 7 packs of saltine crackers and 4 orange juices)        2000: COWS:5, MSSA 5    Pt states he feels slightly better from the gabapentin. Came out for vitals and more snacks.( crackers, pudding and juice)   Took scheduled phenobarb and went back to bed. Declined a shower. \" I will take one tomorrow\"      "

## 2022-10-25 NOTE — PROGRESS NOTES
Met with Pt for discharge planning.  Pt is requesting referral to residential treatment.  Pt has [employer] insurance.  Coached Pt to complete paperwork for assessment and referral.  Pt acknowledged.

## 2022-10-25 NOTE — PHARMACY-ADMISSION MEDICATION HISTORY
Admission Medication History Completed by Pharmacy    See [facility] Admission Navigator for allergy information, preferred outpatient pharmacy, prior to admission medications and immunization status.     Medication history sources:  pt interview, SureScripts    Pertinent changes made to PTA medication list:  Added: N/A  Deleted: N/A  Changed: N/A    Additional medication history information:   - Patient denies taking any additional Rx/OTC medications other than the ones listed below. States he has not been taking any medications regularly, but has baclofen at home.    Prior to Admission medications    Medication Sig Last Dose Taking? Auth Provider Long Term End Date   albuterol (PROAIR HFA/PROVENTIL HFA/VENTOLIN HFA) 108 (90 Base) MCG/ACT inhaler Inhale 2 puffs into the lungs every 6 hours as needed Past Week Yes Madhav Dick MD Yes    baclofen (LIORESAL) 10 MG tablet Take 1 tablet (10 mg) by mouth every 8 hours as needed (withdrawal symptoms including muscle aches, anxiety) Past Week Yes Eloina Liu     naloxone (NARCAN) 4 MG/0.1ML nasal spray Spray 1 spray (4 mg) into one nostril alternating nostrils once as needed for opioid reversal every 2-3 minutes until assistance arrives Unknown Yes An Mejia, DO Yes      Date completed: 10/25/22    Medication history completed by:   Mario Elkins, PharmD, BCPS, BCPP
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
## 2022-10-25 NOTE — TELECONSULT
Infectious Disease Curbside Note  I was called by Stephane Hanson PA-C on 10/25/2022 at 10:59am to provide input for Romeo Maldonado MRN [id number]. [facility] The nature of this request for a curbside consultation does not permit me to perform a comprehensive review of health care records, patient/family interview, nor an examination of the patient. I obtained limited patient information from the provider on the phone call and computer access was not available at the time of the phone call. I checked the chart later in the day.     Based on only the information I was provided today, I make the following recommendations to the treating provider/team for their review and consideration:    Romeo Maldonado is a 32 year old male in the behavioral unit for substance abuse and withdrawal. He had a needle stick injury but unclear to us when. It sounded to me that it might be possible it's within 72 hours. I think benefits outweigh the risk so better to offer HIV PEP with dolutegravir, tenofovir, emtricitabine. He will benefit from Truvada PrEP after he finishes PEP.     I noticed that the note mentions the wrong regimen although the ordered regimen is correct.     These recommendations are not intended to take the place of the care team's clinical judgement, which should always be utilized to provide the most appropriate care to meet the unique needs of each patient. The recommendations offered were based on the limited scope of information provided as today's date. Should additional guidance be needed or required a formal consultation with infectious diseases is recommended.    *If a patient is discharged on IV antibiotics it is not the responsibility of the ID curbside provider to monitor labs or sign for antibiotic orders unless it is otherwise stated. If further outpatient management is required then an outpatient ID consult should be placed.

## 2022-10-26 LAB — HCV RNA SERPL NAA+PROBE-ACNC: NOT DETECTED IU/ML

## 2022-10-26 PROCEDURE — 99232 SBSQ HOSP IP/OBS MODERATE 35: CPT | Performed by: PSYCHIATRY & NEUROLOGY

## 2022-10-26 PROCEDURE — 128N000004 HC R&B CD ADULT

## 2022-10-26 PROCEDURE — 250N000013 HC RX MED GY IP 250 OP 250 PS 637: Performed by: PSYCHOLOGIST

## 2022-10-26 PROCEDURE — 250N000013 HC RX MED GY IP 250 OP 250 PS 637: Performed by: PHYSICIAN ASSISTANT

## 2022-10-26 PROCEDURE — 250N000013 HC RX MED GY IP 250 OP 250 PS 637: Performed by: PSYCHIATRY & NEUROLOGY

## 2022-10-26 RX ORDER — BUPRENORPHINE AND NALOXONE 4; 1 MG/1; MG/1
1 FILM, SOLUBLE BUCCAL; SUBLINGUAL 3 TIMES DAILY
Status: DISCONTINUED | OUTPATIENT
Start: 2022-10-26 | End: 2022-10-31

## 2022-10-26 RX ADMIN — BUPRENORPHINE AND NALOXONE 1 FILM: 4; 1 FILM BUCCAL; SUBLINGUAL at 20:40

## 2022-10-26 RX ADMIN — BUPRENORPHINE AND NALOXONE 1 FILM: 4; 1 FILM BUCCAL; SUBLINGUAL at 15:09

## 2022-10-26 RX ADMIN — PHENOBARBITAL 64.8 MG: 32.4 TABLET ORAL at 09:13

## 2022-10-26 RX ADMIN — DICYCLOMINE HYDROCHLORIDE 20 MG: 10 CAPSULE ORAL at 09:13

## 2022-10-26 RX ADMIN — BUPRENORPHINE HCL 2 MG: 2 TABLET SUBLINGUAL at 09:13

## 2022-10-26 RX ADMIN — BUPRENORPHINE HCL 2 MG: 2 TABLET SUBLINGUAL at 12:21

## 2022-10-26 RX ADMIN — SENNOSIDES AND DOCUSATE SODIUM 1 TABLET: 50; 8.6 TABLET ORAL at 09:13

## 2022-10-26 RX ADMIN — PHENOBARBITAL 64.8 MG: 32.4 TABLET ORAL at 15:09

## 2022-10-26 RX ADMIN — DOLUTEGRAVIR SODIUM 50 MG: 50 TABLET, FILM COATED ORAL at 09:13

## 2022-10-26 RX ADMIN — THERA TABS 1 TABLET: TAB at 09:15

## 2022-10-26 RX ADMIN — PHENOBARBITAL 64.8 MG: 32.4 TABLET ORAL at 20:40

## 2022-10-26 RX ADMIN — EMTRICITABINE AND TENOFOVIR DISOPROXIL FUMARATE 1 TABLET: 200; 300 TABLET, FILM COATED ORAL at 09:15

## 2022-10-26 RX ADMIN — METHOCARBAMOL 500 MG: 500 TABLET ORAL at 16:42

## 2022-10-26 ASSESSMENT — ACTIVITIES OF DAILY LIVING (ADL)
LAUNDRY: WITH SUPERVISION
ADLS_ACUITY_SCORE: 41
HYGIENE/GROOMING: INDEPENDENT
ADLS_ACUITY_SCORE: 41
DRESS: STREET CLOTHES
ADLS_ACUITY_SCORE: 41
ORAL_HYGIENE: INDEPENDENT
ADLS_ACUITY_SCORE: 41

## 2022-10-26 NOTE — PLAN OF CARE
"Goal Outcome Evaluation:    Plan of Care Reviewed With: patient Plan of Care Reviewed With: patient    Overall Patient Progress: improvingOverall Patient Progress: improving    Outcome Evaluation: Pt in opioid and benzodiazepine withdrawal monitoring and overall reports feeling better.    MSSA scores for benzodiazepine detoxification today are 6 and 4, phenobarbital 64.8mg administered x 2. COWS scores for opioid detoxification are 4 and 6, subutex 2mg x 2 administered. Pt then changed to 4mg subuxone TID, will administer 1 dose of 4mg suboxone (total for shift of subutex/suboxone = 8mg). Pt reports poor appetite, feeling abdominal discomfort, generalized pain and increasingly anxious. States overall \"feel a little bit better but I still don't feel very good\"    Pt rates anxiety 7 and depression 9 of 10 in severity. Denies suicidal ideation plans or intent. Has been isolative to his room all shift aside from coming out to eat meals. States plan as to go to treatment \"wherever they'll take me\".     States feeling constipated with abdominal cramps. Administered bentyl x 1 and prn senna x 1. He also has 2 small abrasions, that are scabbed over, to his left knee.    Will continue to monitor and intervene as warranted.   "

## 2022-10-26 NOTE — PROGRESS NOTES
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   The patient's care was discussed with the treatment team during the daily team meeting and/or staff's chart notes were reviewed.  Staff report patient has been visible in the milieu,  no acute eventsovernight.     Patient's mood is mood is depressed  He complains of feeling sweaty anxious jumping out of his skin  Patient complains of poor sleep poor energy poor interest  His appetite is poor  He sleeping in and out  He denies any auditory visual hallucinations    Denied suicidal/homicidal ideation/plan intent.denied any psychosis  No prior suicde attempts  No access to gun  Pt is in alcohol withdrawal still being monitered every 4 hrs for it,   Pt mssa score are monitered  Tolerating meds and has no side effects.              Medications:     Current Facility-Administered Medications   Medication     acetaminophen (TYLENOL) tablet 650 mg     albuterol (PROVENTIL HFA/VENTOLIN HFA) inhaler     alum & mag hydroxide-simethicone (MAALOX) suspension 30 mL     buprenorphine (SUBUTEX) sublingual tablet 2 mg     cloNIDine (CATAPRES) tablet 0.1 mg     dicyclomine (BENTYL) capsule 20 mg     dolutegravir (TIVICAY) tablet 50 mg     emtricitabine-tenofovir (TRUVADA) 200-300 MG per tablet 1 tablet     gabapentin (NEURONTIN) capsule 300-600 mg     ibuprofen (ADVIL/MOTRIN) tablet 600 mg     loperamide (IMODIUM) capsule 2 mg     methocarbamol (ROBAXIN) tablet 500 mg     multivitamin, therapeutic (THERA-VIT) tablet 1 tablet     naloxone (NARCAN) injection 0.2 mg    Or     naloxone  (NARCAN) injection 0.4 mg    Or     naloxone (NARCAN) injection 0.2 mg    Or     naloxone (NARCAN) injection 0.4 mg     OLANZapine (zyPREXA) tablet 10 mg    Or     OLANZapine (zyPREXA) injection 10 mg     ondansetron (ZOFRAN ODT) ODT tab 4 mg     PHENobarbital (LUMINAL) tablet 64.8 mg     polyethylene glycol (MIRALAX) powder 17 g     senna-docusate (SENOKOT-S/PERICOLACE) 8.6-50 MG per tablet 1 tablet     traZODone (DESYREL) tablet 50 mg             Allergies:     Allergies   Allergen Reactions     Pollen Extract Unknown and Other (See Comments)     Zolpidem Shortness Of Breath     wheezing            Psychiatric Examination:   Blood pressure 112/75, pulse 54, temperature 97.5  F (36.4  C), temperature source Temporal, resp. rate 16, weight 83.3 kg (183 lb 9.6 oz), SpO2 98 %.  Weight is 183 lbs 9.6 oz  Body mass index is 26.34 kg/m .    Appearance:  awake, alert and adequately groomed  Attitude:  cooperative  Eye Contact:  good  Mood:  anxious and depressed  Affect:  appropriate and in normal range and mood congruent  Speech:  clear, coherent rate /rhythm are good  Psychomotor Behavior:  no evidence of tardive dyskinesia, dystonia, or tics and intact station, gait and muscle tone  Throught Process:  logical  Associations:  no loose associations  Thought Content:  no evidence of suicidal ideation or homicidal ideation, no evidence of psychotic thought, no auditory hallucinations present and no visual hallucinations present  Insight:  limited  Judgement:  limited  Oriented to:  time, person, and place  Attention Span and Concentration:  intact  Recent and Remote Memory:  intact  Language fund of knowledge are adequate         Labs:   No results found for this or any previous visit (from the past 24 hour(s)).      DX Benzodiazepine use disorder severe  Benzodiazepine withdrawal severe  Methamphetamine use disorder severe  Methamphetamine withdrawal severe  Opiate use disorder severe  Opiate withdrawal severe  Major  depressive disorder moderate recurrent without psychosis  Anxiety NOS  Noncompliance with medication     PLAN  Will be detox of benzodiazepines using phenobarbital 60 mg 3 times daily    MSSA    Eating Disturbances: 3  Tremor: 1 - not visibly apparent but can be felt by the examiner placing his fingertip slightly against the patient's fingertips  Sleep Disturbance: slept through the night or not applicable  Clouding of Sensorium: no evidence  Hallucinations: 0 - none  Quality of Contact: 0 - awareness of examiner and people around him/her  Agitation: 0 - normal activity  Paroxysmal Sweats: 1 - barely perceptible sweating  Temperature: 99.5 or below  Pulse: 0 - 69 or below  Total MSSA Score: 6  Will increase Suboxone to 4 mg 3 times daily      Laboratory/Imaging: reviewed with patient   Consults: internal medicine consult reviewed  Patient will be treated in therapeutic milieu with appropriate individual and group therapies as described.  PDMP CHECKED     Supportive psychotheraoy provided, annie talked about recovery enviroment, relapse prevention, triggers to use.  Discussed with patient many issues of addiction,triggers, relapse, and establishing a solid recovery program.  Asked pt to be med complinat   Medical diagnoses to be addressed this admission:    Plan:  Romeo Maldonado is a 32 year old male with history of polysubstance abuse, opioid use disorder, benzo dependence, depression, anxiety, HCV (treated), and asthma who was admitted to unit 3A on 10/24/2022 for detox.     Polysubstance abuse with benzo and opioid dependence:  Regular use of benzos (xanax, klonopin), methamphetamine, and heroine. Ongoing IVDU. No concern for SSTI at injection sites. Does not share needles but notes recent needle stick after reaching hand into garbage and being stuck by someone else's uncapped syringe. This occurred 4-5 days ago. Previously treated with PEP following use of dirty needle in 5/2022. Tolerated ART meds. Case discussed  with ID, who felt that benefit of PEP treatment outweighed any potential risks. Also noted that patient may benefit from PrEP following treatment. Currently having moderate withdrawal symptoms, primarily from opioids.   - Continue subodone and phenobarbital per primary team  - Check HBsAg, HBsAb, HBcAb, HCV RNA quant, HIV antibody, and HIV RNA quant  - Start PEP with Dolutegravir + Tenofovir  x28 days  - ID recommends starting PrEP (Tenofovir daily) following treatment  - Recommend outpatient follow up with PCP for management of PrEP     Hx HCV infection:  Completed treatment in 2016 with SVR noted. HCV RNA quant undetectable since 2022 in our chart. Will always have positive antibody.  - Check HCV RNA Quant given recent needle stick     Hypoalbuminemia:  ? Malnutrition vs acute phase reactant.   - Monitor            The patient's care was discussed with the Bedside Nurse, Patient and Primary team.       Legal Status: voluntary    Safety Assessment:   Checks:  15 min  Precautions: withdrawal precautions  Pt has not required locked seclusion or restraints in the past 24 hours to maintain safety, please refer to RN documentation for further details.  Discussed with patient many issues of addiction,triggers, relapse, and establishing a solid recovery program.  Able to give informed consent:  YES   Discussed Risks/Benefits/Side Effects/Alternatives: YES    After discussion of the indications, risks, benefits, alternatives and consequences of no treatment, the patient elects to complete detox and do trt.

## 2022-10-26 NOTE — DISCHARGE INSTRUCTIONS
Behavioral Discharge Planning and Instructions  THANK YOU FOR CHOOSING [address]  [phone]    Summary: You were admitted to [facility] on 10/24/2022 for detoxification from opioids and benzodiazepines.  A medical exam was performed that included lab work. You have met with a  and opted to enter [facility].  Please take care and make your recovery a daily priority, Romeo!  It was a pleasure working with you and the entire treatment team here wishes you the very best in your recovery!     [facility]  Intake Date: 11/3/2022  Intake Time: 2 PM  Location: [address] [place] [address]  Phone: [phone]    Main Diagnoses:  Per Dr. 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  You completed a chemical dependency assessment. As an outpatient you will be prescribed suboxone, please take as administered until it is gone. You had labs drawn and those results were reviewed with you. Please take a copy of your lab work with you to your next primary care provider appointment.    Symptoms to Report:  If you experience more anxiety, confusion, sleeplessness, deep sadness or thoughts of suicide, notify your treatment team or notify your primary care provider. IF ANY OF THE SYMPTOMS YOU ARE EXPERIENCING ARE A MEDICAL EMERGENCY CALL 911 IMMEDIATELY.     Lifestyle Adjustment: Adjust your lifestyle to get enough sleep, relaxation, exercise and good nutrition. Continue to develop healthy coping skills  to decrease stress and promote a sober living environment. Do not use mood altering substances including alcohol, illegal drugs or addictive medications other than what is currently prescribed.     Disposition: [facility]    Facts about COVID19 at www.cdc.gov/COVID19 and www.[place].gov/covid19    Keeping hands clean is one of the most important steps we can take to avoid getting sick and spreading germs to others.  Please wash your hands frequently and lather with soap for at least 20 seconds!    Follow-up Appointment for Subolocade assessment:   Chemical Dependency Eval Nov 7th, 2022 at 2:15 PM   [facility]   [address] [address] [phone]    Recovery apps for your phone to locate current in person and zoom recovery meetings  [place] - meeting finn  AA  - meeting finn  Meeting guide - meeting finn  Quick NA meeting - meeting finn  Meme- has various apps    Resources:  *due to covid-19 most AA/NA meetings are being held online however some are in-person or a hybrid combination please check online to verify*  Need Support Now? If you or someone you know is struggling or in crisis, help is available. Call or text [employer] or chat [place].org  AA meetings search for them at: https://aa-intergroup.org (worldwide meeting listings)  AA meetings for [place] area can be found online at: https://a[place].org (click local online meetings listings)  NA meetings for [place] area can be found online at: https://www.nam[place].org  (click find a meeting)  AA and NA Sponsors are excellent resources for support and you can find one at any support group meeting.   Alcoholics Anonymous (https://aa.org/): for information 24 hours/day  AA Intergroup service office in [place] (http://www.aastpaul.org/) 176.499.1671  AA Intergroup service office in [facility]: 166.651.1503. (http://www.aamin[place].org/)  Narcotics Anonymous  (www.nam[place].org) (805) 603-4147  https://aafairviewriverside.org/meetings  SMART Recovery - self management for addiction recovery:  www.smartrecovery.org  Pathways ~ A Health Crisis Resource & Support Center:  603.834.1843.  https://prescribetoprevent.org/patient-education/videos/  http://www.harmreduction.org  [facility] 157-164-2991  Support Group:  AA/NA and Sponsor/support.  National [place] on Mental Illness (www.[place].berto.org): 334.268.2339 or 238-443-2341.  Alcoholics Anonymous (www.alcoholics-anonymous.org): Check your phone book for your local chapter.  Suicide Awareness Voices of Education (SAVE) (www.save.org): 426-876-IQOC (1183)  National Suicide Prevention Line (www.mentalhealthmn.org): 696-196-SAYA (1569)  Mental Health Consumer/Survivor Network of [place] (www.mhcsn.net): 557.206.9609 or 901-233-3897  Mental Health Association of [place] (www.mentalhealth.org): 672.561.5462 or 106-990-6028   Substance Abuse and Mental Health Services (www.samhsa.gov)  [place] Opioid Prevention Coalition: www.opioidcoalition.org    [place] Recovery Connection (MRC)  [facility] connects people seeking recovery to resources that help foster and sustain long-term recovery.  Whether you are seeking resources for treatment, transportation, housing, job training, education, health care or other pathways to recovery, [facility] is a great place to start.  137.613.6236.  www.[place]Bus[place].org    Great Pod casts for nutrition and wellness  Listen on Apple Podcasts  Dishing [employer] Nutrition   [employer]   Nutrition       Understand the connection between what you eat and how you feel. Hosted by licensed nutritionists and dietitians from [employer] we share practical, real-life solutions for healthier living through nutrition.     General Medication Instructions:   See your medication sheet(s) for instructions.   Take all medications as prescribed.  Make no changes unless your primary  care provider suggests them.   Go to all your primary care provider visits.  Be sure to have all your required lab tests. This way, your medicines can be refilled on time.  Do not use any forms of alcohol.    Please Note:  If you have any questions at anytime after you are discharged please call [facility] detox unit 3AW at [phone].  [facility], Behavioral Intake [phone]  Medical Records call [phone]  Outpatient Behavioral Intake call [phone]  LP+ Wait List/Bed Availability call [phone]    Please remember to take all of your behavioral discharge planning and lab paperwork to any follow up appointments, it contains your lab results, diagnosis, medication list and discharge recommendations.      THANK YOU FOR CHOOSING [facility]

## 2022-10-26 NOTE — PLAN OF CARE
Goal Outcome Evaluation:    Problem: Plan of Care - These are the overarching goals to be used throughout the patient stay.    Goal: Optimal Comfort and Wellbeing  Outcome: Progressing     Patient appeared to be sleeping throughout the night. No concerns voiced.    We'll continue to monitor.

## 2022-10-27 LAB — HIV1 RNA # PLAS NAA DL=20: NOT DETECTED COPIES/ML

## 2022-10-27 PROCEDURE — 250N000013 HC RX MED GY IP 250 OP 250 PS 637: Performed by: PSYCHOLOGIST

## 2022-10-27 PROCEDURE — 128N000004 HC R&B CD ADULT

## 2022-10-27 PROCEDURE — 250N000013 HC RX MED GY IP 250 OP 250 PS 637: Performed by: PSYCHIATRY & NEUROLOGY

## 2022-10-27 PROCEDURE — 99232 SBSQ HOSP IP/OBS MODERATE 35: CPT | Performed by: PSYCHIATRY & NEUROLOGY

## 2022-10-27 PROCEDURE — 250N000013 HC RX MED GY IP 250 OP 250 PS 637: Performed by: PHYSICIAN ASSISTANT

## 2022-10-27 RX ADMIN — PHENOBARBITAL 64.8 MG: 32.4 TABLET ORAL at 08:51

## 2022-10-27 RX ADMIN — BUPRENORPHINE AND NALOXONE 1 FILM: 4; 1 FILM BUCCAL; SUBLINGUAL at 08:52

## 2022-10-27 RX ADMIN — DOLUTEGRAVIR SODIUM 50 MG: 50 TABLET, FILM COATED ORAL at 08:52

## 2022-10-27 RX ADMIN — PHENOBARBITAL 64.8 MG: 32.4 TABLET ORAL at 20:39

## 2022-10-27 RX ADMIN — BUPRENORPHINE AND NALOXONE 1 FILM: 4; 1 FILM BUCCAL; SUBLINGUAL at 17:02

## 2022-10-27 RX ADMIN — BUPRENORPHINE AND NALOXONE 1 FILM: 4; 1 FILM BUCCAL; SUBLINGUAL at 20:39

## 2022-10-27 RX ADMIN — EMTRICITABINE AND TENOFOVIR DISOPROXIL FUMARATE 1 TABLET: 200; 300 TABLET, FILM COATED ORAL at 11:48

## 2022-10-27 RX ADMIN — THERA TABS 1 TABLET: TAB at 08:52

## 2022-10-27 RX ADMIN — ACETAMINOPHEN 650 MG: 325 TABLET, FILM COATED ORAL at 08:51

## 2022-10-27 RX ADMIN — PHENOBARBITAL 64.8 MG: 32.4 TABLET ORAL at 17:02

## 2022-10-27 ASSESSMENT — ACTIVITIES OF DAILY LIVING (ADL)
ADLS_ACUITY_SCORE: 41
DRESS: SCRUBS (BEHAVIORAL HEALTH)
ADLS_ACUITY_SCORE: 41
ADLS_ACUITY_SCORE: 41
HYGIENE/GROOMING: INDEPENDENT
ADLS_ACUITY_SCORE: 41
ADLS_ACUITY_SCORE: 41
LAUNDRY: WITH SUPERVISION
ADLS_ACUITY_SCORE: 41
ORAL_HYGIENE: INDEPENDENT
ADLS_ACUITY_SCORE: 41
ADLS_ACUITY_SCORE: 41

## 2022-10-27 ASSESSMENT — ANXIETY QUESTIONNAIRES
6. BECOMING EASILY ANNOYED OR IRRITABLE: NEARLY EVERY DAY
IF YOU CHECKED OFF ANY PROBLEMS ON THIS QUESTIONNAIRE, HOW DIFFICULT HAVE THESE PROBLEMS MADE IT FOR YOU TO DO YOUR WORK, TAKE CARE OF THINGS AT HOME, OR GET ALONG WITH OTHER PEOPLE: EXTREMELY DIFFICULT
GAD7 TOTAL SCORE: 21
4. TROUBLE RELAXING: NEARLY EVERY DAY
7. FEELING AFRAID AS IF SOMETHING AWFUL MIGHT HAPPEN: NEARLY EVERY DAY
5. BEING SO RESTLESS THAT IT IS HARD TO SIT STILL: NEARLY EVERY DAY
1. FEELING NERVOUS, ANXIOUS, OR ON EDGE: NEARLY EVERY DAY
GAD7 TOTAL SCORE: 21
2. NOT BEING ABLE TO STOP OR CONTROL WORRYING: NEARLY EVERY DAY
3. WORRYING TOO MUCH ABOUT DIFFERENT THINGS: NEARLY EVERY DAY

## 2022-10-27 ASSESSMENT — PATIENT HEALTH QUESTIONNAIRE - PHQ9: SUM OF ALL RESPONSES TO PHQ QUESTIONS 1-9: 21

## 2022-10-27 NOTE — PLAN OF CARE
"  Problem: Substance Withdrawal  Goal: Substance Withdrawal  Description: Signs and symptoms of listed problems will be absent or manageable.  Outcome: Progressing   Goal Outcome Evaluation:    Plan of Care Reviewed With: patient            Pt continues to be monitored why withdrawing from opiates and benzos.    He remains isolative to his room reporting poor sleep, back pain and not feeling good.  Assessment done at bedside as pt's back pain was \" horrible\"  \" I just want to lay down.... I didn't sleep for 5 days before I came here...... all because of the freaking drugs....:  Flat blunted affected. Feeling depressed and anxious. Denies SI.         Came out with encouragement for dinner and bedtime meds.   Appetite almost 100%. Drinking adequately. Moved his bowels once.     MSSA 4 and 4   COWs 3 and 2      Continues phenobarb and suboxone for withdrawal      "

## 2022-10-27 NOTE — PROGRESS NOTES
[facility], [place]   Psychiatric Progress Note        Interim history   This is a 32 year old male with Benzodiazepine use disorder severe  Benzodiazepine withdrawal severe  Methamphetamine use disorder severe  Methamphetamine withdrawal severe  Opiate use disorder severe  Opiate withdrawal severe  Major depressive disorder moderate recurrent without psychosis  Anxiety NOS  Noncompliance with medication.Pt seen in rounds.   The patient's care was discussed with the treatment team during the daily team meeting and/or staff's chart notes were reviewed.  Staff report patient has been visible in the milieu,  no acute eventsovernight.     Patient's mood is depressed    Patient complains of poor sleep     low energy low interest  His appetite is improving  He sleeping poor  He denies any auditory visual hallucinations    Denied suicidal/homicidal ideation/plan intent.denied any psychosis  No prior suicde attempts  No access to gun  Pt is in withdrawal still being monitered every 4 hrs for it,   Pt mssa score are monitered  Tolerating meds and has no side effects.              Medications:     Current Facility-Administered Medications   Medication     acetaminophen (TYLENOL) tablet 650 mg     albuterol (PROVENTIL HFA/VENTOLIN HFA) inhaler     alum & mag hydroxide-simethicone (MAALOX) suspension 30 mL     buprenorphine HCl-naloxone HCl (SUBOXONE) 4-1 MG per film 1 Film     cloNIDine (CATAPRES) tablet 0.1 mg     dicyclomine (BENTYL) capsule 20 mg     dolutegravir (TIVICAY) tablet 50 mg     emtricitabine-tenofovir (TRUVADA) 200-300 MG per tablet 1 tablet     gabapentin (NEURONTIN) capsule 300-600 mg     ibuprofen (ADVIL/MOTRIN) tablet 600 mg     loperamide (IMODIUM) capsule 2 mg     methocarbamol (ROBAXIN) tablet 500 mg     multivitamin, therapeutic (THERA-VIT) tablet 1 tablet     naloxone (NARCAN) injection 0.2 mg    Or     naloxone (NARCAN) injection 0.4 mg    Or     naloxone (NARCAN) injection  0.2 mg    Or     naloxone (NARCAN) injection 0.4 mg     OLANZapine (zyPREXA) tablet 10 mg    Or     OLANZapine (zyPREXA) injection 10 mg     ondansetron (ZOFRAN ODT) ODT tab 4 mg     PHENobarbital (LUMINAL) tablet 64.8 mg     polyethylene glycol (MIRALAX) powder 17 g     senna-docusate (SENOKOT-S/PERICOLACE) 8.6-50 MG per tablet 1 tablet     traZODone (DESYREL) tablet 50 mg             Allergies:     Allergies   Allergen Reactions     Pollen Extract Unknown and Other (See Comments)     Zolpidem Shortness Of Breath     wheezing            Psychiatric Examination:   Blood pressure 137/85, pulse 66, temperature 97.7  F (36.5  C), temperature source Temporal, resp. rate 16, weight 83.3 kg (183 lb 9.6 oz), SpO2 96 %.  Weight is 183 lbs 9.6 oz  Body mass index is 26.34 kg/m .    Appearance:  awake, alert and adequately groomed  Attitude:  cooperative  Eye Contact:  good  Mood:  anxious and depressed  Affect:  appropriate and in normal range and mood congruent  Speech:  clear, coherent rate /rhythm are good  Psychomotor Behavior:  no evidence of tardive dyskinesia, dystonia, or tics and intact station, gait and muscle tone  Throught Process:  logical  Associations:  no loose associations  Thought Content:  no evidence of suicidal ideation or homicidal ideation, no evidence of psychotic thought, no auditory hallucinations present and no visual hallucinations present  Insight:  limited  Judgement:  limited  Oriented to:  time, person, and place  Attention Span and Concentration:  intact  Recent and Remote Memory:  intact  Language fund of knowledge are adequate         Labs:   No results found for this or any previous visit (from the past 24 hour(s)).      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  PDMP CHECKED     Supportive psychotheraoy provided, annie talked about recovery enviroment, relapse prevention, triggers to use.  Discussed with patient many issues of addiction,triggers, relapse, and establishing a solid recovery program.  Asked pt to be med complinat   Medical diagnoses to be addressed this admission:    Plan:  Romeo Maldonado is a 32 year old male with history of polysubstance abuse, opioid use disorder, benzo dependence, depression, anxiety, HCV (treated), and asthma who was admitted to unit [facility] on 10/24/2022 for detox.     Polysubstance abuse with benzo and opioid dependence:  Regular use of benzos (xanax, klonopin), methamphetamine, and heroine. Ongoing IVDU. No concern for SSTI at injection sites. Does not share needles but notes recent needle stick after reaching hand into garbage and being stuck by someone else's uncapped syringe. This occurred 4-5 days ago. Previously treated with PEP following use of dirty needle in 5/2022. Tolerated ART meds. Case discussed with ID, who felt that benefit of PEP treatment outweighed any potential risks. Also noted that patient may benefit from PrEP following treatment. Currently having moderate withdrawal symptoms, primarily from opioids.   - Continue subodone and phenobarbital per primary team  - Check HBsAg, HBsAb, HBcAb, HCV RNA quant, HIV antibody, and HIV RNA quant  - Start PEP with Dolutegravir + Tenofovir  x28 days  - ID recommends starting PrEP (Tenofovir daily) following treatment  - Recommend outpatient follow up with PCP for  management of PrEP     Hx HCV infection:  Completed treatment in 2016 with SVR noted. HCV RNA quant undetectable since 2022 in our chart. Will always have positive antibody.  - Check HCV RNA Quant given recent needle stick     Hypoalbuminemia:  ? Malnutrition vs acute phase reactant.   - Monitor            The patient's care was discussed with the Bedside Nurse, Patient and Primary team.       Legal Status: voluntary    Safety Assessment:   Checks:  15 min  Precautions: withdrawal precautions  Pt has not required locked seclusion or restraints in the past 24 hours to maintain safety, please refer to RN documentation for further details.  Discussed with patient many issues of addiction,triggers, relapse, and establishing a solid recovery program.  Able to give informed consent:  YES   Discussed Risks/Benefits/Side Effects/Alternatives: YES    After discussion of the indications, risks, benefits, alternatives and consequences of no treatment, the patient elects to complete detox and do trt.

## 2022-10-27 NOTE — PLAN OF CARE
"Goal Outcome Evaluation:    Plan of Care Reviewed With: patient Plan of Care Reviewed With: patient    Overall Patient Progress: improvingOverall Patient Progress: improving    Outcome Evaluation: Pt remains in opioid and benzodiazepine withdrawal monitoring and appears to be slightly improved from yesterday, remains isolative to his room.    Pt MSSA scores today are 3 and 3, phenobarbital 64.8mg administered as ordered (on TID dosing). COWS scores today are 5 and 4, suboxone administered as ordered. Pt appears slightly better today overall but remains isolative to his room nearly the entire shift. He did complete his case management paperwork this morning after being prompted to complete it. He is reporting pain in a general fashion to his back/right shoulder. Soft care mattress ordered for comfort along with prn tylenol administered.     Pt endorses anxiety 8 and depression 9 of 10 in severity. Isolative and withdrawn in affect. Reports willingness to go to \"any treatment that will take me\". Denies suicidal ideation plans or intent.     Will continue to monitor and intervene as warranted.       "

## 2022-10-27 NOTE — PLAN OF CARE
Goal Outcome Evaluation:    Problem: Plan of Care - These are the overarching goals to be used throughout the patient stay.    Goal: Optimal Comfort and Wellbeing  Outcome: Progressing     Patient appeared to be sleeping throughout the night. No concerns.    We'll continue to monitor.
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
## 2022-10-27 NOTE — PROGRESS NOTES
Completed assessment with Pt.  Applied for RUle 24 funding as Pt will likely lose his insurance coverage at end of month.  Pt signed CHERRIE's and referrals were made to [facility] and [place].  If Pt's [employer] insurance does term he would also be eligible for LP.

## 2022-10-27 NOTE — PROGRESS NOTES
MEDICINE SIGN OFF NOTE    Chart reviewed. Appreciate ID recommendations.     Patient will complete a 28 day course of PEP before starting PrEP.   He will need prescriptions for the remainder of his 28 day course, as well as a 30 day prescription of Truvada for PrEP.   He will need to establish primary care to ensure refills.    On discharge, patient will need prescriptions for the following:    Dolutegravir 50mg daily (# - remainder of 28 days)    Emtricitabine-Tenofovir 200-300mg daily (# - remainder of 28 days + 30 days)    Recommendations for discharge:    Completed PEP x 28 days    Start PrEP after completion of PEP    Patient needs PCP follow up to ensure ongoing refills (please schedule prior to discharge)    Repeat HIV serologies at 6 and 12 weeks (around 12/5/22 and 1/16/23, respectively)    Medicine will sign off.  Please page on-call provider for any further questions or concerns.      Stephane Hanson PA-C

## 2022-10-28 PROCEDURE — 250N000013 HC RX MED GY IP 250 OP 250 PS 637: Performed by: PHYSICIAN ASSISTANT

## 2022-10-28 PROCEDURE — 250N000013 HC RX MED GY IP 250 OP 250 PS 637: Performed by: PSYCHOLOGIST

## 2022-10-28 PROCEDURE — 250N000013 HC RX MED GY IP 250 OP 250 PS 637: Performed by: PSYCHIATRY & NEUROLOGY

## 2022-10-28 PROCEDURE — 99232 SBSQ HOSP IP/OBS MODERATE 35: CPT | Performed by: PSYCHIATRY & NEUROLOGY

## 2022-10-28 PROCEDURE — 128N000004 HC R&B CD ADULT

## 2022-10-28 RX ORDER — PHENOBARBITAL 32.4 MG/1
32.4 TABLET ORAL ONCE
Status: DISCONTINUED | OUTPATIENT
Start: 2022-10-30 | End: 2022-11-02

## 2022-10-28 RX ORDER — PHENOBARBITAL 64.8 MG/1
64.8 TABLET ORAL ONCE
Status: COMPLETED | OUTPATIENT
Start: 2022-10-29 | End: 2022-10-29

## 2022-10-28 RX ADMIN — PHENOBARBITAL 64.8 MG: 32.4 TABLET ORAL at 09:02

## 2022-10-28 RX ADMIN — BUPRENORPHINE AND NALOXONE 1 FILM: 4; 1 FILM BUCCAL; SUBLINGUAL at 21:58

## 2022-10-28 RX ADMIN — DOLUTEGRAVIR SODIUM 50 MG: 50 TABLET, FILM COATED ORAL at 09:02

## 2022-10-28 RX ADMIN — EMTRICITABINE AND TENOFOVIR DISOPROXIL FUMARATE 1 TABLET: 200; 300 TABLET, FILM COATED ORAL at 10:35

## 2022-10-28 RX ADMIN — ACETAMINOPHEN 650 MG: 325 TABLET, FILM COATED ORAL at 09:02

## 2022-10-28 RX ADMIN — METHOCARBAMOL 500 MG: 500 TABLET ORAL at 19:08

## 2022-10-28 RX ADMIN — IBUPROFEN 600 MG: 600 TABLET ORAL at 23:04

## 2022-10-28 RX ADMIN — BUPRENORPHINE AND NALOXONE 1 FILM: 4; 1 FILM BUCCAL; SUBLINGUAL at 17:28

## 2022-10-28 RX ADMIN — CLONIDINE HYDROCHLORIDE 0.1 MG: 0.1 TABLET ORAL at 10:35

## 2022-10-28 RX ADMIN — DICYCLOMINE HYDROCHLORIDE 20 MG: 10 CAPSULE ORAL at 09:02

## 2022-10-28 RX ADMIN — PHENOBARBITAL 64.8 MG: 32.4 TABLET ORAL at 21:58

## 2022-10-28 RX ADMIN — BUPRENORPHINE AND NALOXONE 1 FILM: 4; 1 FILM BUCCAL; SUBLINGUAL at 09:01

## 2022-10-28 RX ADMIN — METHOCARBAMOL 500 MG: 500 TABLET ORAL at 09:02

## 2022-10-28 RX ADMIN — PHENOBARBITAL 64.8 MG: 32.4 TABLET ORAL at 17:27

## 2022-10-28 ASSESSMENT — ACTIVITIES OF DAILY LIVING (ADL)
ADLS_ACUITY_SCORE: 41
ADLS_ACUITY_SCORE: 41
DRESS: SCRUBS (BEHAVIORAL HEALTH)
ORAL_HYGIENE: INDEPENDENT
LAUNDRY: UNABLE TO COMPLETE
HYGIENE/GROOMING: INDEPENDENT
ADLS_ACUITY_SCORE: 41
HYGIENE/GROOMING: INDEPENDENT
ORAL_HYGIENE: INDEPENDENT
ADLS_ACUITY_SCORE: 41
DRESS: SCRUBS (BEHAVIORAL HEALTH)
LAUNDRY: WITH SUPERVISION
ADLS_ACUITY_SCORE: 41

## 2022-10-28 NOTE — PROGRESS NOTES
Scheduled pt for a sublocade evaluation appointment.  Appointment as follows:    Chemical Dependency Eval Nov 7th, 2022 at 2:15 PM   [facility]  [address] [address] [phone]

## 2022-10-28 NOTE — PLAN OF CARE
Goal Outcome Evaluation:    Plan of Care Reviewed With: patient    Pt flat and isolated in the room, VSS, low mood and energy, MSSA 3, COWS 0, Pt approved for [facility], [facility] apt set up for Nov 7th, on Phenobarbital 64.8 mg x3 and Suboxone 4mg x3 daily, pt denies SI/HI, encouraged to eat and drink, pt does not socialize with others, c/o slight back and shoulder discomfort, Ibuprofen 600mg as needed for comfort.

## 2022-10-28 NOTE — PLAN OF CARE
Goal Outcome Evaluation:    Plan of Care Reviewed With: patient Plan of Care Reviewed With: patient    Overall Patient Progress: improvingOverall Patient Progress: improving    Outcome Evaluation: Pt remains in opioid and benzodiazepine withdrawal monitoring, reports improvement. Isolative to his room.    MSSA scores today are 4 and 2, phenobarbital 64.8mg x 1 administered, was sleeping for 1400 administration therefore rescheduled to 1600 (on TID dosing). Pt does not appear to have any difficulty with the phenobarbital administration. COWS scores today are 8 and 4, 4mg suboxone administered x 1, was sleeping for 2nd dose therefore moved to 1600 (on TID dosing). Pt reports symptoms this morning that are consisted with increased opioid withdrawal, runny nose, abdomen cramping, anxiety and back pain. Prn clonidine administered x 1.     Pt is isolative to his room most of the shift, denies suicidal ideation plans or intent. Endorses anxiety 7 (was 8 yesterday) and depression 9 of 10 in severity (depression rating consistently a 9). Seems withdrawn and slightly flat in affect but overall reports feeling better. Encouraged pt to take a shower today.     Will continue to monitor and intervene as warranted.

## 2022-10-28 NOTE — PROGRESS NOTES
Pt approved for [facility].  No current openings but may have one by Monday.  Pt funding approved for treatment as of 10/31/22 00 span until 5/1/2023.

## 2022-10-28 NOTE — PLAN OF CARE
Problem: Substance Withdrawal  Goal: Substance Withdrawal  Description: Signs and symptoms of listed problems will be absent or manageable.  Outcome: Progressing  Patient was noted to have slept through most of the night . There was little or no signs of wakefulness noted. Patient denied symptoms of withdrawal.  Will continue to monitor and support as needed .Judith Branch RN

## 2022-10-28 NOTE — PROGRESS NOTES
[facility], [place]   Psychiatric Progress Note        Interim history   This is a 32 year old male with Benzodiazepine use disorder severe  Benzodiazepine withdrawal severe  Methamphetamine use disorder severe  Methamphetamine withdrawal severe  Opiate use disorder severe  Opiate withdrawal severe  Major depressive disorder moderate recurrent without psychosis  Anxiety NOS  Noncompliance with medication.Pt seen in rounds.   The patient's care was discussed with the treatment team during the daily team meeting and/or staff's chart notes were reviewed.  Staff report patient has been visible in the milieu,  no acute eventsovernight.     Patient's mood is depressed    Patient complains of poor sleep     low energy low interest  His appetite is improving  He sleeping poor  He denies any auditory visual hallucinations    Denied suicidal/homicidal ideation/plan intent.denied any psychosis  No prior suicde attempts  No access to gun  Pt is in withdrawal still being monitered every 4 hrs for it,   Pt mssa score are monitered  Tolerating meds and has no side effects.              Medications:     Current Facility-Administered Medications   Medication     acetaminophen (TYLENOL) tablet 650 mg     albuterol (PROVENTIL HFA/VENTOLIN HFA) inhaler     alum & mag hydroxide-simethicone (MAALOX) suspension 30 mL     buprenorphine HCl-naloxone HCl (SUBOXONE) 4-1 MG per film 1 Film     cloNIDine (CATAPRES) tablet 0.1 mg     dicyclomine (BENTYL) capsule 20 mg     dolutegravir (TIVICAY) tablet 50 mg     emtricitabine-tenofovir (TRUVADA) 200-300 MG per tablet 1 tablet     gabapentin (NEURONTIN) capsule 300-600 mg     ibuprofen (ADVIL/MOTRIN) tablet 600 mg     loperamide (IMODIUM) capsule 2 mg     methocarbamol (ROBAXIN) tablet 500 mg     multivitamin, therapeutic (THERA-VIT) tablet 1 tablet     naloxone (NARCAN) injection 0.2 mg    Or     naloxone (NARCAN) injection 0.4 mg    Or     naloxone (NARCAN) injection  0.2 mg    Or     naloxone (NARCAN) injection 0.4 mg     OLANZapine (zyPREXA) tablet 10 mg    Or     OLANZapine (zyPREXA) injection 10 mg     ondansetron (ZOFRAN ODT) ODT tab 4 mg     PHENobarbital (LUMINAL) tablet 64.8 mg     polyethylene glycol (MIRALAX) powder 17 g     senna-docusate (SENOKOT-S/PERICOLACE) 8.6-50 MG per tablet 1 tablet     traZODone (DESYREL) tablet 50 mg             Allergies:     Allergies   Allergen Reactions     Pollen Extract Unknown and Other (See Comments)     Zolpidem Shortness Of Breath     wheezing            Psychiatric Examination:   Blood pressure 133/71, pulse 61, temperature 97.6  F (36.4  C), temperature source Temporal, resp. rate 16, weight 83.3 kg (183 lb 9.6 oz), SpO2 98 %.  Weight is 183 lbs 9.6 oz  Body mass index is 26.34 kg/m .    Appearance:  awake, alert and adequately groomed  Attitude:  cooperative  Eye Contact:  good  Mood:  anxious and depressed  Affect:  appropriate and in normal range and mood congruent  Speech:  clear, coherent rate /rhythm are good  Psychomotor Behavior:  no evidence of tardive dyskinesia, dystonia, or tics and intact station, gait and muscle tone  Throught Process:  logical  Associations:  no loose associations  Thought Content:  no evidence of suicidal ideation or homicidal ideation, no evidence of psychotic thought, no auditory hallucinations present and no visual hallucinations present  Insight:  limited  Judgement:  limited  Oriented to:  time, person, and place  Attention Span and Concentration:  intact  Recent and Remote Memory:  intact  Language fund of knowledge are adequate         Labs:   No results found for this or any previous visit (from the past 24 hour(s)).      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  PDMP CHECKED     Supportive psychotheraoy provided, annie talked about recovery enviroment, relapse prevention, triggers to use.  Discussed with patient many issues of addiction,triggers, relapse, and establishing a solid recovery program.  Asked pt to be med complinat   Medical diagnoses to be addressed this admission:    Plan:  Romeo Maldonado is a 32 year old male with history of polysubstance abuse, opioid use disorder, benzo dependence, depression, anxiety, HCV (treated), and asthma who was admitted to unit 3A on 10/24/2022 for detox.     Polysubstance abuse with benzo and opioid dependence:  Regular use of benzos (xanax, klonopin), methamphetamine, and heroine. Ongoing IVDU. No concern for SSTI at injection sites. Does not share needles but notes recent needle stick after reaching hand into garbage and being stuck by someone else's uncapped syringe. This occurred 4-5 days ago. Previously treated with PEP following use of dirty needle in 5/2022. Tolerated ART meds. Case discussed with ID, who felt that benefit of PEP treatment outweighed any potential risks. Also noted that patient may benefit from PrEP following treatment. Currently having moderate withdrawal symptoms, primarily from opioids.   - Continue subodone and phenobarbital per primary team  - Check HBsAg, HBsAb, HBcAb, HCV RNA quant, HIV antibody, and HIV RNA quant  - Start PEP with Dolutegravir + Tenofovir  x28  days  - ID recommends starting PrEP (Tenofovir daily) following treatment  - Recommend outpatient follow up with PCP for management of PrEP     Hx HCV infection:  Completed treatment in 2016 with SVR noted. HCV RNA quant undetectable since 2022 in our chart. Will always have positive antibody.  - Check HCV RNA Quant given recent needle stick     Hypoalbuminemia:  ? Malnutrition vs acute phase reactant.   - Monitor            The patient's care was discussed with the Bedside Nurse, Patient and Primary team.       Legal Status: voluntary    Safety Assessment:   Checks:  15 min  Precautions: withdrawal precautions  Pt has not required locked seclusion or restraints in the past 24 hours to maintain safety, please refer to RN documentation for further details.  Discussed with patient many issues of addiction,triggers, relapse, and establishing a solid recovery program.  Able to give informed consent:  YES   Discussed Risks/Benefits/Side Effects/Alternatives: YES    After discussion of the indications, risks, benefits, alternatives and consequences of no treatment, the patient elects to complete detox and do trt.

## 2022-10-29 PROCEDURE — 250N000013 HC RX MED GY IP 250 OP 250 PS 637: Performed by: PSYCHOLOGIST

## 2022-10-29 PROCEDURE — 128N000004 HC R&B CD ADULT

## 2022-10-29 PROCEDURE — 250N000013 HC RX MED GY IP 250 OP 250 PS 637: Performed by: PSYCHIATRY & NEUROLOGY

## 2022-10-29 PROCEDURE — 250N000013 HC RX MED GY IP 250 OP 250 PS 637: Performed by: PHYSICIAN ASSISTANT

## 2022-10-29 RX ADMIN — BUPRENORPHINE AND NALOXONE 1 FILM: 4; 1 FILM BUCCAL; SUBLINGUAL at 14:12

## 2022-10-29 RX ADMIN — EMTRICITABINE AND TENOFOVIR DISOPROXIL FUMARATE 1 TABLET: 200; 300 TABLET, FILM COATED ORAL at 09:16

## 2022-10-29 RX ADMIN — DOLUTEGRAVIR SODIUM 50 MG: 50 TABLET, FILM COATED ORAL at 09:15

## 2022-10-29 RX ADMIN — IBUPROFEN 600 MG: 600 TABLET ORAL at 09:15

## 2022-10-29 RX ADMIN — THERA TABS 1 TABLET: TAB at 09:15

## 2022-10-29 RX ADMIN — PHENOBARBITAL 64.8 MG: 64.8 TABLET ORAL at 09:15

## 2022-10-29 RX ADMIN — BUPRENORPHINE AND NALOXONE 1 FILM: 4; 1 FILM BUCCAL; SUBLINGUAL at 20:25

## 2022-10-29 RX ADMIN — BUPRENORPHINE AND NALOXONE 1 FILM: 4; 1 FILM BUCCAL; SUBLINGUAL at 09:15

## 2022-10-29 ASSESSMENT — ACTIVITIES OF DAILY LIVING (ADL)
ADLS_ACUITY_SCORE: 41
HYGIENE/GROOMING: INDEPENDENT
ADLS_ACUITY_SCORE: 41
ADLS_ACUITY_SCORE: 41
ORAL_HYGIENE: INDEPENDENT
ADLS_ACUITY_SCORE: 41
LAUNDRY: WITH SUPERVISION
ADLS_ACUITY_SCORE: 41
DRESS: INDEPENDENT
ADLS_ACUITY_SCORE: 41

## 2022-10-29 NOTE — PLAN OF CARE
Goal Outcome Evaluation:    Patient appeared to be sleeping throughout the night. No concerns voiced. We'll continue to monitor.

## 2022-10-29 NOTE — PLAN OF CARE
Problem: Substance Withdrawal  Goal: Substance Withdrawal  Description: Signs and symptoms of listed problems will be absent or manageable.  Outcome: Progressing     Patient is currently on Phenobarbital taper. He received 64 mg this morning. Patient was up for breakfast and meds but retired to his room as soon as he was done. He reports anxiety 8/10, depression 9/10 and shoulder pain 7/10 denying SI and all other psych symptoms rancho for safety. Patient limited conversations by giving one worded answers, avoiding eye contact and isolates in his room. He reports his goal for today is to sleep in one more day. Patient is medication compliant Ibuprofen 600 mg given for shoulder pain. Staff will continue to monitor and assist as needed.

## 2022-10-30 PROCEDURE — 250N000013 HC RX MED GY IP 250 OP 250 PS 637: Performed by: PHYSICIAN ASSISTANT

## 2022-10-30 PROCEDURE — 128N000004 HC R&B CD ADULT

## 2022-10-30 PROCEDURE — 250N000013 HC RX MED GY IP 250 OP 250 PS 637: Performed by: PSYCHIATRY & NEUROLOGY

## 2022-10-30 PROCEDURE — 99232 SBSQ HOSP IP/OBS MODERATE 35: CPT | Performed by: PSYCHIATRY & NEUROLOGY

## 2022-10-30 RX ADMIN — EMTRICITABINE AND TENOFOVIR DISOPROXIL FUMARATE 1 TABLET: 200; 300 TABLET, FILM COATED ORAL at 09:10

## 2022-10-30 RX ADMIN — BUPRENORPHINE AND NALOXONE 1 FILM: 4; 1 FILM BUCCAL; SUBLINGUAL at 21:04

## 2022-10-30 RX ADMIN — PHENOBARBITAL 97.2 MG: 64.8 TABLET ORAL at 22:07

## 2022-10-30 RX ADMIN — DOLUTEGRAVIR SODIUM 50 MG: 50 TABLET, FILM COATED ORAL at 08:33

## 2022-10-30 RX ADMIN — BUPRENORPHINE AND NALOXONE 1 FILM: 4; 1 FILM BUCCAL; SUBLINGUAL at 13:44

## 2022-10-30 RX ADMIN — BUPRENORPHINE AND NALOXONE 1 FILM: 4; 1 FILM BUCCAL; SUBLINGUAL at 08:33

## 2022-10-30 RX ADMIN — THERA TABS 1 TABLET: TAB at 08:34

## 2022-10-30 ASSESSMENT — ACTIVITIES OF DAILY LIVING (ADL)
LAUNDRY: WITH SUPERVISION
ORAL_HYGIENE: INDEPENDENT
ADLS_ACUITY_SCORE: 41
HYGIENE/GROOMING: INDEPENDENT
ADLS_ACUITY_SCORE: 41
HYGIENE/GROOMING: INDEPENDENT
ADLS_ACUITY_SCORE: 41
ADLS_ACUITY_SCORE: 41
DRESS: INDEPENDENT
ADLS_ACUITY_SCORE: 41
DRESS: INDEPENDENT
LAUNDRY: WITH SUPERVISION
ORAL_HYGIENE: INDEPENDENT
ADLS_ACUITY_SCORE: 41

## 2022-10-30 NOTE — PROGRESS NOTES
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   The patient's care was discussed with the treatment team during the daily team meeting and/or staff's chart notes were reviewed.  Staff report patient has been visible in the milieu,  no acute eventsovernight.     Patient's mood is SUCKS     IMPROVEDsleep     low energy low interest  His appetite is improving  He sleeping IMPROVING   He denies any auditory visual hallucinations    Denied suicidal/homicidal ideation/plan intent.denied any psychosis  No prior suicde attempts  No access to gun  Pt is in withdrawal still being monitered every 4 hrs for it,   Pt mssa score are monitered  Tolerating meds and has no side effects.              Medications:     Current Facility-Administered Medications   Medication     acetaminophen (TYLENOL) tablet 650 mg     albuterol (PROVENTIL HFA/VENTOLIN HFA) inhaler     alum & mag hydroxide-simethicone (MAALOX) suspension 30 mL     buprenorphine HCl-naloxone HCl (SUBOXONE) 4-1 MG per film 1 Film     dolutegravir (TIVICAY) tablet 50 mg     emtricitabine-tenofovir (TRUVADA) 200-300 MG per tablet 1 tablet     ibuprofen (ADVIL/MOTRIN) tablet 600 mg     loperamide (IMODIUM) capsule 2 mg     multivitamin, therapeutic (THERA-VIT) tablet 1 tablet     naloxone (NARCAN) injection 0.2 mg    Or     naloxone (NARCAN) injection 0.4 mg    Or     naloxone (NARCAN) injection 0.2 mg    Or     naloxone (NARCAN) injection 0.4 mg     ondansetron (ZOFRAN ODT) ODT tab 4 mg     PHENobarbital (LUMINAL) tablet 32.4 mg     PHENobarbital (LUMINAL) tablet 97.2 mg      polyethylene glycol (MIRALAX) powder 17 g     senna-docusate (SENOKOT-S/PERICOLACE) 8.6-50 MG per tablet 1 tablet             Allergies:     Allergies   Allergen Reactions     Pollen Extract Unknown and Other (See Comments)     Zolpidem Shortness Of Breath     wheezing            Psychiatric Examination:   Blood pressure 121/79, pulse 67, temperature 97.4  F (36.3  C), temperature source Temporal, resp. rate 16, weight 83.3 kg (183 lb 9.6 oz), SpO2 98 %.  Weight is 183 lbs 9.6 oz  Body mass index is 26.34 kg/m .    Appearance:  awake, alert and adequately groomed  Attitude:  cooperative  Eye Contact:  good  Mood:  anxious and depressed  Affect:  appropriate and in normal range and mood congruent  Speech:  clear, coherent rate /rhythm are good  Psychomotor Behavior:  no evidence of tardive dyskinesia, dystonia, or tics and intact station, gait and muscle tone  Throught Process:  logical  Associations:  no loose associations  Thought Content:  no evidence of suicidal ideation or homicidal ideation, no evidence of psychotic thought, no auditory hallucinations present and no visual hallucinations present  Insight:  limited  Judgement:  limited  Oriented to:  time, person, and place  Attention Span and Concentration:  intact  Recent and Remote Memory:  intact  Language fund of knowledge are adequate         Labs:   No results found for this or any previous visit (from the past 24 hour(s)).      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  Anxiety NOS  Noncompliance with medication     PLAN  Patient's blood pressure and pulse were low  HOLD parameters were ordered both a phenobarbital taper and Suboxone maintenance  He will continue both the phenobarbital taper and Suboxone taper  Patient will  discontinue clonidine gabapentin trazodone because of low pulse and blood pressure        Continue Suboxone to 4 mg 3 times daily    Patient at this time is ambivalent about taking any medications for his mood we will revisit this if patient changes his mind  Laboratory/Imaging: reviewed with patient   Consults: internal medicine consult reviewed  Patient will be treated in therapeutic milieu with appropriate individual and group therapies as described.  PDMP CHECKED     Supportive psychotheraoy provided, annie talked about recovery enviroment, relapse prevention, triggers to use.  Discussed with patient many issues of addiction,triggers, relapse, and establishing a solid recovery program.  Asked pt to be med complinat   Medical diagnoses to be addressed this admission:    Plan:  Romeo Maldonado is a 32 year old male with history of polysubstance abuse, opioid use disorder, benzo dependence, depression, anxiety, HCV (treated), and asthma who was admitted to unit 3A on 10/24/2022 for detox.     Polysubstance abuse with benzo and opioid dependence:  Regular use of benzos (xanax, klonopin), methamphetamine, and heroine. Ongoing IVDU. No concern for SSTI at injection sites. Does not share needles but notes recent needle stick after reaching hand into garbage and being stuck by someone else's uncapped syringe. This occurred 4-5 days ago. Previously treated with PEP following use of dirty needle in 5/2022. Tolerated ART meds. Case discussed with ID, who felt that benefit of PEP treatment outweighed any potential risks. Also noted that patient may benefit from PrEP following treatment. Currently having moderate withdrawal symptoms, primarily from opioids.   - Continue subodone and phenobarbital per primary team  - Check HBsAg, HBsAb, HBcAb, HCV RNA quant, HIV antibody, and HIV RNA quant  - Start PEP with Dolutegravir + Tenofovir  x28 days  - ID recommends starting PrEP (Tenofovir daily) following treatment  - Recommend  outpatient follow up with PCP for management of PrEP     Hx HCV infection:  Completed treatment in 2016 with SVR noted. HCV RNA quant undetectable since 2022 in our chart. Will always have positive antibody.  - Check HCV RNA Quant given recent needle stick     Hypoalbuminemia:  ? Malnutrition vs acute phase reactant.   - Monitor            The patient's care was discussed with the Bedside Nurse, Patient and Primary team.       Legal Status: voluntary    Safety Assessment:   Checks:  15 min  Precautions: withdrawal precautions  Pt has not required locked seclusion or restraints in the past 24 hours to maintain safety, please refer to RN documentation for further details.  Discussed with patient many issues of addiction,triggers, relapse, and establishing a solid recovery program.  Able to give informed consent:  YES   Discussed Risks/Benefits/Side Effects/Alternatives: YES    After discussion of the indications, risks, benefits, alternatives and consequences of no treatment, the patient elects to complete detox and do trt.

## 2022-10-30 NOTE — PLAN OF CARE
Problem: Substance Withdrawal  Goal: Substance Withdrawal  Description: Signs and symptoms of listed problems will be absent or manageable.  Outcome: Progressing   Goal Outcome Evaluation:    Plan of Care Reviewed With: patient        Patient  continues on Phenobarbital 64.8 mg BID today.  Also on Suboxon 4-1 mg SL TID daily.  Pt has flat blunted affect, Endorsed depression and anxiety 8/10 , offered PRN Hydroxyzine  declined.  Patient did not attend groups.  MSSA 4 and 2, COW's 0 and 2.   Pt is visible on unit, watching TV with peers, no interaction with peers noticed.Compliant with   Pt has been bradycardia HR 55 at 1600, rechecked was 74 and 55 at 2000. MD notified regarding HR.Suggested palpated. Writer palpated HR prior med administration, was 54 bpm, left a message for MD. Writer did not administer the  2200 scheduled dose of phenobarbital d/t the low HR of 54.   Staff will continue to monitor and update changes.

## 2022-10-30 NOTE — PLAN OF CARE
"  Problem: Substance Withdrawal  Goal: Substance Withdrawal  Description: Signs and symptoms of listed problems will be absent or manageable.  Outcome: Progressing   Goal Outcome Evaluation:      Patient Pt slept 8 hours during this shift.  Patient pulse rate was low from the previous shift still continue  to be low VS check Temp 98.4, Resp 16, /63, and Pulse 47, 02 sat 95% at room air. Internal medicine  was informed stated \" continue to monitor patient as far is symptomatic Doctor promise she will let the morning team know gave Patient fluid and recheck was Temp 98.4, Pulse 49,Will continue to monitor.                          "

## 2022-10-30 NOTE — PLAN OF CARE
Problem: Substance Withdrawal  Goal: Substance Withdrawal  Description: Signs and symptoms of listed problems will be absent or manageable.  Outcome: Progressing     Patient was reluctant to wake up this morning. He was encouraged to get up for vitals and breakfast. As soon as the was done eating breakfast,he went right back to bed. He was bradycardia  when lying down with a HR of 49. HR went up to 60 when he is sitting up. His phenobarbital dose of 34.2 mg  this morning was held and provider on call notified. Patient endorsed anxiety 7/10 depression 9/10 and shoulder pain 6/10 denying all interventions. He continues to remain withdrawn saying he did not sleep last night even though night shift reported he slept 8 hrs.His provider discontinued some of his medications, like Zyprexa,Bentyl,Robaxin, clonidine and Gabapentin.  Patient appears less motivated to socialize or engage with staff / peers. Patient turned down encouragement to shower twice, He denied SI,SIB and contracted for safety. Writer will continue to monitor and support as needed.

## 2022-10-31 PROCEDURE — 250N000013 HC RX MED GY IP 250 OP 250 PS 637: Performed by: PHYSICIAN ASSISTANT

## 2022-10-31 PROCEDURE — 250N000013 HC RX MED GY IP 250 OP 250 PS 637: Performed by: PSYCHIATRY & NEUROLOGY

## 2022-10-31 PROCEDURE — 128N000004 HC R&B CD ADULT

## 2022-10-31 PROCEDURE — 99232 SBSQ HOSP IP/OBS MODERATE 35: CPT | Performed by: PSYCHIATRY & NEUROLOGY

## 2022-10-31 RX ORDER — BUPRENORPHINE AND NALOXONE 8; 2 MG/1; MG/1
1 FILM, SOLUBLE BUCCAL; SUBLINGUAL DAILY
Status: DISCONTINUED | OUTPATIENT
Start: 2022-11-01 | End: 2022-11-03 | Stop reason: HOSPADM

## 2022-10-31 RX ORDER — BUPRENORPHINE AND NALOXONE 4; 1 MG/1; MG/1
1 FILM, SOLUBLE BUCCAL; SUBLINGUAL 3 TIMES DAILY
Status: DISCONTINUED | OUTPATIENT
Start: 2022-10-31 | End: 2022-10-31

## 2022-10-31 RX ORDER — PHENOBARBITAL 32.4 MG/1
32.4 TABLET ORAL 3 TIMES DAILY
Status: DISCONTINUED | OUTPATIENT
Start: 2022-10-31 | End: 2022-11-02

## 2022-10-31 RX ORDER — BUPRENORPHINE AND NALOXONE 4; 1 MG/1; MG/1
1 FILM, SOLUBLE BUCCAL; SUBLINGUAL DAILY
Status: DISCONTINUED | OUTPATIENT
Start: 2022-10-31 | End: 2022-10-31

## 2022-10-31 RX ORDER — BUPRENORPHINE AND NALOXONE 4; 1 MG/1; MG/1
1 FILM, SOLUBLE BUCCAL; SUBLINGUAL ONCE
Status: COMPLETED | OUTPATIENT
Start: 2022-10-31 | End: 2022-10-31

## 2022-10-31 RX ADMIN — BUPRENORPHINE AND NALOXONE 1 FILM: 4; 1 FILM, SOLUBLE BUCCAL; SUBLINGUAL at 13:25

## 2022-10-31 RX ADMIN — DOLUTEGRAVIR SODIUM 50 MG: 50 TABLET, FILM COATED ORAL at 09:03

## 2022-10-31 RX ADMIN — EMTRICITABINE AND TENOFOVIR DISOPROXIL FUMARATE 1 TABLET: 200; 300 TABLET, FILM COATED ORAL at 10:11

## 2022-10-31 RX ADMIN — PHENOBARBITAL 32.4 MG: 32.4 TABLET ORAL at 12:06

## 2022-10-31 RX ADMIN — BUPRENORPHINE AND NALOXONE 1 FILM: 4; 1 FILM BUCCAL; SUBLINGUAL at 09:03

## 2022-10-31 RX ADMIN — PHENOBARBITAL 32.4 MG: 32.4 TABLET ORAL at 20:20

## 2022-10-31 RX ADMIN — THERA TABS 1 TABLET: TAB at 09:03

## 2022-10-31 ASSESSMENT — ACTIVITIES OF DAILY LIVING (ADL)
ADLS_ACUITY_SCORE: 41
ADLS_ACUITY_SCORE: 41
HYGIENE/GROOMING: INDEPENDENT
LAUNDRY: UNABLE TO COMPLETE
ADLS_ACUITY_SCORE: 41
DRESS: STREET CLOTHES
HYGIENE/GROOMING: INDEPENDENT
ORAL_HYGIENE: INDEPENDENT
ORAL_HYGIENE: INDEPENDENT
ADLS_ACUITY_SCORE: 41
DRESS: INDEPENDENT
ADLS_ACUITY_SCORE: 41

## 2022-10-31 NOTE — PLAN OF CARE
Problem: Substance Withdrawal  Goal: Substance Withdrawal  Description: Signs and symptoms of listed problems will be absent or manageable.  Outcome: Progressing   Goal Outcome Evaluation:           Pt slept throughout the night. No c/o withdrawal symptoms. No apparent pain or discomfort.

## 2022-10-31 NOTE — PLAN OF CARE
Goal Outcome Evaluation:    Plan of Care Reviewed With: patient        Patient stayed in room most of the time this shift sleeping.  Up to the dinning for breakfast and lunch.  Patient here to detox from opiates/benzo's.  Opiates score 3 and 2, MSSA/benzo's 2 and 2.  Patient on scheduled soboxone and phenobarb taper.  Good appetite, medication compliant.  Flat affect, denies SI/SIB, patient continue to stay in room, sleeping.  No aggressive behavior noted, will continue to monitor closely.

## 2022-10-31 NOTE — PROGRESS NOTES
Call received from [facility].  They have beds available today but cannot take Pt on phenobarbital.  Will arrange for transfer pending verification of medication status.  Pt is scheduled for admission to [facility]:  Intake Date: 11/3/2022  Intake Time: 12pm  Location: [address] [place] [address]  Phone: [phone]  Pt will be sent by medical cab and will need to discharge at 1100.

## 2022-10-31 NOTE — PROGRESS NOTES
[facility], [place]   Psychiatric Progress Note        Interim history   This is a 32 year old male with Benzodiazepine use disorder severe  Benzodiazepine withdrawal severe  Methamphetamine use disorder severe  Methamphetamine withdrawal severe  Opiate use disorder severe  Opiate withdrawal severe  Major depressive disorder moderate recurrent without psychosis  Anxiety NOS  Noncompliance with medication.Pt seen in rounds.   The patient's care was discussed with the treatment team during the daily team meeting and/or staff's chart notes were reviewed.  Staff report patient has been visible in the milieu,  no acute eventsovernight.     Patient's mood is anxious    Poor sleep     low energy low interest  His appetite is improving  He sleeping IMPROVING   He denies any auditory visual hallucinations    Denied suicidal/homicidal ideation/plan intent.denied any psychosis  No prior suicde attempts  No access to gun  Pt is in withdrawal still being monitered every 4 hrs for it,   Pt mssa score are monitered  Tolerating meds and has no side effects.              Medications:     Current Facility-Administered Medications   Medication     acetaminophen (TYLENOL) tablet 650 mg     albuterol (PROVENTIL HFA/VENTOLIN HFA) inhaler     alum & mag hydroxide-simethicone (MAALOX) suspension 30 mL     buprenorphine HCl-naloxone HCl (SUBOXONE) 4-1 MG per film 1 Film     dolutegravir (TIVICAY) tablet 50 mg     emtricitabine-tenofovir (TRUVADA) 200-300 MG per tablet 1 tablet     ibuprofen (ADVIL/MOTRIN) tablet 600 mg     loperamide (IMODIUM) capsule 2 mg     multivitamin, therapeutic (THERA-VIT) tablet 1 tablet     naloxone (NARCAN) injection 0.2 mg    Or     naloxone (NARCAN) injection 0.4 mg    Or     naloxone (NARCAN) injection 0.2 mg    Or     naloxone (NARCAN) injection 0.4 mg     ondansetron (ZOFRAN ODT) ODT tab 4 mg     PHENobarbital (LUMINAL) tablet 32.4 mg     PHENobarbital (LUMINAL) tablet 97.2 mg      polyethylene glycol (MIRALAX) powder 17 g     senna-docusate (SENOKOT-S/PERICOLACE) 8.6-50 MG per tablet 1 tablet             Allergies:     Allergies   Allergen Reactions     Pollen Extract Unknown and Other (See Comments)     Zolpidem Shortness Of Breath     wheezing            Psychiatric Examination:   Blood pressure 113/77, pulse 55, temperature 97.3  F (36.3  C), temperature source Temporal, resp. rate 16, weight 83.3 kg (183 lb 9.6 oz), SpO2 96 %.  Weight is 183 lbs 9.6 oz  Body mass index is 26.34 kg/m .    Appearance:  awake, alert and adequately groomed  Attitude:  cooperative  Eye Contact:  good  Mood:  anxious and depressed  Affect:  appropriate and in normal range and mood congruent  Speech:  clear, coherent rate /rhythm are good  Psychomotor Behavior:  no evidence of tardive dyskinesia, dystonia, or tics and intact station, gait and muscle tone  Throught Process:  logical  Associations:  no loose associations  Thought Content:  no evidence of suicidal ideation or homicidal ideation, no evidence of psychotic thought, no auditory hallucinations present and no visual hallucinations present  Insight:  limited  Judgement:  limited  Oriented to:  time, person, and place  Attention Span and Concentration:  intact  Recent and Remote Memory:  intact  Language fund of knowledge are adequate         Labs:   No results found for this or any previous visit (from the past 24 hour(s)).      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  Anxiety NOS  Noncompliance with medication     PLAN      Patient's blood pressure and pulse were low  HOLD parameters were ordered both a phenobarbital taper and Suboxone maintenance    Patient blood pressure and pulse were so low on the Saturday patient only got 60 mg  of phenobarbital on Sunday he got 90 mg of phenobarbital  Today continue with 90 mg of phenobarbital but in divided doses  Patient wants to taper off Suboxone but I recommend at least staying on 8 mg he wants to take all of it once    He will continue both the phenobarbital taper and Suboxone taper  Patient will discontinue clonidine gabapentin trazodone because of low pulse and blood pressure            Patient at this time is ambivalent about taking any medications for his mood we will revisit this if patient changes his mind  Laboratory/Imaging: reviewed with patient   Consults: internal medicine consult reviewed  Patient will be treated in therapeutic milieu with appropriate individual and group therapies as described.  PDMP CHECKED     Supportive psychotheraoy provided, annie talked about recovery enviroment, relapse prevention, triggers to use.  Discussed with patient many issues of addiction,triggers, relapse, and establishing a solid recovery program.  Asked pt to be med complinat   Medical diagnoses to be addressed this admission:    Plan:  Romeo Maldonado is a 32 year old male with history of polysubstance abuse, opioid use disorder, benzo dependence, depression, anxiety, HCV (treated), and asthma who was admitted to unit 3A on 10/24/2022 for detox.     Polysubstance abuse with benzo and opioid dependence:  Regular use of benzos (xanax, klonopin), methamphetamine, and heroine. Ongoing IVDU. No concern for SSTI at injection sites. Does not share needles but notes recent needle stick after reaching hand into garbage and being stuck by someone else's uncapped syringe. This occurred 4-5 days ago. Previously treated with PEP following use of dirty needle in 5/2022. Tolerated ART meds. Case discussed with ID, who felt that benefit of PEP treatment outweighed any potential risks. Also noted that patient may benefit from PrEP following treatment. Currently having moderate withdrawal symptoms, primarily from opioids.    - Continue subodone and phenobarbital per primary team  - Check HBsAg, HBsAb, HBcAb, HCV RNA quant, HIV antibody, and HIV RNA quant  - Start PEP with Dolutegravir + Tenofovir  x28 days  - ID recommends starting PrEP (Tenofovir daily) following treatment  - Recommend outpatient follow up with PCP for management of PrEP     Hx HCV infection:  Completed treatment in 2016 with SVR noted. HCV RNA quant undetectable since 2022 in our chart. Will always have positive antibody.  - Check HCV RNA Quant given recent needle stick     Hypoalbuminemia:  ? Malnutrition vs acute phase reactant.   - Monitor            The patient's care was discussed with the Bedside Nurse, Patient and Primary team.       Legal Status: voluntary    Safety Assessment:   Checks:  15 min  Precautions: withdrawal precautions  Pt has not required locked seclusion or restraints in the past 24 hours to maintain safety, please refer to RN documentation for further details.  Discussed with patient many issues of addiction,triggers, relapse, and establishing a solid recovery program.  Able to give informed consent:  YES   Discussed Risks/Benefits/Side Effects/Alternatives: YES    After discussion of the indications, risks, benefits, alternatives and consequences of no treatment, the patient elects to complete detox and do trt.

## 2022-10-31 NOTE — PLAN OF CARE
Problem: Substance Withdrawal  Goal: Substance Withdrawal  Description: Signs and symptoms of listed problems will be absent or manageable.  Outcome: Progressing   Goal Outcome Evaluation:    Plan of Care Reviewed With: patient        Patient continues on Suboxone 4-1 mg 3 times daily and a dose of Phenobarbital  97.2 mg at HS  given  for benzo/opiates withdrawals. HS dose given as parameters met per order.  Pt still bradycardia, asymptomatic.  MSSA 2 and 1, COWs 1 and 0.    /68 (BP Location: Right arm, Patient Position: Supine, Cuff Size: Adult Regular)   Pulse 56   Temp 98.3  F (36.8  C) (Oral)   Resp 16   Wt 83.3 kg (183 lb 9.6 oz)   SpO2 100%   BMI 26.34 kg/m      Denies SI/HI/SIB.Contracts safety while on unit,Compliant with his med's. Did not attend groups. Minimal interaction with peers.  Patient has flat affect, tolerating intakes. Resting most of the shift, steps out for meals. Encouraged fluids, tolerating well.  Staff will continue to monitor for benzo/ Opiates withdrawals and treat .

## 2022-11-01 ENCOUNTER — TELEPHONE (OUTPATIENT)
Dept: BEHAVIORAL HEALTH | Facility: CLINIC | Age: 32
End: 2022-11-01

## 2022-11-01 PROCEDURE — 99231 SBSQ HOSP IP/OBS SF/LOW 25: CPT | Performed by: PSYCHIATRY & NEUROLOGY

## 2022-11-01 PROCEDURE — 250N000013 HC RX MED GY IP 250 OP 250 PS 637: Performed by: PSYCHIATRY & NEUROLOGY

## 2022-11-01 PROCEDURE — 250N000013 HC RX MED GY IP 250 OP 250 PS 637: Performed by: PHYSICIAN ASSISTANT

## 2022-11-01 PROCEDURE — 128N000004 HC R&B CD ADULT

## 2022-11-01 RX ADMIN — PHENOBARBITAL 32.4 MG: 32.4 TABLET ORAL at 20:32

## 2022-11-01 RX ADMIN — BUPRENORPHINE AND NALOXONE 1 FILM: 8; 2 FILM, SOLUBLE BUCCAL; SUBLINGUAL at 08:46

## 2022-11-01 RX ADMIN — DOLUTEGRAVIR SODIUM 50 MG: 50 TABLET, FILM COATED ORAL at 08:45

## 2022-11-01 RX ADMIN — PHENOBARBITAL 32.4 MG: 32.4 TABLET ORAL at 08:45

## 2022-11-01 RX ADMIN — PHENOBARBITAL 32.4 MG: 32.4 TABLET ORAL at 14:19

## 2022-11-01 RX ADMIN — THERA TABS 1 TABLET: TAB at 08:45

## 2022-11-01 RX ADMIN — EMTRICITABINE AND TENOFOVIR DISOPROXIL FUMARATE 1 TABLET: 200; 300 TABLET, FILM COATED ORAL at 10:57

## 2022-11-01 ASSESSMENT — ACTIVITIES OF DAILY LIVING (ADL)
ADLS_ACUITY_SCORE: 41
ORAL_HYGIENE: INDEPENDENT
HYGIENE/GROOMING: INDEPENDENT
ADLS_ACUITY_SCORE: 41
DRESS: INDEPENDENT
ORAL_HYGIENE: INDEPENDENT
ADLS_ACUITY_SCORE: 41
ADLS_ACUITY_SCORE: 41
DRESS: INDEPENDENT;SCRUBS (BEHAVIORAL HEALTH)
ADLS_ACUITY_SCORE: 41
HYGIENE/GROOMING: INDEPENDENT
ADLS_ACUITY_SCORE: 41

## 2022-11-01 NOTE — PLAN OF CARE
"Goal Outcome Evaluation:     Plan of Care Reviewed With: patient     Patient alert and oriented to person, place, and time, appeared untidy. Pt pleasant, blunted affect, good eye contact, cooperative, medication compliant. No PRN's given. Pt on seizure precautions, no activity observed. Pt contracts for safety. Pt isolated in room, out for meals, appropriate with peers and staff. Pt demonstrates ability to communicate needs to staff. No behaviors noted. Will continue to monitor behavior and encourage engagement.     NURSING ASSESSMENT  Pain: denies  Anxiety: denies  Depression: denies  SI: denies  SIB: denies  HI: denies  AVH: denies, did not appear to be responding to internal stimuli, did not demonstrate delusional thinking.  Mood/Affect: blunted   Sleep: pt states \"ok\"  Medication: compliant, no side effects reported or observed  PRN: none  Appetite: breakfast  50%    Lunch 50%  ADLs: independent   Visits: none  Vitals: WNL   Medical:  pt reports no issues    Problem: Plan of Care - These are the overarching goals to be used throughout the patient stay.    Goal: Plan of Care Review  Description: The Plan of Care Review/Shift note should be completed every shift.  The Outcome Evaluation is a brief statement about your assessment that the patient is improving, declining, or no change.  This information will be displayed automatically on your shift note.  Outcome: Progressing     Problem: Plan of Care - These are the overarching goals to be used throughout the patient stay.    Goal: Optimal Comfort and Wellbeing  Outcome: Progressing     Problem: Adult Behavioral Health Plan of Care  Goal: Adheres to Safety Considerations for Self and Others  Outcome: Progressing     Problem: Substance Withdrawal  Goal: Substance Withdrawal  Description: Signs and symptoms of listed problems will be absent or manageable.  Outcome: Progressing     Problem: Substance Withdrawal  Goal: Social and Therapeutic (Substance " Withdrawal)  Description: Signs and symptoms of listed problems will be absent or manageable.  Outcome: Progressing

## 2022-11-01 NOTE — PLAN OF CARE
Problem: Substance Withdrawal  Goal: Substance Withdrawal  Description: Signs and symptoms of listed problems will be absent or manageable.  Outcome: Progressing   Goal Outcome Evaluation:    Plan of Care Reviewed With: patient        Pt has been awake and alert all evening.  He has been social with staff and peers, participating in programming.  Pt reports eating well and drinking fluids.  His thoughts follow, he makes good eye contact, speech is clear and he is cooperative.  Pt denies SI, SIB--he plans to attend [facility] on Thursday, 11/3.  Will continue to evaluate, and assist with discharge planning.

## 2022-11-01 NOTE — PROGRESS NOTES
[facility], [place]   Psychiatric Progress Note        Interim history   This is a 32 year old male with Benzodiazepine use disorder severe  Benzodiazepine withdrawal severe  Methamphetamine use disorder severe  Methamphetamine withdrawal severe  Opiate use disorder severe  Opiate withdrawal severe  Major depressive disorder moderate recurrent without psychosis  Anxiety NOS  Noncompliance with medication.Pt seen in rounds.   The patient's care was discussed with the treatment team during the daily team meeting and/or staff's chart notes were reviewed.  Staff report patient has been visible in the milieu,  no acute eventsovernight.     Patient's mood is better excited about cd trt    Poor sleep     Improving  energy low interest  His appetite is improving  He sleeping IMPROVING   He denies any auditory visual hallucinations    Denied suicidal/homicidal ideation/plan intent.denied any psychosis  No prior suicde attempts  No access to gun  Pt is in withdrawal still being monitered every 4 hrs for it,   Pt mssa score are monitered  Tolerating meds and has no side effects.              Medications:     Current Facility-Administered Medications   Medication     acetaminophen (TYLENOL) tablet 650 mg     albuterol (PROVENTIL HFA/VENTOLIN HFA) inhaler     alum & mag hydroxide-simethicone (MAALOX) suspension 30 mL     buprenorphine HCl-naloxone HCl (SUBOXONE) 8-2 MG per film 1 Film     dolutegravir (TIVICAY) tablet 50 mg     emtricitabine-tenofovir (TRUVADA) 200-300 MG per tablet 1 tablet     ibuprofen (ADVIL/MOTRIN) tablet 600 mg     loperamide (IMODIUM) capsule 2 mg     multivitamin, therapeutic (THERA-VIT) tablet 1 tablet     naloxone (NARCAN) injection 0.2 mg    Or     naloxone (NARCAN) injection 0.4 mg    Or     naloxone (NARCAN) injection 0.2 mg    Or     naloxone (NARCAN) injection 0.4 mg     ondansetron (ZOFRAN ODT) ODT tab 4 mg     PHENobarbital (LUMINAL) tablet 32.4 mg     PHENobarbital  (LUMINAL) tablet 32.4 mg     polyethylene glycol (MIRALAX) powder 17 g     senna-docusate (SENOKOT-S/PERICOLACE) 8.6-50 MG per tablet 1 tablet             Allergies:     Allergies   Allergen Reactions     Pollen Extract Unknown and Other (See Comments)     Zolpidem Shortness Of Breath     wheezing            Psychiatric Examination:   Blood pressure 105/69, pulse 51, temperature 97.3  F (36.3  C), temperature source Temporal, resp. rate 16, weight 83.3 kg (183 lb 9.6 oz), SpO2 97 %.  Weight is 183 lbs 9.6 oz  Body mass index is 26.34 kg/m .    Appearance:  awake, alert and adequately groomed  Attitude:  cooperative  Eye Contact:  good  Mood:  better  Affect:  appropriate and in normal range and mood congruent  Speech:  clear, coherent rate /rhythm are good  Psychomotor Behavior:  no evidence of tardive dyskinesia, dystonia, or tics and intact station, gait and muscle tone  Throught Process:  logical  Associations:  no loose associations  Thought Content:  no evidence of suicidal ideation or homicidal ideation, no evidence of psychotic thought, no auditory hallucinations present and no visual hallucinations present  Insight:  limited  Judgement:  limited  Oriented to:  time, person, and place  Attention Span and Concentration:  intact  Recent and Remote Memory:  intact  Language fund of knowledge are adequate         Labs:   No results found for this or any previous visit (from the past 24 hour(s)).      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  Anxiety NOS  Noncompliance with medication     PLAN      Patient's blood pressure and pulse were low  HOLD parameters were ordered both a phenobarbital taper and Suboxone maintenance    Patient blood pressure and pulse were so low on the Saturday patient only  got 60 mg of phenobarbital on Sunday he got 90 mg of phenobarbital   continue with 90 mg of phenobarbital but in divided doses  Patient wants to taper off Suboxone but I recommend at least staying on 8 mg he wants to take all of it once    He will continue both the phenobarbital taper and Suboxone taper  Patient will discontinue clonidine gabapentin trazodone because of low pulse and blood pressure            Patient at this time is ambivalent about taking any medications for his mood we will revisit this if patient changes his mind  Laboratory/Imaging: reviewed with patient   Consults: internal medicine consult reviewed  Patient will be treated in therapeutic milieu with appropriate individual and group therapies as described.  PDMP CHECKED     Supportive psychotheraoy provided, annie talked about recovery enviroment, relapse prevention, triggers to use.  Discussed with patient many issues of addiction,triggers, relapse, and establishing a solid recovery program.  Asked pt to be med complinat   Medical diagnoses to be addressed this admission:    Plan:  Romeo Maldonado is a 32 year old male with history of polysubstance abuse, opioid use disorder, benzo dependence, depression, anxiety, HCV (treated), and asthma who was admitted to unit 3A on 10/24/2022 for detox.     Polysubstance abuse with benzo and opioid dependence:  Regular use of benzos (xanax, klonopin), methamphetamine, and heroine. Ongoing IVDU. No concern for SSTI at injection sites. Does not share needles but notes recent needle stick after reaching hand into garbage and being stuck by someone else's uncapped syringe. This occurred 4-5 days ago. Previously treated with PEP following use of dirty needle in 5/2022. Tolerated ART meds. Case discussed with ID, who felt that benefit of PEP treatment outweighed any potential risks. Also noted that patient may benefit from PrEP following treatment. Currently having moderate withdrawal symptoms, primarily from  opioids.   - Continue subodone and phenobarbital per primary team  - Check HBsAg, HBsAb, HBcAb, HCV RNA quant, HIV antibody, and HIV RNA quant  - Start PEP with Dolutegravir + Tenofovir  x28 days  - ID recommends starting PrEP (Tenofovir daily) following treatment  - Recommend outpatient follow up with PCP for management of PrEP     Hx HCV infection:  Completed treatment in 2016 with SVR noted. HCV RNA quant undetectable since 2022 in our chart. Will always have positive antibody.  - Check HCV RNA Quant given recent needle stick     Hypoalbuminemia:  ? Malnutrition vs acute phase reactant.   - Monitor            The patient's care was discussed with the Bedside Nurse, Patient and Primary team.       Legal Status: voluntary    Safety Assessment:   Checks:  15 min  Precautions: withdrawal precautions  Pt has not required locked seclusion or restraints in the past 24 hours to maintain safety, please refer to RN documentation for further details.  Discussed with patient many issues of addiction,triggers, relapse, and establishing a solid recovery program.  Able to give informed consent:  YES   Discussed Risks/Benefits/Side Effects/Alternatives: YES    After discussion of the indications, risks, benefits, alternatives and consequences of no treatment, the patient elects to complete detox and do trt.

## 2022-11-01 NOTE — PLAN OF CARE
Problem: Substance Withdrawal  Goal: Substance Withdrawal  Description: Signs and symptoms of listed problems will be absent or manageable.  Outcome: Progressing   Goal Outcome Evaluation:    During safety checks, the patient appeared asleep most of the night, with no concerns reported or identified. He seemed to be sleeping for about 7 hours. He's here for opiates and benzodiazepine detox; no MSSA is required during the night.

## 2022-11-02 PROCEDURE — 250N000013 HC RX MED GY IP 250 OP 250 PS 637: Performed by: PHYSICIAN ASSISTANT

## 2022-11-02 PROCEDURE — 250N000013 HC RX MED GY IP 250 OP 250 PS 637: Performed by: PSYCHIATRY & NEUROLOGY

## 2022-11-02 PROCEDURE — 99239 HOSP IP/OBS DSCHRG MGMT >30: CPT | Performed by: PSYCHIATRY & NEUROLOGY

## 2022-11-02 PROCEDURE — 128N000004 HC R&B CD ADULT

## 2022-11-02 RX ORDER — PHENOBARBITAL 32.4 MG/1
32.4 TABLET ORAL AT BEDTIME
Status: DISCONTINUED | OUTPATIENT
Start: 2022-11-02 | End: 2022-11-03 | Stop reason: HOSPADM

## 2022-11-02 RX ORDER — PHENOBARBITAL 32.4 MG/1
32.4 TABLET ORAL ONCE
Status: COMPLETED | OUTPATIENT
Start: 2022-11-03 | End: 2022-11-03

## 2022-11-02 RX ORDER — ALBUTEROL SULFATE 90 UG/1
2 AEROSOL, METERED RESPIRATORY (INHALATION) EVERY 6 HOURS PRN
Qty: 8.5 G | Refills: 0 | Status: SHIPPED | OUTPATIENT
Start: 2022-11-02

## 2022-11-02 RX ORDER — BUPRENORPHINE AND NALOXONE 8; 2 MG/1; MG/1
1 FILM, SOLUBLE BUCCAL; SUBLINGUAL DAILY
Qty: 30 EACH | Refills: 0 | Status: SHIPPED | OUTPATIENT
Start: 2022-11-03

## 2022-11-02 RX ORDER — EMTRICITABINE AND TENOFOVIR DISOPROXIL FUMARATE 200; 300 MG/1; MG/1
1 TABLET, FILM COATED ORAL DAILY
Qty: 25 TABLET | Refills: 0 | Status: SHIPPED | OUTPATIENT
Start: 2022-11-02 | End: 2022-11-02

## 2022-11-02 RX ORDER — EMTRICITABINE AND TENOFOVIR DISOPROXIL FUMARATE 200; 300 MG/1; MG/1
1 TABLET, FILM COATED ORAL DAILY
Qty: 55 TABLET | Refills: 0 | Status: SHIPPED | OUTPATIENT
Start: 2022-11-02

## 2022-11-02 RX ADMIN — THERA TABS 1 TABLET: TAB at 09:25

## 2022-11-02 RX ADMIN — BUPRENORPHINE AND NALOXONE 1 FILM: 8; 2 FILM, SOLUBLE BUCCAL; SUBLINGUAL at 09:25

## 2022-11-02 RX ADMIN — DOLUTEGRAVIR SODIUM 50 MG: 50 TABLET, FILM COATED ORAL at 09:25

## 2022-11-02 RX ADMIN — PHENOBARBITAL 32.4 MG: 32.4 TABLET ORAL at 09:26

## 2022-11-02 RX ADMIN — EMTRICITABINE AND TENOFOVIR DISOPROXIL FUMARATE 1 TABLET: 200; 300 TABLET, FILM COATED ORAL at 14:08

## 2022-11-02 ASSESSMENT — ACTIVITIES OF DAILY LIVING (ADL)
ORAL_HYGIENE: INDEPENDENT
HYGIENE/GROOMING: INDEPENDENT
ADLS_ACUITY_SCORE: 41
HYGIENE/GROOMING: INDEPENDENT
LAUNDRY: WITH SUPERVISION
ADLS_ACUITY_SCORE: 41
ADLS_ACUITY_SCORE: 41
LAUNDRY: WITH SUPERVISION
DRESS: SCRUBS (BEHAVIORAL HEALTH)
ORAL_HYGIENE: INDEPENDENT
ADLS_ACUITY_SCORE: 41
DRESS: SCRUBS (BEHAVIORAL HEALTH)
ADLS_ACUITY_SCORE: 41

## 2022-11-02 NOTE — DISCHARGE SUMMARY
Romeo Maldonado MRN# [id number]   Age: 32 year old YOB: 1990     This is anticipated discharge summary     DISCHARGE  DX    Benzodiazepine use disorder severe    Methamphetamine use disorder severe    Opiate use disorder severe    Major depressive disorder moderate recurrent without psychosis  Anxiety NOS           Event Leading to Hospitalization:     See Admission note by admitting provider for patient encounter. for additional details.          Hospital Course:   PATIENT was admitted to [facility] attending  under DR tomlin, please review the detailed admit note on 10/25/2022   The patient was placed under status 15 (15 minute checks) to ensure patient safety.   Patient was detox of benzodiazepines using phenobarbital  Patient started the taper and is tolerating the taper  Patient was monitored on the COWS and started on Suboxone was initially 12 mg but patient preferred lower dosages reduced to 8 mg    CBC, BMP and utox obtained.    All outpatient medications were continued    PATIENTdid participate in groups and was visible in the milieu.     The patient's symptoms of withdrawal improved.     Patients energy motivation , sleep appetite improved.  Pt completed detox . It was un eventful.      Discussed with patient medications for craving.  Spoke with patient about triggers coping skills relapse prevention.    CONSULTS DONE DURING PATIENTS HOSPITALIZATION.  Patient was seen by medicine on date 10/25/2022    This as per their medical consult    Assessment & Plan     Romeo Maldonado is a 32 year old male with history of polysubstance abuse, opioid use disorder, benzo dependence, depression, anxiety, HCV (treated), and asthma who was admitted to unit 3A on 10/24/2022 for detox.     Polysubstance abuse with benzo and opioid dependence:  Regular use of benzos (xanax, klonopin), methamphetamine, and heroine. Ongoing IVDU. No concern for SSTI at injection sites. Does not share needles but notes  recent needle stick after reaching hand into garbage and being stuck by someone else's uncapped syringe. This occurred 4-5 days ago. Previously treated with PEP following use of dirty needle in 5/2022. Tolerated ART meds. Case discussed with ID, who felt that benefit of PEP treatment outweighed any potential risks. Also noted that patient may benefit from PrEP following treatment. Currently having moderate withdrawal symptoms, primarily from opioids.   - Continue subodone and phenobarbital per primary team  - Check HBsAg, HBsAb, HBcAb, HCV RNA quant, HIV antibody, and HIV RNA quant  - Start PEP with Dolutegravir + Tenofovir  x28 days  - ID recommends starting PrEP (Tenofovir daily) following treatment  - Recommend outpatient follow up with PCP for management of PrEP     Hx HCV infection:  Completed treatment in 2016 with SVR noted. HCV RNA quant undetectable since 2022 in our chart. Will always have positive antibody.  - Check HCV RNA Quant given recent needle stick     Hypoalbuminemia:  ? Malnutrition vs acute phase reactant.   - Monitor            The patient's care was discussed with the Bedside Nurse, Patient and Primary team.     Stephane Hanson PA-C    Chart reviewed. Appreciate ID recommendations.      Patient will complete a 28 day course of PEP before starting PrEP.   He will need prescriptions for the remainder of his 28 day course, as well as a 30 day prescription of Truvada for PrEP.   He will need to establish primary care to ensure refills.     On discharge, patient will need prescriptions for the following:    Dolutegravir 50mg daily (# - remainder of 28 days)    Emtricitabine-Tenofovir 200-300mg daily (# - remainder of 28 days + 30 days)     Recommendations for discharge:    Completed PEP x 28 days    Start PrEP after completion of PEP    Patient needs PCP follow up to ensure ongoing refills (please schedule prior to discharge)    Repeat HIV serologies at 6 and 12 weeks (around 12/5/22 and 1/16/23,  respectively)     Medicine will sign off.  Please page on-call provider for any further questions or concerns.        Pt was seen by cm  As per recommendations from cm    Call received from [facility].  They have beds available today but cannot take Pt on phenobarbital.  Will arrange for transfer pending verification of medication status.  Pt is scheduled for admission to [facility]:  Intake Date: 11/3/2022  Intake Time: 12pm  Location: [address] [place] [address]  Phone: [phone]  Pt will be sent by medical cab and will need to discharge at 1100.         Labs:reviewed with patient     No results found for this or any previous visit (from the past 48 hour(s)).      Recent Results (from the past 240 hour(s))   Comprehensive metabolic panel    Collection Time: 10/24/22  9:50 PM   Result Value Ref Range    Sodium 141 133 - 144 mmol/L    Potassium 3.5 3.4 - 5.3 mmol/L    Chloride 106 94 - 109 mmol/L    Carbon Dioxide (CO2) 25 20 - 32 mmol/L    Anion Gap 10 3 - 14 mmol/L    Urea Nitrogen 6 (L) 7 - 30 mg/dL    Creatinine 0.57 (L) 0.66 - 1.25 mg/dL    Calcium 8.5 8.5 - 10.1 mg/dL    Glucose 104 (H) 70 - 99 mg/dL    Alkaline Phosphatase 68 40 - 150 U/L    AST 11 0 - 45 U/L    ALT 25 0 - 70 U/L    Protein Total 6.8 6.8 - 8.8 g/dL    Albumin 3.2 (L) 3.4 - 5.0 g/dL    Bilirubin Total 0.3 0.2 - 1.3 mg/dL    GFR Estimate >90 >60 mL/min/1.73m2   Ethyl Alcohol Level    Collection Time: 10/24/22  9:50 PM   Result Value Ref Range    Alcohol ethyl <0.01 <=0.01 g/dL   Asymptomatic COVID-19 Virus (Coronavirus) by PCR Nasopharyngeal    Collection Time: 10/24/22  9:50 PM    Specimen: Nasopharyngeal; Swab   Result Value Ref Range    SARS CoV2 PCR Negative Negative   CBC with platelets and differential    Collection Time: 10/24/22  9:50 PM   Result Value Ref Range    WBC Count 5.6 4.0 - 11.0 10e3/uL    RBC Count 4.88 4.40 - 5.90 10e6/uL    Hemoglobin 13.8 13.3 - 17.7 g/dL    Hematocrit 40.1 40.0 - 53.0 %     MCV 82 78 - 100 fL    MCH 28.3 26.5 - 33.0 pg    MCHC 34.4 31.5 - 36.5 g/dL    RDW 12.9 10.0 - 15.0 %    Platelet Count 273 150 - 450 10e3/uL    % Neutrophils 45 %    % Lymphocytes 42 %    % Monocytes 7 %    % Eosinophils 6 %    % Basophils 0 %    % Immature Granulocytes 0 %    NRBCs per 100 WBC 0 <1 /100    Absolute Neutrophils 2.5 1.6 - 8.3 10e3/uL    Absolute Lymphocytes 2.3 0.8 - 5.3 10e3/uL    Absolute Monocytes 0.4 0.0 - 1.3 10e3/uL    Absolute Eosinophils 0.3 0.0 - 0.7 10e3/uL    Absolute Basophils 0.0 0.0 - 0.2 10e3/uL    Absolute Immature Granulocytes 0.0 <=0.4 10e3/uL    Absolute NRBCs 0.0 10e3/uL   Hepatitis B core antibody    Collection Time: 10/24/22  9:50 PM   Result Value Ref Range    Hepatitis B Core Antibody Total Nonreactive Nonreactive   Hepatitis B surface antigen    Collection Time: 10/24/22  9:50 PM   Result Value Ref Range    Hepatitis B Surface Antigen Nonreactive Nonreactive   Drug abuse screen 1 urine (ED)    Collection Time: 10/24/22 10:41 PM   Result Value Ref Range    Amphetamines Urine Screen Positive (A) Screen Negative    Barbiturates Urine Screen Positive (A) Screen Negative    Benzodiazepines Urine Screen Negative Screen Negative    Cannabinoids Urine Screen Negative Screen Negative    Cocaine Urine Screen Negative Screen Negative    Opiates Urine Screen Negative Screen Negative   Hepatitis B Surface Antibody    Collection Time: 10/25/22 11:42 AM   Result Value Ref Range    Hepatitis B Surface Antibody Instrument Value 5.68 <8.00 m[IU]/mL    Hepatitis B Surface Antibody Nonreactive    Hepatitis C RNA, Quantitative by PCR    Collection Time: 10/25/22 11:42 AM   Result Value Ref Range    Hepatitis C RNA IU/mL Not Detected Not Detected IU/mL   HIV Antigen Antibody Combo    Collection Time: 10/25/22 11:42 AM   Result Value Ref Range    HIV Antigen Antibody Combo Nonreactive Nonreactive   HIV-1 RNA quantitative    Collection Time: 10/25/22 11:42 AM   Result Value Ref Range    HIV-1 RNA  copies/mL Not Detected Not Detected Copies/mL            Because this patient meets criteria for an Alcohol Use Disorder, I performed the following brief intervention on the date of this note:              1) Expressed concern that the patient is drinking at unhealthy levels known to increase their risk of alcohol related problems              2) Gave feedback linking alcohol use and health, including personalized feedback explaining how alcohol use can interact with their medical and/or psychiatric problems, and with prescribed medications.              3) Advised patient to abstain.    PT counseled on nicotine cessation and nicotine replacement provided    Discussed with patient many issues of addiction,triggers, relapse, and establishing a solid recovery program.    DISCHARGE MENTAL STATUS EXAMINATION:  The patient is alert, oriented x3.  Good fund of knowledge.  Good use of language.  Recent and remote memory, language, fund of knowledge are all adequate.  Euthymic mood congruent affect  Speech normal rate/rhythm linear tp no loose asso,The patient does not have any active suicidal or homicidal ideation.  Does not have any auditory or visual hallucination.  Fair insight/judgment At this time, the patient was stable to be discharged.        Pt was not determined to not be a danger to himself or others. At the current time of discharge, the patient does not meet criteria for involuntary hospitalization. On the day of discharge, the patient reports that they do not have suicidal or homicidal ideation and would never hurt themselves or others. Steps taken to minimize risk include: assessing patient s behavior and thought process daily during hospital stay, discharging patient with adequate plan for follow up for mental and physical health and discussing safety plan of returning to the hospital should the patient ever have thoughts of harming themselves or others. Therefore, based on all available evidence including  the factors cited above, the patient does not appear to be at imminent risk for self-harm, and is appropriate for outpatient level of care.     Educated about side effects/risk vs benefits /alternative including non treatment.Pt consented to be on medication.     .Total time spent on discharge summary more than 35 min  More than  20 min  planning, coordination of care, medication reconciliation and performance of physical exam on day of discharge.Care was coordinated with unit RN and unit therapist         Review of your medicines      UNREVIEWED medicines. Ask your doctor about these medicines      Dose / Directions   albuterol 108 (90 Base) MCG/ACT inhaler  Commonly known as: PROAIR HFA/PROVENTIL HFA/VENTOLIN HFA  Used for: Hypoxia      Dose: 2 puff  Inhale 2 puffs into the lungs every 6 hours as needed  Quantity: 8.5 g  Refills: 0     baclofen 10 MG tablet  Commonly known as: LIORESAL  Used for: Opioid withdrawal (H)      Dose: 10 mg  Take 1 tablet (10 mg) by mouth every 8 hours as needed (withdrawal symptoms including muscle aches, anxiety)  Quantity: 15 tablet  Refills: 0     naloxone 4 MG/0.1ML nasal spray  Commonly known as: NARCAN  Used for: Opioid use disorder      Dose: 4 mg  Spray 1 spray (4 mg) into one nostril alternating nostrils once as needed for opioid reversal every 2-3 minutes until assistance arrives  Quantity: 0.2 mL  Refills: 11             Disposition: Patient going tomorrow to [facility]     Facts about COVID19 at www.cdc.gov/COVID19 and www.[place].gov/covid19     Keeping hands clean is one of the most important steps we can take to avoid getting sick and spreading germs to others.  Please wash your hands frequently and lather with soap for at least 20 seconds!     Medical Follow-Up:Icon Location   Go    Nov7 Chemical Dependency Eval 2:15 PM   [facility] ARREDONDO  [place] [id number] [address] [address] [phone]     What's  "Next    What's Next   Nov7 Chemical Dependency Eval with [facility]AND ARREDONDO  Monday Nov 7, 2022 2:15 PM [place] [id number] [address] [address] [phone]       Pending Results           Review of your medicines      UNREVIEWED medicines. Ask your doctor about these medicines      Dose / Directions   albuterol 108 (90 Base) MCG/ACT inhaler  Commonly known as: PROAIR HFA/PROVENTIL HFA/VENTOLIN HFA  Used for: Hypoxia      Dose: 2 puff  Inhale 2 puffs into the lungs every 6 hours as needed  Quantity: 8.5 g  Refills: 0     baclofen 10 MG tablet  Commonly known as: LIORESAL  Used for: Opioid withdrawal (H)      Dose: 10 mg  Take 1 tablet (10 mg) by mouth every 8 hours as needed (withdrawal symptoms including muscle aches, anxiety)  Quantity: 15 tablet  Refills: 0     naloxone 4 MG/0.1ML nasal spray  Commonly known as: NARCAN  Used for: Opioid use disorder      Dose: 4 mg  Spray 1 spray (4 mg) into one nostril alternating nostrils once as needed for opioid reversal every 2-3 minutes until assistance arrives  Quantity: 0.2 mL  Refills: 11             [facility] Follow[facility]  Intake Date: 11/3/2022  Intake Time: 12pm  Location: [address] [place] [id number]  Phone: [phone]  .        \"Much or all of the text in this note was generated through the use of Dragon Dictate voice to text software. Errors in spelling or words which appear to be out of contact are unintentional, may be present due having escaped editing\"     "

## 2022-11-02 NOTE — PLAN OF CARE
"Goal Outcome Evaluation:    Plan of Care Reviewed With: patient Plan of Care Reviewed With: patient    Overall Patient Progress: improvingOverall Patient Progress: improving    Outcome Evaluation: Pt stable from all withdrawal aspects and is excited to go to treatment tomorrow.    MSSA scores today are 2 and 1, phenobarbital 32.4mg administered x 1 (on BID dosing). Pt is not exhibiting or reporting any s/s of withdrawal at this time. Tolerating phenobarbital without any side effects witnessed or reported. Pt reports feeling excited \"to get going\" to treatment.    Pt endorses feeling hopefulness and happiness. Denies suicidal ideation plans or intent. Seems slightly flat in affect. He is isolative to his room aside from coming out to eat meals. Denies anxiety and depression.     Will continue to monitor and intervene as warranted.   "

## 2022-11-02 NOTE — PLAN OF CARE
Pt scored MSSA 1, 4. Pt received scheduled 32.4 mg phenobarbital. Pt scored COWS 0, 0.  Cooperative, flat. Pt endorsed anxiety 8/10 and depression 9/10. Pt denies SI/SIB.   Pt isolative, withdrawn to room. Pt reports eating 50% of dinner.  Medication compliant. No PRNs given this shift.   At 2008, Pt's HR 58, /71 on monitor. Writer rechecked at 2029, Pt's HR 88 on oximeter - palpated radial pulse and bounding, regular. Pt's oxygen saturation 98% at 2029. Phenobarbital 32.4 mg given based on HR 88.  Blood pressure 112/71, pulse 58, temperature 98.9  F (37.2  C), temperature source Oral, resp. rate 16, weight 83.3 kg (183 lb 9.6 oz), SpO2 98 %.    Problem: Plan of Care - These are the overarching goals to be used throughout the patient stay.    Goal: Plan of Care Review  Description: The Plan of Care Review/Shift note should be completed every shift.  The Outcome Evaluation is a brief statement about your assessment that the patient is improving, declining, or no change.  This information will be displayed automatically on your shift note.  Outcome: Progressing     Problem: Substance Withdrawal  Goal: Substance Withdrawal  Description: Signs and symptoms of listed problems will be absent or manageable.  Outcome: Progressing

## 2022-11-02 NOTE — PLAN OF CARE
Problem: Substance Withdrawal  Goal: Substance Withdrawal  Description: Signs and symptoms of listed problems will be absent or manageable.  Outcome: Progressing   Goal Outcome Evaluation:           Pt continues to be monitored for benzo and opiates withdrawal symptoms. Pt appeared asleep, did not c/o withdrawal symptoms. Slept all night.

## 2022-11-02 NOTE — PROGRESS NOTES
[facility]   Psychiatric Progress Note        Interim history   This is a 32 year old male with Benzodiazepine use disorder severe  Benzodiazepine withdrawal severe  Methamphetamine use disorder severe  Methamphetamine withdrawal severe  Opiate use disorder severe  Opiate withdrawal severe  Major depressive disorder moderate recurrent without psychosis  Anxiety NOS  Noncompliance with medication.Pt seen in rounds.   The patient's care was discussed with the treatment team during the daily team meeting and/or staff's chart notes were reviewed.  Staff report patient has been visible in the milieu,  no acute eventsovernight.     Patient's mood is better excited about cd trt    Improving  sleep     Improving  energy low interest  His appetite is improving  He sleeping IMPROVING   He denies any auditory visual hallucinations    Denied suicidal/homicidal ideation/plan intent.denied any psychosis  No prior suicde attempts  No access to gun  Pt is in withdrawal still being monitered every 4 hrs for it,   Pt mssa score are monitered  Tolerating meds and has no side effects.              Medications:     Current Facility-Administered Medications   Medication     acetaminophen (TYLENOL) tablet 650 mg     albuterol (PROVENTIL HFA/VENTOLIN HFA) inhaler     alum & mag hydroxide-simethicone (MAALOX) suspension 30 mL     buprenorphine HCl-naloxone HCl (SUBOXONE) 8-2 MG per film 1 Film     dolutegravir (TIVICAY) tablet 50 mg     emtricitabine-tenofovir (TRUVADA) 200-300 MG per tablet 1 tablet     ibuprofen (ADVIL/MOTRIN) tablet 600 mg     loperamide (IMODIUM) capsule 2 mg     multivitamin, therapeutic (THERA-VIT) tablet 1 tablet     naloxone (NARCAN) injection 0.2 mg    Or     naloxone (NARCAN) injection 0.4 mg    Or     naloxone (NARCAN) injection 0.2 mg    Or     naloxone (NARCAN) injection 0.4 mg     ondansetron (ZOFRAN ODT) ODT tab 4 mg     PHENobarbital (LUMINAL) tablet 32.4 mg      PHENobarbital (LUMINAL) tablet 32.4 mg     polyethylene glycol (MIRALAX) powder 17 g     senna-docusate (SENOKOT-S/PERICOLACE) 8.6-50 MG per tablet 1 tablet             Allergies:     Allergies   Allergen Reactions     Pollen Extract Unknown and Other (See Comments)     Zolpidem Shortness Of Breath     wheezing            Psychiatric Examination:   Blood pressure 105/70, pulse 60, temperature 97.1  F (36.2  C), temperature source Temporal, resp. rate 16, weight 83.3 kg (183 lb 9.6 oz), SpO2 98 %.  Weight is 183 lbs 9.6 oz  Body mass index is 26.34 kg/m .    Appearance:  awake, alert and adequately groomed  Attitude:  cooperative  Eye Contact:  good  Mood:  better  Affect:  appropriate and in normal range and mood congruent  Speech:  clear, coherent rate /rhythm are good  Psychomotor Behavior:  no evidence of tardive dyskinesia, dystonia, or tics and intact station, gait and muscle tone  Throught Process:  logical  Associations:  no loose associations  Thought Content:  no evidence of suicidal ideation or homicidal ideation, no evidence of psychotic thought, no auditory hallucinations present and no visual hallucinations present  Insight:  limited  Judgement:  limited  Oriented to:  time, person, and place  Attention Span and Concentration:  intact  Recent and Remote Memory:  intact  Language fund of knowledge are adequate         Labs:   No results found for this or any previous visit (from the past 24 hour(s)).      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  Anxiety NOS  Noncompliance with medication     PLAN  We will start phenobarbital taper 60 mg today  Tomorrow patient  30 mg and that is the last dose  Patient will continue Suboxone maintenance  At this point patient's mood has improved he does  not want to take any antidepressants  He set up for treatment tomorrow                    Laboratory/Imaging: reviewed with patient   Consults: internal medicine consult reviewed  Patient will be treated in therapeutic milieu with appropriate individual and group therapies as described.  PDMP CHECKED     Supportive psychotheraoy provided, annie talked about recovery enviroment, relapse prevention, triggers to use.  Discussed with patient many issues of addiction,triggers, relapse, and establishing a solid recovery program.  Asked pt to be med complinat   Medical diagnoses to be addressed this admission:    Plan:  Romeo Maldonado is a 32 year old male with history of polysubstance abuse, opioid use disorder, benzo dependence, depression, anxiety, HCV (treated), and asthma who was admitted to unit 3A on 10/24/2022 for detox.     Polysubstance abuse with benzo and opioid dependence:  Regular use of benzos (xanax, klonopin), methamphetamine, and heroine. Ongoing IVDU. No concern for SSTI at injection sites. Does not share needles but notes recent needle stick after reaching hand into garbage and being stuck by someone else's uncapped syringe. This occurred 4-5 days ago. Previously treated with PEP following use of dirty needle in 5/2022. Tolerated ART meds. Case discussed with ID, who felt that benefit of PEP treatment outweighed any potential risks. Also noted that patient may benefit from PrEP following treatment. Currently having moderate withdrawal symptoms, primarily from opioids.   - Continue subodone and phenobarbital per primary team  - Check HBsAg, HBsAb, HBcAb, HCV RNA quant, HIV antibody, and HIV RNA quant  - Start PEP with Dolutegravir + Tenofovir  x28 days  - ID recommends starting PrEP (Tenofovir daily) following treatment  - Recommend outpatient follow up with PCP for management of PrEP     Hx HCV infection:  Completed treatment in 2016 with SVR noted. HCV RNA quant undetectable since 2022 in our chart. Will  always have positive antibody.  - Check HCV RNA Quant given recent needle stick     Hypoalbuminemia:  ? Malnutrition vs acute phase reactant.   - Monitor            The patient's care was discussed with the Bedside Nurse, Patient and Primary team.       Legal Status: voluntary    Safety Assessment:   Checks:  15 min  Precautions: withdrawal precautions  Pt has not required locked seclusion or restraints in the past 24 hours to maintain safety, please refer to RN documentation for further details.  Discussed with patient many issues of addiction,triggers, relapse, and establishing a solid recovery program.  Able to give informed consent:  YES   Discussed Risks/Benefits/Side Effects/Alternatives: YES    After discussion of the indications, risks, benefits, alternatives and consequences of no treatment, the patient elects to complete detox and do trt.

## 2022-11-03 VITALS
DIASTOLIC BLOOD PRESSURE: 79 MMHG | RESPIRATION RATE: 16 BRPM | WEIGHT: 183.6 LBS | BODY MASS INDEX: 26.34 KG/M2 | SYSTOLIC BLOOD PRESSURE: 121 MMHG | HEART RATE: 52 BPM | TEMPERATURE: 97.4 F | OXYGEN SATURATION: 99 %

## 2022-11-03 PROCEDURE — 250N000013 HC RX MED GY IP 250 OP 250 PS 637: Performed by: PHYSICIAN ASSISTANT

## 2022-11-03 PROCEDURE — 250N000013 HC RX MED GY IP 250 OP 250 PS 637: Performed by: PSYCHIATRY & NEUROLOGY

## 2022-11-03 RX ADMIN — EMTRICITABINE AND TENOFOVIR DISOPROXIL FUMARATE 1 TABLET: 200; 300 TABLET, FILM COATED ORAL at 09:48

## 2022-11-03 RX ADMIN — THERA TABS 1 TABLET: TAB at 08:34

## 2022-11-03 RX ADMIN — PHENOBARBITAL 32.4 MG: 32.4 TABLET ORAL at 08:34

## 2022-11-03 RX ADMIN — BUPRENORPHINE AND NALOXONE 1 FILM: 8; 2 FILM, SOLUBLE BUCCAL; SUBLINGUAL at 08:34

## 2022-11-03 RX ADMIN — DOLUTEGRAVIR SODIUM 50 MG: 50 TABLET, FILM COATED ORAL at 08:34

## 2022-11-03 ASSESSMENT — ACTIVITIES OF DAILY LIVING (ADL)
LAUNDRY: WITH SUPERVISION
ORAL_HYGIENE: INDEPENDENT
ADLS_ACUITY_SCORE: 41
DRESS: SCRUBS (BEHAVIORAL HEALTH)
ADLS_ACUITY_SCORE: 41
ADLS_ACUITY_SCORE: 41
HYGIENE/GROOMING: INDEPENDENT

## 2022-11-03 NOTE — PROVIDER NOTIFICATION
At 2222, [employer] notified of Pt's low HR.  Pt HR 56 at 2210. Per provider order, 2200 scheduled phenobarbital held and provider notified.  Update at 2313, awaiting call from [employer].    At 2215, provider notified of Pt's low HR.   Pt HR 56 at 2210. Per provider order, 2200 scheduled phenobarbital held and provider notified.  Provider, Sandra Johnson, called back and recommended to notify psychiatry. Completed at 2222.    Around 2055, provider notified of Pt's low HR.   At 2023 HR 53. At 2050 HR 50.  Pt has no symptoms. Pt denies pain.

## 2022-11-03 NOTE — PROGRESS NOTES
Pt given copy of their discharge instructions and medication administration instructions. All discharge plans and labs were discussed with patient. Pt reports no questions at this time regarding discharge plans, labs or medications. Pt denies any suicidal ideation, plans or intent at this time. [facility] Patient plan is to go home to gather some clothing and then go to treatment today at [facility]. Pt does have a follow up in place for 11/7/22.  Patient is discharged at this time.

## 2022-11-03 NOTE — PLAN OF CARE
Problem: Substance Withdrawal  Goal: Substance Withdrawal  Description: Signs and symptoms of listed problems will be absent or manageable.  Outcome: Progressing   Goal Outcome Evaluation:         Pt continues in detox for opiates and benzos  Pt slept through the night with no c/o pain or discomfort.   No request for prn meds.

## 2022-11-03 NOTE — PLAN OF CARE
"Pt scored MSSA 1, 1. Pt scheduled phenobarbital not given this shift due to low HR of 56 - providers notified.   Pt reports his \"normal\" anxiety and depression. Pt rates anxiety 8/10 and depression 8/10. Pt denies SI/SIB. Pt denies pain.   Pt cooperative, pleasant, flat. Withdrawn to room at beginning of the shift. Pt has been visible in groups and lounge after dinner. Pt encouraged to drink fluids.   No PRNs given this shift.   Medical and psychiatry providers notified of low HR (see Provider Notification note). Awaiting call back from [employer] for holding scheduled phenobarbital due to Pt's HR not meeting order parameters. Pt HR 53 at 2023 and HR 50 at 2050 - provider notified per order. Pt HR 56 at 2210 when scheduled phenobarbital was due - providers notified per order.  Pt expected to discharge tomorrow (11/3).  Pt is currently sleeping - will continue to monitor.   "

## 2022-11-04 ENCOUNTER — PATIENT OUTREACH (OUTPATIENT)
Dept: CARE COORDINATION | Facility: CLINIC | Age: 32
End: 2022-11-04

## 2022-11-04 NOTE — PROGRESS NOTES
[facility]    Background: Transitional Care Management program identified per system criteria and reviewed by [facility] team for possible outreach.    Assessment: Upon chart review, CCRC Team member will not proceed with patient outreach related to this episode of Transitional Care Management program due to reason below:    Patient has a follow up appointment with an appropriate provider today for hospital discharge     Per AVS: [facility]  Intake Date: 11/3/2022  Intake Time: 2 PM  Location: [address] [place] [address]  Phone: [phone]    Plan: Transitional Care Management episode addressed appropriately per reason noted above.      ERMELINDA Almeida  [facility], [facility]    *Connected Care Resource Team does NOT follow patient ongoing. Referrals are identified based on internal discharge reports and the outreach is to ensure patient has an understanding of their discharge instructions.

## 2022-11-07 ENCOUNTER — TELEPHONE (OUTPATIENT)
Dept: BEHAVIORAL HEALTH | Facility: CLINIC | Age: 32
End: 2022-11-07

## 2022-11-15 ENCOUNTER — HOSPITAL ENCOUNTER (EMERGENCY)
Facility: CLINIC | Age: 32
Discharge: HOME | End: 2022-11-15
Attending: EMERGENCY MEDICINE | Admitting: EMERGENCY MEDICINE
Payer: COMMERCIAL

## 2022-11-15 ENCOUNTER — HOSPITAL ENCOUNTER (EMERGENCY)
Facility: CLINIC | Age: 32
Discharge: HOME | End: 2022-11-16
Attending: EMERGENCY MEDICINE | Admitting: EMERGENCY MEDICINE
Payer: COMMERCIAL

## 2022-11-15 VITALS
OXYGEN SATURATION: 99 % | WEIGHT: 180 LBS | SYSTOLIC BLOOD PRESSURE: 140 MMHG | HEIGHT: 70 IN | BODY MASS INDEX: 25.77 KG/M2 | HEART RATE: 111 BPM | TEMPERATURE: 97.4 F | DIASTOLIC BLOOD PRESSURE: 94 MMHG | RESPIRATION RATE: 18 BRPM

## 2022-11-15 DIAGNOSIS — R53.81 MALAISE: ICD-10-CM

## 2022-11-15 DIAGNOSIS — F22 PARANOIA (H): ICD-10-CM

## 2022-11-15 DIAGNOSIS — F19.10 POLYSUBSTANCE ABUSE (H): ICD-10-CM

## 2022-11-15 DIAGNOSIS — F19.10 DRUG ABUSE (H): ICD-10-CM

## 2022-11-15 DIAGNOSIS — R11.2 NAUSEA AND VOMITING, UNSPECIFIED VOMITING TYPE: ICD-10-CM

## 2022-11-15 PROCEDURE — 99284 EMERGENCY DEPT VISIT MOD MDM: CPT | Mod: CS | Performed by: EMERGENCY MEDICINE

## 2022-11-15 PROCEDURE — 99283 EMERGENCY DEPT VISIT LOW MDM: CPT

## 2022-11-15 PROCEDURE — C9803 HOPD COVID-19 SPEC COLLECT: HCPCS | Performed by: EMERGENCY MEDICINE

## 2022-11-15 RX ORDER — OLANZAPINE 10 MG/1
10 TABLET, ORALLY DISINTEGRATING ORAL ONCE
Status: DISCONTINUED | OUTPATIENT
Start: 2022-11-15 | End: 2022-11-15 | Stop reason: HOSPADM

## 2022-11-15 RX ORDER — CLONIDINE HYDROCHLORIDE 0.1 MG/1
0.1 TABLET ORAL 2 TIMES DAILY
COMMUNITY

## 2022-11-15 ASSESSMENT — ACTIVITIES OF DAILY LIVING (ADL)
ADLS_ACUITY_SCORE: 35

## 2022-11-15 NOTE — ED PROVIDER NOTES
"History   Chief Complaint:  \"I just want another chance\"    HPI   History supplemented by electronic chart review    Romeo Maldonado is a 32 year old male who presents by EMS for evaluation of drug use.  Patient has a history of stimulant use disorder, benzodiazepine use disorder, opioid use disorder and was recently admitted to [place] detox facility for late October to November 2.  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  He started using drugs again, and most recently used IV methamphetamine about 8 hours ago.  He feels paranoid.  He did not use other drugs.  He does not have a suicidal or homicidal thoughts.  He feels that people are out to get him.  He would like to go back to detox if possible.  He has been through treatment a handful of times.  No other symptoms recently.    Review of Systems  All other systems reviewed and negative except as above in HPI.    Allergies:  Pollen Extract  Zolpidem     Medications:    albuterol (PROAIR HFA/PROVENTIL HFA/VENTOLIN HFA) 108 (90 Base) MCG/ACT inhaler  buprenorphine HCl-naloxone HCl (SUBOXONE) 8-2 MG per film  dolutegravir (TIVICAY) 50 MG tablet  emtricitabine-tenofovir (TRUVADA) 200-300 MG per tablet  naloxone (NARCAN) 4 MG/0.1ML nasal spray        Past Medical History:    Past Medical History:   Diagnosis Date     Allergic state      Anxiety      Depressive disorder      Hepatitis C      Opioid use disorder, severe, dependence (H) 11/17/2013     Stimulant use disorder      Uncomplicated asthma        Patient Active Problem List    Diagnosis Date Noted     Unresponsive state 05/10/2022     Priority: Medium     Altered mental status, unspecified altered mental status type 05/10/2022     Priority: Medium     Opioid use disorder 04/25/2022     Priority: Medium     Abscess of neck 02/15/2022     Priority: " Medium     Cellulitis of neck 02/15/2022     Priority: Medium     Polysubstance abuse (H) 02/02/2022     Priority: Medium     Uncomplicated alcohol dependence (H) 02/02/2022     Priority: Medium     Hyperthermia 07/04/2021     Priority: Medium     Acute renal failure, unspecified acute renal failure type (H) 07/04/2021     Priority: Medium     Hypoxia 06/26/2021     Priority: Medium     Opiate overdose, accidental or unintentional, initial encounter (H) 06/26/2021     Priority: Medium     Opioid overdose, accidental or unintentional, initial encounter (H) 06/26/2021     Priority: Medium     MDD (major depressive disorder) 03/26/2021     Priority: Medium     Suicidal ideation 03/24/2021     Priority: Medium     Alcoholic hepatitis without ascites 03/24/2021     Priority: Medium     Benzodiazepine withdrawal with complication (H) 03/24/2021     Priority: Medium     Opioid withdrawal (H) 08/14/2015     Priority: Medium     Psychosis (H) 04/06/2014     Priority: Medium     Cellulitis and abscess 04/01/2014     Priority: Medium     Fever 04/01/2014     Priority: Medium     Nondependent amphetamine or related acting sympathomimetic abuse 02/22/2014     Priority: Medium     Opioid use disorder, severe, dependence (H) 11/17/2013     Priority: Medium     Anxiety state 02/15/2013     Priority: Medium     Insomnia 02/15/2013     Priority: Medium     Cellulitis and abscess of upper arm and forearm 08/11/2012     Priority: Medium     Overdose drug 06/15/2012     Priority: Medium     Problem list name updated by automated process. Provider to review and confirm       Poisoning by drug or medicinal substance 06/15/2012     Priority: Medium     Overview:   Overview:   Problem list name updated by automated process. Provider to review and confirm          Past Surgical History:    Past Surgical History:   Procedure Laterality Date     INCISION AND DRAINAGE SOFT TISSUE UPPER EXTREMITY, COMBINED  8/11/2012    Procedure: COMBINED  "INCISION AND DRAINAGE SOFT TISSUE UPPER EXTREMITY;  Incision and drainage Right Arm Abscess;  Surgeon: Cheli White MD;  Location: [facility] OR        Family History:    family history includes Hypertension in his father.    Social History:  Has an apartment  Mother lives in [place]    Physical Exam     Patient Vitals for the past 24 hrs:   BP Temp Temp src Pulse Resp SpO2 Height Weight   11/15/22 0602 (!) 140/94 97.4  F (36.3  C) Temporal 111 18 99 % 1.778 m (5' 10\") 81.6 kg (180 lb)   11/15/22 0553 -- -- -- -- 16 -- -- --      Physical Exam  General: Male sitting upright in triage room 1  HENT: mucous membranes moist  Eyes: PERRL without nystagmus  CV: extremities well perfused, regular rhythm  Resp: normal effort, no stridor, no cough observed  GI: abdomen soft and nontender, no guarding  MSK: no bony tenderness   Skin: appropriately warm and dry, punctate raeann in R AC fossa without evidence of infection  Neuro: alert, clear speech, oriented, normal tone in extremities, ambulatory, mild psychomotor agitation  Psych: anxious, cooperative, denies feeling suicidal, no evidence of hallucinations, reports paranoia    Emergency Department Course   Laboratory:  COVID - pending    Emergency Department Course:  Reviewed:  I reviewed nursing notes, vitals, and past medical history    Assessments:  I obtained history and examined the patient as noted above.          Consults:   See above in ED Course    Interventions:  Medications   OLANZapine zydis (zyPREXA) ODT tab 10 mg (has no administration in time range)      Disposition:  Eloped    Impression & Plan    Medical Decision Making:  He presents with concern for paranoia in the setting of recent drug abuse.  He has a history of the same.  He initially acted slightly intoxicated with slight agitation, though is ambulatory with a nonfocal neurologic exam.  He vacillated about whether he wanted to remain here in the ED for further care, hung around in the waiting room for " quite some time during which time I spoke with him again, there were no longer any signs of overt intoxication.  For this reason I did not feel he could be kept against as well.  I did recommend that he speak with the mental health team regarding his paranoia, acknowledging that his recent drug use is likely playing a key role in this.  I do not think he would likely need to be hospitalized.  He did not wish to have any resources for detox programs in the end.  Ultimately, he eloped, without announcing his intention to leave, perhaps related to the fact that the emergency department is very busy and there was a long wait to get a normal ED room.  However, again I did evaluate him on several occasions including detailed initial evaluation.  Why would not be surprised if he returns to care in the near future under similar circumstances, at this time I do consider him reasonable for ongoing outpatient care.  He would benefit from refraining from illicit drug use.    Diagnosis:    ICD-10-CM    1. Paranoia (H)  F22       2. Drug abuse (H)  F19.10          11/15/2022   MD Aaron Jenkins Jeffrey Alan, MD  11/15/22 1553

## 2022-11-16 VITALS
OXYGEN SATURATION: 100 % | DIASTOLIC BLOOD PRESSURE: 64 MMHG | WEIGHT: 171 LBS | TEMPERATURE: 97.7 F | SYSTOLIC BLOOD PRESSURE: 102 MMHG | RESPIRATION RATE: 18 BRPM | BODY MASS INDEX: 24.48 KG/M2 | HEIGHT: 70 IN | HEART RATE: 83 BPM

## 2022-11-16 LAB
ALBUMIN SERPL-MCNC: 3.2 G/DL (ref 3.4–5)
ALP SERPL-CCNC: 119 U/L (ref 40–150)
ALT SERPL W P-5'-P-CCNC: 198 U/L (ref 0–70)
ANION GAP SERPL CALCULATED.3IONS-SCNC: 6 MMOL/L (ref 3–14)
AST SERPL W P-5'-P-CCNC: 246 U/L (ref 0–45)
BASOPHILS # BLD AUTO: 0 10E3/UL (ref 0–0.2)
BASOPHILS NFR BLD AUTO: 0 %
BILIRUB SERPL-MCNC: 0.7 MG/DL (ref 0.2–1.3)
BUN SERPL-MCNC: 24 MG/DL (ref 7–30)
CALCIUM SERPL-MCNC: 8.5 MG/DL (ref 8.5–10.1)
CHLORIDE BLD-SCNC: 102 MMOL/L (ref 94–109)
CO2 SERPL-SCNC: 28 MMOL/L (ref 20–32)
CREAT SERPL-MCNC: 1.19 MG/DL (ref 0.66–1.25)
EOSINOPHIL # BLD AUTO: 0 10E3/UL (ref 0–0.7)
EOSINOPHIL NFR BLD AUTO: 0 %
ERYTHROCYTE [DISTWIDTH] IN BLOOD BY AUTOMATED COUNT: 13 % (ref 10–15)
FLUAV RNA SPEC QL NAA+PROBE: NEGATIVE
FLUBV RNA RESP QL NAA+PROBE: NEGATIVE
GFR SERPL CREATININE-BSD FRML MDRD: 82 ML/MIN/1.73M2
GLUCOSE BLD-MCNC: 174 MG/DL (ref 70–99)
HCT VFR BLD AUTO: 37 % (ref 40–53)
HGB BLD-MCNC: 12.8 G/DL (ref 13.3–17.7)
IMM GRANULOCYTES # BLD: 0.2 10E3/UL
IMM GRANULOCYTES NFR BLD: 1 %
LACTATE SERPL-SCNC: 1.8 MMOL/L (ref 0.7–2)
LYMPHOCYTES # BLD AUTO: 0.5 10E3/UL (ref 0.8–5.3)
LYMPHOCYTES NFR BLD AUTO: 3 %
MCH RBC QN AUTO: 28.8 PG (ref 26.5–33)
MCHC RBC AUTO-ENTMCNC: 34.6 G/DL (ref 31.5–36.5)
MCV RBC AUTO: 83 FL (ref 78–100)
MONOCYTES # BLD AUTO: 0.6 10E3/UL (ref 0–1.3)
MONOCYTES NFR BLD AUTO: 3 %
NEUTROPHILS # BLD AUTO: 15.3 10E3/UL (ref 1.6–8.3)
NEUTROPHILS NFR BLD AUTO: 93 %
NRBC # BLD AUTO: 0 10E3/UL
NRBC BLD AUTO-RTO: 0 /100
PLATELET # BLD AUTO: 170 10E3/UL (ref 150–450)
POTASSIUM BLD-SCNC: 3.4 MMOL/L (ref 3.4–5.3)
PROT SERPL-MCNC: 6.6 G/DL (ref 6.8–8.8)
RBC # BLD AUTO: 4.45 10E6/UL (ref 4.4–5.9)
RSV RNA SPEC NAA+PROBE: NEGATIVE
SARS-COV-2 RNA RESP QL NAA+PROBE: NEGATIVE
SODIUM SERPL-SCNC: 136 MMOL/L (ref 133–144)
WBC # BLD AUTO: 16.6 10E3/UL (ref 4–11)

## 2022-11-16 PROCEDURE — 87040 BLOOD CULTURE FOR BACTERIA: CPT | Mod: 91 | Performed by: EMERGENCY MEDICINE

## 2022-11-16 PROCEDURE — 80053 COMPREHEN METABOLIC PANEL: CPT | Performed by: EMERGENCY MEDICINE

## 2022-11-16 PROCEDURE — 36415 COLL VENOUS BLD VENIPUNCTURE: CPT | Performed by: EMERGENCY MEDICINE

## 2022-11-16 PROCEDURE — 87637 SARSCOV2&INF A&B&RSV AMP PRB: CPT | Mod: 59 | Performed by: EMERGENCY MEDICINE

## 2022-11-16 PROCEDURE — 85025 COMPLETE CBC W/AUTO DIFF WBC: CPT | Performed by: EMERGENCY MEDICINE

## 2022-11-16 PROCEDURE — 87637 SARSCOV2&INF A&B&RSV AMP PRB: CPT | Performed by: EMERGENCY MEDICINE

## 2022-11-16 PROCEDURE — 83605 ASSAY OF LACTIC ACID: CPT | Performed by: EMERGENCY MEDICINE

## 2022-11-16 RX ORDER — ONDANSETRON 8 MG/1
8 TABLET, ORALLY DISINTEGRATING ORAL EVERY 8 HOURS PRN
Qty: 10 TABLET | Refills: 0 | Status: SHIPPED | OUTPATIENT
Start: 2022-11-16 | End: 2022-11-19

## 2022-11-16 RX ORDER — BUPRENORPHINE AND NALOXONE 8; 2 MG/1; MG/1
1 FILM, SOLUBLE BUCCAL; SUBLINGUAL DAILY
Qty: 3 FILM | Refills: 0 | Status: SHIPPED | OUTPATIENT
Start: 2022-11-16

## 2022-11-16 ASSESSMENT — ACTIVITIES OF DAILY LIVING (ADL)
ADLS_ACUITY_SCORE: 37

## 2022-11-16 NOTE — ED TRIAGE NOTES
Patient brought to waiting room via EMS. History of opiate use but did not use opiates foor three days, yesterday patient used meth and tore apart his bedroom. Patient states he is paranoid, has not slept in three days, and thinks someone is after him. After the meth use yesterday he presented to [facility] where he was prescribed three new medications. Patient took the medications mid-afternoon today and then used what he had left of opiates around the house - then felt more paranoid and went on a walk to get help. Patient walked to [facility] and vomited three times, believing he was reacting to his medications. [facility] into the college and had someone call 911 for help for him. Patient was calm and cooperative with EMS, received an oral Zofran, 4 mg.

## 2022-11-16 NOTE — ED TRIAGE NOTES
Patient was using opiates regularly, then quit abruptly for three days, used meth yesterday, and then used the last of his opiates today. Seeking detox.      Triage Assessment     Row Name 11/15/22 2138       Triage Assessment (Adult)    Airway WDL WDL       Respiratory WDL    Respiratory WDL WDL       Skin Circulation/Temperature WDL    Skin Circulation/Temperature WDL WDL       Cardiac WDL    Cardiac WDL WDL       Peripheral/Neurovascular WDL    Peripheral Neurovascular WDL WDL       Cognitive/Neuro/Behavioral WDL    Cognitive/Neuro/Behavioral WDL WDL

## 2022-11-16 NOTE — DISCHARGE INSTRUCTIONS
Follow up in the Recovery Clinic (see handout). Don't start Suboxone until AT LEAST 24 hours since last use - preferably more - as you will go into immediate withdrawal when taking suboxone if you still have opioids in your system.     Follow up with your clinic doctor within the next 1-4 days. Return to the ER with any worsening or concerns.

## 2022-11-16 NOTE — ED TRIAGE NOTES
Triage Assessment     Row Name 11/15/22 2137       Triage Assessment (Adult)    Airway WDL WDL       Respiratory WDL    Respiratory WDL WDL       Skin Circulation/Temperature WDL    Skin Circulation/Temperature WDL WDL       Cardiac WDL    Cardiac WDL WDL       Peripheral/Neurovascular WDL    Peripheral Neurovascular WDL WDL       Cognitive/Neuro/Behavioral WDL    Cognitive/Neuro/Behavioral WDL WDL

## 2022-11-16 NOTE — ED NOTES
Patient reports using substances via IV. Reports does not always use a new needle, and sometimes runs old needles through bleach. Patient has been on HIV prophylaxis.

## 2022-11-16 NOTE — ED PROVIDER NOTES
"    [facility] EMERGENCY DEPARTMENT ([facility])    11/15/22            History     Chief Complaint   Patient presents with     Addiction Problem     Used meth a few days ago, became paranoid. Used opiates today, started throwing up. Patient is afraid people are going to give up on him. Wants people to see his sincere effort to get help. Patient reports he has been using meth consistently for the past year and a half.      Paranoid     Thinks people are out to get him, thinks people are going to hurt him. Patient reports feeling scared.      JUDY Maldonado is a 32 year old male who male presents to the ED today primarily requesting detox. He states that he uses opiates (fentanyl as well as methamphetamine-IV). His history is very difficult to follow, and his story changes some.  He states that his last methamphetamine was about 3 days ago (though earlier in the day he indicated that it had been 8 hours prior, during his previous visit).  He states that he tried to use some fentanyl today to make himself feel better, but it did not really help.  He states that he is feeling generally achy, generally weak, and feverish today. He states he did have several episodes of nonbloody emesis today. No diarrhea or abdominal pain. He denies any stuffy nose, sore throat, cough, or shortness of breath.  He states that he does use alcohol, but is very vague about this. He states that he uses \"not that much\" but cannot quantify. He states that he thinks it is possible he could have withdrawal, but has never had any notable alcohol withdrawal. He does not report any suicidal ideation.    This part of the medical record was transcribed by Raiza Diaz Medical Scribe, from a dictation done by Hallie Weinstein MD.         Past Medical History  Past Medical History:   Diagnosis Date     Allergic state      Anxiety      Depressive disorder      Hepatitis C      Opioid use disorder, severe, dependence (H) 11/17/2013     " "Stimulant use disorder     methamphetamine, GHB     Uncomplicated asthma      Past Surgical History:   Procedure Laterality Date     INCISION AND DRAINAGE SOFT TISSUE UPPER EXTREMITY, COMBINED  8/11/2012    Procedure: COMBINED INCISION AND DRAINAGE SOFT TISSUE UPPER EXTREMITY;  Incision and drainage Right Arm Abscess;  Surgeon: Cheli White MD;  Location:  OR     buprenorphine HCl-naloxone HCl (SUBOXONE) 8-2 MG per film  cloNIDine (CATAPRES) 0.1 MG tablet  dolutegravir (TIVICAY) 50 MG tablet  emtricitabine-tenofovir (TRUVADA) 200-300 MG per tablet  ondansetron (ZOFRAN ODT) 8 MG ODT tab  albuterol (PROAIR HFA/PROVENTIL HFA/VENTOLIN HFA) 108 (90 Base) MCG/ACT inhaler  buprenorphine HCl-naloxone HCl (SUBOXONE) 8-2 MG per film  naloxone (NARCAN) 4 MG/0.1ML nasal spray      Allergies   Allergen Reactions     Pollen Extract Unknown and Other (See Comments)     Zolpidem Shortness Of Breath     wheezing     Family History  Family History   Problem Relation Age of Onset     Hypertension Father      Social History   Social History     Tobacco Use     Smoking status: Former     Packs/day: 0.25     Types: Cigarettes     Smokeless tobacco: Never   Vaping Use     Vaping Use: Every day   Substance Use Topics     Alcohol use: Yes     Comment: occasional     Drug use: Yes     Types: Methamphetamines, Opiates, IV     Comment: heroin daily, meth a couple of times in the last 2 weks      Past medical history, past surgical history, medications, allergies, family history, and social history were reviewed with the patient. No additional pertinent items.       Review of Systems  A complete review of systems was performed with pertinent positives and negatives noted in the HPI, and all other systems negative.    Physical Exam   BP: 118/79  Pulse: 113  Temp: (!) 100.5  F (38.1  C)  Resp: 18  Height: 177.8 cm (5' 10\")  Weight: 77.6 kg (171 lb)  SpO2: 100 %  Physical Exam  Constitutional:       General: He is not in acute distress.     " Appearance: He is not diaphoretic.   HENT:      Head: Atraumatic.      Mouth/Throat:      Mouth: Oropharynx is clear and moist.   Eyes:      General: No scleral icterus.  Cardiovascular:      Pulses: Intact distal pulses.      Heart sounds: Normal heart sounds.   Pulmonary:      Effort: No respiratory distress.      Breath sounds: Normal breath sounds.   Abdominal:      Palpations: Abdomen is soft.      Tenderness: There is no abdominal tenderness.   Musculoskeletal:         General: No tenderness or edema.   Skin:     General: Skin is warm.      Findings: No rash.   Psychiatric:      Comments: Very tangential         ED Course      Procedures                     Results for orders placed or performed during the hospital encounter of 11/15/22   Symptomatic; Unknown Influenza A/B & SARS-CoV2 (COVID-19) Virus PCR Multiplex Nose     Status: Normal    Specimen: Nose; Swab   Result Value Ref Range    Influenza A PCR Negative Negative    Influenza B PCR Negative Negative    RSV PCR Negative Negative    SARS CoV2 PCR Negative Negative    Narrative    Testing was performed using the Xpert Xpress CoV2/Flu/RSV Assay on the [employer] Gene[employer]pert Instrument. This test should be ordered for the detection of SARS-CoV-2 and influenza viruses in individuals who meet clinical and/or epidemiological criteria. Test performance is unknown in asymptomatic patients. This test is for in vitro diagnostic use under the FDA EUA for laboratories certified under CLIA to perform high or moderate complexity testing. This test has not been FDA cleared or approved. A negative result does not rule out the presence of PCR inhibitors in the specimen or target RNA in concentration below the limit of detection for the assay. If only one viral target is positive but coinfection with multiple targets is suspected, the sample should be re-tested with another FDA cleared, approved, or authorized test, if coinfection would change clinical management. This test  was validated by the [facility]. These laboratories are certified under the Clinical Laboratory Improvement Amendments of 1988 (CLIA-88) as qualified to perform high complexity laboratory testing.   Comprehensive metabolic panel     Status: Abnormal   Result Value Ref Range    Sodium 136 133 - 144 mmol/L    Potassium 3.4 3.4 - 5.3 mmol/L    Chloride 102 94 - 109 mmol/L    Carbon Dioxide (CO2) 28 20 - 32 mmol/L    Anion Gap 6 3 - 14 mmol/L    Urea Nitrogen 24 7 - 30 mg/dL    Creatinine 1.19 0.66 - 1.25 mg/dL    Calcium 8.5 8.5 - 10.1 mg/dL    Glucose 174 (H) 70 - 99 mg/dL    Alkaline Phosphatase 119 40 - 150 U/L     (H) 0 - 45 U/L     (H) 0 - 70 U/L    Protein Total 6.6 (L) 6.8 - 8.8 g/dL    Albumin 3.2 (L) 3.4 - 5.0 g/dL    Bilirubin Total 0.7 0.2 - 1.3 mg/dL    GFR Estimate 82 >60 mL/min/1.73m2   CBC with platelets and differential     Status: Abnormal   Result Value Ref Range    WBC Count 16.6 (H) 4.0 - 11.0 10e3/uL    RBC Count 4.45 4.40 - 5.90 10e6/uL    Hemoglobin 12.8 (L) 13.3 - 17.7 g/dL    Hematocrit 37.0 (L) 40.0 - 53.0 %    MCV 83 78 - 100 fL    MCH 28.8 26.5 - 33.0 pg    MCHC 34.6 31.5 - 36.5 g/dL    RDW 13.0 10.0 - 15.0 %    Platelet Count 170 150 - 450 10e3/uL    % Neutrophils 93 %    % Lymphocytes 3 %    % Monocytes 3 %    % Eosinophils 0 %    % Basophils 0 %    % Immature Granulocytes 1 %    NRBCs per 100 WBC 0 <1 /100    Absolute Neutrophils 15.3 (H) 1.6 - 8.3 10e3/uL    Absolute Lymphocytes 0.5 (L) 0.8 - 5.3 10e3/uL    Absolute Monocytes 0.6 0.0 - 1.3 10e3/uL    Absolute Eosinophils 0.0 0.0 - 0.7 10e3/uL    Absolute Basophils 0.0 0.0 - 0.2 10e3/uL    Absolute Immature Granulocytes 0.2 <=0.4 10e3/uL    Absolute NRBCs 0.0 10e3/uL   Lactic acid whole blood     Status: Normal   Result Value Ref Range    Lactic Acid 1.8 0.7 - 2.0 mmol/L   CBC with platelets differential     Status: Abnormal    Narrative    The following orders were created for panel order CBC with  platelets differential.  Procedure                               Abnormality         Status                     ---------                               -----------         ------                     CBC with platelets and d...[[id number]]  Abnormal            Final result                 Please view results for these tests on the individual orders.     Medications - No data to display     Assessments & Plan (with Medical Decision Making)   On his triage vital signs he did have a very mild fever at 100.5.  Influenza, COVID, RSV came back negative.  He denies any stuffy nose, sore throat, cough, shortness of breath.  Lungs are clear on exam, sats are normal.  No sign of cellulitis around injection marks.  He did complain of some vomiting, has no abdominal pain, no diarrhea.  Lactate was normal.  Blood cultures were sent.  I did not appreciate any murmur to suggest endocarditis.  No other clearly localizing symptoms.  On repeat vitals, temperature is normal.  Is unclear whether he truly was febrile, or whether this initial very mild elevation could be related to sympathomimetic substance use.  He was very tangential, somewhat paranoid here.  We did allow him to rest overnight in the ED, his mentation seems to cleared somewhat.  He states he overall feels improved from earlier, but still has some generalized fatigue.  Continues to deny any localizing symptoms.  Is unclear whether he could have a viral etiology versus other occult infection.  No urinary symptoms.  Blood cultures were sent.  Vitals are now normal.  He is eating and drinking normally, ambulating normally.  He did have elevated LFTs, though nausea has resolved, and no abd pain/tenderness.He does report alcohol abuse, though this seems less of an issue than his opioid and methamphetamine use.  We will give a prescription for Suboxone.  He will be discharged with information on recovery clinic and encouraged follow-up.  He will be encouraged to abstain from  Suboxone until he is not definite withdrawal.  He is encouraged to follow-up with primary care, return to the ER with any new or worsening symptoms, any other concerns.  He verbalizes understanding and is agreeable to the plan.    Dictation Disclaimer: Some of this Note has been completed with voice-recognition dictation software. Although errors are generally corrected real-time, there is the potential for a rare error to be present in the completed chart.      I have reviewed the nursing notes. I have reviewed the findings, diagnosis, plan and need for follow up with the patient.    Discharge Medication List as of 11/16/2022  6:38 AM      START taking these medications    Details   !! buprenorphine HCl-naloxone HCl (SUBOXONE) 8-2 MG per film Place 1 Film under the tongue daily, Disp-3 Film, R-0, Local PrintFor OUD      ondansetron (ZOFRAN ODT) 8 MG ODT tab Take 1 tablet (8 mg) by mouth every 8 hours as needed for nausea, Disp-10 tablet, R-0, Local Print       !! - Potential duplicate medications found. Please discuss with provider.          Final diagnoses:   Polysubstance abuse (H)   Malaise   Nausea and vomiting, unspecified vomiting type     I, Raiza Diaz, am serving as a trained medical scribe to document services personally performed by Hallie Weinstein MD, based on the provider's statements to me.     I, Hallie Weinstein MD, was physically present and have reviewed and verified the accuracy of this note documented by Raiza Diaz.    --  Hallie Weinstein MD  [facility] EMERGENCY DEPARTMENT  11/15/2022     Hallie Weinstein MD  11/16/22 2117

## 2022-11-20 ENCOUNTER — HEALTH MAINTENANCE LETTER (OUTPATIENT)
Age: 32
End: 2022-11-20

## 2022-11-21 LAB
BACTERIA BLD CULT: NO GROWTH
BACTERIA BLD CULT: NO GROWTH

## 2023-04-15 ENCOUNTER — HEALTH MAINTENANCE LETTER (OUTPATIENT)
Age: 33
End: 2023-04-15

## 2023-08-30 NOTE — PROGRESS NOTES
[facility] Initial Visit    ASSESSMENT/PLAN                                                        1. Opioid use disorder  Reviewed history and diagnostic criteria.  He would like to continue Sublocade injections.  Orders placed and request for insurance approval sent to finance specialists.  He is not experiencing any significant symptoms of withdrawal or cravings but is anxious about this.  Possible his difficulty sleeping the past few nights is related to being due for injection.  I have sent a very small supply of Suboxone 4-1mg films for him to have on hand should he develop symptoms to minimize risk of return to use while awaiting subsequent infusion.    - Fentanyl Urine, Qualitative; Standing  - buprenorphine HCl-naloxone HCl (SUBOXONE) 4-1 MG per film; Take 1/2 to 1 film daily as needed for symptoms of opioid withdrawal.  Dispense: 5 Film; Refill: 0  - naloxone (NARCAN) 4 MG/0.1ML nasal spray; Spray 1 spray (4 mg) into one nostril alternating nostrils once as needed for opioid reversal every 2-3 minutes until assistance arrives  Dispense: 0.2 mL; Refill: 11    2. Polysubstance abuse (H)  No substance use since Feb 2022.  Encouraged continued involvement with AA.      3. Current episode of major depressive disorder without prior episode, unspecified depression episode severity  Managed by psychiatry, planning to start Latuda.      At follow-up visit he will need to have labs completed - HIV and Hep C to follow-up from most recent IVDU.  Repeat HIV in June as well (given recent use of post-exposure prophylaxis).  Will need LFTs done as well for Sublocade.         Return in about 2 weeks (around 5/9/2022) for Follow up, with me, in person.      SUBJECTIVE                                                      CC/HPI:  Romeo Maldonado is a 31 year old male with PMHx of anxiety, depression, and opioid use disorder who presents to the [facility] for initial visit.  Pt was referred by  [facility] care coordinator.   Pt decided to seek treatment in Recovery Clinic to continue Sublocade infusions.    He was recently hospitalized in Feb 2022 w/ skin infection related to IVDU.  He completed residential treatmetn at [facility] after discharge.  Received dose of Sublocade 300mg before discharge on 3/21/22 (per his report and ).  Had planned to stop Sublocade after this injection but gave further consideration and now would like to continue.  Has started to struggle with sleep for a few days but no other symptoms of withdrawal.  No opioid cravings.  Was on Sublocade last year, stopped and then relapsed - he wants to avoid repeating this.    Substance Use History :  Opioids:   Age at first use: 16 - Started using Percocet and Vicodin (from non-prescription sources).  Progressed to IV heroin at age 18.    Current use: timing of last use: 2/12/22; substance: heroin; quantity 0.5-1 gram daily; route: IV      IV drug use: Yes:    History of overdose: Yes: 3-4 times  Previous treatments : Yes: [facility] (3/2022), [facility] [place]; active in  currently, has sponsor   Longest period of sobriety: 4 years  Medical complications related to substance use: hep C (s/p treatment)  Hepatitis C: s/p treatment for Hep C (2016), HCV RNA not detected (2/2022)  HIV: non-reactive (2/15/22) - needs to repeat after completing post-exposure prophylaxis     Taking buprenorphine? Yes: Sublocade  As prescribed? Yes: last shot was 3/23/22  Number of buprenorphine films/tablets remaining currently: 0  Side effects related to buprenorphine (constipation, dry mouth, sedation?) Yes: constipation    Narcan currently available: Yes    DSM-5 OUD criteria met:  Taken in larger amounts/greater time spent in behavior over longer period of time than intended,Yes:   Persistent desire or unsuccessful efforts to cut down or control use/behavior, Yes:   A great deal of time is spent in activities necessary to obtain the  substance/participate in the behavior or recover from its effects, Yes:   Cravings, Yes:   Recurrent use/behavior resulting in failure to fulfill major role obligations at work, school, or home, Yes:   Continued use/behavior despite having persistent or recurrent social or interpersonal problems caused or exacerbated by effects of use/behavior, Yes:   Important social, occupational, or recreational activities are given up or reduced because of use/behavior, Yes:   Recurrent use/behavior in situations in which it is physically hazardous, Yes:   Continued use/behavior despite knowledge of having a persistent or recurrent physical or psychological problem that is likely to have been caused or exacerbated by use/behavior, Yes   Tolerance, Yes:     Withdrawal, Yes:      Other Addiction History:  Stimulants:   Past use: Meth- started at 24-25, daily use; Cocaine- started at age 15, occasional use  Use in last 6 months: Meth daily use 2/12/22; Cocaine- occasional (cannot recall last use, maybe Jan 2022)  Sedatives/hypnotics/anxiolytics:   Past use: xanax- would only use when in withdrawal   Use in last 6 months: Xanax- 2/10/22  Alcohol:   Past use: has not had drink since 2015  Use in last 6 months: denies  Nicotine:   Cigarettes: denies  Vaping: currently   Chewing tobacco: denies  Cannabis:   Past use: 15-25 years of age  Use in last 6 months: denies  Hallucinogens:   Past use: mushrooms one time  Use in last 6 months: denies  Behavioral addictions:   denies;         [place] Prescription Drug Monitoring Program Reviewed:  Yes; as expected        Past Medical History:   Diagnosis Date     Allergic state      Anxiety      Depressive disorder      Hepatitis C      Substance abuse (H)     meth use     Uncomplicated asthma          PAST PSYCHIATRIC HISTORY:  Diagnoses- anxiety and depression   Suicide Attempts: No   Hospitalizations: No , nothing not related to drug use    PHQ-2 Score:     PHQ-2 ( 1999 [employer]) 4/25/2022    Q1: Little interest or pleasure in doing things 1   Q2: Feeling down, depressed or hopeless 2   PHQ-2 Score 3        If PHQ-2 score of 3 or higher, has [facility] therapist or provider been notified? Yes    Any current suicidal ideation? No  If yes, has [facility] therapist or provider been notified? N/A    Mental health provider: Noemy Currie (follow up on MH referral if needed), starting with new therapist at [facility] soon    Past Surgical History:   Procedure Laterality Date     INCISION AND DRAINAGE SOFT TISSUE UPPER EXTREMITY, COMBINED  8/11/2012    Procedure: COMBINED INCISION AND DRAINAGE SOFT TISSUE UPPER EXTREMITY;  Incision and drainage Right Arm Abscess;  Surgeon: Cheli White MD;  Location:  OR       Medications:  buPROPion (WELLBUTRIN XL) 300 MG 24 hr tablet,   LATUDA 20 MG TABS tablet,   albuterol (PROAIR HFA/PROVENTIL HFA/VENTOLIN HFA) 108 (90 Base) MCG/ACT inhaler, Inhale 2 puffs into the lungs every 6 hours as needed (Patient not taking: Reported on 4/25/2022)    Self Administer Medications: Behavioral Services        Allergies   Allergen Reactions     Ambien [Zolpidem] Shortness Of Breath     wheezing     Pollen Extract Unknown       Family History   Problem Relation Age of Onset     Hypertension Father          Social History  Housing status: alone  Employment status: Employed full time - finance  Relationship status: Single  Children: 1 child (4 years old)  Legal: DUI in 3/2021, on probation for 2 years   Insurance needs: active  Contact information up to date? yes    3rd Party Involvement:  [facility] (please obtain CHERRIE if pt would like to include)    REVIEW OF SYSTEMS:  General:  No recent fevers/chills.   Eyes:  No vision concerns.  No jaundice.    Resp: No coughing, wheezing or shortness of breath  CV: No chest pains or palpitations  GI: No complaints.  Some constipation since starting Sublocade (manages with Metamucil and colace)  : No urinary  "frequency or dysuria  Musculoskeletal: No significant muscle or joint pains.  No edema  Neurologic: No numbness, tingling, weakness, problems with balance or coordination  Psychiatric: No acute concerns other than as above.   Skin: No rashes or areas of acute infection    OBJECTIVE                                                        BP (!) 149/88   Pulse 73   Ht 1.778 m (5' 10\")   Wt 88.9 kg (196 lb)   BMI 28.12 kg/m      Physical Exam  Vitals reviewed.   Constitutional:       General: He is not in acute distress.     Appearance: Normal appearance. He is not ill-appearing or diaphoretic.   HENT:      Head: Normocephalic and atraumatic.      Nose: No congestion or rhinorrhea.   Eyes:      General: No scleral icterus.     Conjunctiva/sclera: Conjunctivae normal.   Cardiovascular:      Rate and Rhythm: Normal rate.   Pulmonary:      Effort: Pulmonary effort is normal. No respiratory distress.   Skin:     General: Skin is warm and dry.      Coloration: Skin is not jaundiced or pale.   Neurological:      General: No focal deficit present.      Mental Status: He is alert and oriented to person, place, and time.      Gait: Gait normal.   Psychiatric:         Attention and Perception: Attention normal.         Mood and Affect: Mood is depressed. Affect is flat.         Speech: Speech normal.         Behavior: Behavior normal. Behavior is cooperative.         Thought Content: Thought content normal.         Judgment: Judgment normal.         Labs:    UDS: buprenorphine  *POC urine drug screen does not screen for Fentanyl    No results found for this or any previous visit (from the past 720 hour(s)).      Patient counseling completed today:  Discussed mechanism of action, potential risks/benefits/side effects of medications and other recommendations above.  Discussed risk of precipitated withdrawal with initiation of buprenorphine in the presence of full opioid agonists.  Reviewed directions for initiation of " buprenorphine to reduce risk of precipitated withdrawal and maximize efficacy.  Recommend pt keep naloxone in their possession and reviewed other aspects of harm reduction to reduce risk of overdose with return to use.   Recommended avoiding concurrent use of alcohol, benzodiazepines or other sedatives with buprenorphine or other opioids.  Discussed importance of avoiding isolation, building a network of supportive relationships, avoiding people/places/things associated with past use to reduce risk of relapse; including motivational interviewing regarding psychosocial treatment for addiction.       An Mejia DO  [facility]  [address] [place] [id number] [phone]         within normal limits

## 2023-09-04 NOTE — PROGRESS NOTES
[facility]    Medicine Progress Note - Hospitalist Service    Date of Admission:  2/15/2022    Assessment & Plan        Romeo Maldonado is a 31 year old male admitted on 2/15/2022. He presents to the emergency department with right neck abscess associated with injection drug use as feeling generally unwell for the past 2 days with cold sweats, some mild abdominal discomfort.    Injection site abscess  Patient with approximately 1.5 cm superficial abscess over right sternocleidomastoid at site of missed injection with methamphetamine.  Reports feeling increasingly unwell over the past 2 days.  - Admitted to inpatient.  - Abscess aspirated in ER, culture pending.  - Blood culture x2 pending.  - Continue Zosyn while awaiting culture results.  - Likely ID consult tomorrow when culture results available.  - Psychiatry consulted for anxiety and depression, heroin dependence.  - Care management consult to assist with facilitating transfer to inpatient chemical dependency treatment; patient believes that his bed will be available in the next 1 to 2 days based on discussions with intake nurse.    Methamphetamine use disorder  GHB use disorder - Typically utilizes to come down from methamphetamine  Benzodiazepine use disorder - Reports he will take Xanax 2 mg once every few days to help with anxiety.  Does not use daily, several days since last use  Opiate dependence with mild opiate withdrawal - Patient reported diaphoresis, some mild abdominal discomfort, generally feeling unwell with cold sweats over the past 2 days, worse today.  Last IV heroin and methamphetamine use 2/13/2022.  Could be early sepsis associated with injection site abscess versus opiate withdrawal.  Patient reports feeling slightly better with Suboxone dose administered by his mother yesterday  - Continue prior to admission Suboxone 8-2 twice daily.  - Psychiatry consulted, appreciate their assistance.    High risk HIV exposure  48  "hours ago patient used a needle that he found at an HIV-positive acquaintance's home for IV drug use.  He is uncertain if this was a clean needle.  Believes his friend has an undetectable viral load.  - Continue postexposure prophylaxis with emtricitabine tenofovir and dolutegravir daily.  Discussed regimen with patient on admission, and he is agreeable; stated he will be able to remain on this medication as he plans to discharge directly to inpatient chemical dependency.  - Patient was tested and negative for HIV as of February 2.  - HIV antigen/antibody testing negative on 2/15/22.  - Likely consult ID tomorrow.    Anxiety with depression  Insomnia  - Trazodone 50 mg at bedtime as needed for sleep.  - Gabapentin 200 mg 3 times daily as needed for anxiety.  - As needed hydroxyzine every 4 hours.  - Prazosin 1 mg daily.  - Wellbutrin  mg daily.  - Psychiatry consulted as noted above.    History of hepatitis C status post treatment  - Recent hep C RNA undetectable 2/2/2022       Diet: Regular Diet Adult    DVT Prophylaxis: Ambulate every shift  Marie Catheter: Not present  Central Lines: None  Cardiac Monitoring: None  Code Status: Full Code      Disposition Plan   Expected Discharge: 02/17/2022     Anticipated discharge location: inpatient rehabilitation facility (in patient chem dep)    Delays:            The patient's care was discussed with the Bedside Nurse and Patient.    Jonh Loaiza MD  Hospitalist Service  [facility]  Securely message with the Vocera Web Console (learn more here)  Text page via [employer] Paging/Directory         Clinically Significant Risk Factors Present on Admission                 # Overweight: Estimated body mass index is 25.83 kg/m  as calculated from the following:    Height as of this encounter: 1.778 m (5' 10\").    Weight as of this encounter: 81.6 kg (180 lb).      ______________________________________________________________________    Interval " History   Romeo Maldonado was seen today. He feels better this afternoon.  Neck pain improved. Denies fevers, chest pain, shortness of breath, nausea, abdominal pain. No family present, offered to call them or come back later when they are in the room.    Data reviewed today: I reviewed all medications, new labs and imaging results over the last 24 hours. I personally reviewed no images or EKG's today.    Physical Exam   Vital Signs: Temp: 99.4  F (37.4  C) Temp src: Oral BP: 138/83 Pulse: 76   Resp: 16 SpO2: 98 % O2 Device: None (Room air)    Weight: 180 lbs 0 oz  Constitutional: awake, alert, cooperative, no apparent distress, laying in the hospital bed  HEENT: area of erythema/tenderness on right neck, improved compared to photo from earlier today  Respiratory: clear to auscultation bilaterally, no crackles or wheezing  Cardiovascular: regular rate and rhythm, normal S1 and S2, no murmur noted  GI: normal bowel sounds, soft, non-distended, non-tender  Skin: warm, dry  Musculoskeletal: no lower extremity pitting edema present  Neurologic: awake, alert, oriented to name, place and time, motor is 5 out of 5 bilaterally, sensory is intact    Data   Recent Labs   Lab 02/15/22  1359 02/15/22  0145   WBC  --  11.0   HGB  --  13.8   MCV  --  86   PLT  --  313   NA  --  138   POTASSIUM  --  3.4   CHLORIDE  --  102   CO2  --  29   BUN  --  16   CR  --  0.66   ANIONGAP  --  7   NILA  --  8.6   * 121*   ALBUMIN  --  3.4   PROTTOTAL  --  7.3   BILITOTAL  --  0.2   ALKPHOS  --  59   ALT  --  38   AST  --  15     Medications       buprenorphine HCl-naloxone HCl  1 Film Sublingual BID     buPROPion  150 mg Oral Daily     dolutegravir  50 mg Oral Daily    And     emtricitabine-tenofovir  1 tablet Oral Daily     piperacillin-tazobactam  3.375 g Intravenous Q6H     prazosin  1 mg Oral Daily     sodium chloride (PF)  3 mL Intracatheter Q8H      No

## 2024-01-17 ENCOUNTER — HOSPITAL ENCOUNTER (EMERGENCY)
Facility: CLINIC | Age: 34
Discharge: HOME | End: 2024-01-18
Attending: EMERGENCY MEDICINE | Admitting: EMERGENCY MEDICINE
Payer: COMMERCIAL

## 2024-01-17 VITALS
HEIGHT: 70 IN | DIASTOLIC BLOOD PRESSURE: 97 MMHG | WEIGHT: 184.8 LBS | RESPIRATION RATE: 17 BRPM | BODY MASS INDEX: 26.45 KG/M2 | SYSTOLIC BLOOD PRESSURE: 148 MMHG | OXYGEN SATURATION: 99 % | TEMPERATURE: 97.7 F | HEART RATE: 69 BPM

## 2024-01-17 DIAGNOSIS — F10.129 ALCOHOL ABUSE WITH INTOXICATION (H): ICD-10-CM

## 2024-01-17 DIAGNOSIS — F11.10 OPIATE ABUSE, CONTINUOUS (H): ICD-10-CM

## 2024-01-17 PROCEDURE — 99284 EMERGENCY DEPT VISIT MOD MDM: CPT | Performed by: EMERGENCY MEDICINE

## 2024-01-17 ASSESSMENT — ACTIVITIES OF DAILY LIVING (ADL): ADLS_ACUITY_SCORE: 37

## 2024-01-18 PROBLEM — F15.20 AMPHETAMINE DEPENDENCE (H): Status: ACTIVE | Noted: 2024-01-18

## 2024-01-18 PROBLEM — F32.9 MDD (MAJOR DEPRESSIVE DISORDER): Status: ACTIVE | Noted: 2021-03-26

## 2024-01-18 LAB
ALBUMIN SERPL BCG-MCNC: 4.2 G/DL (ref 3.5–5.2)
ALP SERPL-CCNC: 59 U/L (ref 40–150)
ALT SERPL W P-5'-P-CCNC: 27 U/L (ref 0–70)
ANION GAP SERPL CALCULATED.3IONS-SCNC: 5 MMOL/L (ref 7–15)
AST SERPL W P-5'-P-CCNC: 59 U/L (ref 0–45)
BASOPHILS # BLD AUTO: 0 10E3/UL (ref 0–0.2)
BASOPHILS NFR BLD AUTO: 0 %
BILIRUB SERPL-MCNC: 0.2 MG/DL
BUN SERPL-MCNC: 8.9 MG/DL (ref 6–20)
CALCIUM SERPL-MCNC: 9.1 MG/DL (ref 8.6–10)
CHLORIDE SERPL-SCNC: 103 MMOL/L (ref 98–107)
CREAT SERPL-MCNC: 0.71 MG/DL (ref 0.67–1.17)
DEPRECATED HCO3 PLAS-SCNC: 32 MMOL/L (ref 22–29)
EGFRCR SERPLBLD CKD-EPI 2021: >90 ML/MIN/1.73M2
EOSINOPHIL # BLD AUTO: 0 10E3/UL (ref 0–0.7)
EOSINOPHIL NFR BLD AUTO: 1 %
ERYTHROCYTE [DISTWIDTH] IN BLOOD BY AUTOMATED COUNT: 11.9 % (ref 10–15)
ETHANOL SERPL-MCNC: 0.02 G/DL
GLUCOSE SERPL-MCNC: 77 MG/DL (ref 70–99)
HCT VFR BLD AUTO: 39.8 % (ref 40–53)
HGB BLD-MCNC: 13.4 G/DL (ref 13.3–17.7)
IMM GRANULOCYTES # BLD: 0 10E3/UL
IMM GRANULOCYTES NFR BLD: 0 %
LYMPHOCYTES # BLD AUTO: 1.5 10E3/UL (ref 0.8–5.3)
LYMPHOCYTES NFR BLD AUTO: 30 %
MAGNESIUM SERPL-MCNC: 2.1 MG/DL (ref 1.7–2.3)
MCH RBC QN AUTO: 28.6 PG (ref 26.5–33)
MCHC RBC AUTO-ENTMCNC: 33.7 G/DL (ref 31.5–36.5)
MCV RBC AUTO: 85 FL (ref 78–100)
MONOCYTES # BLD AUTO: 0.5 10E3/UL (ref 0–1.3)
MONOCYTES NFR BLD AUTO: 9 %
NEUTROPHILS # BLD AUTO: 3 10E3/UL (ref 1.6–8.3)
NEUTROPHILS NFR BLD AUTO: 60 %
NRBC # BLD AUTO: 0 10E3/UL
NRBC BLD AUTO-RTO: 0 /100
PLATELET # BLD AUTO: 245 10E3/UL (ref 150–450)
POTASSIUM SERPL-SCNC: 3.9 MMOL/L (ref 3.4–5.3)
PROT SERPL-MCNC: 7 G/DL (ref 6.4–8.3)
RBC # BLD AUTO: 4.69 10E6/UL (ref 4.4–5.9)
SODIUM SERPL-SCNC: 140 MMOL/L (ref 135–145)
WBC # BLD AUTO: 5 10E3/UL (ref 4–11)

## 2024-01-18 PROCEDURE — 80053 COMPREHEN METABOLIC PANEL: CPT | Performed by: EMERGENCY MEDICINE

## 2024-01-18 PROCEDURE — 83735 ASSAY OF MAGNESIUM: CPT | Performed by: EMERGENCY MEDICINE

## 2024-01-18 PROCEDURE — 85025 COMPLETE CBC W/AUTO DIFF WBC: CPT | Performed by: EMERGENCY MEDICINE

## 2024-01-18 PROCEDURE — 36415 COLL VENOUS BLD VENIPUNCTURE: CPT | Performed by: EMERGENCY MEDICINE

## 2024-01-18 PROCEDURE — 82077 ASSAY SPEC XCP UR&BREATH IA: CPT | Performed by: EMERGENCY MEDICINE

## 2024-01-18 RX ORDER — BUPRENORPHINE AND NALOXONE 8; 2 MG/1; MG/1
FILM, SOLUBLE BUCCAL; SUBLINGUAL
Qty: 9 FILM | Refills: 0 | Status: SHIPPED | OUTPATIENT
Start: 2024-01-18

## 2024-01-18 RX ORDER — DIAZEPAM 5 MG
5-20 TABLET ORAL EVERY 30 MIN PRN
Status: DISCONTINUED | OUTPATIENT
Start: 2024-01-18 | End: 2024-01-18 | Stop reason: HOSPADM

## 2024-01-18 ASSESSMENT — ACTIVITIES OF DAILY LIVING (ADL)
ADLS_ACUITY_SCORE: 37
ADLS_ACUITY_SCORE: 37

## 2024-01-18 NOTE — ED PROVIDER NOTES
ED Provider Note  [facility]      History     Chief Complaint   Patient presents with    Drug / Alcohol Assessment     Pt arrives due to detox alcohol and opiates. Last used fentanyl two hrs ago. Pt reports depression and anxiety.         Drug / Alcohol Assessment      Romeo Maldonado is a 33 year old male who has a history of polysubstance abuse including opiates and alcohol, both of which he has been using, and presents for detox.  Patient reports has been drinking at least a pint a day for a long time, and using about a gram or more fentanyl day either by smoking or injecting, and would like to detox from both.  He states he has successfully detoxed in the past, but recently relapsed in the last month or so in the setting of significant social stressors.  He denies any fevers, chills, chest pain, shortness of breath, abdominal pain, nausea or vomiting.  Denies headaches or neck pain or stiffness.  He is not injected in a long time and instead has been taking fentanyl by smoking it, sometimes up to 3 g a day per his report.  He has had severe withdrawals from opiate and alcohol in the past.  He denies suicidal ideations or plans however feels like he is increasingly depressed and concerned about his mental health.  He denies hallucinations or psychosis now or historically.  He denies homicidal ideations.  His primary concern at this time is detox, after which he wants to pursue mental health resources and evaluation.    Past Medical History  Past Medical History:   Diagnosis Date    Allergic state     Anxiety     Depressive disorder     Hepatitis C     Opioid use disorder, severe, dependence (H) 11/17/2013    Stimulant use disorder     methamphetamine, GHB    Uncomplicated asthma      Past Surgical History:   Procedure Laterality Date    INCISION AND DRAINAGE SOFT TISSUE UPPER EXTREMITY, COMBINED  8/11/2012    Procedure: COMBINED INCISION AND DRAINAGE SOFT TISSUE UPPER EXTREMITY;   "Incision and drainage Right Arm Abscess;  Surgeon: Cheli White MD;  Location: [facility] OR     albuterol (PROAIR HFA/PROVENTIL HFA/VENTOLIN HFA) 108 (90 Base) MCG/ACT inhaler  buprenorphine HCl-naloxone HCl (SUBOXONE) 8-2 MG per film  buprenorphine HCl-naloxone HCl (SUBOXONE) 8-2 MG per film  cloNIDine (CATAPRES) 0.1 MG tablet  dolutegravir (TIVICAY) 50 MG tablet  emtricitabine-tenofovir (TRUVADA) 200-300 MG per tablet  naloxone (NARCAN) 4 MG/0.1ML nasal spray      Allergies   Allergen Reactions    Pollen Extract Unknown and Other (See Comments)    Zolpidem Shortness Of Breath     wheezing     Family History  Family History   Problem Relation Age of Onset    Hypertension Father      Social History   Social History     Tobacco Use    Smoking status: Former     Packs/day: .25     Types: Cigarettes    Smokeless tobacco: Never   Vaping Use    Vaping Use: Every day   Substance Use Topics    Alcohol use: Yes     Comment: occasional    Drug use: Yes     Types: Methamphetamines, Opiates, IV     Comment: heroin daily, meth a couple of times in the last 2 weks         A medically appropriate review of systems was performed with pertinent positives and negatives noted in the HPI, and all other systems negative.    Physical Exam   BP: (!) 148/97  Pulse: 69  Temp: 97.7  F (36.5  C)  Resp: 17  Height: 177.8 cm (5' 10\")  Weight: 83.8 kg (184 lb 12.8 oz)  SpO2: 99 %  Physical Exam  Vitals and nursing note reviewed.   Constitutional:       General: He is in acute distress.      Appearance: Normal appearance. He is not toxic-appearing.   HENT:      Head: Normocephalic and atraumatic.      Mouth/Throat:      Mouth: Mucous membranes are moist.      Pharynx: Oropharynx is clear.   Eyes:      Extraocular Movements: Extraocular movements intact.      Pupils: Pupils are equal, round, and reactive to light.   Cardiovascular:      Rate and Rhythm: Normal rate and regular rhythm.   Pulmonary:      Effort: Pulmonary effort is normal.      Breath " sounds: Normal breath sounds.   Abdominal:      Palpations: Abdomen is soft.      Tenderness: There is no abdominal tenderness.   Musculoskeletal:      Cervical back: Normal range of motion and neck supple.      Right lower leg: No edema.      Left lower leg: No edema.   Skin:     General: Skin is warm and dry.   Neurological:      General: No focal deficit present.      Mental Status: He is alert and oriented to person, place, and time.      Gait: Gait normal.   Psychiatric:         Mood and Affect: Mood normal.         Behavior: Behavior normal.           ED Course, Procedures, & Data      Procedures                     Results for orders placed or performed during the hospital encounter of 01/17/24   Comprehensive metabolic panel     Status: Abnormal   Result Value Ref Range    Sodium 140 135 - 145 mmol/L    Potassium 3.9 3.4 - 5.3 mmol/L    Carbon Dioxide (CO2) 32 (H) 22 - 29 mmol/L    Anion Gap 5 (L) 7 - 15 mmol/L    Urea Nitrogen 8.9 6.0 - 20.0 mg/dL    Creatinine 0.71 0.67 - 1.17 mg/dL    GFR Estimate >90 >60 mL/min/1.73m2    Calcium 9.1 8.6 - 10.0 mg/dL    Chloride 103 98 - 107 mmol/L    Glucose 77 70 - 99 mg/dL    Alkaline Phosphatase 59 40 - 150 U/L    AST 59 (H) 0 - 45 U/L    ALT 27 0 - 70 U/L    Protein Total 7.0 6.4 - 8.3 g/dL    Albumin 4.2 3.5 - 5.2 g/dL    Bilirubin Total 0.2 <=1.2 mg/dL   Magnesium     Status: Normal   Result Value Ref Range    Magnesium 2.1 1.7 - 2.3 mg/dL   Alcohol level blood     Status: Abnormal   Result Value Ref Range    Alcohol ethyl 0.02 (H) <=0.01 g/dL   CBC with platelets and differential     Status: Abnormal   Result Value Ref Range    WBC Count 5.0 4.0 - 11.0 10e3/uL    RBC Count 4.69 4.40 - 5.90 10e6/uL    Hemoglobin 13.4 13.3 - 17.7 g/dL    Hematocrit 39.8 (L) 40.0 - 53.0 %    MCV 85 78 - 100 fL    MCH 28.6 26.5 - 33.0 pg    MCHC 33.7 31.5 - 36.5 g/dL    RDW 11.9 10.0 - 15.0 %    Platelet Count 245 150 - 450 10e3/uL    % Neutrophils 60 %    % Lymphocytes 30 %    %  Monocytes 9 %    % Eosinophils 1 %    % Basophils 0 %    % Immature Granulocytes 0 %    NRBCs per 100 WBC 0 <1 /100    Absolute Neutrophils 3.0 1.6 - 8.3 10e3/uL    Absolute Lymphocytes 1.5 0.8 - 5.3 10e3/uL    Absolute Monocytes 0.5 0.0 - 1.3 10e3/uL    Absolute Eosinophils 0.0 0.0 - 0.7 10e3/uL    Absolute Basophils 0.0 0.0 - 0.2 10e3/uL    Absolute Immature Granulocytes 0.0 <=0.4 10e3/uL    Absolute NRBCs 0.0 10e3/uL   CBC with Platelets & Differential     Status: Abnormal    Narrative    The following orders were created for panel order CBC with Platelets & Differential.  Procedure                               Abnormality         Status                     ---------                               -----------         ------                     CBC with platelets and d...[[id number]]  Abnormal            Final result                 Please view results for these tests on the individual orders.     Medications   diazepam (VALIUM) tablet 5-20 mg (has no administration in time range)     Labs Ordered and Resulted from Time of ED Arrival to Time of ED Departure   COMPREHENSIVE METABOLIC PANEL - Abnormal       Result Value    Sodium 140      Potassium 3.9      Carbon Dioxide (CO2) 32 (*)     Anion Gap 5 (*)     Urea Nitrogen 8.9      Creatinine 0.71      GFR Estimate >90      Calcium 9.1      Chloride 103      Glucose 77      Alkaline Phosphatase 59      AST 59 (*)     ALT 27      Protein Total 7.0      Albumin 4.2      Bilirubin Total 0.2     ETHYL ALCOHOL LEVEL - Abnormal    Alcohol ethyl 0.02 (*)    CBC WITH PLATELETS AND DIFFERENTIAL - Abnormal    WBC Count 5.0      RBC Count 4.69      Hemoglobin 13.4      Hematocrit 39.8 (*)     MCV 85      MCH 28.6      MCHC 33.7      RDW 11.9      Platelet Count 245      % Neutrophils 60      % Lymphocytes 30      % Monocytes 9      % Eosinophils 1      % Basophils 0      % Immature Granulocytes 0      NRBCs per 100 WBC 0      Absolute Neutrophils 3.0      Absolute Lymphocytes  1.5      Absolute Monocytes 0.5      Absolute Eosinophils 0.0      Absolute Basophils 0.0      Absolute Immature Granulocytes 0.0      Absolute NRBCs 0.0     MAGNESIUM - Normal    Magnesium 2.1       No orders to display          Critical care was not performed.     Medical Decision Making  The patient's presentation was of high complexity (a chronic illness severe exacerbation, progression, or side effect of treatment).    The patient's evaluation involved:  review of external note(s) from 1 sources (see separate area of note for details)    The patient's management necessitated high risk (a decision regarding hospitalization) and further care after sign-out to oncoming ED physician (see their note for further management).    Assessment & Plan    Romeo Maldonado is a 33 year old male who has a history of polysubstance abuse including opiates and alcohol, both of which he has been using, and presents for detox.  Patient is mildly intoxicated but coherent, ambulatory without difficulty, and answering questions appropriately.  He denies suicidal ideations.  He states he had last used fentanyl and drank alcohol just prior to arrival.  He is hypertensive but otherwise his vitals within normal limits. Screening labwork obtained and patient signed out pending detox placement. He is not suicidal at this time but does have mental health concerns and would like resources. DEC consulted to assist.     I have reviewed the nursing notes. I have reviewed the findings, diagnosis, plan and need for follow up with the patient.    New Prescriptions    No medications on file       Final diagnoses:   Opiate abuse, continuous (H)   Alcohol abuse with intoxication (H24)       Ramo Love MD  [facility] EMERGENCY DEPARTMENT  1/17/2024     Ramo Love MD  01/18/24 0334

## 2024-01-18 NOTE — ED NOTES
The pt was accepted at shift change sign out pending DEC assessment and transfer to [place] detox. Per , his primary issue seems to be substance dependence. She recommends detox, then would be a good candidate for therapy and/or medications, but doesn't think that those would be helpful until sober. Awaiting final acceptance to [place] detox, then will plan to transfer there.     Hallie Weinstein MD  01/18/24 0322

## 2024-01-18 NOTE — ED NOTES
Pt attempted to give urine specimen but stated does not have the urge to void at this time. fluid offered.

## 2024-01-18 NOTE — DISCHARGE INSTRUCTIONS
Seek detox and treatment. Please don't start suboxone until at least 24 hours after last use and you are having significant withdrawal symptoms.

## 2024-01-18 NOTE — CONSULTS
"Diagnostic Evaluation Consultation  Crisis Assessment    Patient Name: Romeo Maldonado  Age:  33 year old  Legal Sex: male  Gender Identity: male  Pronouns: he/him/his  Race: White  Ethnicity: Not  or   Language: English      Patient was assessed: Virtual: iPad Crisis Assessment Start Time: 0240 Crisis Assessment Stop Time: 0313  Patient location: [facility] EMERGENCY DEPARTMENT                             [facility]    Referral Data and Chief Complaint  Romeo Maldonado presents to the ED by  self. Patient is presenting to the ED for the following concerns: Significant behavioral change, Worsening psychosocial stress, Substance use, Intoxication, Anxiety, Depression.   Factors that make the mental health crisis life threatening or complex are:  Patient is about to lose job due to continued substance use; he is on leave of absence from work, their idea, starting today..      Informed Consent and Assessment Methods  Explained the crisis assessment process, including applicable information disclosures and limits to confidentiality, assessed understanding of the process, and obtained consent to proceed with the assessment.  Assessment methods included conducting a formal interview with patient, review of medical records, collaboration with medical staff, and obtaining relevant collateral information from family and community providers when available.  : done     Patient response to interventions: acceptance expressed, verbalizes understanding  Coping skills were attempted to reduce the crisis:  Presented to ED for detox     History of the Crisis   Patient started using substances at age 13.  He has run the gammet of addictive, illicit substances; ETOH addiction, heroin, Fentanyl, cocaine, meth.   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  He says things were \"great\" while sober.  In 2021, the mother of his daughter started seeing someone " else, asked patient to leave their home and ended up moving to [place] with patient's daughter.   He relapsed and has been struggling since then.   He says he has ended up in scary seedy places looking for drugs, doing things and being in places he never dreamed he'd be.   He is on a final warning at work.  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  He is having difficulty remembering, concentrating, etc.  He has substance induced hallucinations at times.  He is using daily;  he drinks a pint per day; a gram of Fentanyl and a lot of cocaine.  He says he hasn't slept in 4 days.  He is cooperative in ED.  He struggles to stay awake during interview.   He fears for  his future and currently has motivation to get sober.   He has been to IP CD treatment he says about 10 times.  He was IP  hospitalized in 2021 after presenting for detox/intoxication.    Brief Psychosocial History  Family:  Single, Children yes  Support System:  Other (specify) (work, some family, friends)  Employment Status:  employed full-time  Source of Income:  salary/wages  Financial Environmental Concerns:     Current Hobbies:     Barriers in Personal Life:  behavioral concerns, mental health concerns    Significant Clinical History  Current Anxiety Symptoms:  excessive worry, anxious  Current Depression/Trauma:  sense of doom, difficulty concentrating, withdrawl/isolation, crying or feels like crying, excessive guilt, sadness, impaired decision making  Current Somatic Symptoms:  somatic symptoms (abdominal pain, headache, tension), excessive worry, anxious  Current Psychosis/Thought Disturbance:     Current Eating Symptoms:     Chemical Use History:  Alcohol: Daily  Benzodiazepines: Rx Abuse, Street buy drugs  Opiates: Rx abuse, Heroin IV, Street buy pills  Cocaine: Snorted  Last Use:: 01/17/24  Withdrawal Symptoms: Nausea   Past diagnosis:  Anxiety Disorder, Depression, Substance Use  "Disorder  Family history:  No known history of mental health or chemical health concerns  Past treatment:  Individual therapy, AA/NA, Inpatient Hospitalization  Details of most recent treatment:  Patient has been to IP CD tx 10 times.  He was IP MH in 2021.  Pt currently has no supportive services  Other relevant history:  Had 4 years sober       Collateral Information  Is there collateral information: Yes (gathered by colleague)     Collateral information name, relationship, phone number:  PINEDA SUAREZ Mother [phone]    What happened today: \"I have not seen him for couple weeks. I know he is not doing well. I thought he was using something when I saw him couple weeks ago. I told him he needs help and that he couln't be around me if he is using.\"     What is different about patient's functioning: \"Reports he was not thinking clearly. He wasn't having rational behavior. He just wasn't making sense. he was hospitilized 9-10 months for similar behavior.\"       Has patient made comments about wanting to kill themselves/others: no    If d/c is recommended, can they take part in safety/aftercare planning:  yes    Additional collateral information:        Risk Assessment  [place] Suicide Severity Rating Scale Full Clinical Version:  Suicidal Ideation  Q1 Wish to be Dead (Lifetime): Yes  Q2 Non-Specific Active Suicidal Thoughts (Lifetime): Yes  3. Active Suicidal Ideation with any Methods (Not Plan) Without Intent to Act (Lifetime): Yes  Q4 Active Suicidal Ideation with Some Intent to Act, Without Specific Plan (Lifetime): No  Q5 Active Suicidal Ideation with Specific Plan and Intent (Lifetime): No  Q6 Suicide Behavior (Lifetime): no     Suicidal Behavior (Lifetime)  Actual Attempt (Lifetime): No  Has subject engaged in non-suicidal self-injurious behavior? (Lifetime): No  Interrupted Attempts (Lifetime): No  Aborted or Self-Interrupted Attempt (Lifetime): No  Preparatory Acts or Behavior (Lifetime): " No    [place] Suicide Severity Rating Scale Recent:   Suicidal Ideation (Recent)  Q1 Wished to be Dead (Past Month): yes  Q2 Suicidal Thoughts (Past Month): yes  Q3 Suicidal Thought Method: no  Q4 Suicidal Intent without Specific Plan: no  Q5 Suicide Intent with Specific Plan: no  Level of Risk per Screen: low risk     Suicidal Behavior (Recent)  Actual Attempt (Past 3 Months): No  Has subject engaged in non-suicidal self-injurious behavior? (Past 3 Months): No  Interrupted Attempts (Past 3 Months): No  Aborted or Self-Interrupted Attempt (Past 3 Months): No  Preparatory Acts or Behavior (Past 3 Months): No    Environmental or Psychosocial Events: work or task failure, ongoing abuse of substances  Protective Factors: Protective Factors: strong bond to family unit, community support, or employment, lives in a responsibly safe and stable environment, reality testing ability    Does the patient have thoughts of harming others? Feels Like Hurting Others: no  Previous Attempt to Hurt Others: no  Is the patient engaging in sexually inappropriate behavior?: no    Is the patient engaging in sexually inappropriate behavior?  no        Mental Status Exam   Affect: Appropriate  Appearance: Appropriate  Attention Span/Concentration: Other (please comment)  Eye Contact: Variable    Fund of Knowledge: Appropriate   Language /Speech Content: Fluent  Language /Speech Volume: Soft, Normal  Language /Speech Rate/Productions: Normal  Recent Memory: Variable  Remote Memory: Variable  Mood: Anxious, Sad  Orientation to Person: Yes   Orientation to Place: Yes  Orientation to Time of Day: Yes  Orientation to Date:       Situation (Do they understand why they are here?): Yes  Psychomotor Behavior: Normal  Thought Content: Clear  Thought Form: Intact     Mini-Cog Assessment  Number of Words Recalled:    Clock-Drawing Test:     Three Item Recall:    Mini-Cog Total Score:       Medication  Psychotropic medications:   Medication Orders -  Psychiatric (From admission, onward)      Start     Dose/Rate Route Frequency Ordered Stop    01/18/24 0139  diazepam (VALIUM) tablet 5-20 mg         5-20 mg Oral EVERY 30 MIN PRN 01/18/24 0139               Current Care Team  Patient Care Team:  Jean Pierre Simon as PCP - An Gordon DO as Assigned PCP    Diagnosis  Patient Active Problem List   Diagnosis Code    Overdose drug T50.901A    Cellulitis and abscess of upper arm and forearm [id number]    Opioid use disorder, severe, dependence (H) F11.20    Psychosis (H) F29    Opioid withdrawal (H) F11.93    Anxiety state F41.1    Cellulitis and abscess L03.90, L02.91    Fever R50.9    Insomnia G47.00    Nondependent amphetamine or related acting sympathomimetic abuse F15.10    Poisoning by drug or medicinal substance T50.901A    Suicidal ideation R45.851    Alcoholic hepatitis without ascites (H28) K70.10    Benzodiazepine withdrawal with complication (H) F13.939    MDD (major depressive disorder) F32.9    Hypoxia R09.02    Opiate overdose, accidental or unintentional, initial encounter (H) T40.601A    Opioid overdose, accidental or unintentional, initial encounter (H) T40.2X1A    Hyperthermia R50.9    Acute renal failure, unspecified acute renal failure type (H24) N17.9    Polysubstance abuse (H) F19.10    Uncomplicated alcohol dependence (H) F10.20    Abscess of neck L02.11    Cellulitis of neck L03.221    Opioid use disorder F11.90    Unresponsive state R41.89    Altered mental status, unspecified altered mental status type R41.82    Amphetamine dependence (H) F15.20       Primary Problem This Admission  Active Hospital Problems    Amphetamine dependence (H)      MDD (major depressive disorder)        Clinical Summary and Substantiation of Recommendations   Patient clearly needs detox followed by intensive sobriety supports.  Romeo says he now clearly sees the progressive nature of addicition and how it is taking away his family, his job, his  mental health, his self esteem.   Patient is using multisubstances daily and putting himself in risky situations.   He is not actively suicidal, homicidal or at risk for NSSI.  However, the longer he continues to use at this rate, the more likely will his mental and overall health and functioning will continue to deteriorate.   He had 4 years of sobriety once with active participation in service work and meetings.   He needs the support and environment to get and stay clean again.  [facility]        Patient coping skills attempted to reduce the crisis:  Presented to ED for detox    Disposition  Recommended disposition: Detox        Reviewed case and recommendations with attending provider. Attending Name: Hallie Weinstein MD       Attending concurs with disposition: yes       Patient and/or validated legal guardian concurs with disposition:   yes       Final disposition:  discharge    Legal status on admission:  voluntary     Assessment Details   Total duration spent with the patient: 33 min     CPT code(s) utilized: 90403 - Psychotherapy for Crisis - 60 (30-74*) min    Megan Nicholson [facility], Psychotherapist  DEC - Triage & Transition Services  Callback: [phone]

## 2024-01-18 NOTE — ED NOTES
Patient Verbalized understanding of discharge instructions including medication administration and recommended follow up care as noted on discharge instructions. Written discharge instructions given, denies any further questions. Prescriptions: were printed and sent with patient.  Patient is going to [facility].

## 2024-01-25 NOTE — TELEPHONE ENCOUNTER
Patient is at [facility].    Will close encounter.    An Mejia, DO on 6/10/2022 at 12:06 PM    
Please reach out to encourage follow-up in clinic.  Patient missed Sublocade visit on 5/31, needs visit.    Thanks!    An Mejia, DO on 6/3/2022 at 5:14 PM    
Writer attempted to reach patient via telephone, no answer; left VM message asking for [facility] call to [facility] and ask to speak with a nurse. [facility] [employer] message also sent to patient, checking in and requesting he be seen in [facility].     Beulah Roberson RN on 6/6/2022 at 10:46 AM    
Patient/Caregiver provided printed discharge information.

## 2024-06-22 ENCOUNTER — HEALTH MAINTENANCE LETTER (OUTPATIENT)
Age: 34
End: 2024-06-22

## 2025-07-12 ENCOUNTER — HEALTH MAINTENANCE LETTER (OUTPATIENT)
Age: 35
End: 2025-07-12